# Patient Record
Sex: FEMALE | Race: WHITE | NOT HISPANIC OR LATINO | Employment: OTHER | URBAN - METROPOLITAN AREA
[De-identification: names, ages, dates, MRNs, and addresses within clinical notes are randomized per-mention and may not be internally consistent; named-entity substitution may affect disease eponyms.]

---

## 2019-07-22 DIAGNOSIS — Z12.39 SCREENING BREAST EXAMINATION: Primary | ICD-10-CM

## 2019-08-08 ENCOUNTER — HOSPITAL ENCOUNTER (OUTPATIENT)
Dept: RADIOLOGY | Facility: HOSPITAL | Age: 69
Discharge: HOME | End: 2019-08-08
Attending: FAMILY MEDICINE
Payer: MEDICARE

## 2019-08-08 VITALS — WEIGHT: 154 LBS | BODY MASS INDEX: 24.75 KG/M2 | HEIGHT: 66 IN

## 2019-08-08 DIAGNOSIS — Z12.39 SCREENING BREAST EXAMINATION: ICD-10-CM

## 2019-08-08 PROCEDURE — 77067 SCR MAMMO BI INCL CAD: CPT | Mod: TC

## 2019-09-21 PROBLEM — Z98.84 GASTRIC BYPASS STATUS FOR OBESITY: Status: ACTIVE | Noted: 2019-09-21

## 2019-09-21 PROBLEM — R41.89 COGNITIVE IMPAIRMENT: Status: ACTIVE | Noted: 2019-09-21

## 2019-09-21 PROBLEM — Z79.01 ANTICOAGULANT LONG-TERM USE: Status: ACTIVE | Noted: 2019-09-21

## 2019-09-21 PROBLEM — I10 HYPERTENSION, ESSENTIAL: Status: ACTIVE | Noted: 2019-09-21

## 2019-09-21 PROBLEM — E03.9 HYPOTHYROIDISM: Status: ACTIVE | Noted: 2019-09-21

## 2019-09-21 PROBLEM — F31.9 BIPOLAR 1 DISORDER: Status: ACTIVE | Noted: 2019-09-21

## 2019-09-21 PROBLEM — I48.21 PERMANENT ATRIAL FIBRILLATION: Status: ACTIVE | Noted: 2019-09-21

## 2019-09-21 PROBLEM — R19.7 DIARRHEA: Status: ACTIVE | Noted: 2019-09-21

## 2019-09-21 PROBLEM — A04.72 C. DIFFICILE COLITIS: Status: ACTIVE | Noted: 2019-09-21

## 2019-09-21 PROBLEM — E78.5 HYPERLIPIDEMIA: Status: ACTIVE | Noted: 2019-09-21

## 2019-10-15 DIAGNOSIS — R26.89 POOR BALANCE: Primary | ICD-10-CM

## 2019-10-23 ENCOUNTER — CLINICAL SUPPORT (OUTPATIENT)
Dept: REHABILITATION | Facility: HOSPITAL | Age: 69
End: 2019-10-23
Payer: MEDICARE

## 2019-10-23 DIAGNOSIS — R26.89 LOSS OF BALANCE: Primary | ICD-10-CM

## 2019-10-23 PROCEDURE — 97161 PT EVAL LOW COMPLEX 20 MIN: CPT

## 2019-10-23 NOTE — PLAN OF CARE
"[facility] OUTPATIENT THERAPY  Physical Therapy Initial Evaluation    Name: Enrique Jones  Clinic Number: [id number]    Therapy Diagnosis:   Encounter Diagnosis   Name Primary?    Loss of balance Yes     Physician: Saqib Garcia Jr.,*    Physician Orders: PT Eval and Treat /loss of balance  Medical Diagnosis from Referral: Poor balance  Evaluation Date: 10/23/2019  Authorization Period Expiration: 10/14/2020  Plan of Care Expiration: 12/20/19  Visit # / Visits authorized: 1/ 48    Precautions: Standard and Fall    Subjective   Date of onset: insidious onset  History of current condition - ENRIQUE reports: That she has had some balance issues for quite sometime.  Most recently she was establishing service with a new PCP and was discussing her symptoms and he referred her to OPPT for eval. She adds that her sense of LOB tends to be more exacerbated in a large environment such as "[employer] or [employer]" vs a smaller environment.  She denies any incidents of falls only incidences of LOB with recovery.       Medical History:   No past medical history on file.    Surgical History:   Enrique Jones  has a past surgical history that includes Hysterectomy and Total Reduction Mammoplasty (2002).    Medications:   Enrique has a current medication list which includes the following prescription(s): alprazolam, amlodipine, bupropion, cholestyramine, diphenoxylate-atropine 2.5-0.025 mg, divalproex er, donepezil, eliquis, ferrous sulfate, hydrochlorothiazide, klor-con m20, l-methylfolate-b2-b6-b12, levothyroxine, losartan, multaq, namenda xr, rabeprazole, rifaximin, toprol xl, and trazodone.    Allergies:   Review of patient's allergies indicates:   Allergen Reactions    B12 [cyanocobalamin-cobamamide]     Niacin preparations     Penicillins         Imaging, none:     Prior Therapy: N/A  Social History: lives with their spouse, in a single story slab home  Occupation: retired  Prior Level of Function: " "Independnet  Current Level of Function: Independent/Mod I with QC    Pain:  Current 0/10, worst 0/10, best 0/10   Location: N/A    Description: N/A  Aggravating Factors: N/A  Easing Factors: N/A    Pts goals: "stop being this dizzy"    Objective     Posture:  Sits upright with rounded shoulders       General Objective           MMT       Upper Ext.    Status Comment    Left Right              Flexion 4/5 4/5      Extension 4+/5 4+/5      ABduction 4/5 4/5                      Lower Ext.                Hip flexion 3+/5 3+/5      Hip Ext 3+/5 3+/5      Hip AB 4/5 4/5      Hip AD 3/5 3/5      Glut Max 3+/5 3+/5              Knee flex 4+/5 4+/5      Knee ext 4+/5 4+/5      DF 4/5 4/5                               SPECIAL TESTS            Status Comment    Left Right     Vertebral artery test Negative Negative                                     No Nystagmus noted       FUNCTIONAL TESTS      BBS:39/56= Medium Fall Risk       FUNCTIONAL MOBILITY        Balance: BBS:39/56= Medium Fall Risk.          Gait: Patient amb into clinic with no AD.  She amb with decreased step/stride lengths, decreased velocity/annie.  She is able to clear her B swing feet.  Pateint also amb by intermittently holding walls and or reaching out for furniture to improve confidence/stability with amb.          Transfer: Patient performed sit<>stand T/F from arm chair and mat Mod I with increased UE use as well as increased fwd lean.          Time In: 1500  Time Out: 1605    Assessment       Kasandra is a 69 y.o. female who is retired presenting to OP Physical Therapy for initial evaluation on 10/23/19 with a MD Dx of loss of balance.  Patient exhibits generalized weakness, but most notably at her hips and LE contributing to decreased bal/LOB issues.  Additionally, the BBS reveals patient difficulty with shifting weight, decreased REGGIE, as well as visual dependence for balance, all contributing to and increased/medium risk of falls with a score of " 39/56.  Additionally, her VOR needs assessment to be performed in her upcoming sessions.  The patient's clinical characteristics are evolving.   Patient would benefit from skilled Physical Therapy to address HER noted deficits.      Pt prognosis is Good.   Pt will benefit from skilled outpatient Physical Therapy to address the deficits stated above and in the chart below, provide pt/family education, and to maximize pt's level of independence.     Plan of care discussed with patient: Yes  Pt's spiritual, cultural and educational needs considered and patient is agreeable to the plan of care and goals as stated below:         Goals:  Patient will: STG/LTG Status   1a demonstrate initial HEP with use of handout prn in order to optimize Rx outcomes and max. Functional mob.in 4 visits. STG    2a improve MMT grades for B hip flex to 4-/5 (initial 3+/5), B hip ext to 4-/5 (intiial 3+/5), B hip AB to 4+/5 (initial 4/5), B hip AD to 3+/5 (intiial 3/5), B glut Max to 4-/5 (initial 3+/5), and B ankle DF to 4+/5 (intial 4/5) in order to improve hip and ankle support for hip and ankle strategies to promote balance and decrease fall risk in 7 visits.    STG    3a improve BBS score to 44/56 in order to promote improved balance and decreased fall risk in 7 visits.    STG    4a. Ambulate 350' with </= 4 contacts/incidneces of environmental support to improve bal and confidence with her safe home and community mobility in 7 visits.   STG              1b demonstrate final HEP with use of handout prn in order to optimize Rx outcomes and max. Functional mob.in by discharge. LTG    2b improve MMT grades for B hip flex to 4/5 (initial 3+/5), B hip ext to 4/5 (intiial 3+/5), B hip AD to 4-/5 (intiial 3/5), B glut Max to 4/5 (initial 3+/5), and B ankle DF to 5/5 (intial 4/5) in order to improve hip and ankle support for hip and ankle strategies to promote balance and decrease fall risk in 12 visits.   LTG    3b improve BBS score to 47/56 in  order to promote improved balance and decreased fall risk in 12 visits.   LTG    4b. Ambulate 350' with </= 2 contacts/incidneces of environmental support to improve bal and confidence with her safe home and community mobility in 12 visits.   LTG                            Anticipated Barriers for therapy: None    Medical Necessity is demonstrated by the following  History  Co-morbidities and personal factors that may impact the plan of care Co-morbidities:   Non remarkable    Personal Factors:   age  lifestyle     low   Examination  Body Structures and Functions, activity limitations and participation restrictions that may impact the plan of care Body Regions:   lower extremities    Body Systems:    strength  balance  gait  transitions    Participation Restrictions:   None    Activity limitations:   Learning and applying knowledge  no deficits    General Tasks and Commands  no deficits    Communication  [place]    Mobility  walking    Self care  dressing    Domestic Life  shopping  cooking  doing house work (cleaning house, washing dishes, laundry)    Interactions/Relationships  no deficits    Life Areas  no deficits    Community and Social Life  community life  recreation and leisure         moderate   Clinical Presentation evolving clinical presentation with changing clinical characteristics moderate   Decision Making/ Complexity Score: moderate         Plan       POC valid from 10/23/19 through 12/20/19. 2Times a week for 12 visits, with treatments to consist of: Balance (55361): Improve overall coordination and balance, Cardiovascular, Closed Chain Strengthening, Core Stabilization, Flexibility (57505): improve muscle ROM, flexibility and  function, Home Exercise and Stretching, Patient Education, Plyometrics, Postural Awareness and  Training, Postural Stabilization, ROM Exercises (01276) : Passive or active activities to increase joint ROM, Strengthening  (61555): improve muscle strength and  function., Therapeutic Exercise (49361): improve muscle strength, ROM, flexibility and muscle function., Gait Training (09904) : Improve overall gait function including stair climbing, Cross Friction Massage, Manual Stretching (12491): passive or active stretching to improve muscle length and function., Strain/Counter-Strain, Manual Traction, Myofascial Release , Peripheral Joint Mobilization, Soft Tissue Mobs (02301): increase ROM, tissue length, joint mechanics and modulate pain., Spine Mobilization  (30070): increase ROM, tissue length, joint mechanics and modulate pain., Massage (20547):, Combo E-Stim/Ultrasound, Cryotherapy (33142: Application of cold to decrease local swelling and decrease pain., Heat - 02167:  Application of heat to increase local circulation and decrease pain., Hi-Volt E-Stim  (): Application of electrical stimulation to modulate pain., IFC E-Stim (): Application of electrical stimulation to modulate pain., Mechanical Traction (58644), Micro-Current, Premodulated E-Stim  (): Application of electrical stimulation to modulate pain., TENs E-Stim  (): Application of electrical stimulation to modulate pain., Ultrasound (55309): increase local circulation, improve tissue healing time and modulate pain., Whirlpool (00815): increase local tissue circulation, improve elasticity of tissues, increased blood flow for improved muscle strength, ROM, flexiblity, and function., NMES E-stim ():  Application of electrical stimulation for motor learning and control., Iontophoresis (52704), NMR (29449): re-education of movement, balance, coordination, kinesthetic sense, posture and proprioception, Self Care & Home Management (14975) - Self-care/home management training (e.g., activities of daily living [ADL] and compensatory training, meal preparation, safety procedures, and instructions in use of assistive technology devices/adaptive equipment), direct one-on-one contact (15 minutes),  Therapeutic Activity (99707):  Use of dynamic activities to improve functional performance..        Paul Rey, PT        I CERTIFY THE NEED FOR THESE SERVICES FURNISHED UNDER THIS PLAN OF TREATMENT AND WHILE UNDER MY CARE     Physician's comments:           Physician's Signature: ___________________________________________________

## 2019-11-06 ENCOUNTER — CLINICAL SUPPORT (OUTPATIENT)
Dept: REHABILITATION | Facility: HOSPITAL | Age: 69
End: 2019-11-06
Payer: MEDICARE

## 2019-11-06 DIAGNOSIS — R26.89 LOSS OF BALANCE: Primary | ICD-10-CM

## 2019-11-06 PROCEDURE — 97112 NEUROMUSCULAR REEDUCATION: CPT

## 2019-11-06 PROCEDURE — 97110 THERAPEUTIC EXERCISES: CPT

## 2019-11-06 NOTE — PROGRESS NOTES
Physical Therapy Daily Treatment Note     Name: Enrique Jones  Clinic Number: [id number]    Therapy Diagnosis:   Encounter Diagnosis   Name Primary?    Loss of balance Yes     Physician: Saqib Garcia Jr.,*    Visit Date: 11/6/2019    Physician Orders: PT eval and treat  Medical Diagnosis:  Poor balance  Evaluation Date: 10/23/19  Authorization Period Expiration: 10/14/2020  Plan of Care Certification Period: 12/20/19  Visit #/Visits authorized: 2/ 48     Time In: 1110  Time Out: 1205      Precautions: standard, falls    Subjective     Pt reports: she has some minimal discomfort to her L knee today.  She denies any falls or LOB since last visit.      She N/A compliant with home exercise program.  Response to previous treatment: First visit following eval.    Functional change: N/A    Pain: 0/10  Location: left knee      Objective     ENRIQUE received therapeutic exercises to develop strength, endurance, ROM and flexibility for 25 minutes including:    · Stretches - seated gastroc rope stretch 30 sec x 2  · Stretches - seated hamstring stretch 30 sec x 2  · Supine bridges 10 x 3 with 3 sec holds  · Supine SLR with 1Lb AW 10 x 2  · SL Clams with OTB 10 x 2  · SL Reverse clams with 1Lb AW 10 x 2  · SL AB with 1Lb AW 10 x 2  ·         ENRIQUE participated in neuromuscular re-education activities to improve: Balance, Coordination and Kinesthetic for 20 minutes. The following activities were included:    In // bars:    SLS - 30 sec x 3 trials each EO/EC with no UE support  Stride stance 30 sec x 3 trials EC (DNP)  Max position x 30 sec x 3 trials each EO/EC with use of two fingers to each UE.    Wobble board 30 sec x 3 trials each A/P and lateral    (patient incurred some nausea and dizziness when performing without hands and EC, she required recovery period which resolved nausea and dizziness)    Home Exercises Provided and Patient Education Provided     Education provided:   Issued handout and educated on HEP  including mode, freq reps and sets.  Patient verbalized understanding and with return demonstration.  Patient with no adverse affects noted nor verbalized.      Written Home Exercises Provided: yes.  Exercises were reviewed and ENRIQUE was able to demonstrate them prior to the end of the session.  ENRIQUE demonstrated fair  understanding of the education provided.     See EMR under Media for exercises provided 11/6/2019.    Assessment     Patient participated with PT to address her balance, strength activity genaro and mobility.  She required education, VC/TC for improved exs quality in order to improve hip and ankle strengthening.  She incurred some exacerbation of symptoms with performance of SLS and no use of UE which resolved with recovery a recovery period.  She was issued and educated on HEP and is expected to improve with cont Rx.      ENRIQUE is progressing well towards her goals.   Pt prognosis is Good.     Pt will continue to benefit from skilled outpatient physical therapy to address the deficits listed in the problem list box on initial evaluation, provide pt/family education and to maximize pt's level of independence in the home and community environment.     Pt's spiritual, cultural and educational needs considered and pt agreeable to plan of care and goals.     Anticipated barriers to physical therapy: none    Goals:  Patient will: STG/LTG Status   1a demonstrate initial HEP with use of handout prn in order to optimize Rx outcomes and max. Functional mob.in 4 visits. STG In porgress 11/6/2019      2a improve MMT grades for B hip flex to 4-/5 (initial 3+/5), B hip ext to 4-/5 (intiial 3+/5), B hip AB to 4+/5 (initial 4/5), B hip AD to 3+/5 (intiial 3/5), B glut Max to 4-/5 (initial 3+/5), and B ankle DF to 4+/5 (intial 4/5) in order to improve hip and ankle support for hip and ankle strategies to promote balance and decrease fall risk in 7 visits.    STG In progress 11/6/2019      3a improve BBS score to 44/56  in order to promote improved balance and decreased fall risk in 7 visits.    STG     4a. Ambulate 350' with </= 4 contacts/incidneces of environmental support to improve bal and confidence with her safe home and community mobility in 7 visits.   STG                     1b demonstrate final HEP with use of handout prn in order to optimize Rx outcomes and max. Functional mob.in by discharge. LTG     2b improve MMT grades for B hip flex to 4/5 (initial 3+/5), B hip ext to 4/5 (intiial 3+/5), B hip AD to 4-/5 (intiial 3/5), B glut Max to 4/5 (initial 3+/5), and B ankle DF to 5/5 (intial 4/5) in order to improve hip and ankle support for hip and ankle strategies to promote balance and decrease fall risk in 12 visits.   LTG     3b improve BBS score to 47/56 in order to promote improved balance and decreased fall risk in 12 visits.   LTG     4b. Ambulate 350' with </= 2 contacts/incidneces of environmental support to improve bal and confidence with her safe home and community mobility in 12 visits.   LTG                                    Plan     Reinforce HEP add HS and gastroc stretches to HEP, cont with TE and adjust neuro exs with use of hands to mitigate exacerbation of nausea and dizziness.       Paul Rey, PT

## 2019-11-08 ENCOUNTER — CLINICAL SUPPORT (OUTPATIENT)
Dept: REHABILITATION | Facility: HOSPITAL | Age: 69
End: 2019-11-08
Payer: MEDICARE

## 2019-11-08 DIAGNOSIS — R26.89 LOSS OF BALANCE: Primary | ICD-10-CM

## 2019-11-08 PROCEDURE — 97110 THERAPEUTIC EXERCISES: CPT

## 2019-11-08 PROCEDURE — 97112 NEUROMUSCULAR REEDUCATION: CPT

## 2019-11-08 NOTE — PROGRESS NOTES
Physical Therapy Daily Treatment Note     Name: Enrique Jones  Clinic Number: [id number]    Therapy Diagnosis:   Encounter Diagnosis   Name Primary?    Loss of balance Yes     Physician: Saqib Garcia Jr.,*    Visit Date: 11/8/2019    Physician Orders: PT eval and treat  Medical Diagnosis:  Poor balance  Evaluation Date: 10/23/19  Authorization Period Expiration: 10/14/2020  Plan of Care Certification Period: 12/20/19  Visit #/Visits authorized: 3/ 48     Time In: 1105  Time Out: 1205      Precautions: standard, falls    Subjective     Pt reports: she has some mm soreness  She denies any falls or LOB since last visit and no pain.      She N/A compliant with home exercise program.  Response to previous treatment: mm soreness  Functional change: N/A    Pain: 0/10  Location: left knee      Objective     ENRIQUE received therapeutic exercises to develop strength, endurance, ROM and flexibility for 45 minutes including:    · Stretches - seated gastroc rope stretch 30 sec x 2  · Stretches - seated hamstring stretch 30 sec x 2  · Supine bridges 10 x 3 with 3 sec holds  · Supine SLR with 1Lb AW 10 x 3  · SL Clams with OTB 10 x 3  · SL Reverse clams with 1Lb AW 10 x 3  · SL AB with 1Lb AW 10 x 2  ·         ENRIQUE participated in neuromuscular re-education activities to improve: Balance, Coordination and Kinesthetic for 9 minutes. The following activities were included:    In // bars:    SLS - 30 sec x 2 trials each EO with two finger support (EC => nausea)  Stride stance 30 sec x 2 trials EO  Max position x 30 sec x 2 trials each EO with use of two fingers to each UE. (DNP)   Wobble board 30 sec x 2 trials each A/P and lateral      Home Exercises Provided and Patient Education Provided     Education provided:   Issued handout and educated on HEP including mode, freq reps and sets.  Patient verbalized understanding and with return demonstration.  Patient with no adverse affects noted nor verbalized.      Written Home  Exercises Provided: yes.  Exercises were reviewed and ENRIQUE was able to demonstrate them prior to the end of the session.  ENRIQUE demonstrated fair  understanding of the education provided.     See EMR under Media for exercises provided 11/6/19 and 11/8/19.    Assessment     Patient participated with PT to address her balance, strength act genaro and mob. She required education, VC/TC for improved exs quality and performance in order to address her hip and LE strength as well as for her neuromuscular re-education with ankle and hip musculature.  She was able to genaro PT Rx with no exacerbations in dizziness nor nausea this day and was given updated handout for HEP.  Patient is expected to progress to goals with cont PT in order to obtain her goals and optimize her safe mobility.          ENRIQUE is progressing well towards her goals.   Pt prognosis is Good.     Pt will continue to benefit from skilled outpatient physical therapy to address the deficits listed in the problem list box on initial evaluation, provide pt/family education and to maximize pt's level of independence in the home and community environment.     Pt's spiritual, cultural and educational needs considered and pt agreeable to plan of care and goals.     Anticipated barriers to physical therapy: none    Goals:  Patient will: STG/LTG Status   1a demonstrate initial HEP with use of handout prn in order to optimize Rx outcomes and max. Functional mob.in 4 visits. STG In porgress 11/8/2019      2a improve MMT grades for B hip flex to 4-/5 (initial 3+/5), B hip ext to 4-/5 (intiial 3+/5), B hip AB to 4+/5 (initial 4/5), B hip AD to 3+/5 (intiial 3/5), B glut Max to 4-/5 (initial 3+/5), and B ankle DF to 4+/5 (intial 4/5) in order to improve hip and ankle support for hip and ankle strategies to promote balance and decrease fall risk in 7 visits.    STG In progress 11/8/2019      3a improve BBS score to 44/56 in order to promote improved balance and decreased fall  risk in 7 visits.    STG     4a. Ambulate 350' with </= 4 contacts/incidneces of environmental support to improve bal and confidence with her safe home and community mobility in 7 visits.   STG                     1b demonstrate final HEP with use of handout prn in order to optimize Rx outcomes and max. Functional mob.in by discharge. LTG     2b improve MMT grades for B hip flex to 4/5 (initial 3+/5), B hip ext to 4/5 (intiial 3+/5), B hip AD to 4-/5 (intiial 3/5), B glut Max to 4/5 (initial 3+/5), and B ankle DF to 5/5 (intial 4/5) in order to improve hip and ankle support for hip and ankle strategies to promote balance and decrease fall risk in 12 visits.   LTG     3b improve BBS score to 47/56 in order to promote improved balance and decreased fall risk in 12 visits.   LTG     4b. Ambulate 350' with </= 2 contacts/incidneces of environmental support to improve bal and confidence with her safe home and community mobility in 12 visits.   LTG                                    Plan     Reinforce HEP and cont to prog to LTG as genaro.  Return to 1-2 eyes closed exs     Paul Rey, PT

## 2019-11-11 ENCOUNTER — CLINICAL SUPPORT (OUTPATIENT)
Dept: REHABILITATION | Facility: HOSPITAL | Age: 69
End: 2019-11-11
Payer: MEDICARE

## 2019-11-11 DIAGNOSIS — R26.89 LOSS OF BALANCE: Primary | ICD-10-CM

## 2019-11-11 PROCEDURE — 97112 NEUROMUSCULAR REEDUCATION: CPT

## 2019-11-11 PROCEDURE — 97110 THERAPEUTIC EXERCISES: CPT

## 2019-11-11 NOTE — PROGRESS NOTES
"  Physical Therapy Daily Treatment Note     Name: Enrique Jones  Clinic Number: [id number]    Therapy Diagnosis:   No diagnosis found.  Physician: Saqib Garcia Jr.,*    Visit Date: 11/11/2019    Physician Orders: PT eval and treat  Medical Diagnosis:  Poor balance  Evaluation Date: 10/23/19  Authorization Period Expiration: 10/14/2020  Plan of Care Certification Period: 12/20/19  Visit #/Visits authorized: 3/ 48     Monthly Assessment due on or before 11/23/19      Time In: 1400  Time Out: 1505      Precautions: standard, falls    Subjective     Pt reports: she has some mm soreness "in my groin"  She denies any falls since last visit, but did have several LOB episodes.      She was compliant with home exercise program.  Response to previous treatment: mm soreness  Functional change: N/A    Pain: 0/10  Location: left knee      Objective     ENRIQUE received therapeutic exercises to develop strength, endurance, ROM and flexibility for 46 minutes including:    · Stretches - slant board gastroc stretch 30 sec x 3  · Stretches - seated hamstring stretch 30 sec x 3  · Supine bridges 10 x 3  · Supine SLR with 1Lb AW 10 x 3 no weight  · SL Clams with OTB 10 x 3  · SL Reverse clams with 0Lb AW 10 x 3  · SL AB with 0Lb AW 10 x 3  ·               ENRIQUE participated in neuromuscular re-education activities to improve: Balance, Coordination and Kinesthetic for 10 minutes. The following activities were included:    In // bars:    SLS - 30 sec x 2 trials each EO with two finger support (EC => nausea) with feet progressively positioned from shoulders with to max position on both firm and foam surfaces.    Stride stance 30 sec x 2 trials EO (DNP)  Max position x 30 sec x 2 trials each EO with use of two fingers to each UE. (DNP)   Wobble board 30 sec x 2 trials each A/P and lateral (DNP)      Performed differential DX testing for nystagmus with BPPV as well as VOR x 1 and VOR x 2. Smooth pursuits vs saccades all of which were " negative.         Home Exercises Provided and Patient Education Provided     Education provided:   Issued handout and educated on HEP including mode, freq reps and sets.  Patient verbalized understanding and with return demonstration.  Patient with no adverse affects noted nor verbalized.      Written Home Exercises Provided: yes.  Exercises were reviewed and ENRIQUE was able to demonstrate them prior to the end of the session.  ENRIQUE demonstrated fair  understanding of the education provided.     See EMR under Media for exercises provided 11/6/19 and 11/8/19.    Assessment     Patient participated with PT to address her balance, and strength, as well as for BPPV and VOR  Testing which were negative.  Patient was required education, VC/TC for improved exs quality and performance in order to improve hip/ankle strategies in order to address her balance.  She was able to genaro Rx with no exacerbations of symptoms and c/o of nausea/dizziness.  She is expected to progress to her goals with cont PT Rx in order to achieve safe functional mob/Indpenedence.          ENRIQUE is progressing well towards her goals.   Pt prognosis is Good.     Pt will continue to benefit from skilled outpatient physical therapy to address the deficits listed in the problem list box on initial evaluation, provide pt/family education and to maximize pt's level of independence in the home and community environment.     Pt's spiritual, cultural and educational needs considered and pt agreeable to plan of care and goals.     Anticipated barriers to physical therapy: none    Goals:  Patient will: STG/LTG Status   1a demonstrate initial HEP with use of handout prn in order to optimize Rx outcomes and max. Functional mob.in 4 visits. STG In [facility] 11/11/2019      2a improve MMT grades for B hip flex to 4-/5 (initial 3+/5), B hip ext to 4-/5 (intiial 3+/5), B hip AB to 4+/5 (initial 4/5), B hip AD to 3+/5 (intiial 3/5), B glut Max to 4-/5 (initial 3+/5), and  B ankle DF to 4+/5 (intial 4/5) in order to improve hip and ankle support for hip and ankle strategies to promote balance and decrease fall risk in 7 visits.    STG In progress 11/11/2019      3a improve BBS score to 44/56 in order to promote improved balance and decreased fall risk in 7 visits.    STG     4a. Ambulate 350' with </= 4 contacts/incidneces of environmental support to improve bal and confidence with her safe home and community mobility in 7 visits.   STG                     1b demonstrate final HEP with use of handout prn in order to optimize Rx outcomes and max. Functional mob.in by discharge. LTG     2b improve MMT grades for B hip flex to 4/5 (initial 3+/5), B hip ext to 4/5 (intiial 3+/5), B hip AD to 4-/5 (intiial 3/5), B glut Max to 4/5 (initial 3+/5), and B ankle DF to 5/5 (intial 4/5) in order to improve hip and ankle support for hip and ankle strategies to promote balance and decrease fall risk in 12 visits.   LTG     3b improve BBS score to 47/56 in order to promote improved balance and decreased fall risk in 12 visits.   LTG     4b. Ambulate 350' with </= 2 contacts/incidneces of environmental support to improve bal and confidence with her safe home and community mobility in 12 visits.   LTG                                    Plan     Reinforce HEP and cont to prog to LTG as genaro.     Paul Rey, PT

## 2019-11-13 ENCOUNTER — CLINICAL SUPPORT (OUTPATIENT)
Dept: REHABILITATION | Facility: HOSPITAL | Age: 69
End: 2019-11-13
Payer: MEDICARE

## 2019-11-13 DIAGNOSIS — R26.89 LOSS OF BALANCE: ICD-10-CM

## 2019-11-13 PROCEDURE — 97110 THERAPEUTIC EXERCISES: CPT

## 2019-11-13 PROCEDURE — 97112 NEUROMUSCULAR REEDUCATION: CPT

## 2019-11-13 NOTE — PROGRESS NOTES
Physical Therapy Daily Treatment Note     Name: Enrique Jones  Clinic Number: [id number]    Therapy Diagnosis:   No diagnosis found.  Physician: Saqib Garcia Jr.,*    Visit Date: 11/13/2019    Physician Orders: PT eval and treat  Medical Diagnosis:  Poor balance  Evaluation Date: 10/23/19  Authorization Period Expiration: 10/14/2020  Plan of Care Certification Period: 12/20/19  Visit #/Visits authorized: 4/ 48   PTA visit: 1/5  Monthly Assessment due on or before 11/23/19      Time In: 1300  Time Out: 1400      Precautions: standard, falls    Subjective     Pt reports: Continues to report groin pain /c there-ex.    She was compliant with home exercise program.  Response to previous treatment: mm soreness  Functional change: N/A    Pain: 0/10  Location: left knee      Objective     ENRIQUE received therapeutic exercises to develop strength, endurance, ROM and flexibility for 46 minutes including:          +Recumbent bike x 10 minutes  · Stretches - slant board gastroc stretch 30 sec x 3  · Stretches - seated hamstring stretch 30 sec x 3  · Supine bridges 10 x 3  · Supine SLR with 1Lb AW 10 x 3 no weight  · SL Clams with OTB 10 x 3  · SL Reverse clams with 0Lb AW 10 x 3  · SL AB with 0Lb AW 10 x 3  · +cybex leg press 3 x 10 reps /c 3 plates                ENRIQUE participated in neuromuscular re-education activities to improve: Balance, Coordination and Kinesthetic for 10 minutes. The following activities were included:    In // bars:    SLS - 30 sec x 2 trials each EO   Max position on foam /c EC 3 x 30 sec  Alternating toe taps on foam 3 x 10 reps without UE support    Stride stance 30 sec x 2 trials EO (DNP)  Max position x 30 sec x 2 trials each EO with use of two fingers to each UE. (DNP)   Wobble board 30 sec x 2 trials each A/P and lateral (DNP)      Performed differential DX testing for nystagmus with BPPV as well as VOR x 1 and VOR x 2. Smooth pursuits vs saccades all of which were negative. NP         Home  Exercises Provided and Patient Education Provided     Education provided:   Issued handout and educated on HEP including mode, freq reps and sets.  Patient verbalized understanding and with return demonstration.  Patient with no adverse affects noted nor verbalized.      Written Home Exercises Provided: yes.  Exercises were reviewed and ENRIQUE was able to demonstrate them prior to the end of the session.  ENRIQUE demonstrated fair  understanding of the education provided.     See EMR under Media for exercises provided 11/6/19 and 11/8/19.    Assessment     Pt was able to tolerate progressing there-ex as indicated above. She continues to display difficulty performing SLS balance there-ex, however; she able to perform balance there-ex without onset of nausea. Pt will continue to benefit from skilled PT to improve BLE strength to allow pt to ambulate in an uncontrolled environment without limitations.          ENRIQUE is progressing well towards her goals.   Pt prognosis is Good.     Pt will continue to benefit from skilled outpatient physical therapy to address the deficits listed in the problem list box on initial evaluation, provide pt/family education and to maximize pt's level of independence in the home and community environment.     Pt's spiritual, cultural and educational needs considered and pt agreeable to plan of care and goals.     Anticipated barriers to physical therapy: none    Goals:  Patient will: STG/LTG Status   1a demonstrate initial HEP with use of handout prn in order to optimize Rx outcomes and max. Functional mob.in 4 visits. STG In [facility] 11/13/2019      2a improve MMT grades for B hip flex to 4-/5 (initial 3+/5), B hip ext to 4-/5 (intiial 3+/5), B hip AB to 4+/5 (initial 4/5), B hip AD to 3+/5 (intiial 3/5), B glut Max to 4-/5 (initial 3+/5), and B ankle DF to 4+/5 (intial 4/5) in order to improve hip and ankle support for hip and ankle strategies to promote balance and decrease fall risk in 7  visits.    STG In progress 11/13/2019      3a improve BBS score to 44/56 in order to promote improved balance and decreased fall risk in 7 visits.    STG     4a. Ambulate 350' with </= 4 contacts/incidneces of environmental support to improve bal and confidence with her safe home and community mobility in 7 visits.   STG                     1b demonstrate final HEP with use of handout prn in order to optimize Rx outcomes and max. Functional mob.in by discharge. LTG     2b improve MMT grades for B hip flex to 4/5 (initial 3+/5), B hip ext to 4/5 (intiial 3+/5), B hip AD to 4-/5 (intiial 3/5), B glut Max to 4/5 (initial 3+/5), and B ankle DF to 5/5 (intial 4/5) in order to improve hip and ankle support for hip and ankle strategies to promote balance and decrease fall risk in 12 visits.   LTG     3b improve BBS score to 47/56 in order to promote improved balance and decreased fall risk in 12 visits.   LTG     4b. Ambulate 350' with </= 2 contacts/incidneces of environmental support to improve bal and confidence with her safe home and community mobility in 12 visits.   LTG                                    Plan     Reinforce HEP and cont to prog to LTG as genaro.     Polina Watson, PTA

## 2019-11-14 ENCOUNTER — HOSPITAL ENCOUNTER (OUTPATIENT)
Dept: RADIOLOGY | Facility: HOSPITAL | Age: 69
Discharge: HOME | End: 2019-11-14
Attending: FAMILY MEDICINE
Payer: MEDICARE

## 2019-11-14 DIAGNOSIS — Z98.84 BARIATRIC SURGERY STATUS: Primary | ICD-10-CM

## 2019-11-14 DIAGNOSIS — Z98.84 BARIATRIC SURGERY STATUS: ICD-10-CM

## 2019-11-14 PROCEDURE — 74018 RADEX ABDOMEN 1 VIEW: CPT | Mod: TC,PO

## 2019-11-18 ENCOUNTER — HOSPITAL ENCOUNTER (OUTPATIENT)
Dept: RADIOLOGY | Facility: HOSPITAL | Age: 69
Discharge: HOME | End: 2019-11-18
Attending: FAMILY MEDICINE
Payer: MEDICARE

## 2019-11-18 DIAGNOSIS — Z98.84 BARIATRIC SURGERY STATUS: ICD-10-CM

## 2019-11-18 PROCEDURE — 76700 US EXAM ABDOM COMPLETE: CPT | Mod: TC,PO

## 2019-11-20 ENCOUNTER — CLINICAL SUPPORT (OUTPATIENT)
Dept: REHABILITATION | Facility: HOSPITAL | Age: 69
End: 2019-11-20
Payer: MEDICARE

## 2019-11-20 DIAGNOSIS — R26.89 LOSS OF BALANCE: ICD-10-CM

## 2019-11-20 PROCEDURE — 97110 THERAPEUTIC EXERCISES: CPT

## 2019-11-20 PROCEDURE — 97112 NEUROMUSCULAR REEDUCATION: CPT

## 2019-11-20 NOTE — PROGRESS NOTES
Physical Therapy Daily Treatment Note     Name: Enrique Jones  Clinic Number: [id number]    Therapy Diagnosis:   No diagnosis found.  Physician: Saqib Garcia Jr.,*    Visit Date: 11/20/2019    Physician Orders: PT eval and treat  Medical Diagnosis:  Poor balance  Evaluation Date: 10/23/19  Authorization Period Expiration: 10/14/2020  Plan of Care Certification Period: 12/20/19  Visit #/Visits authorized: 5/ 48   PTA visit: 2/5  Monthly Assessment due on or before 11/23/19      Time In: 1300  Time Out: 1400      Precautions: standard, falls    Subjective     Pt reports: No longer has groin pain.    She was compliant with home exercise program.  Response to previous treatment: mm soreness  Functional change: N/A    Pain: 0/10  Location: left knee      Objective     ENRIQUE received therapeutic exercises to develop strength, endurance, ROM and flexibility for 46 minutes including:          Recumbent bike x 10 minutes  · Stretches - slant board gastroc stretch 30 sec x 3  · Stretches - seated hamstring stretch 30 sec x 3  · Supine bridges 10 x 3  · Supine SLR with 1Lb AW 10 x 3   · SL Clams with OTB 10 x 3  · SL Reverse clams with 0Lb AW 10 x 3  · SL AB with 0Lb AW 10 x 3  · cybex leg press 3 x 10 reps /c 3 plates  · +Step ups /c 1 riser 2 x 10 reps                ENRIQUE participated in neuromuscular re-education activities to improve: Balance, Coordination and Kinesthetic for 10 minutes. The following activities were included:    In // bars:    SLS - 30 sec x 2 trials each EO   Max position on foam /c EC 3 x 30 sec  Alternating toe taps on foam 3 x 10 reps without UE support    Stride stance 30 sec x 2 trials EO (DNP)  Max position x 30 sec x 2 trials each EO with use of two fingers to each UE. (DNP)   Wobble board 30 sec x 2 trials each A/P and lateral (DNP)      Performed differential DX testing for nystagmus with BPPV as well as VOR x 1 and VOR x 2. Smooth pursuits vs saccades all of which were negative. NP          Home Exercises Provided and Patient Education Provided     Education provided:   Issued handout and educated on HEP including mode, freq reps and sets.  Patient verbalized understanding and with return demonstration.  Patient with no adverse affects noted nor verbalized.      Written Home Exercises Provided: yes.  Exercises were reviewed and ENRIQUE was able to demonstrate them prior to the end of the session.  ENRIQUE demonstrated fair  understanding of the education provided.     See EMR under Media for exercises provided 11/6/19 and 11/8/19.    Assessment     Pt was able to tolerate progressing there-ex as indicated above. Pt req'd cueing to improve execution of SLR /c good response noted as she demo'd improvement. No improvement noted in SLS on this date. Pt will continue to benefit from skilled PT to improve BLE strength to allow pt to maneuver obstacles within the community without experiencing LOB or limitations.        ENRIQUE is progressing well towards her goals.   Pt prognosis is Good.     Pt will continue to benefit from skilled outpatient physical therapy to address the deficits listed in the problem list box on initial evaluation, provide pt/family education and to maximize pt's level of independence in the home and community environment.     Pt's spiritual, cultural and educational needs considered and pt agreeable to plan of care and goals.     Anticipated barriers to physical therapy: none    Goals:  Patient will: STG/LTG Status   1a demonstrate initial HEP with use of handout prn in order to optimize Rx outcomes and max. Functional mob.in 4 visits. STG [facility] 11/20/2019      2a improve MMT grades for B hip flex to 4-/5 (initial 3+/5), B hip ext to 4-/5 (intiial 3+/5), B hip AB to 4+/5 (initial 4/5), B hip AD to 3+/5 (intiial 3/5), B glut Max to 4-/5 (initial 3+/5), and B ankle DF to 4+/5 (intial 4/5) in order to improve hip and ankle support for hip and ankle strategies to promote balance and  decrease fall risk in 7 visits.    STG In progress 11/20/2019      3a improve BBS score to 44/56 in order to promote improved balance and decreased fall risk in 7 visits.    STG     4a. Ambulate 350' with </= 4 contacts/incidneces of environmental support to improve bal and confidence with her safe home and community mobility in 7 visits.   STG                     1b demonstrate final HEP with use of handout prn in order to optimize Rx outcomes and max. Functional mob.in by discharge. LTG     2b improve MMT grades for B hip flex to 4/5 (initial 3+/5), B hip ext to 4/5 (intiial 3+/5), B hip AD to 4-/5 (intiial 3/5), B glut Max to 4/5 (initial 3+/5), and B ankle DF to 5/5 (intial 4/5) in order to improve hip and ankle support for hip and ankle strategies to promote balance and decrease fall risk in 12 visits.   LTG     3b improve BBS score to 47/56 in order to promote improved balance and decreased fall risk in 12 visits.   LTG     4b. Ambulate 350' with </= 2 contacts/incidneces of environmental support to improve bal and confidence with her safe home and community mobility in 12 visits.   LTG                                    Plan     Reinforce HEP and cont to prog to LTG as genaro.     Polina Watson, PTA

## 2019-11-25 ENCOUNTER — CLINICAL SUPPORT (OUTPATIENT)
Dept: REHABILITATION | Facility: HOSPITAL | Age: 69
End: 2019-11-25
Payer: MEDICARE

## 2019-11-25 DIAGNOSIS — R26.89 LOSS OF BALANCE: Primary | ICD-10-CM

## 2019-11-25 PROCEDURE — 97110 THERAPEUTIC EXERCISES: CPT

## 2019-11-25 PROCEDURE — 97750 PHYSICAL PERFORMANCE TEST: CPT

## 2019-11-25 NOTE — PROGRESS NOTES
Physical Therapy Monthly Assessment/ Treatment Note     Name: Enrique Jones  Clinic Number: [id number]    Therapy Diagnosis:   No diagnosis found.  Physician: Saqib Garcia Jr.,*    Visit Date: 11/25/2019    Physician Orders: PT eval and treat  Medical Diagnosis:  Poor balance  Evaluation Date: 10/23/19  Authorization Period Expiration: 10/14/2020  Plan of Care Certification Period: 12/20/19  Visit #/Visits authorized: 6/ 48   PTA visit: 0/5    Monthly Assessment performed on 11/25/19, next assessment due on or before 12/25/19      Time In: 1300  Time Out: 1405      Precautions: standard, falls    Subjective     Pt reports: She has no pain,and currently states that she is definitely getting better.  She adds that she has been having longer periods of time (~3-4 days) without exacerbations of dizziness.      She was compliant with home exercise program.  Response to previous treatment: mm soreness  Functional change: N/A    Pain: 0/10  Location: left knee      Objective     ENRIQUE received therapeutic exercises to develop strength, endurance, ROM and flexibility for 25 minutes including:    · Recumbent bike x 10 minutes  · Stretches - slant board gastroc stretch 30 sec x 3 NP  · Stretches - seated hamstring stretch 30 sec x 3 NP  · Supine bridges 10 x 3  · Supine SLR with 1Lb AW 10 x 3   · SL Clams with OTB 10 x 3  · SL Reverse clams with 0Lb AW 10 x 3  · SL AB with 1.5Lb AW 10 x 3  · cybex leg press 3 x 10 reps /c 3 plates  · +Step ups /c 1 riser 2 x 10 reps            DNP neuro-musclar re education this day    ENRIQUE participated in neuromuscular re-education activities to improve: Balance, Coordination and Kinesthetic for 0 minutes. The following activities were included:    In // bars:    SLS - 30 sec x 2 trials each EO   Max position on foam /c EC 3 x 30 sec  Alternating toe taps on foam 3 x 10 reps without UE support    Stride stance 30 sec x 2 trials EO (DNP)  Max position x 30 sec x 2 trials each EO with  use of two fingers to each UE. (DNP)   Wobble board 30 sec x 2 trials each A/P and lateral (DNP)      Performed differential DX testing for nystagmus with BPPV as well as VOR x 1 and VOR x 2. Smooth pursuits vs saccades all of which were negative. NP           Patient  participated in dynamic functional therapeutic activities ( performance testing) to improve functional performance for 30 minutes, including: BBS and MMT        MMT      Hip   Status Comment    Left Right            Flexion 3+/5 3+/5     Extension 4-/5 4-/5     ABduction 4/5 4/5     ADDuction 3+/5 4-/5                   Foot DF 4+/5 4+/5          FunctionalTesting       Armando Balance Test 47/56 = low fall risk        Home Exercises Provided and Patient Education Provided     Education provided:   Issued handout and educated on HEP including mode, freq reps and sets.  Patient verbalized understanding and with return demonstration.  Patient with no adverse affects noted nor verbalized.      Written Home Exercises Provided: yes.  Exercises were reviewed and ENRIQUE was able to demonstrate them prior to the end of the session.  ENRIQUE demonstrated fair  understanding of the education provided.     See EMR under Media for exercises provided 11/6/19 and 11/8/19.    Monthly Assessment     Patient was initially evaluated on 10/23/19 abd has had five follow up visits addressing her deficits.  She has made noted progress with her balance strength activity genaro and functional mob.  She continues with challenges to her balance with hip and ankle strategies as well as cont hip and LE weakness.  Additionally, she does require VC/TC for improved quality and performance of TE with recovery periods due to fatigue. She has met STG 1a and 3a, and STG 2a and 4a are in progress.  She is expected to cont to progress to established goals with cont Rx in order to address her hip and LE weakness in order to improve her balance and mitigate her dizziness and fall risk as well as to  optimize her safe functional mob.      ENRIQUE is progressing well towards her goals.   Pt prognosis is Good.     Pt will continue to benefit from skilled outpatient physical therapy to address the deficits listed in the problem list box on initial evaluation, provide pt/family education and to maximize pt's level of independence in the home and community environment.     Pt's spiritual, cultural and educational needs considered and pt agreeable to plan of care and goals.     Anticipated barriers to physical therapy: none    Goals:  Patient will: STG/LTG Status   1a demonstrate initial HEP with use of handout prn in order to optimize Rx outcomes and max. Functional mob.in 4 visits. STG Met 11/25/2019      2a improve MMT grades for B hip flex to 4-/5 (initial 3+/5), B hip ext to 4-/5 (intiial 3+/5), B hip AB to 4+/5 (initial 4/5), B hip AD to 3+/5 (intiial 3/5), B glut Max to 4-/5 (initial 3+/5), and B ankle DF to 4+/5 (intial 4/5) in order to improve hip and ankle support for hip and ankle strategies to promote balance and decrease fall risk in 7 visits.    STG In progress 11/25/2019      3a improve BBS score to 44/56 in order to promote improved balance and decreased fall risk in 7 visits.    STG Met 11/25/2019    4a. Ambulate 350' with </= 4 contacts/incidneces of environmental support to improve bal and confidence with her safe home and community mobility in 7 visits.   STG In progress 11/25/2019                      1b demonstrate final HEP with use of handout prn in order to optimize Rx outcomes and max. Functional mob.in by discharge. LTG     2b improve MMT grades for B hip flex to 4/5 (initial 3+/5), B hip ext to 4/5 (intiial 3+/5), B hip AD to 4-/5 (intiial 3/5), B glut Max to 4/5 (initial 3+/5), and B ankle DF to 5/5 (intial 4/5) in order to improve hip and ankle support for hip and ankle strategies to promote balance and decrease fall risk in 12 visits.   LTG     3b improve BBS score to 47/56 in order to  promote improved balance and decreased fall risk in 12 visits.   LTG     4b. Ambulate 350' with </= 2 contacts/incidneces of environmental support to improve bal and confidence with her safe home and community mobility in 12 visits.   LTG                                    Plan     Cont to prog to LTG as genaro. And incorporate more hip strengthening     Paul Rey, PT

## 2019-11-27 ENCOUNTER — CLINICAL SUPPORT (OUTPATIENT)
Dept: REHABILITATION | Facility: HOSPITAL | Age: 69
End: 2019-11-27
Payer: MEDICARE

## 2019-11-27 DIAGNOSIS — R26.89 LOSS OF BALANCE: ICD-10-CM

## 2019-11-27 PROCEDURE — 97112 NEUROMUSCULAR REEDUCATION: CPT

## 2019-11-27 PROCEDURE — 97110 THERAPEUTIC EXERCISES: CPT

## 2019-11-27 NOTE — PROGRESS NOTES
Physical Therapy Treatment Note     Name: Enrique Jones  Clinic Number: [id number]    Therapy Diagnosis:   Encounter Diagnosis   Name Primary?    Loss of balance      Physician: Saqib Garcia Jr.,*    Visit Date: 11/27/2019    Physician Orders: PT eval and treat  Medical Diagnosis:  Poor balance  Evaluation Date: 10/23/19  Authorization Period Expiration: 10/14/2020  Plan of Care Certification Period: 12/20/19  Visit #/Visits authorized: 7/ 48   PTA visit: 1/5    Monthly Assessment performed on 11/25/19, next assessment due on or before 12/25/19      Time In: 1300  Time Out: 1405      Precautions: standard, falls    Subjective     Pt reports:no new complaints this am.    She was compliant with home exercise program.  Response to previous treatment: mm soreness  Functional change: N/A    Pain: 0/10  Location: left knee      Objective     ENRIQUE received therapeutic exercises to develop strength, endurance, ROM and flexibility for 25 minutes including:    · Recumbent bike x 10 minutes  · Stretches - slant board gastroc stretch 30 sec x 3 NP  · Stretches - seated hamstring stretch 30 sec x 3 NP  · Supine bridges 10 x 3  · Supine SLR with 1.5Lb AW 10 x 3   · SL Clams with OTB 10 x 3  · SL Reverse clams with 0Lb AW 10 x 3  · SL AB with 1.5Lb AW 10 x 3  · cybex leg press 3 x 10 reps /c 4 plates  · Step ups /c 1 riser 2 x 10 reps    ENRIQUE participated in neuromuscular re-education activities to improve: Balance, Coordination and Kinesthetic for 0 minutes. The following activities were included:    In // bars:    SLS - 30 sec x 2 trials each EO   Max position on foam /c EC 3 x 30 sec  Alternating toe taps on foam 3 x 10 reps without UE support    Stride stance 30 sec x 2 trials EO (DNP)  Max position x 30 sec x 2 trials each EO with use of two fingers to each UE. (DNP)   Wobble board 30 sec x 2 trials each A/P and lateral (DNP)      Performed differential DX testing for nystagmus with BPPV as well as VOR x 1 and VOR x  2. Smooth pursuits vs saccades all of which were negative. NP           Patient  participated in dynamic functional therapeutic activities ( performance testing) to improve functional performance for 0 minutes, including:           Home Exercises Provided and Patient Education Provided     Education provided:   Issued handout and educated on HEP including mode, freq reps and sets.  Patient verbalized understanding and with return demonstration.  Patient with no adverse affects noted nor verbalized.      Written Home Exercises Provided: yes.  Exercises were reviewed and ENRIQUE was able to demonstrate them prior to the end of the session.  ENRIQUE demonstrated fair  understanding of the education provided.     See EMR under Media for exercises provided 11/6/19 and 11/8/19.     Assessment     Pt tolerated increasing there-ex as indicated above well without provocation of pain. She req's cueing to improve execution of sidelying hip abd and responds well to cueing as improvement was noted. She will continue to benefit from skilled PT to improve BLE strength to allow her to ambulate with in the community without requiring environmental support to improve balance.     ENRIQUE is progressing well towards her goals.   Pt prognosis is Good.     Pt will continue to benefit from skilled outpatient physical therapy to address the deficits listed in the problem list box on initial evaluation, provide pt/family education and to maximize pt's level of independence in the home and community environment.     Pt's spiritual, cultural and educational needs considered and pt agreeable to plan of care and goals.     Anticipated barriers to physical therapy: none    Goals:  Patient will: STG/LTG Status   1a demonstrate initial HEP with use of handout prn in order to optimize Rx outcomes and max. Functional mob.in 4 visits. STG Met 11/27/2019      2a improve MMT grades for B hip flex to 4-/5 (initial 3+/5), B hip ext to 4-/5 (intiial 3+/5), B  hip AB to 4+/5 (initial 4/5), B hip AD to 3+/5 (intiial 3/5), B glut Max to 4-/5 (initial 3+/5), and B ankle DF to 4+/5 (intial 4/5) in order to improve hip and ankle support for hip and ankle strategies to promote balance and decrease fall risk in 7 visits.    STG In progress 11/27/2019      3a improve BBS score to 44/56 in order to promote improved balance and decreased fall risk in 7 visits.    STG Met 11/27/2019    4a. Ambulate 350' with </= 4 contacts/incidneces of environmental support to improve bal and confidence with her safe home and community mobility in 7 visits.   STG In progress 11/27/2019                      1b demonstrate final HEP with use of handout prn in order to optimize Rx outcomes and max. Functional mob.in by discharge. LTG     2b improve MMT grades for B hip flex to 4/5 (initial 3+/5), B hip ext to 4/5 (intiial 3+/5), B hip AD to 4-/5 (intiial 3/5), B glut Max to 4/5 (initial 3+/5), and B ankle DF to 5/5 (intial 4/5) in order to improve hip and ankle support for hip and ankle strategies to promote balance and decrease fall risk in 12 visits.   LTG     3b improve BBS score to 47/56 in order to promote improved balance and decreased fall risk in 12 visits.   LTG     4b. Ambulate 350' with </= 2 contacts/incidneces of environmental support to improve bal and confidence with her safe home and community mobility in 12 visits.   LTG                                    Plan     Cont to prog to LTG as genaro. And incorporate more hip strengthening     Polina Watson, PTA

## 2019-12-04 ENCOUNTER — CLINICAL SUPPORT (OUTPATIENT)
Dept: REHABILITATION | Facility: HOSPITAL | Age: 69
End: 2019-12-04
Payer: MEDICARE

## 2019-12-04 DIAGNOSIS — R26.89 LOSS OF BALANCE: Primary | ICD-10-CM

## 2019-12-04 PROCEDURE — 97112 NEUROMUSCULAR REEDUCATION: CPT

## 2019-12-04 PROCEDURE — 97110 THERAPEUTIC EXERCISES: CPT

## 2019-12-04 NOTE — PROGRESS NOTES
Physical Therapy Treatment Note     Name: Enrique Jones  Clinic Number: [id number]    Therapy Diagnosis:   Encounter Diagnosis   Name Primary?    Loss of balance Yes     Physician: Saqib Gracia Jr.,*    Visit Date: 12/4/2019    Physician Orders: PT eval and treat  Medical Diagnosis:  Poor balance  Evaluation Date: 10/23/19  Authorization Period Expiration: 10/14/2020  Plan of Care Certification Period: 12/20/19  Visit #/Visits authorized: 8/ 48   PTA visit: 0/5    Monthly Assessment performed on 11/25/19, next assessment due on or before 12/25/19      Time In: 1300  Time Out: 1405      Precautions: standard, falls    Subjective     Pt reports: she had not been feeling too well and recently had a steroid injection and is a little better this day. Patient arrived early this day and was seen in the 1300 hour.      She was compliant with home exercise program.  Response to previous treatment: mm soreness  Functional change: N/A    Pain: 0/10  Location: left knee      Objective     ENRIQUE received therapeutic exercises to develop strength, endurance, ROM and flexibility for 42 minutes including:    · Recumbent bike x 10 minutes  · Stretches - slant board gastroc stretch 60 sec x 2  · Stretches - seated hamstring stretch 60 sec x 2   · Supine bridges 10 x 3  · Supine SLR with 1.5Lb AW 10 x 3   · SL Clams with OTB 10 x 3  · SL Reverse clams with 0Lb AW 10 x 3  · SL AB with 1.5Lb AW 10 x 3   · cybex leg press 3 x 10 reps /c 5 plates  · Step ups /c 1 riser 2 x 10 repsDNP    ENRIQUE participated in neuromuscular re-education activities to improve: Balance, Coordination and Kinesthetic for 14 minutes. The following activities were included:    In // bars:    SLS - 30 sec x 2 trials each EO   Max position on foam /c EC 3 x 30 sec  Alternating toe taps onto foam 3 x 10 reps without UE support NP  Stride stance 30 sec x 2 trials EO   Max position x 30 sec x 3 trials each EO   Wobble board 30 sec x 3 trials each A/P and  lateral       Patient  participated in dynamic functional therapeutic activities ( performance testing) to improve functional performance for 0 minutes, including:           Home Exercises Provided and Patient Education Provided     Education provided:   Issued handout and educated on HEP including mode, freq reps and sets.  Patient verbalized understanding and with return demonstration.  Patient with no adverse affects noted nor verbalized.      Written Home Exercises Provided: yes.  Exercises were reviewed and ENRIQUE was able to demonstrate them prior to the end of the session.  ENRIQUE demonstrated fair  understanding of the education provided.     See EMR under Media for exercises provided 11/6/19 and 11/8/19.     Assessment     Patient participated with Rx to address her activity genaro, strength, and balance.  She was able to participate with Rx requiring education, VC/TC for improved quality and performance of TE and for effective performance of balance exs to incorporate hip and ankle strategies.   She tolerated noted progressions in resistance with TE for LE strength without c/o and is expected to cont to progress to her established goals with cont PT.      ENRIQUE is progressing well towards her goals.   Pt prognosis is Good.     Pt will continue to benefit from skilled outpatient physical therapy to address the deficits listed in the problem list box on initial evaluation, provide pt/family education and to maximize pt's level of independence in the home and community environment.     Pt's spiritual, cultural and educational needs considered and pt agreeable to plan of care and goals.     Anticipated barriers to physical therapy: none    Goals:  Patient will: STG/LTG Status   1a demonstrate initial HEP with use of handout prn in order to optimize Rx outcomes and max. Functional mob.in 4 visits. STG Met 12/4/2019      2a improve MMT grades for B hip flex to 4-/5 (initial 3+/5), B hip ext to 4-/5 (intiial 3+/5), B  hip AB to 4+/5 (initial 4/5), B hip AD to 3+/5 (intiial 3/5), B glut Max to 4-/5 (initial 3+/5), and B ankle DF to 4+/5 (intial 4/5) in order to improve hip and ankle support for hip and ankle strategies to promote balance and decrease fall risk in 7 visits.    STG In progress 12/4/2019      3a improve BBS score to 44/56 in order to promote improved balance and decreased fall risk in 7 visits.    STG Met 12/4/2019    4a. Ambulate 350' with </= 4 contacts/incidneces of environmental support to improve bal and confidence with her safe home and community mobility in 7 visits.   STG In progress 12/4/2019                      1b demonstrate final HEP with use of handout prn in order to optimize Rx outcomes and max. Functional mob.in by discharge. LTG     2b improve MMT grades for B hip flex to 4/5 (initial 3+/5), B hip ext to 4/5 (intiial 3+/5), B hip AD to 4-/5 (intiial 3/5), B glut Max to 4/5 (initial 3+/5), and B ankle DF to 5/5 (intial 4/5) in order to improve hip and ankle support for hip and ankle strategies to promote balance and decrease fall risk in 12 visits.   LTG     3b improve BBS score to 47/56 in order to promote improved balance and decreased fall risk in 12 visits.   LTG     4b. Ambulate 350' with </= 2 contacts/incidneces of environmental support to improve bal and confidence with her safe home and community mobility in 12 visits.   LTG                                    Plan     Cont to prog to LTG as genaro., incorporate more hip strengthening     Paul Rey, PT

## 2019-12-09 ENCOUNTER — CLINICAL SUPPORT (OUTPATIENT)
Dept: REHABILITATION | Facility: HOSPITAL | Age: 69
End: 2019-12-09
Payer: MEDICARE

## 2019-12-09 DIAGNOSIS — R26.89 LOSS OF BALANCE: Primary | ICD-10-CM

## 2019-12-09 PROCEDURE — 97110 THERAPEUTIC EXERCISES: CPT

## 2019-12-09 PROCEDURE — 97112 NEUROMUSCULAR REEDUCATION: CPT

## 2019-12-09 NOTE — PROGRESS NOTES
Physical Therapy Treatment Note     Name: Enrique Jones  Clinic Number: [id number]    Therapy Diagnosis:   No diagnosis found.  Physician: Saqib Garcia Jr.,*    Visit Date: 12/9/2019    Physician Orders: PT eval and treat  Medical Diagnosis:  Poor balance  Evaluation Date: 10/23/19  Authorization Period Expiration: 10/14/2020  Plan of Care Certification Period: 12/20/19  Visit #/Visits authorized: 9/ 48 (Freq Ev + 12)  PTA visit: 0/5    Monthly Assessment performed on 11/25/19, next assessment due on or before 12/25/19      Time In: 1100  Time Out: 1158      Precautions: standard, falls    Subjective     Pt reports: she has been doing well and has no pain and cont with less incidences of dizziness.and did no have any dizziness over the weekend.     She was compliant with home exercise program.  Response to previous treatment: mm soreness  Functional change: N/A    Pain: 0/10  Location: left knee      Objective     ENRIQUE received therapeutic exercises to develop strength, endurance, ROM and flexibility for 47  minutes including:    · Recumbent bike x 8 minutes  · Stretches - slant board gastroc stretch 60 sec x 2  · Stretches - seated hamstring stretch 60 sec x 2   · Supine bridges 10 x 3   · Supine SLR with 2.0Lb AW 10 x 3   · SL Clams with OTB 10 x 3  · SL Reverse clams with 0Lb AW 10 x 3  · SL AB with 2.0Lb AW 10 x 3   · cybex leg press 3 x 10 reps /c 5 plates  · Step ups /c 1 riser 2 x 10 reps NP    ENRIQUE participated in neuromuscular re-education activities to improve: Balance, Coordination and Kinesthetic for 9 minutes. The following activities were included:      +TM x 9 min @ 0.8-1.0 mph with change in head position q 20 sec    In // bars:DNP    SLS - 30 sec x 2 trials each EO   Max position on foam /c EC 3 x 30 sec  Alternating toe taps onto foam 3 x 10 reps without UE support NP  Stride stance 30 sec x 2 trials EO   Max position x 30 sec x 3 trials each EO   Wobble board 30 sec x 3 trials each A/P and  lateral       Patient  participated in dynamic functional therapeutic activities ( performance testing) to improve functional performance for 0 minutes, including:           Home Exercises Provided and Patient Education Provided     Education provided:   Issued handout and educated on HEP including mode, freq reps and sets.  Patient verbalized understanding and with return demonstration.  Patient with no adverse affects noted nor verbalized.      Written Home Exercises Provided: yes.  Exercises were reviewed and ENRIQUE was able to demonstrate them prior to the end of the session.  ENRIQUE demonstrated fair  understanding of the education provided.     See EMR under Media for exercises provided 11/6/19 and 11/8/19.     Assessment     Patient participated with PT to address her deficits and cont to have increased times between exacerbations of dizziness.  She cont to to tolerate her progressions in Rx without exacerbations in symptoms.  This day she amb on TM with difficulty for increased step/stride lengths @ 1.0 mph.  However she was able to genaro amb with head turning and no incidences of dizziness.  She did require VC/TC for improved exs quality and performance of hip and LE strengthening as well as for increased step length on TM.  She is expected to cont to progress to goals with cont PT Rx.          ENRIQUE is progressing well towards her goals.   Pt prognosis is Good.     Pt will continue to benefit from skilled outpatient physical therapy to address the deficits listed in the problem list box on initial evaluation, provide pt/family education and to maximize pt's level of independence in the home and community environment.     Pt's spiritual, cultural and educational needs considered and pt agreeable to plan of care and goals.     Anticipated barriers to physical therapy: none    Goals:  Patient will: STG/LTG Status   1a demonstrate initial HEP with use of handout prn in order to optimize Rx outcomes and max.  Functional mob.in 4 visits. STG Met 12/9/2019      2a improve MMT grades for B hip flex to 4-/5 (initial 3+/5), B hip ext to 4-/5 (intiial 3+/5), B hip AB to 4+/5 (initial 4/5), B hip AD to 3+/5 (intiial 3/5), B glut Max to 4-/5 (initial 3+/5), and B ankle DF to 4+/5 (intial 4/5) in order to improve hip and ankle support for hip and ankle strategies to promote balance and decrease fall risk in 7 visits.    STG In progress 12/9/2019      3a improve BBS score to 44/56 in order to promote improved balance and decreased fall risk in 7 visits.    STG Met 12/9/2019    4a. Ambulate 350' with </= 4 contacts/incidneces of environmental support to improve bal and confidence with her safe home and community mobility in 7 visits.   STG In progress 12/9/2019                      1b demonstrate final HEP with use of handout prn in order to optimize Rx outcomes and max. Functional mob.in by discharge. LTG     2b improve MMT grades for B hip flex to 4/5 (initial 3+/5), B hip ext to 4/5 (intiial 3+/5), B hip AD to 4-/5 (intiial 3/5), B glut Max to 4/5 (initial 3+/5), and B ankle DF to 5/5 (intial 4/5) in order to improve hip and ankle support for hip and ankle strategies to promote balance and decrease fall risk in 12 visits.   LTG     3b improve BBS score to 47/56 in order to promote improved balance and decreased fall risk in 12 visits.   LTG     4b. Ambulate 350' with </= 2 contacts/incidneces of environmental support to improve bal and confidence with her safe home and community mobility in 12 visits.   LTG                                    Plan     Cont to prog to LTG as genaro., con with hip strengthening as well as TM amb with head turning.      Paul Rey, PT

## 2019-12-11 ENCOUNTER — DOCUMENTATION ONLY (OUTPATIENT)
Dept: REHABILITATION | Facility: HOSPITAL | Age: 69
End: 2019-12-11

## 2019-12-11 NOTE — PROGRESS NOTES
PT/PTA face to face conference completed regarding patient treatment plan and progress towards established goals. Treatment will be continued as described in initial report/ evaluation and treatment/progress notes. Patient will be seen by physical therapist every sixth visit or minimally once per month.       Polina Watson, PTA

## 2020-07-15 PROBLEM — I95.1 ORTHOSTASIS: Status: ACTIVE | Noted: 2020-07-15

## 2020-07-15 PROBLEM — H26.9 CATARACT: Status: ACTIVE | Noted: 2020-07-15

## 2020-07-24 DIAGNOSIS — I95.1 ORTHOSTATIC HYPOTENSION: ICD-10-CM

## 2020-07-24 DIAGNOSIS — I25.10 CORONARY ATHEROSCLEROSIS OF NATIVE CORONARY ARTERY: ICD-10-CM

## 2020-07-24 DIAGNOSIS — I48.91 ATRIAL FIBRILLATION: Primary | ICD-10-CM

## 2020-07-27 DIAGNOSIS — I48.91 ATRIAL FIBRILLATION: Primary | ICD-10-CM

## 2020-07-27 DIAGNOSIS — I95.1 ORTHOSTATIC HYPOTENSION: ICD-10-CM

## 2020-08-03 ENCOUNTER — TELEPHONE (OUTPATIENT)
Dept: CARDIOLOGY | Facility: HOSPITAL | Age: 70
End: 2020-08-03

## 2020-08-04 ENCOUNTER — CLINICAL SUPPORT (OUTPATIENT)
Dept: CARDIOLOGY | Facility: HOSPITAL | Age: 70
End: 2020-08-04
Attending: FAMILY MEDICINE
Payer: MEDICARE

## 2020-08-04 ENCOUNTER — HOSPITAL ENCOUNTER (OUTPATIENT)
Dept: RADIOLOGY | Facility: HOSPITAL | Age: 70
Discharge: HOME | End: 2020-08-04
Attending: FAMILY MEDICINE
Payer: MEDICARE

## 2020-08-04 VITALS — BODY MASS INDEX: 26.68 KG/M2 | WEIGHT: 166 LBS | HEIGHT: 66 IN

## 2020-08-04 DIAGNOSIS — I95.1 ORTHOSTATIC HYPOTENSION: ICD-10-CM

## 2020-08-04 DIAGNOSIS — I48.91 ATRIAL FIBRILLATION: ICD-10-CM

## 2020-08-04 DIAGNOSIS — I25.10 CORONARY ATHEROSCLEROSIS OF NATIVE CORONARY ARTERY: ICD-10-CM

## 2020-08-04 PROCEDURE — 93306 TTE W/DOPPLER COMPLETE: CPT

## 2020-08-04 PROCEDURE — 93880 EXTRACRANIAL BILAT STUDY: CPT | Mod: TC

## 2020-08-05 LAB
AORTIC ROOT ANNULUS: 2.56 CM
AORTIC VALVE CUSP SEPERATION: 1.78 CM
AV INDEX (PROSTH): 0.73
AV MEAN GRADIENT: 3 MMHG
AV PEAK GRADIENT: 5 MMHG
AV VALVE AREA: 2.82 CM2
AV VELOCITY RATIO: 69.54
BSA FOR ECHO PROCEDURE: 1.87 M2
CV ECHO LV RWT: 0.52 CM
DOP CALC AO PEAK VEL: 1.16 M/S
DOP CALC AO VTI: 29.52 CM
DOP CALC LVOT AREA: 3.9 CM2
DOP CALC LVOT DIAMETER: 2.22 CM
DOP CALC LVOT PEAK VEL: 80.67 M/S
DOP CALC LVOT STROKE VOLUME: 83.3 CM3
DOP CALCLVOT PEAK VEL VTI: 21.53 CM
E WAVE DECELERATION TIME: 227.91 MSEC
E/A RATIO: 0.8
E/E' RATIO: 14.18 M/S
ECHO LV POSTERIOR WALL: 1.13 CM (ref 0.6–1.1)
FRACTIONAL SHORTENING: 25 % (ref 28–44)
INTERVENTRICULAR SEPTUM: 1.04 CM (ref 0.6–1.1)
LEFT ATRIUM SIZE: 3.57 CM
LEFT INTERNAL DIMENSION IN SYSTOLE: 3.25 CM (ref 2.1–4)
LEFT VENTRICLE MASS INDEX: 88 G/M2
LEFT VENTRICULAR INTERNAL DIMENSION IN DIASTOLE: 4.36 CM (ref 3.5–6)
LEFT VENTRICULAR MASS: 163.31 G
LV LATERAL E/E' RATIO: 13 M/S
LV SEPTAL E/E' RATIO: 15.6 M/S
MV PEAK A VEL: 0.97 M/S
MV PEAK E VEL: 0.78 M/S
PISA TR MAX VEL: 2.74 M/S
PV PEAK VELOCITY: 77 CM/S
RA PRESSURE: 3 MMHG
RIGHT VENTRICULAR END-DIASTOLIC DIMENSION: 227 CM
TDI LATERAL: 0.06 M/S
TDI SEPTAL: 0.05 M/S
TDI: 0.06 M/S
TR MAX PG: 30 MMHG
TV REST PULMONARY ARTERY PRESSURE: 33 MMHG

## 2020-08-10 ENCOUNTER — HOSPITAL ENCOUNTER (OUTPATIENT)
Dept: RADIOLOGY | Facility: HOSPITAL | Age: 70
Discharge: HOME | End: 2020-08-10
Attending: FAMILY MEDICINE
Payer: MEDICARE

## 2020-08-10 DIAGNOSIS — Z12.31 BREAST CANCER SCREENING BY MAMMOGRAM: ICD-10-CM

## 2020-08-10 DIAGNOSIS — M81.0 OSTEOPOROSIS, UNSPECIFIED OSTEOPOROSIS TYPE, UNSPECIFIED PATHOLOGICAL FRACTURE PRESENCE: ICD-10-CM

## 2020-08-10 PROCEDURE — 77067 SCR MAMMO BI INCL CAD: CPT | Mod: TC,PO

## 2020-08-10 PROCEDURE — 77080 DXA BONE DENSITY AXIAL: CPT | Mod: TC,PO

## 2020-08-15 PROBLEM — I65.29 STENOSIS OF CAROTID ARTERY: Status: ACTIVE | Noted: 2020-08-15

## 2020-09-10 ENCOUNTER — OFFICE VISIT (OUTPATIENT)
Dept: FAMILY MEDICINE | Facility: CLINIC | Age: 70
End: 2020-09-10
Payer: MEDICARE

## 2020-09-10 VITALS
HEIGHT: 66 IN | WEIGHT: 168 LBS | BODY MASS INDEX: 27 KG/M2 | TEMPERATURE: 98 F | HEART RATE: 55 BPM | DIASTOLIC BLOOD PRESSURE: 86 MMHG | SYSTOLIC BLOOD PRESSURE: 138 MMHG | OXYGEN SATURATION: 99 %

## 2020-09-10 DIAGNOSIS — Z98.84 GASTRIC BYPASS STATUS FOR OBESITY: ICD-10-CM

## 2020-09-10 DIAGNOSIS — D51.9 ANEMIA DUE TO VITAMIN B12 DEFICIENCY, UNSPECIFIED B12 DEFICIENCY TYPE: ICD-10-CM

## 2020-09-10 DIAGNOSIS — I10 HYPERTENSION, ESSENTIAL: ICD-10-CM

## 2020-09-10 DIAGNOSIS — E55.9 VITAMIN D DEFICIENCY: ICD-10-CM

## 2020-09-10 DIAGNOSIS — F31.9 BIPOLAR 1 DISORDER: ICD-10-CM

## 2020-09-10 DIAGNOSIS — E78.5 HYPERLIPIDEMIA, UNSPECIFIED HYPERLIPIDEMIA TYPE: Primary | ICD-10-CM

## 2020-09-10 DIAGNOSIS — E03.9 HYPOTHYROIDISM, UNSPECIFIED TYPE: ICD-10-CM

## 2020-09-10 PROCEDURE — 99214 PR OFFICE/OUTPT VISIT, EST, LEVL IV, 30-39 MIN: ICD-10-PCS | Mod: S$GLB,,, | Performed by: FAMILY MEDICINE

## 2020-09-10 PROCEDURE — 99214 OFFICE O/P EST MOD 30 MIN: CPT | Mod: S$GLB,,, | Performed by: FAMILY MEDICINE

## 2020-09-10 RX ORDER — LOSARTAN POTASSIUM 25 MG/1
25 TABLET ORAL DAILY
Qty: 30 TABLET | Refills: 1 | Status: SHIPPED | OUTPATIENT
Start: 2020-09-10 | End: 2021-03-01 | Stop reason: SDUPTHER

## 2020-09-10 RX ORDER — DRONEDARONE 400 MG/1
400 TABLET, FILM COATED ORAL 2 TIMES DAILY
Qty: 180 TABLET | Refills: 1 | Status: SHIPPED | OUTPATIENT
Start: 2020-09-10 | End: 2021-03-01 | Stop reason: SDUPTHER

## 2020-09-10 RX ORDER — RABEPRAZOLE SODIUM 20 MG/1
20 TABLET, DELAYED RELEASE ORAL DAILY
Qty: 90 TABLET | Refills: 1 | Status: SHIPPED | OUTPATIENT
Start: 2020-09-10 | End: 2021-03-01 | Stop reason: SDUPTHER

## 2020-09-10 RX ORDER — KETOROLAC TROMETHAMINE 5 MG/ML
SOLUTION OPHTHALMIC
COMMUNITY
Start: 2020-08-31 | End: 2021-03-01

## 2020-09-10 RX ORDER — PREDNISOLONE ACETATE 10 MG/ML
SUSPENSION/ DROPS OPHTHALMIC
COMMUNITY
Start: 2020-08-31 | End: 2021-03-01

## 2020-09-10 RX ORDER — POTASSIUM CHLORIDE 1500 MG/1
20 TABLET, EXTENDED RELEASE ORAL DAILY
Qty: 90 TABLET | Refills: 1 | Status: SHIPPED | OUTPATIENT
Start: 2020-09-10 | End: 2021-03-01 | Stop reason: SDUPTHER

## 2020-09-10 RX ORDER — DIVALPROEX SODIUM 500 MG/1
1000 TABLET, FILM COATED, EXTENDED RELEASE ORAL DAILY
Qty: 180 TABLET | Refills: 1 | Status: SHIPPED | OUTPATIENT
Start: 2020-09-10 | End: 2021-03-01 | Stop reason: SDUPTHER

## 2020-09-10 RX ORDER — POLYMYXIN B SULFATE AND TRIMETHOPRIM 1; 10000 MG/ML; [USP'U]/ML
SOLUTION OPHTHALMIC
COMMUNITY
Start: 2020-08-12 | End: 2021-03-01

## 2020-09-11 PROBLEM — D51.9 ANEMIA DUE TO VITAMIN B12 DEFICIENCY: Status: ACTIVE | Noted: 2020-09-11

## 2020-09-11 PROBLEM — E55.9 VITAMIN D DEFICIENCY: Status: ACTIVE | Noted: 2020-09-11

## 2020-09-12 NOTE — PROGRESS NOTES
Follow-up multiple issues.  She is status post gastric bypass.  B12 deficiency vitamin-D deficiency deficiency for this.  Please check a follow-up these.  Weight is up about 10 lb.  Her diarrhea Clostridium difficile resolved and she has gained some weight.  She is working well her diet.  carotid stenosis had an ultrasound done by the cardiologist but is not aware of the results needs to work this up for her.  Looked yet up she has some minor disease but no occlusive problems.  Cardiac she has atrial fibrillation she is aware of no palpitations no PND no chest no pedal edema.  She is anticoagulated she is not having with stasis.  Not taking any nonsteroidals.  Bipolar disorder mood seems stable.  No major depression or significant anxiety no episodes.  Medications without side effects.  Due for liver functions and Depakote level.  Hypertension doing well no chest pain blood pressure good.  No pedal edema hyperlipidemia tolerating her medication.  Needs refill.  No myalgia statin.  Her metoprolol dose was decreased.  No orthostasis.    Physical examination vital signs are noted.  Affect is normal.  Mood normal.  No acute distress.  Well-developed well-nourished pupils were equal round regular reactive to light accommodation neck is supple no bruit no adenopathy.  Chest clear no wheezes or crackles no dyspnea.  Heart regular rate rhythm without murmur.  Abdomen bowel sounds positive soft nontender no hepatosplenomegaly no guarding or rebound extremities are without edema positive pedal pulses.  Good capillary refill.    Impression status post gastric bypass.  B12 deficiency.  Vitamin-D deficiency.  Hypertension controlled.  Bipolar disorder stable.  Atrial fibrillation.  Anticoagulation.  Hypertension controlled.  Hyperlipidemia.  Carotid stenosis.    Plan labs at [facility] CBC CMP lipids B12 vitamin D ferritin Depakote level and TSH.  Refilled all of her medications 90 day supplies with 1 refill.  Follow-up  with me in 3 months.

## 2020-09-14 ENCOUNTER — LAB VISIT (OUTPATIENT)
Dept: LAB | Facility: HOSPITAL | Age: 70
End: 2020-09-14
Attending: FAMILY MEDICINE
Payer: MEDICARE

## 2020-09-14 DIAGNOSIS — F31.9 BIPOLAR 1 DISORDER: ICD-10-CM

## 2020-09-14 DIAGNOSIS — D51.9 ANEMIA DUE TO VITAMIN B12 DEFICIENCY, UNSPECIFIED B12 DEFICIENCY TYPE: ICD-10-CM

## 2020-09-14 DIAGNOSIS — Z98.84 GASTRIC BYPASS STATUS FOR OBESITY: ICD-10-CM

## 2020-09-14 DIAGNOSIS — I10 HYPERTENSION, ESSENTIAL: ICD-10-CM

## 2020-09-14 DIAGNOSIS — E55.9 VITAMIN D DEFICIENCY: ICD-10-CM

## 2020-09-14 DIAGNOSIS — E03.9 HYPOTHYROIDISM, UNSPECIFIED TYPE: ICD-10-CM

## 2020-09-14 DIAGNOSIS — E78.5 HYPERLIPIDEMIA, UNSPECIFIED HYPERLIPIDEMIA TYPE: ICD-10-CM

## 2020-09-14 LAB
ALBUMIN SERPL BCP-MCNC: 3.6 G/DL (ref 3.5–5.2)
ALP SERPL-CCNC: 67 U/L (ref 55–135)
ALT SERPL W/O P-5'-P-CCNC: 20 U/L (ref 10–44)
ANION GAP SERPL CALC-SCNC: 11 MMOL/L (ref 8–16)
AST SERPL-CCNC: 17 U/L (ref 10–40)
BASOPHILS # BLD AUTO: 0.05 K/UL (ref 0–0.2)
BASOPHILS NFR BLD: 0.8 % (ref 0–1.9)
BILIRUB SERPL-MCNC: 0.6 MG/DL (ref 0.1–1)
BUN SERPL-MCNC: 16 MG/DL (ref 8–23)
CALCIUM SERPL-MCNC: 9.2 MG/DL (ref 8.7–10.5)
CHLORIDE SERPL-SCNC: 102 MMOL/L (ref 95–110)
CHOLEST SERPL-MCNC: 171 MG/DL (ref 120–199)
CHOLEST/HDLC SERPL: 2.3 {RATIO} (ref 2–5)
CO2 SERPL-SCNC: 29 MMOL/L (ref 23–29)
CREAT SERPL-MCNC: 0.6 MG/DL (ref 0.5–1.4)
DIFFERENTIAL METHOD: ABNORMAL
EOSINOPHIL # BLD AUTO: 0.1 K/UL (ref 0–0.5)
EOSINOPHIL NFR BLD: 2.1 % (ref 0–8)
ERYTHROCYTE [DISTWIDTH] IN BLOOD BY AUTOMATED COUNT: 12.9 % (ref 11.5–14.5)
EST. GFR  (AFRICAN AMERICAN): >60 ML/MIN/1.73 M^2
EST. GFR  (NON AFRICAN AMERICAN): >60 ML/MIN/1.73 M^2
FERRITIN SERPL-MCNC: 329 NG/ML (ref 20–300)
GLUCOSE SERPL-MCNC: 93 MG/DL (ref 70–110)
HCT VFR BLD AUTO: 43.1 % (ref 37–48.5)
HDLC SERPL-MCNC: 75 MG/DL (ref 40–75)
HDLC SERPL: 43.9 % (ref 20–50)
HGB BLD-MCNC: 13.8 G/DL (ref 12–16)
IMM GRANULOCYTES # BLD AUTO: 0.02 K/UL (ref 0–0.04)
IMM GRANULOCYTES NFR BLD AUTO: 0.3 % (ref 0–0.5)
LDLC SERPL CALC-MCNC: 82.8 MG/DL (ref 63–159)
LYMPHOCYTES # BLD AUTO: 3.1 K/UL (ref 1–4.8)
LYMPHOCYTES NFR BLD: 49.2 % (ref 18–48)
MCH RBC QN AUTO: 32.2 PG (ref 27–31)
MCHC RBC AUTO-ENTMCNC: 32 G/DL (ref 32–36)
MCV RBC AUTO: 101 FL (ref 82–98)
MONOCYTES # BLD AUTO: 0.5 K/UL (ref 0.3–1)
MONOCYTES NFR BLD: 7.6 % (ref 4–15)
NEUTROPHILS # BLD AUTO: 2.5 K/UL (ref 1.8–7.7)
NEUTROPHILS NFR BLD: 40 % (ref 38–73)
NONHDLC SERPL-MCNC: 96 MG/DL
NRBC BLD-RTO: 0 /100 WBC
PLATELET # BLD AUTO: 229 K/UL (ref 150–350)
PMV BLD AUTO: 10.9 FL (ref 9.2–12.9)
POTASSIUM SERPL-SCNC: 3.7 MMOL/L (ref 3.5–5.1)
PROT SERPL-MCNC: 6.3 G/DL (ref 6–8.4)
RBC # BLD AUTO: 4.29 M/UL (ref 4–5.4)
SODIUM SERPL-SCNC: 142 MMOL/L (ref 136–145)
TRIGL SERPL-MCNC: 66 MG/DL (ref 30–150)
TSH SERPL DL<=0.005 MIU/L-ACNC: 2.52 UIU/ML (ref 0.34–5.6)
VALPROATE SERPL-MCNC: 49.2 UG/ML (ref 50–100)
VIT B12 SERPL-MCNC: >1500 PG/ML (ref 210–950)
WBC # BLD AUTO: 6.22 K/UL (ref 3.9–12.7)

## 2020-09-14 PROCEDURE — 84443 ASSAY THYROID STIM HORMONE: CPT

## 2020-09-14 PROCEDURE — 85025 COMPLETE CBC W/AUTO DIFF WBC: CPT

## 2020-09-14 PROCEDURE — 80053 COMPREHEN METABOLIC PANEL: CPT

## 2020-09-14 PROCEDURE — 80164 ASSAY DIPROPYLACETIC ACD TOT: CPT

## 2020-09-14 PROCEDURE — 82728 ASSAY OF FERRITIN: CPT

## 2020-09-14 PROCEDURE — 80061 LIPID PANEL: CPT

## 2020-09-14 PROCEDURE — 36415 COLL VENOUS BLD VENIPUNCTURE: CPT

## 2020-09-14 PROCEDURE — 82607 VITAMIN B-12: CPT

## 2020-09-17 RX ORDER — APIXABAN 5 MG/1
5 TABLET, FILM COATED ORAL 2 TIMES DAILY
Qty: 60 TABLET | Refills: 0 | Status: SHIPPED | OUTPATIENT
Start: 2020-09-17 | End: 2020-12-01 | Stop reason: SDUPTHER
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
## 2020-09-18 ENCOUNTER — TELEPHONE (OUTPATIENT)
Dept: CARDIOLOGY | Facility: CLINIC | Age: 70
End: 2020-09-18
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
## 2020-09-21 ENCOUNTER — TELEPHONE (OUTPATIENT)
Dept: FAMILY MEDICINE | Facility: CLINIC | Age: 70
End: 2020-09-21

## 2020-09-21 NOTE — TELEPHONE ENCOUNTER
----- Message from Kacie Jay sent at 9/16/2020  1:29 PM CDT -----  Regarding: pharmacy call  Type:  Pharmacy Calling to Clarify an RX    Name of Caller:Wan  Pharmacy Name:[facility]  Prescription Name:l-methylfolate-b2-b6-b12 (CEREFOLIN) 6-5-50-1 mg Tab  What do they need to clarify?:type of medication and strength  Best Call Back Number:[phone]  Additional Information:        LEFT MESSAGE CALL BACK REGARDING CEREFOLIN

## 2020-09-23 ENCOUNTER — CLINICAL SUPPORT (OUTPATIENT)
Dept: FAMILY MEDICINE | Facility: CLINIC | Age: 70
End: 2020-09-23
Payer: MEDICARE

## 2020-09-23 PROCEDURE — G0008 ADMIN INFLUENZA VIRUS VAC: HCPCS | Mod: S$GLB,,, | Performed by: FAMILY MEDICINE

## 2020-09-23 PROCEDURE — 90694 VACC AIIV4 NO PRSRV 0.5ML IM: CPT | Mod: S$GLB,,, | Performed by: FAMILY MEDICINE

## 2020-09-23 PROCEDURE — G0008 PR ADMIN INFLUENZA VIRUS VAC: ICD-10-PCS | Mod: S$GLB,,, | Performed by: FAMILY MEDICINE

## 2020-09-23 PROCEDURE — 90694 FLU VACCINE - QUADRIVALENT - ADJUVANTED: ICD-10-PCS | Mod: S$GLB,,, | Performed by: FAMILY MEDICINE

## 2020-11-10 ENCOUNTER — OFFICE VISIT (OUTPATIENT)
Dept: CARDIOLOGY | Facility: CLINIC | Age: 70
End: 2020-11-10
Payer: MEDICARE

## 2020-11-10 VITALS
WEIGHT: 172 LBS | HEART RATE: 53 BPM | RESPIRATION RATE: 16 BRPM | HEIGHT: 66 IN | SYSTOLIC BLOOD PRESSURE: 128 MMHG | DIASTOLIC BLOOD PRESSURE: 76 MMHG | OXYGEN SATURATION: 97 % | BODY MASS INDEX: 27.64 KG/M2

## 2020-11-10 DIAGNOSIS — I65.23 BILATERAL CAROTID ARTERY STENOSIS: ICD-10-CM

## 2020-11-10 DIAGNOSIS — I48.0 PAROXYSMAL ATRIAL FIBRILLATION: ICD-10-CM

## 2020-11-10 DIAGNOSIS — E78.5 HYPERLIPIDEMIA, UNSPECIFIED HYPERLIPIDEMIA TYPE: Primary | ICD-10-CM

## 2020-11-10 DIAGNOSIS — I10 HYPERTENSION, ESSENTIAL: ICD-10-CM

## 2020-11-10 PROCEDURE — 99214 OFFICE O/P EST MOD 30 MIN: CPT | Mod: S$GLB,,, | Performed by: INTERNAL MEDICINE

## 2020-11-10 PROCEDURE — 99214 PR OFFICE/OUTPT VISIT, EST, LEVL IV, 30-39 MIN: ICD-10-PCS | Mod: S$GLB,,, | Performed by: INTERNAL MEDICINE

## 2020-12-01 ENCOUNTER — OFFICE VISIT (OUTPATIENT)
Dept: FAMILY MEDICINE | Facility: CLINIC | Age: 70
End: 2020-12-01
Payer: MEDICARE

## 2020-12-01 ENCOUNTER — LAB VISIT (OUTPATIENT)
Dept: LAB | Facility: HOSPITAL | Age: 70
End: 2020-12-01
Attending: FAMILY MEDICINE
Payer: MEDICARE

## 2020-12-01 VITALS
WEIGHT: 170 LBS | SYSTOLIC BLOOD PRESSURE: 138 MMHG | DIASTOLIC BLOOD PRESSURE: 63 MMHG | HEART RATE: 54 BPM | HEIGHT: 66 IN | OXYGEN SATURATION: 98 % | TEMPERATURE: 96 F | BODY MASS INDEX: 27.32 KG/M2

## 2020-12-01 DIAGNOSIS — F31.9 BIPOLAR 1 DISORDER: ICD-10-CM

## 2020-12-01 DIAGNOSIS — I48.21 PERMANENT ATRIAL FIBRILLATION: ICD-10-CM

## 2020-12-01 DIAGNOSIS — E55.9 VITAMIN D DEFICIENCY: ICD-10-CM

## 2020-12-01 DIAGNOSIS — R41.89 COGNITIVE IMPAIRMENT: ICD-10-CM

## 2020-12-01 DIAGNOSIS — G47.00 INSOMNIA, UNSPECIFIED TYPE: ICD-10-CM

## 2020-12-01 DIAGNOSIS — H93.19 TINNITUS, UNSPECIFIED LATERALITY: ICD-10-CM

## 2020-12-01 DIAGNOSIS — D51.9 ANEMIA DUE TO VITAMIN B12 DEFICIENCY, UNSPECIFIED B12 DEFICIENCY TYPE: ICD-10-CM

## 2020-12-01 DIAGNOSIS — I65.29 STENOSIS OF CAROTID ARTERY, UNSPECIFIED LATERALITY: ICD-10-CM

## 2020-12-01 DIAGNOSIS — E78.5 HYPERLIPIDEMIA, UNSPECIFIED HYPERLIPIDEMIA TYPE: ICD-10-CM

## 2020-12-01 DIAGNOSIS — D75.89 MACROCYTOSIS: ICD-10-CM

## 2020-12-01 DIAGNOSIS — R42 VERTIGO: ICD-10-CM

## 2020-12-01 DIAGNOSIS — Z79.01 ANTICOAGULANT LONG-TERM USE: ICD-10-CM

## 2020-12-01 DIAGNOSIS — E03.9 HYPOTHYROIDISM, UNSPECIFIED TYPE: ICD-10-CM

## 2020-12-01 DIAGNOSIS — J30.9 ALLERGIC RHINITIS, UNSPECIFIED SEASONALITY, UNSPECIFIED TRIGGER: ICD-10-CM

## 2020-12-01 DIAGNOSIS — K21.9 GASTROESOPHAGEAL REFLUX DISEASE, UNSPECIFIED WHETHER ESOPHAGITIS PRESENT: ICD-10-CM

## 2020-12-01 DIAGNOSIS — I10 HYPERTENSION, ESSENTIAL: ICD-10-CM

## 2020-12-01 DIAGNOSIS — I10 HYPERTENSION, ESSENTIAL: Primary | ICD-10-CM

## 2020-12-01 DIAGNOSIS — Z98.84 GASTRIC BYPASS STATUS FOR OBESITY: ICD-10-CM

## 2020-12-01 LAB
25(OH)D3+25(OH)D2 SERPL-MCNC: 54 NG/ML (ref 30–96)
ALBUMIN SERPL BCP-MCNC: 3.9 G/DL (ref 3.5–5.2)
ALP SERPL-CCNC: 80 U/L (ref 55–135)
ALT SERPL W/O P-5'-P-CCNC: 33 U/L (ref 10–44)
ANION GAP SERPL CALC-SCNC: 9 MMOL/L (ref 8–16)
AST SERPL-CCNC: 27 U/L (ref 10–40)
BILIRUB SERPL-MCNC: 0.7 MG/DL (ref 0.1–1)
BUN SERPL-MCNC: 13 MG/DL (ref 8–23)
CALCIUM SERPL-MCNC: 9.4 MG/DL (ref 8.7–10.5)
CHLORIDE SERPL-SCNC: 101 MMOL/L (ref 95–110)
CO2 SERPL-SCNC: 31 MMOL/L (ref 23–29)
CREAT SERPL-MCNC: 0.7 MG/DL (ref 0.5–1.4)
EST. GFR  (AFRICAN AMERICAN): >60 ML/MIN/1.73 M^2
EST. GFR  (NON AFRICAN AMERICAN): >60 ML/MIN/1.73 M^2
GLUCOSE SERPL-MCNC: 109 MG/DL (ref 70–110)
POTASSIUM SERPL-SCNC: 5 MMOL/L (ref 3.5–5.1)
PROT SERPL-MCNC: 7.2 G/DL (ref 6–8.4)
SODIUM SERPL-SCNC: 141 MMOL/L (ref 136–145)
TSH SERPL DL<=0.005 MIU/L-ACNC: 2.73 UIU/ML (ref 0.34–5.6)
VALPROATE SERPL-MCNC: 26.4 UG/ML (ref 50–100)
VIT B12 SERPL-MCNC: >1500 PG/ML (ref 210–950)

## 2020-12-01 PROCEDURE — G0009 PNEUMOCOCCAL CONJUGATE VACCINE 13-VALENT LESS THAN 5YO & GREATER THAN: ICD-10-PCS | Mod: S$GLB,,, | Performed by: FAMILY MEDICINE

## 2020-12-01 PROCEDURE — 82607 VITAMIN B-12: CPT

## 2020-12-01 PROCEDURE — 99214 OFFICE O/P EST MOD 30 MIN: CPT | Mod: 25,S$GLB,, | Performed by: FAMILY MEDICINE

## 2020-12-01 PROCEDURE — 90670 PNEUMOCOCCAL CONJUGATE VACCINE 13-VALENT LESS THAN 5YO & GREATER THAN: ICD-10-PCS | Mod: S$GLB,,, | Performed by: FAMILY MEDICINE

## 2020-12-01 PROCEDURE — G0009 ADMIN PNEUMOCOCCAL VACCINE: HCPCS | Mod: S$GLB,,, | Performed by: FAMILY MEDICINE

## 2020-12-01 PROCEDURE — 90670 PCV13 VACCINE IM: CPT | Mod: S$GLB,,, | Performed by: FAMILY MEDICINE

## 2020-12-01 PROCEDURE — 84443 ASSAY THYROID STIM HORMONE: CPT

## 2020-12-01 PROCEDURE — 82306 VITAMIN D 25 HYDROXY: CPT

## 2020-12-01 PROCEDURE — 80053 COMPREHEN METABOLIC PANEL: CPT

## 2020-12-01 PROCEDURE — 99214 PR OFFICE/OUTPT VISIT, EST, LEVL IV, 30-39 MIN: ICD-10-PCS | Mod: 25,S$GLB,, | Performed by: FAMILY MEDICINE

## 2020-12-01 PROCEDURE — 80164 ASSAY DIPROPYLACETIC ACD TOT: CPT

## 2020-12-01 PROCEDURE — 36415 COLL VENOUS BLD VENIPUNCTURE: CPT

## 2020-12-01 RX ORDER — POTASSIUM CHLORIDE 20 MEQ/1
20 TABLET, EXTENDED RELEASE ORAL
COMMUNITY
Start: 2020-09-10 | End: 2020-12-01 | Stop reason: SDUPTHER

## 2020-12-01 RX ORDER — LEVOTHYROXINE SODIUM 100 UG/1
100 TABLET ORAL DAILY
Qty: 90 TABLET | Refills: 3 | Status: SHIPPED | OUTPATIENT
Start: 2020-12-01 | End: 2021-09-29 | Stop reason: SDUPTHER

## 2020-12-01 RX ORDER — CHOLECALCIFEROL (VITAMIN D3) 125 MCG
5000 CAPSULE ORAL DAILY
COMMUNITY
Start: 2020-11-07

## 2020-12-01 RX ORDER — TRAZODONE HYDROCHLORIDE 100 MG/1
100 TABLET ORAL NIGHTLY
Qty: 180 TABLET | Refills: 3 | Status: SHIPPED | OUTPATIENT
Start: 2020-12-01 | End: 2021-09-29 | Stop reason: SDUPTHER

## 2020-12-01 RX ORDER — DRONEDARONE 400 MG/1
400 TABLET, FILM COATED ORAL
COMMUNITY
Start: 2020-09-10 | End: 2020-12-01 | Stop reason: SDUPTHER

## 2020-12-01 RX ORDER — APIXABAN 5 MG/1
5 TABLET, FILM COATED ORAL 2 TIMES DAILY
Qty: 180 TABLET | Refills: 3 | Status: SHIPPED | OUTPATIENT
Start: 2020-12-01 | End: 2021-09-29 | Stop reason: SDUPTHER

## 2020-12-01 RX ORDER — TRAZODONE HYDROCHLORIDE 100 MG/1
100 TABLET ORAL
COMMUNITY
Start: 2020-04-09 | End: 2020-12-01 | Stop reason: SDUPTHER

## 2020-12-01 RX ORDER — DIVALPROEX SODIUM 500 MG/1
500 TABLET, FILM COATED, EXTENDED RELEASE ORAL
COMMUNITY
Start: 2020-09-10 | End: 2020-12-01 | Stop reason: SDUPTHER

## 2020-12-01 RX ORDER — RABEPRAZOLE SODIUM 20 MG/1
20 TABLET, DELAYED RELEASE ORAL
COMMUNITY
Start: 2020-09-25 | End: 2020-12-01 | Stop reason: SDUPTHER

## 2020-12-01 RX ORDER — LEVOTHYROXINE SODIUM 100 UG/1
100 TABLET ORAL
COMMUNITY
Start: 2020-05-26 | End: 2020-12-01 | Stop reason: SDUPTHER

## 2020-12-01 RX ORDER — AMLODIPINE BESYLATE 5 MG/1
5 TABLET ORAL
COMMUNITY
Start: 2020-03-20 | End: 2020-12-01 | Stop reason: SDUPTHER

## 2020-12-01 NOTE — PROGRESS NOTES
Subjective:       Patient ID: Kasandra Jones is a 70 y.o. female.    Chief Complaint: Insomnia (refills 90 days), Hypothyroidism, Hyperlipidemia, and Hypertension    Three month follow up and some medication refills. No blood work prior to today's visit. Last labs done with last 3 month visit. TSH was 2.5. Ferritin was ok. CBC was normal, other than MCV slightly elevated. CMP was normal. Vitamin B12 normal. Total cholesterol 171, LDL 83. Valproic acid 49. Weight has been stable. Hypertension, blood pressure has been doing ok. Cardiovascular doing ok. Saw Dr. Owens 2 weeks ago. No chest pain. A fib is doing ok. No bleeding on Eliquis 5 mg. Taking Multaq 400 mg bid. Bilateral carotid US in July was ok. Some depression being stuck at home due to covid. She is not currently seeing a psychiatrist. Memory is doing ok overall on Namenda. Insomnia, she needs a refill of Trazodone today. She already received her flu shot. She already had the pneumovax 23, so she is now due for prevnar 13. Already had both singles vaccines. She also states her allergies have been bothering her. She is alternating Claritin and Allegra. Reports hoarseness, rhinorrhea, and productive cough. Denies reflux. Taking Aciphex for the GERD. Additionally notes she still gets episodes of dizziness and tinnitus while sitting or standing.     Review of Systems   Constitutional: Negative.  Negative for appetite change, chills, fatigue and fever.   HENT: Positive for rhinorrhea, tinnitus and voice change. Negative for ear pain, sinus pressure/congestion and sore throat.    Respiratory: Positive for cough. Negative for chest tightness, shortness of breath and wheezing.    Cardiovascular: Negative.  Negative for chest pain, palpitations and leg swelling.   Gastrointestinal: Negative.  Negative for abdominal pain, diarrhea, nausea, vomiting and reflux.   Genitourinary: Negative.  Negative for dysuria, frequency, hematuria and urgency.   Musculoskeletal:  Negative.    Integumentary:  Negative.   Neurological: Positive for dizziness. Negative for weakness, numbness and headaches.   Psychiatric/Behavioral:        Depression   All other systems reviewed and are negative.        Objective:      Physical Exam  Vitals signs reviewed.   Constitutional:       General: She is not in acute distress.     Appearance: Normal appearance.   HENT:      Head: Normocephalic and atraumatic.      Right Ear: External ear normal.      Left Ear: External ear normal.      Nose: Nose normal.      Mouth/Throat:      Mouth: Mucous membranes are moist.   Eyes:      Pupils: Pupils are equal, round, and reactive to light.   Neck:      Musculoskeletal: Normal range of motion and neck supple.      Vascular: No carotid bruit.   Cardiovascular:      Rate and Rhythm: Normal rate and regular rhythm.      Pulses: Normal pulses.      Heart sounds: Normal heart sounds. No murmur. No friction rub. No gallop.       Comments: No A fib  Pulmonary:      Effort: Pulmonary effort is normal.      Breath sounds: Normal breath sounds. No wheezing, rhonchi or rales.   Musculoskeletal: Normal range of motion.         General: No swelling or tenderness.      Right lower leg: No edema.      Left lower leg: No edema.   Skin:     General: Skin is warm and dry.      Capillary Refill: Capillary refill takes less than 2 seconds.   Neurological:      General: No focal deficit present.      Mental Status: She is alert and oriented to person, place, and time.   Psychiatric:         Mood and Affect: Mood normal.         Behavior: Behavior normal.         Assessment:       1. Hypertension, essential    2. Hyperlipidemia, unspecified hyperlipidemia type    3. Anticoagulant long-term use    4. Hypothyroidism, unspecified type    5. Permanent atrial fibrillation    6. Cognitive impairment    7. Stenosis of carotid artery, unspecified laterality    8. Bipolar 1 disorder    9. Gastric bypass status for obesity    10. Insomnia,  unspecified type    11. Macrocytosis    12. Allergic rhinitis, unspecified seasonality, unspecified trigger    13. Vertigo    14. Tinnitus, unspecified laterality    15. Gastroesophageal reflux disease, unspecified whether esophagitis present        Plan:       **Try Zyrtec 10 mg qd. Refill Levothyroxine 100 mcg, Eliquis 5 mg, and Trazodone 100 mg 90 day with 1 refill. Valproic acid, Vitamin D, B12, CMP, and TSH ordered. Prevnar 13 done today.*

## 2020-12-02 ENCOUNTER — TELEPHONE (OUTPATIENT)
Dept: FAMILY MEDICINE | Facility: CLINIC | Age: 70
End: 2020-12-02

## 2020-12-02 NOTE — TELEPHONE ENCOUNTER
----- Message from Terry Shay sent at 12/2/2020  8:28 AM CST -----  Contact: Self  Type:  RX Refill Request    Who Called:  Patient  Refill or New Rx:  Refill  RX Name and Strength:  buPROPion (WELLBUTRIN XL) 300 MG 24 hr tablet    Is this a 30 day or 90 day RX:  90  Preferred Pharmacy with phone number:  [facility]   Local or Mail Order:  local  Ordering Provider:  Logan Valladares Call Back Number:  [phone]   Additional Information:  Will  after lunch  #90/3 REFILLS QD

## 2020-12-02 NOTE — TELEPHONE ENCOUNTER
Pt didn't answer  ----- Message from Sanjiv Ford III, MD sent at 12/1/2020  9:23 PM CST -----  UNCHANGED/STABLE

## 2020-12-28 ENCOUNTER — TELEPHONE (OUTPATIENT)
Dept: FAMILY MEDICINE | Facility: CLINIC | Age: 70
End: 2020-12-28

## 2020-12-28 NOTE — TELEPHONE ENCOUNTER
----- Message from Princess GUZMAN Witt sent at 12/28/2020  8:30 AM CST -----  Contact: pt  Type:  RX Refill Request    Who Called: elizabeth spouse   Refill or New Rx:  refill   RX Name and Strength:  amLODIPine (NORVASC) 5 MG tablet  How is the patient currently taking it? (ex. 1XDay):    Is this a 30 day or 90 day RX:  30  Preferred Pharmacy with phone number:    [facility] in [place]     Local or Mail Order: local   Ordering Provider:  Dr. Ford   [facility] Call Back Number:  [phone]     Additional Information:  Patient is needing to speak with someone in regards to traZODone (DESYREL) 100 MG tablet she has been taking it twice instead of once. Please call to assist    NORVASC 5 MG #90/3 QD TRAZODONE 100 MG #180/3 2 QH WRITTEN

## 2021-01-05 ENCOUNTER — IMMUNIZATION (OUTPATIENT)
Dept: PRIMARY CARE CLINIC | Facility: CLINIC | Age: 71
End: 2021-01-05
Payer: MEDICARE

## 2021-01-05 DIAGNOSIS — Z23 NEED FOR VACCINATION: ICD-10-CM

## 2021-01-05 PROCEDURE — 91300 COVID-19, MRNA, LNP-S, PF, 30 MCG/0.3 ML DOSE VACCINE: CPT | Mod: S$GLB,,, | Performed by: FAMILY MEDICINE

## 2021-01-05 PROCEDURE — 0001A COVID-19, MRNA, LNP-S, PF, 30 MCG/0.3 ML DOSE VACCINE: CPT | Mod: S$GLB,,, | Performed by: FAMILY MEDICINE

## 2021-01-05 PROCEDURE — 0001A COVID-19, MRNA, LNP-S, PF, 30 MCG/0.3 ML DOSE VACCINE: ICD-10-PCS | Mod: S$GLB,,, | Performed by: FAMILY MEDICINE

## 2021-01-05 PROCEDURE — 91300 COVID-19, MRNA, LNP-S, PF, 30 MCG/0.3 ML DOSE VACCINE: ICD-10-PCS | Mod: S$GLB,,, | Performed by: FAMILY MEDICINE

## 2021-01-26 ENCOUNTER — IMMUNIZATION (OUTPATIENT)
Dept: PRIMARY CARE CLINIC | Facility: CLINIC | Age: 71
End: 2021-01-26
Payer: MEDICARE

## 2021-01-26 DIAGNOSIS — Z23 NEED FOR VACCINATION: Primary | ICD-10-CM

## 2021-01-26 PROCEDURE — 91300 COVID-19, MRNA, LNP-S, PF, 30 MCG/0.3 ML DOSE VACCINE: ICD-10-PCS | Mod: S$GLB,,, | Performed by: FAMILY MEDICINE

## 2021-01-26 PROCEDURE — 0002A COVID-19, MRNA, LNP-S, PF, 30 MCG/0.3 ML DOSE VACCINE: CPT | Mod: S$GLB,,, | Performed by: FAMILY MEDICINE

## 2021-01-26 PROCEDURE — 0002A COVID-19, MRNA, LNP-S, PF, 30 MCG/0.3 ML DOSE VACCINE: ICD-10-PCS | Mod: S$GLB,,, | Performed by: FAMILY MEDICINE

## 2021-01-26 PROCEDURE — 91300 COVID-19, MRNA, LNP-S, PF, 30 MCG/0.3 ML DOSE VACCINE: CPT | Mod: S$GLB,,, | Performed by: FAMILY MEDICINE

## 2021-02-10 PROBLEM — I48.0 PAROXYSMAL ATRIAL FIBRILLATION: Status: ACTIVE | Noted: 2019-09-21

## 2021-03-01 ENCOUNTER — OFFICE VISIT (OUTPATIENT)
Dept: FAMILY MEDICINE | Facility: CLINIC | Age: 71
End: 2021-03-01
Payer: MEDICARE

## 2021-03-01 VITALS
SYSTOLIC BLOOD PRESSURE: 138 MMHG | OXYGEN SATURATION: 97 % | DIASTOLIC BLOOD PRESSURE: 64 MMHG | HEART RATE: 57 BPM | WEIGHT: 172 LBS | BODY MASS INDEX: 27.64 KG/M2 | HEIGHT: 66 IN

## 2021-03-01 DIAGNOSIS — Z98.84 GASTRIC BYPASS STATUS FOR OBESITY: ICD-10-CM

## 2021-03-01 DIAGNOSIS — R07.9 CHEST PAIN, UNSPECIFIED TYPE: Primary | ICD-10-CM

## 2021-03-01 DIAGNOSIS — I48.21 PERMANENT ATRIAL FIBRILLATION: ICD-10-CM

## 2021-03-01 DIAGNOSIS — I65.29 STENOSIS OF CAROTID ARTERY, UNSPECIFIED LATERALITY: ICD-10-CM

## 2021-03-01 DIAGNOSIS — D51.9 ANEMIA DUE TO VITAMIN B12 DEFICIENCY, UNSPECIFIED B12 DEFICIENCY TYPE: ICD-10-CM

## 2021-03-01 DIAGNOSIS — K21.9 GASTROESOPHAGEAL REFLUX DISEASE, UNSPECIFIED WHETHER ESOPHAGITIS PRESENT: ICD-10-CM

## 2021-03-01 DIAGNOSIS — I10 HYPERTENSION, ESSENTIAL: ICD-10-CM

## 2021-03-01 DIAGNOSIS — F31.9 BIPOLAR 1 DISORDER: ICD-10-CM

## 2021-03-01 DIAGNOSIS — E78.5 HYPERLIPIDEMIA, UNSPECIFIED HYPERLIPIDEMIA TYPE: ICD-10-CM

## 2021-03-01 DIAGNOSIS — E03.9 HYPOTHYROIDISM, UNSPECIFIED TYPE: ICD-10-CM

## 2021-03-01 DIAGNOSIS — E55.9 VITAMIN D DEFICIENCY: ICD-10-CM

## 2021-03-01 DIAGNOSIS — Z79.01 ANTICOAGULANT LONG-TERM USE: ICD-10-CM

## 2021-03-01 DIAGNOSIS — F41.9 ANXIETY: ICD-10-CM

## 2021-03-01 PROCEDURE — 93010 ELECTROCARDIOGRAM REPORT: CPT | Mod: S$GLB,,, | Performed by: INTERNAL MEDICINE

## 2021-03-01 PROCEDURE — 93005 EKG 12-LEAD: ICD-10-PCS | Mod: S$GLB,,, | Performed by: FAMILY MEDICINE

## 2021-03-01 PROCEDURE — 99214 PR OFFICE/OUTPT VISIT, EST, LEVL IV, 30-39 MIN: ICD-10-PCS | Mod: S$GLB,,, | Performed by: FAMILY MEDICINE

## 2021-03-01 PROCEDURE — 99214 OFFICE O/P EST MOD 30 MIN: CPT | Mod: S$GLB,,, | Performed by: FAMILY MEDICINE

## 2021-03-01 PROCEDURE — 93005 ELECTROCARDIOGRAM TRACING: CPT | Mod: S$GLB,,, | Performed by: FAMILY MEDICINE

## 2021-03-01 PROCEDURE — 93010 EKG 12-LEAD: ICD-10-PCS | Mod: S$GLB,,, | Performed by: INTERNAL MEDICINE

## 2021-03-01 RX ORDER — LOSARTAN POTASSIUM 25 MG/1
25 TABLET ORAL DAILY
Qty: 90 TABLET | Refills: 1 | Status: SHIPPED | OUTPATIENT
Start: 2021-03-01 | End: 2021-07-23 | Stop reason: SDUPTHER

## 2021-03-01 RX ORDER — RABEPRAZOLE SODIUM 20 MG/1
20 TABLET, DELAYED RELEASE ORAL DAILY
Qty: 90 TABLET | Refills: 1 | Status: SHIPPED | OUTPATIENT
Start: 2021-03-01 | End: 2021-07-23 | Stop reason: SDUPTHER

## 2021-03-01 RX ORDER — DIVALPROEX SODIUM 500 MG/1
1000 TABLET, FILM COATED, EXTENDED RELEASE ORAL DAILY
Qty: 180 TABLET | Refills: 1 | Status: SHIPPED | OUTPATIENT
Start: 2021-03-01 | End: 2021-09-29 | Stop reason: SDUPTHER

## 2021-03-01 RX ORDER — METOPROLOL SUCCINATE 25 MG/1
25 TABLET, EXTENDED RELEASE ORAL DAILY
Qty: 90 TABLET | Refills: 1 | OUTPATIENT
Start: 2021-03-01 | End: 2021-03-01

## 2021-03-01 RX ORDER — DRONEDARONE 400 MG/1
400 TABLET, FILM COATED ORAL 2 TIMES DAILY
Qty: 180 TABLET | Refills: 1 | Status: SHIPPED | OUTPATIENT
Start: 2021-03-01 | End: 2021-09-29 | Stop reason: SDUPTHER

## 2021-03-01 RX ORDER — DRONEDARONE 400 MG/1
400 TABLET, FILM COATED ORAL 2 TIMES DAILY
Qty: 180 TABLET | Refills: 1 | OUTPATIENT
Start: 2021-03-01 | End: 2021-03-01

## 2021-03-01 RX ORDER — POTASSIUM CHLORIDE 1500 MG/1
20 TABLET, EXTENDED RELEASE ORAL DAILY
Qty: 90 TABLET | Refills: 1 | Status: SHIPPED | OUTPATIENT
Start: 2021-03-01 | End: 2021-09-29 | Stop reason: SDUPTHER

## 2021-03-01 RX ORDER — LOSARTAN POTASSIUM 25 MG/1
25 TABLET ORAL DAILY
Qty: 90 TABLET | Refills: 1 | OUTPATIENT
Start: 2021-03-01 | End: 2021-03-01

## 2021-03-01 RX ORDER — METOPROLOL SUCCINATE 25 MG/1
25 TABLET, EXTENDED RELEASE ORAL DAILY
Qty: 90 TABLET | Refills: 1 | Status: SHIPPED | OUTPATIENT
Start: 2021-03-01 | End: 2021-07-23 | Stop reason: SDUPTHER

## 2021-03-01 RX ORDER — LORATADINE 10 MG/1
10 TABLET ORAL DAILY
COMMUNITY
End: 2022-12-16

## 2021-03-01 RX ORDER — RABEPRAZOLE SODIUM 20 MG/1
20 TABLET, DELAYED RELEASE ORAL DAILY
Qty: 90 TABLET | Refills: 1 | OUTPATIENT
Start: 2021-03-01 | End: 2021-03-01

## 2021-03-01 RX ORDER — ALPRAZOLAM 0.5 MG/1
0.5 TABLET ORAL DAILY PRN
Qty: 30 TABLET | Refills: 0 | Status: SHIPPED | OUTPATIENT
Start: 2021-03-01 | End: 2021-12-29 | Stop reason: SDUPTHER

## 2021-03-01 RX ORDER — ALPRAZOLAM 0.5 MG/1
0.5 TABLET ORAL DAILY PRN
Qty: 30 TABLET | Refills: 0 | Status: SHIPPED | OUTPATIENT
Start: 2021-03-01 | End: 2021-03-01

## 2021-03-01 RX ORDER — POTASSIUM CHLORIDE 1500 MG/1
20 TABLET, EXTENDED RELEASE ORAL DAILY
Qty: 90 TABLET | Refills: 1 | OUTPATIENT
Start: 2021-03-01 | End: 2021-03-01

## 2021-03-01 RX ORDER — DIVALPROEX SODIUM 500 MG/1
1000 TABLET, FILM COATED, EXTENDED RELEASE ORAL DAILY
Qty: 180 TABLET | Refills: 1 | OUTPATIENT
Start: 2021-03-01 | End: 2021-03-01

## 2021-03-03 ENCOUNTER — LAB VISIT (OUTPATIENT)
Dept: LAB | Facility: HOSPITAL | Age: 71
End: 2021-03-03
Attending: FAMILY MEDICINE
Payer: MEDICARE

## 2021-03-03 DIAGNOSIS — Z79.01 ANTICOAGULANT LONG-TERM USE: ICD-10-CM

## 2021-03-03 DIAGNOSIS — I10 HYPERTENSION, ESSENTIAL: ICD-10-CM

## 2021-03-03 DIAGNOSIS — E78.5 HYPERLIPIDEMIA, UNSPECIFIED HYPERLIPIDEMIA TYPE: ICD-10-CM

## 2021-03-03 DIAGNOSIS — D51.9 ANEMIA DUE TO VITAMIN B12 DEFICIENCY, UNSPECIFIED B12 DEFICIENCY TYPE: ICD-10-CM

## 2021-03-03 DIAGNOSIS — F31.9 BIPOLAR 1 DISORDER: ICD-10-CM

## 2021-03-03 DIAGNOSIS — E03.9 HYPOTHYROIDISM, UNSPECIFIED TYPE: ICD-10-CM

## 2021-03-03 DIAGNOSIS — E55.9 VITAMIN D DEFICIENCY: ICD-10-CM

## 2021-03-03 LAB
25(OH)D3+25(OH)D2 SERPL-MCNC: 53 NG/ML (ref 30–96)
ALBUMIN SERPL BCP-MCNC: 3.3 G/DL (ref 3.5–5.2)
ALP SERPL-CCNC: 78 U/L (ref 55–135)
ALT SERPL W/O P-5'-P-CCNC: 24 U/L (ref 10–44)
ANION GAP SERPL CALC-SCNC: 10 MMOL/L (ref 8–16)
AST SERPL-CCNC: 20 U/L (ref 10–40)
BASOPHILS # BLD AUTO: 0.05 K/UL (ref 0–0.2)
BASOPHILS NFR BLD: 0.7 % (ref 0–1.9)
BILIRUB SERPL-MCNC: 0.6 MG/DL (ref 0.1–1)
BUN SERPL-MCNC: 15 MG/DL (ref 8–23)
CALCIUM SERPL-MCNC: 9.3 MG/DL (ref 8.7–10.5)
CHLORIDE SERPL-SCNC: 103 MMOL/L (ref 95–110)
CHOLEST SERPL-MCNC: 157 MG/DL (ref 120–199)
CHOLEST/HDLC SERPL: 2.4 {RATIO} (ref 2–5)
CO2 SERPL-SCNC: 29 MMOL/L (ref 23–29)
CREAT SERPL-MCNC: 0.7 MG/DL (ref 0.5–1.4)
DIFFERENTIAL METHOD: ABNORMAL
EOSINOPHIL # BLD AUTO: 0.2 K/UL (ref 0–0.5)
EOSINOPHIL NFR BLD: 2.5 % (ref 0–8)
ERYTHROCYTE [DISTWIDTH] IN BLOOD BY AUTOMATED COUNT: 12.6 % (ref 11.5–14.5)
EST. GFR  (AFRICAN AMERICAN): >60 ML/MIN/1.73 M^2
EST. GFR  (NON AFRICAN AMERICAN): >60 ML/MIN/1.73 M^2
FERRITIN SERPL-MCNC: 347 NG/ML (ref 20–300)
GLUCOSE SERPL-MCNC: 95 MG/DL (ref 70–110)
HCT VFR BLD AUTO: 42 % (ref 37–48.5)
HDLC SERPL-MCNC: 66 MG/DL (ref 40–75)
HDLC SERPL: 42 % (ref 20–50)
HGB BLD-MCNC: 13.3 G/DL (ref 12–16)
IMM GRANULOCYTES # BLD AUTO: 0.02 K/UL (ref 0–0.04)
IMM GRANULOCYTES NFR BLD AUTO: 0.3 % (ref 0–0.5)
LDLC SERPL CALC-MCNC: 78.6 MG/DL (ref 63–159)
LYMPHOCYTES # BLD AUTO: 2.5 K/UL (ref 1–4.8)
LYMPHOCYTES NFR BLD: 36.2 % (ref 18–48)
MCH RBC QN AUTO: 32 PG (ref 27–31)
MCHC RBC AUTO-ENTMCNC: 31.7 G/DL (ref 32–36)
MCV RBC AUTO: 101 FL (ref 82–98)
MONOCYTES # BLD AUTO: 0.5 K/UL (ref 0.3–1)
MONOCYTES NFR BLD: 7.5 % (ref 4–15)
NEUTROPHILS # BLD AUTO: 3.6 K/UL (ref 1.8–7.7)
NEUTROPHILS NFR BLD: 52.8 % (ref 38–73)
NONHDLC SERPL-MCNC: 91 MG/DL
NRBC BLD-RTO: 0 /100 WBC
PLATELET # BLD AUTO: 223 K/UL (ref 150–350)
PMV BLD AUTO: 11.1 FL (ref 9.2–12.9)
POTASSIUM SERPL-SCNC: 4.7 MMOL/L (ref 3.5–5.1)
PROT SERPL-MCNC: 6.4 G/DL (ref 6–8.4)
RBC # BLD AUTO: 4.15 M/UL (ref 4–5.4)
SODIUM SERPL-SCNC: 142 MMOL/L (ref 136–145)
TRIGL SERPL-MCNC: 62 MG/DL (ref 30–150)
TSH SERPL DL<=0.005 MIU/L-ACNC: 1.82 UIU/ML (ref 0.34–5.6)
VALPROATE SERPL-MCNC: 49.4 UG/ML (ref 50–100)
VIT B12 SERPL-MCNC: >1500 PG/ML (ref 210–950)
WBC # BLD AUTO: 6.79 K/UL (ref 3.9–12.7)

## 2021-03-03 PROCEDURE — 80053 COMPREHEN METABOLIC PANEL: CPT | Performed by: FAMILY MEDICINE

## 2021-03-03 PROCEDURE — 82728 ASSAY OF FERRITIN: CPT | Performed by: FAMILY MEDICINE

## 2021-03-03 PROCEDURE — 85025 COMPLETE CBC W/AUTO DIFF WBC: CPT | Performed by: FAMILY MEDICINE

## 2021-03-03 PROCEDURE — 80164 ASSAY DIPROPYLACETIC ACD TOT: CPT | Performed by: FAMILY MEDICINE

## 2021-03-03 PROCEDURE — 86803 HEPATITIS C AB TEST: CPT | Performed by: FAMILY MEDICINE

## 2021-03-03 PROCEDURE — 82607 VITAMIN B-12: CPT | Performed by: FAMILY MEDICINE

## 2021-03-03 PROCEDURE — 36415 COLL VENOUS BLD VENIPUNCTURE: CPT

## 2021-03-03 PROCEDURE — 82306 VITAMIN D 25 HYDROXY: CPT | Performed by: FAMILY MEDICINE

## 2021-03-03 PROCEDURE — 84443 ASSAY THYROID STIM HORMONE: CPT | Performed by: FAMILY MEDICINE

## 2021-03-03 PROCEDURE — 80061 LIPID PANEL: CPT | Performed by: FAMILY MEDICINE

## 2021-03-04 LAB — HCV AB S/CO SERPL IA: <0.1 S/CO RATIO (ref 0–0.9)

## 2021-05-25 ENCOUNTER — TELEPHONE (OUTPATIENT)
Dept: FAMILY MEDICINE | Facility: CLINIC | Age: 71
End: 2021-05-25

## 2021-05-31 ENCOUNTER — TELEPHONE (OUTPATIENT)
Dept: FAMILY MEDICINE | Facility: CLINIC | Age: 71
End: 2021-05-31

## 2021-06-08 ENCOUNTER — TELEPHONE (OUTPATIENT)
Dept: FAMILY MEDICINE | Facility: CLINIC | Age: 71
End: 2021-06-08

## 2021-06-30 ENCOUNTER — OFFICE VISIT (OUTPATIENT)
Dept: CARDIOLOGY | Facility: CLINIC | Age: 71
End: 2021-06-30
Payer: MEDICARE

## 2021-06-30 VITALS
BODY MASS INDEX: 26.68 KG/M2 | HEART RATE: 60 BPM | DIASTOLIC BLOOD PRESSURE: 76 MMHG | SYSTOLIC BLOOD PRESSURE: 130 MMHG | WEIGHT: 166 LBS | OXYGEN SATURATION: 94 % | HEIGHT: 66 IN

## 2021-06-30 DIAGNOSIS — R94.31 NONSPECIFIC ABNORMAL ELECTROCARDIOGRAM (ECG) (EKG): ICD-10-CM

## 2021-06-30 DIAGNOSIS — I65.23 BILATERAL CAROTID ARTERY STENOSIS: ICD-10-CM

## 2021-06-30 DIAGNOSIS — Z79.01 ANTICOAGULANT LONG-TERM USE: ICD-10-CM

## 2021-06-30 DIAGNOSIS — I48.0 PAROXYSMAL ATRIAL FIBRILLATION: ICD-10-CM

## 2021-06-30 DIAGNOSIS — R06.02 SOB (SHORTNESS OF BREATH): Primary | ICD-10-CM

## 2021-06-30 PROCEDURE — 93000 EKG 12-LEAD: ICD-10-PCS | Mod: S$GLB,,, | Performed by: INTERNAL MEDICINE

## 2021-06-30 PROCEDURE — 99214 OFFICE O/P EST MOD 30 MIN: CPT | Mod: S$GLB,,, | Performed by: INTERNAL MEDICINE

## 2021-06-30 PROCEDURE — 93000 ELECTROCARDIOGRAM COMPLETE: CPT | Mod: S$GLB,,, | Performed by: INTERNAL MEDICINE

## 2021-06-30 PROCEDURE — 99214 PR OFFICE/OUTPT VISIT, EST, LEVL IV, 30-39 MIN: ICD-10-PCS | Mod: S$GLB,,, | Performed by: INTERNAL MEDICINE

## 2021-07-09 ENCOUNTER — HOSPITAL ENCOUNTER (OUTPATIENT)
Dept: CARDIOLOGY | Facility: CLINIC | Age: 71
Discharge: HOME | End: 2021-07-09
Attending: NURSE PRACTITIONER
Payer: MEDICARE

## 2021-07-09 ENCOUNTER — HOSPITAL ENCOUNTER (OUTPATIENT)
Dept: RADIOLOGY | Facility: CLINIC | Age: 71
Discharge: HOME | End: 2021-07-09
Attending: NURSE PRACTITIONER
Payer: MEDICARE

## 2021-07-09 DIAGNOSIS — R94.31 NONSPECIFIC ABNORMAL ELECTROCARDIOGRAM (ECG) (EKG): ICD-10-CM

## 2021-07-09 DIAGNOSIS — R06.02 SOB (SHORTNESS OF BREATH): ICD-10-CM

## 2021-07-09 DIAGNOSIS — I48.0 PAROXYSMAL ATRIAL FIBRILLATION: ICD-10-CM

## 2021-07-09 PROCEDURE — 93015 CV STRESS TEST SUPVJ I&R: CPT | Mod: S$GLB,,, | Performed by: INTERNAL MEDICINE

## 2021-07-09 PROCEDURE — 78452 STRESS TEST WITH MYOCARDIAL PERFUSION (CUPID ONLY): ICD-10-PCS | Mod: S$GLB,,, | Performed by: INTERNAL MEDICINE

## 2021-07-09 PROCEDURE — A9502 TC99M TETROFOSMIN: HCPCS | Mod: S$GLB,,, | Performed by: INTERNAL MEDICINE

## 2021-07-09 PROCEDURE — A9502 STRESS TEST WITH MYOCARDIAL PERFUSION (CUPID ONLY): ICD-10-PCS | Mod: S$GLB,,, | Performed by: INTERNAL MEDICINE

## 2021-07-09 PROCEDURE — 78452 HT MUSCLE IMAGE SPECT MULT: CPT | Mod: S$GLB,,, | Performed by: INTERNAL MEDICINE

## 2021-07-09 PROCEDURE — 93015 STRESS TEST WITH MYOCARDIAL PERFUSION (CUPID ONLY): ICD-10-PCS | Mod: S$GLB,,, | Performed by: INTERNAL MEDICINE

## 2021-07-09 RX ORDER — REGADENOSON 0.08 MG/ML
0.4 INJECTION, SOLUTION INTRAVENOUS ONCE
Status: COMPLETED | OUTPATIENT
Start: 2021-07-09 | End: 2021-07-09

## 2021-07-09 RX ADMIN — REGADENOSON 0.4 MG: 0.08 INJECTION, SOLUTION INTRAVENOUS at 08:07

## 2021-07-18 LAB
CV PHARM DOSE: 0.4 MG
CV STRESS BASE HR: 52 BPM
DIASTOLIC BLOOD PRESSURE: 82 MMHG
EJECTION FRACTION- HIGH: 65 %
END DIASTOLIC INDEX-HIGH: 158 ML/M2
END DIASTOLIC INDEX-LOW: 94 ML/M2
END SYSTOLIC INDEX-HIGH: 71 ML/M2
END SYSTOLIC INDEX-LOW: 33 ML/M2
NUC STRESS DIASTOLIC VOLUME INDEX: 81
NUC STRESS EJECTION FRACTION: 73 %
NUC STRESS SYSTOLIC VOLUME INDEX: 22
OHS CV CPX 1 MINUTE RECOVERY HEART RATE: 75 BPM
OHS CV CPX 85 PERCENT MAX PREDICTED HEART RATE MALE: 121
OHS CV CPX MAX PREDICTED HEART RATE: 143
OHS CV CPX PATIENT IS FEMALE: 1
OHS CV CPX PATIENT IS MALE: 0
OHS CV CPX PEAK DIASTOLIC BLOOD PRESSURE: 68 MMHG
OHS CV CPX PEAK HEAR RATE: 75 BPM
OHS CV CPX PEAK RATE PRESSURE PRODUCT: 9900
OHS CV CPX PEAK SYSTOLIC BLOOD PRESSURE: 132 MMHG
OHS CV CPX PERCENT MAX PREDICTED HEART RATE ACHIEVED: 53
OHS CV CPX RATE PRESSURE PRODUCT PRESENTING: 8320
RETIRED EF AND QEF - SEE NOTES: 53 %
SYSTOLIC BLOOD PRESSURE: 160 MMHG

## 2021-07-20 ENCOUNTER — TELEPHONE (OUTPATIENT)
Dept: FAMILY MEDICINE | Facility: CLINIC | Age: 71
End: 2021-07-20

## 2021-07-23 ENCOUNTER — OFFICE VISIT (OUTPATIENT)
Dept: FAMILY MEDICINE | Facility: CLINIC | Age: 71
End: 2021-07-23
Payer: MEDICARE

## 2021-07-23 ENCOUNTER — LAB VISIT (OUTPATIENT)
Dept: LAB | Facility: HOSPITAL | Age: 71
End: 2021-07-23
Attending: FAMILY MEDICINE
Payer: MEDICARE

## 2021-07-23 VITALS
BODY MASS INDEX: 26.52 KG/M2 | SYSTOLIC BLOOD PRESSURE: 136 MMHG | OXYGEN SATURATION: 98 % | DIASTOLIC BLOOD PRESSURE: 67 MMHG | TEMPERATURE: 97 F | WEIGHT: 165 LBS | HEIGHT: 66 IN | HEART RATE: 52 BPM

## 2021-07-23 DIAGNOSIS — H91.90 HEARING LOSS, UNSPECIFIED HEARING LOSS TYPE, UNSPECIFIED LATERALITY: ICD-10-CM

## 2021-07-23 DIAGNOSIS — I10 HYPERTENSION, ESSENTIAL: ICD-10-CM

## 2021-07-23 DIAGNOSIS — Z79.01 ANTICOAGULANT LONG-TERM USE: ICD-10-CM

## 2021-07-23 DIAGNOSIS — E03.9 HYPOTHYROIDISM, UNSPECIFIED TYPE: ICD-10-CM

## 2021-07-23 DIAGNOSIS — M54.50 LOW BACK PAIN, UNSPECIFIED BACK PAIN LATERALITY, UNSPECIFIED CHRONICITY, UNSPECIFIED WHETHER SCIATICA PRESENT: ICD-10-CM

## 2021-07-23 DIAGNOSIS — Z12.31 BREAST CANCER SCREENING BY MAMMOGRAM: ICD-10-CM

## 2021-07-23 DIAGNOSIS — I65.29 STENOSIS OF CAROTID ARTERY, UNSPECIFIED LATERALITY: ICD-10-CM

## 2021-07-23 DIAGNOSIS — F31.9 BIPOLAR 1 DISORDER: ICD-10-CM

## 2021-07-23 DIAGNOSIS — K21.9 GASTROESOPHAGEAL REFLUX DISEASE, UNSPECIFIED WHETHER ESOPHAGITIS PRESENT: ICD-10-CM

## 2021-07-23 DIAGNOSIS — N39.0 URINARY TRACT INFECTION WITHOUT HEMATURIA, SITE UNSPECIFIED: Primary | ICD-10-CM

## 2021-07-23 DIAGNOSIS — I48.21 PERMANENT ATRIAL FIBRILLATION: ICD-10-CM

## 2021-07-23 DIAGNOSIS — Z98.84 GASTRIC BYPASS STATUS FOR OBESITY: ICD-10-CM

## 2021-07-23 LAB
ALBUMIN SERPL BCP-MCNC: 3.6 G/DL (ref 3.5–5.2)
ALP SERPL-CCNC: 85 U/L (ref 55–135)
ALT SERPL W/O P-5'-P-CCNC: 20 U/L (ref 10–44)
ANION GAP SERPL CALC-SCNC: 9 MMOL/L (ref 8–16)
AST SERPL-CCNC: 16 U/L (ref 10–40)
BASOPHILS # BLD AUTO: 0.03 K/UL (ref 0–0.2)
BASOPHILS NFR BLD: 0.5 % (ref 0–1.9)
BILIRUB SERPL-MCNC: 0.8 MG/DL (ref 0.1–1)
BILIRUB SERPL-MCNC: ABNORMAL MG/DL
BLOOD URINE, POC: ABNORMAL
BUN SERPL-MCNC: 11 MG/DL (ref 8–23)
CALCIUM SERPL-MCNC: 9.1 MG/DL (ref 8.7–10.5)
CHLORIDE SERPL-SCNC: 100 MMOL/L (ref 95–110)
CO2 SERPL-SCNC: 29 MMOL/L (ref 23–29)
COLOR, POC UA: YELLOW
CREAT SERPL-MCNC: 0.6 MG/DL (ref 0.5–1.4)
DIFFERENTIAL METHOD: ABNORMAL
EOSINOPHIL # BLD AUTO: 0.1 K/UL (ref 0–0.5)
EOSINOPHIL NFR BLD: 1.9 % (ref 0–8)
ERYTHROCYTE [DISTWIDTH] IN BLOOD BY AUTOMATED COUNT: 12.7 % (ref 11.5–14.5)
EST. GFR  (AFRICAN AMERICAN): >60 ML/MIN/1.73 M^2
EST. GFR  (NON AFRICAN AMERICAN): >60 ML/MIN/1.73 M^2
GLUCOSE SERPL-MCNC: 103 MG/DL (ref 70–110)
GLUCOSE UR QL STRIP: ABNORMAL
HCT VFR BLD AUTO: 42.7 % (ref 37–48.5)
HGB BLD-MCNC: 13.6 G/DL (ref 12–16)
IMM GRANULOCYTES # BLD AUTO: 0.02 K/UL (ref 0–0.04)
IMM GRANULOCYTES NFR BLD AUTO: 0.3 % (ref 0–0.5)
KETONES UR QL STRIP: ABNORMAL
LEUKOCYTE ESTERASE URINE, POC: ABNORMAL
LYMPHOCYTES # BLD AUTO: 1.9 K/UL (ref 1–4.8)
LYMPHOCYTES NFR BLD: 32.9 % (ref 18–48)
MCH RBC QN AUTO: 32.2 PG (ref 27–31)
MCHC RBC AUTO-ENTMCNC: 31.9 G/DL (ref 32–36)
MCV RBC AUTO: 101 FL (ref 82–98)
MONOCYTES # BLD AUTO: 0.4 K/UL (ref 0.3–1)
MONOCYTES NFR BLD: 7.2 % (ref 4–15)
NEUTROPHILS # BLD AUTO: 3.4 K/UL (ref 1.8–7.7)
NEUTROPHILS NFR BLD: 57.2 % (ref 38–73)
NITRITE, POC UA: POSITIVE
NRBC BLD-RTO: 0 /100 WBC
PH, POC UA: 6
PLATELET # BLD AUTO: 232 K/UL (ref 150–450)
PMV BLD AUTO: 11.2 FL (ref 9.2–12.9)
POTASSIUM SERPL-SCNC: 4.5 MMOL/L (ref 3.5–5.1)
PROT SERPL-MCNC: 6.8 G/DL (ref 6–8.4)
PROTEIN, POC: ABNORMAL
RBC # BLD AUTO: 4.22 M/UL (ref 4–5.4)
SODIUM SERPL-SCNC: 138 MMOL/L (ref 136–145)
SPECIFIC GRAVITY, POC UA: 1.01
UROBILINOGEN, POC UA: NORMAL
VALPROATE SERPL-MCNC: 44.8 UG/ML (ref 50–100)
WBC # BLD AUTO: 5.87 K/UL (ref 3.9–12.7)

## 2021-07-23 PROCEDURE — 85025 COMPLETE CBC W/AUTO DIFF WBC: CPT | Performed by: FAMILY MEDICINE

## 2021-07-23 PROCEDURE — 99214 OFFICE O/P EST MOD 30 MIN: CPT | Mod: S$GLB,,, | Performed by: FAMILY MEDICINE

## 2021-07-23 PROCEDURE — 99214 PR OFFICE/OUTPT VISIT, EST, LEVL IV, 30-39 MIN: ICD-10-PCS | Mod: S$GLB,,, | Performed by: FAMILY MEDICINE

## 2021-07-23 PROCEDURE — 80164 ASSAY DIPROPYLACETIC ACD TOT: CPT | Performed by: FAMILY MEDICINE

## 2021-07-23 PROCEDURE — 36415 COLL VENOUS BLD VENIPUNCTURE: CPT | Performed by: FAMILY MEDICINE

## 2021-07-23 PROCEDURE — 81003 URINALYSIS AUTO W/O SCOPE: CPT | Mod: QW,S$GLB,, | Performed by: FAMILY MEDICINE

## 2021-07-23 PROCEDURE — 81003 POCT URINALYSIS W/O SCOPE: ICD-10-PCS | Mod: QW,S$GLB,, | Performed by: FAMILY MEDICINE

## 2021-07-23 PROCEDURE — 80053 COMPREHEN METABOLIC PANEL: CPT | Performed by: FAMILY MEDICINE

## 2021-07-23 RX ORDER — RABEPRAZOLE SODIUM 20 MG/1
20 TABLET, DELAYED RELEASE ORAL DAILY
Qty: 90 TABLET | Refills: 1 | Status: SHIPPED | OUTPATIENT
Start: 2021-07-23 | End: 2021-09-29 | Stop reason: SDUPTHER

## 2021-07-23 RX ORDER — L-MEFOL/A-CYST/MEB12/ALGAL OIL 6-600-2 MG
TABLET ORAL
Qty: 90 TABLET | Refills: 1 | Status: SHIPPED | OUTPATIENT
Start: 2021-07-23 | End: 2021-09-29 | Stop reason: SDUPTHER

## 2021-07-23 RX ORDER — L-MEFOL/A-CYST/MEB12/ALGAL OIL 6-600-2 MG
TABLET ORAL
COMMUNITY
End: 2021-07-23 | Stop reason: SDUPTHER

## 2021-07-23 RX ORDER — SUCRALFATE 1 G/1
1 TABLET ORAL 3 TIMES DAILY
Qty: 270 TABLET | Refills: 1 | Status: SHIPPED | OUTPATIENT
Start: 2021-07-23 | End: 2021-12-29

## 2021-07-23 RX ORDER — NITROFURANTOIN 25; 75 MG/1; MG/1
100 CAPSULE ORAL 2 TIMES DAILY
Qty: 20 CAPSULE | Refills: 0 | Status: SHIPPED | OUTPATIENT
Start: 2021-07-23 | End: 2021-09-10

## 2021-07-23 RX ORDER — LOSARTAN POTASSIUM 25 MG/1
25 TABLET ORAL DAILY
Qty: 90 TABLET | Refills: 1 | Status: SHIPPED | OUTPATIENT
Start: 2021-07-23 | End: 2021-12-29 | Stop reason: SDUPTHER

## 2021-07-23 RX ORDER — SUCRALFATE 1 G/1
1 TABLET ORAL 4 TIMES DAILY
COMMUNITY
End: 2021-07-23 | Stop reason: SDUPTHER

## 2021-07-23 RX ORDER — NITROFURANTOIN 25; 75 MG/1; MG/1
100 CAPSULE ORAL 2 TIMES DAILY
COMMUNITY
End: 2021-07-23 | Stop reason: SDUPTHER

## 2021-07-23 RX ORDER — METOPROLOL SUCCINATE 25 MG/1
25 TABLET, EXTENDED RELEASE ORAL DAILY
Qty: 90 TABLET | Refills: 1 | Status: SHIPPED | OUTPATIENT
Start: 2021-07-23 | End: 2021-11-15

## 2021-07-25 LAB — BACTERIA UR CULT: ABNORMAL

## 2021-07-26 ENCOUNTER — TELEPHONE (OUTPATIENT)
Dept: FAMILY MEDICINE | Facility: CLINIC | Age: 71
End: 2021-07-26

## 2021-07-27 PROBLEM — R06.02 SOB (SHORTNESS OF BREATH): Status: RESOLVED | Noted: 2021-06-30 | Resolved: 2021-07-27

## 2021-07-27 PROBLEM — H91.90 HEARING LOSS: Status: ACTIVE | Noted: 2021-07-27

## 2021-07-27 PROBLEM — A04.72 C. DIFFICILE COLITIS: Status: RESOLVED | Noted: 2019-09-21 | Resolved: 2021-07-27

## 2021-07-27 PROBLEM — R19.7 DIARRHEA: Status: RESOLVED | Noted: 2019-09-21 | Resolved: 2021-07-27

## 2021-07-27 PROBLEM — R94.31 NONSPECIFIC ABNORMAL ELECTROCARDIOGRAM (ECG) (EKG): Status: RESOLVED | Noted: 2021-06-30 | Resolved: 2021-07-27

## 2021-07-27 PROBLEM — R26.89 LOSS OF BALANCE: Status: RESOLVED | Noted: 2019-10-23 | Resolved: 2021-07-27

## 2021-08-04 ENCOUNTER — OFFICE VISIT (OUTPATIENT)
Dept: CARDIOLOGY | Facility: CLINIC | Age: 71
End: 2021-08-04
Payer: MEDICARE

## 2021-08-04 ENCOUNTER — TELEPHONE (OUTPATIENT)
Dept: CARDIOLOGY | Facility: CLINIC | Age: 71
End: 2021-08-04

## 2021-08-04 VITALS
WEIGHT: 162 LBS | HEART RATE: 56 BPM | HEIGHT: 66 IN | BODY MASS INDEX: 26.03 KG/M2 | SYSTOLIC BLOOD PRESSURE: 122 MMHG | DIASTOLIC BLOOD PRESSURE: 70 MMHG

## 2021-08-04 DIAGNOSIS — K21.00 GASTROESOPHAGEAL REFLUX DISEASE WITH ESOPHAGITIS WITHOUT HEMORRHAGE: ICD-10-CM

## 2021-08-04 DIAGNOSIS — I95.1 ORTHOSTASIS: ICD-10-CM

## 2021-08-04 DIAGNOSIS — Z79.01 ANTICOAGULANT LONG-TERM USE: ICD-10-CM

## 2021-08-04 DIAGNOSIS — I48.0 PAROXYSMAL ATRIAL FIBRILLATION: ICD-10-CM

## 2021-08-04 DIAGNOSIS — E78.00 PURE HYPERCHOLESTEROLEMIA: ICD-10-CM

## 2021-08-04 DIAGNOSIS — I10 HYPERTENSION, ESSENTIAL: ICD-10-CM

## 2021-08-04 PROCEDURE — 99214 PR OFFICE/OUTPT VISIT, EST, LEVL IV, 30-39 MIN: ICD-10-PCS | Mod: S$GLB,,, | Performed by: INTERNAL MEDICINE

## 2021-08-04 PROCEDURE — 99214 OFFICE O/P EST MOD 30 MIN: CPT | Mod: S$GLB,,, | Performed by: INTERNAL MEDICINE

## 2021-08-11 ENCOUNTER — HOSPITAL ENCOUNTER (OUTPATIENT)
Dept: RADIOLOGY | Facility: HOSPITAL | Age: 71
Discharge: HOME | End: 2021-08-11
Attending: FAMILY MEDICINE
Payer: MEDICARE

## 2021-08-11 VITALS — BODY MASS INDEX: 26.05 KG/M2 | WEIGHT: 162.06 LBS | HEIGHT: 66 IN

## 2021-08-11 DIAGNOSIS — Z12.31 BREAST CANCER SCREENING BY MAMMOGRAM: ICD-10-CM

## 2021-08-11 PROCEDURE — 77067 SCR MAMMO BI INCL CAD: CPT | Mod: TC,PO

## 2021-09-09 ENCOUNTER — TELEPHONE (OUTPATIENT)
Dept: FAMILY MEDICINE | Facility: CLINIC | Age: 71
End: 2021-09-09

## 2021-09-10 ENCOUNTER — HOSPITAL ENCOUNTER (OUTPATIENT)
Dept: RADIOLOGY | Facility: HOSPITAL | Age: 71
Discharge: HOME | End: 2021-09-10
Attending: NURSE PRACTITIONER
Payer: MEDICARE

## 2021-09-10 ENCOUNTER — OFFICE VISIT (OUTPATIENT)
Dept: FAMILY MEDICINE | Facility: CLINIC | Age: 71
End: 2021-09-10
Payer: MEDICARE

## 2021-09-10 VITALS
SYSTOLIC BLOOD PRESSURE: 138 MMHG | HEIGHT: 66 IN | TEMPERATURE: 96 F | WEIGHT: 167 LBS | BODY MASS INDEX: 26.84 KG/M2 | OXYGEN SATURATION: 98 % | DIASTOLIC BLOOD PRESSURE: 70 MMHG | HEART RATE: 56 BPM

## 2021-09-10 DIAGNOSIS — F31.9 BIPOLAR 1 DISORDER: ICD-10-CM

## 2021-09-10 DIAGNOSIS — M54.9 UPPER BACK PAIN ON RIGHT SIDE: ICD-10-CM

## 2021-09-10 DIAGNOSIS — F41.9 ANXIETY: Primary | ICD-10-CM

## 2021-09-10 DIAGNOSIS — E78.00 PURE HYPERCHOLESTEROLEMIA: ICD-10-CM

## 2021-09-10 DIAGNOSIS — I48.0 PAROXYSMAL ATRIAL FIBRILLATION: ICD-10-CM

## 2021-09-10 DIAGNOSIS — D51.9 ANEMIA DUE TO VITAMIN B12 DEFICIENCY, UNSPECIFIED B12 DEFICIENCY TYPE: ICD-10-CM

## 2021-09-10 DIAGNOSIS — I65.23 BILATERAL CAROTID ARTERY STENOSIS: ICD-10-CM

## 2021-09-10 DIAGNOSIS — Z79.01 ANTICOAGULANT LONG-TERM USE: ICD-10-CM

## 2021-09-10 DIAGNOSIS — E55.9 VITAMIN D DEFICIENCY: ICD-10-CM

## 2021-09-10 DIAGNOSIS — E03.9 HYPOTHYROIDISM, UNSPECIFIED TYPE: ICD-10-CM

## 2021-09-10 DIAGNOSIS — K21.00 GASTROESOPHAGEAL REFLUX DISEASE WITH ESOPHAGITIS WITHOUT HEMORRHAGE: ICD-10-CM

## 2021-09-10 DIAGNOSIS — R41.89 COGNITIVE IMPAIRMENT: ICD-10-CM

## 2021-09-10 DIAGNOSIS — I10 HYPERTENSION, ESSENTIAL: ICD-10-CM

## 2021-09-10 PROBLEM — H26.9 CATARACT: Status: RESOLVED | Noted: 2020-07-15 | Resolved: 2021-09-10

## 2021-09-10 PROCEDURE — 99214 PR OFFICE/OUTPT VISIT, EST, LEVL IV, 30-39 MIN: ICD-10-PCS | Mod: S$GLB,,, | Performed by: NURSE PRACTITIONER

## 2021-09-10 PROCEDURE — 99214 OFFICE O/P EST MOD 30 MIN: CPT | Mod: S$GLB,,, | Performed by: NURSE PRACTITIONER

## 2021-09-10 PROCEDURE — 72070 X-RAY EXAM THORAC SPINE 2VWS: CPT | Mod: TC

## 2021-09-10 PROCEDURE — 72040 X-RAY EXAM NECK SPINE 2-3 VW: CPT | Mod: TC

## 2021-09-10 RX ORDER — TIZANIDINE 4 MG/1
4 TABLET ORAL EVERY 8 HOURS PRN
Qty: 60 TABLET | Refills: 1 | Status: SHIPPED | OUTPATIENT
Start: 2021-09-10 | End: 2021-09-20

## 2021-09-15 ENCOUNTER — TELEPHONE (OUTPATIENT)
Dept: FAMILY MEDICINE | Facility: CLINIC | Age: 71
End: 2021-09-15

## 2021-09-20 ENCOUNTER — TELEPHONE (OUTPATIENT)
Dept: FAMILY MEDICINE | Facility: CLINIC | Age: 71
End: 2021-09-20

## 2021-09-27 ENCOUNTER — TELEPHONE (OUTPATIENT)
Dept: FAMILY MEDICINE | Facility: CLINIC | Age: 71
End: 2021-09-27

## 2021-09-28 ENCOUNTER — TELEPHONE (OUTPATIENT)
Dept: FAMILY MEDICINE | Facility: CLINIC | Age: 71
End: 2021-09-28

## 2021-09-28 ENCOUNTER — HOSPITAL ENCOUNTER (EMERGENCY)
Facility: HOSPITAL | Age: 71
Discharge: HOME | End: 2021-09-28
Attending: EMERGENCY MEDICINE
Payer: MEDICARE

## 2021-09-28 VITALS
BODY MASS INDEX: 26.84 KG/M2 | HEIGHT: 66 IN | SYSTOLIC BLOOD PRESSURE: 155 MMHG | HEART RATE: 64 BPM | OXYGEN SATURATION: 97 % | TEMPERATURE: 99 F | WEIGHT: 167 LBS | DIASTOLIC BLOOD PRESSURE: 93 MMHG | RESPIRATION RATE: 18 BRPM

## 2021-09-28 DIAGNOSIS — M79.604 PAIN OF RIGHT LOWER EXTREMITY: Primary | ICD-10-CM

## 2021-09-28 DIAGNOSIS — R52 PAIN: ICD-10-CM

## 2021-09-28 PROCEDURE — 99284 EMERGENCY DEPT VISIT MOD MDM: CPT | Mod: 25

## 2021-09-28 RX ORDER — TRAMADOL HYDROCHLORIDE 50 MG/1
50 TABLET ORAL EVERY 6 HOURS PRN
Qty: 6 TABLET | Refills: 0 | Status: SHIPPED | OUTPATIENT
Start: 2021-09-28 | End: 2021-12-29

## 2021-09-29 ENCOUNTER — OFFICE VISIT (OUTPATIENT)
Dept: FAMILY MEDICINE | Facility: CLINIC | Age: 71
End: 2021-09-29
Payer: MEDICARE

## 2021-09-29 VITALS
HEIGHT: 66 IN | HEART RATE: 61 BPM | WEIGHT: 142 LBS | SYSTOLIC BLOOD PRESSURE: 127 MMHG | TEMPERATURE: 97 F | BODY MASS INDEX: 22.82 KG/M2 | OXYGEN SATURATION: 96 % | DIASTOLIC BLOOD PRESSURE: 58 MMHG

## 2021-09-29 DIAGNOSIS — K21.00 GASTROESOPHAGEAL REFLUX DISEASE WITH ESOPHAGITIS WITHOUT HEMORRHAGE: Primary | ICD-10-CM

## 2021-09-29 DIAGNOSIS — I10 HYPERTENSION, ESSENTIAL: ICD-10-CM

## 2021-09-29 DIAGNOSIS — I48.21 PERMANENT ATRIAL FIBRILLATION: ICD-10-CM

## 2021-09-29 DIAGNOSIS — E55.9 VITAMIN D DEFICIENCY: ICD-10-CM

## 2021-09-29 DIAGNOSIS — I65.23 BILATERAL CAROTID ARTERY STENOSIS: ICD-10-CM

## 2021-09-29 DIAGNOSIS — E78.00 PURE HYPERCHOLESTEROLEMIA: ICD-10-CM

## 2021-09-29 DIAGNOSIS — F41.9 ANXIETY: ICD-10-CM

## 2021-09-29 DIAGNOSIS — D51.9 ANEMIA DUE TO VITAMIN B12 DEFICIENCY, UNSPECIFIED B12 DEFICIENCY TYPE: ICD-10-CM

## 2021-09-29 DIAGNOSIS — E03.9 HYPOTHYROIDISM, UNSPECIFIED TYPE: ICD-10-CM

## 2021-09-29 DIAGNOSIS — Z79.01 ANTICOAGULANT LONG-TERM USE: ICD-10-CM

## 2021-09-29 DIAGNOSIS — F31.9 BIPOLAR 1 DISORDER: ICD-10-CM

## 2021-09-29 PROCEDURE — 90694 FLU VACCINE - QUADRIVALENT - ADJUVANTED: ICD-10-PCS | Mod: S$GLB,,, | Performed by: NURSE PRACTITIONER

## 2021-09-29 PROCEDURE — G0008 FLU VACCINE - QUADRIVALENT - ADJUVANTED: ICD-10-PCS | Mod: S$GLB,,, | Performed by: NURSE PRACTITIONER

## 2021-09-29 PROCEDURE — 90694 VACC AIIV4 NO PRSRV 0.5ML IM: CPT | Mod: S$GLB,,, | Performed by: NURSE PRACTITIONER

## 2021-09-29 PROCEDURE — 99214 PR OFFICE/OUTPT VISIT, EST, LEVL IV, 30-39 MIN: ICD-10-PCS | Mod: S$GLB,,, | Performed by: NURSE PRACTITIONER

## 2021-09-29 PROCEDURE — 99214 OFFICE O/P EST MOD 30 MIN: CPT | Mod: S$GLB,,, | Performed by: NURSE PRACTITIONER

## 2021-09-29 PROCEDURE — G0008 ADMIN INFLUENZA VIRUS VAC: HCPCS | Mod: S$GLB,,, | Performed by: NURSE PRACTITIONER

## 2021-09-29 RX ORDER — BUPROPION HYDROCHLORIDE 300 MG/1
300 TABLET ORAL DAILY
Qty: 90 TABLET | Refills: 1 | Status: SHIPPED | OUTPATIENT
Start: 2021-09-29 | End: 2022-03-28 | Stop reason: SDUPTHER

## 2021-09-29 RX ORDER — LEVOTHYROXINE SODIUM 100 UG/1
100 TABLET ORAL DAILY
Qty: 90 TABLET | Refills: 1 | Status: SHIPPED | OUTPATIENT
Start: 2021-09-29 | End: 2022-03-28 | Stop reason: SDUPTHER

## 2021-09-29 RX ORDER — POTASSIUM CHLORIDE 1500 MG/1
20 TABLET, EXTENDED RELEASE ORAL DAILY
Qty: 90 TABLET | Refills: 1 | Status: SHIPPED | OUTPATIENT
Start: 2021-09-29 | End: 2021-12-29 | Stop reason: SDUPTHER

## 2021-09-29 RX ORDER — AMLODIPINE BESYLATE 5 MG/1
5 TABLET ORAL DAILY
Qty: 90 TABLET | Refills: 1 | Status: SHIPPED | OUTPATIENT
Start: 2021-09-29 | End: 2022-03-28 | Stop reason: SDUPTHER

## 2021-09-29 RX ORDER — DIVALPROEX SODIUM 500 MG/1
1000 TABLET, FILM COATED, EXTENDED RELEASE ORAL DAILY
Qty: 180 TABLET | Refills: 1 | Status: SHIPPED | OUTPATIENT
Start: 2021-09-29 | End: 2022-03-28 | Stop reason: SDUPTHER

## 2021-09-29 RX ORDER — TRAZODONE HYDROCHLORIDE 100 MG/1
100 TABLET ORAL NIGHTLY
Qty: 180 TABLET | Refills: 3 | Status: SHIPPED | OUTPATIENT
Start: 2021-09-29 | End: 2021-12-29 | Stop reason: SDUPTHER

## 2021-09-29 RX ORDER — APIXABAN 5 MG/1
5 TABLET, FILM COATED ORAL 2 TIMES DAILY
Qty: 180 TABLET | Refills: 3 | Status: SHIPPED | OUTPATIENT
Start: 2021-09-29 | End: 2023-01-03 | Stop reason: SDUPTHER

## 2021-09-29 RX ORDER — DRONEDARONE 400 MG/1
400 TABLET, FILM COATED ORAL 2 TIMES DAILY
Qty: 180 TABLET | Refills: 1 | Status: SHIPPED | OUTPATIENT
Start: 2021-09-29 | End: 2021-12-29 | Stop reason: SDUPTHER

## 2021-09-29 RX ORDER — L-MEFOL/A-CYST/MEB12/ALGAL OIL 6-600-2 MG
TABLET ORAL
Qty: 90 TABLET | Refills: 1 | Status: SHIPPED | OUTPATIENT
Start: 2021-09-29 | End: 2022-01-13

## 2021-09-29 RX ORDER — RABEPRAZOLE SODIUM 20 MG/1
20 TABLET, DELAYED RELEASE ORAL DAILY
Qty: 90 TABLET | Refills: 1 | Status: SHIPPED | OUTPATIENT
Start: 2021-09-29 | End: 2022-03-28 | Stop reason: SDUPTHER

## 2021-10-05 ENCOUNTER — LAB VISIT (OUTPATIENT)
Dept: LAB | Facility: HOSPITAL | Age: 71
End: 2021-10-05
Attending: NURSE PRACTITIONER
Payer: MEDICARE

## 2021-10-05 DIAGNOSIS — F31.9 BIPOLAR 1 DISORDER: ICD-10-CM

## 2021-10-05 DIAGNOSIS — I10 HYPERTENSION, ESSENTIAL: ICD-10-CM

## 2021-10-05 DIAGNOSIS — Z79.01 ANTICOAGULANT LONG-TERM USE: ICD-10-CM

## 2021-10-05 DIAGNOSIS — E78.00 PURE HYPERCHOLESTEROLEMIA: ICD-10-CM

## 2021-10-05 DIAGNOSIS — E55.9 VITAMIN D DEFICIENCY: ICD-10-CM

## 2021-10-05 DIAGNOSIS — E03.9 HYPOTHYROIDISM, UNSPECIFIED TYPE: ICD-10-CM

## 2021-10-05 LAB
25(OH)D3+25(OH)D2 SERPL-MCNC: 49 NG/ML (ref 30–96)
ALBUMIN SERPL BCP-MCNC: 3.5 G/DL (ref 3.5–5.2)
ALP SERPL-CCNC: 83 U/L (ref 55–135)
ALT SERPL W/O P-5'-P-CCNC: 17 U/L (ref 10–44)
ANION GAP SERPL CALC-SCNC: 11 MMOL/L (ref 8–16)
AST SERPL-CCNC: 15 U/L (ref 10–40)
BASOPHILS # BLD AUTO: 0.04 K/UL (ref 0–0.2)
BASOPHILS NFR BLD: 0.7 % (ref 0–1.9)
BILIRUB SERPL-MCNC: 0.7 MG/DL (ref 0.1–1)
BUN SERPL-MCNC: 14 MG/DL (ref 8–23)
CALCIUM SERPL-MCNC: 9.4 MG/DL (ref 8.7–10.5)
CHLORIDE SERPL-SCNC: 108 MMOL/L (ref 95–110)
CHOLEST SERPL-MCNC: 160 MG/DL (ref 120–199)
CHOLEST/HDLC SERPL: 2.2 {RATIO} (ref 2–5)
CO2 SERPL-SCNC: 28 MMOL/L (ref 23–29)
CREAT SERPL-MCNC: 0.7 MG/DL (ref 0.5–1.4)
DIFFERENTIAL METHOD: ABNORMAL
EOSINOPHIL # BLD AUTO: 0.1 K/UL (ref 0–0.5)
EOSINOPHIL NFR BLD: 2.5 % (ref 0–8)
ERYTHROCYTE [DISTWIDTH] IN BLOOD BY AUTOMATED COUNT: 12.5 % (ref 11.5–14.5)
EST. GFR  (AFRICAN AMERICAN): >60 ML/MIN/1.73 M^2
EST. GFR  (NON AFRICAN AMERICAN): >60 ML/MIN/1.73 M^2
GLUCOSE SERPL-MCNC: 99 MG/DL (ref 70–110)
HCT VFR BLD AUTO: 42.8 % (ref 37–48.5)
HDLC SERPL-MCNC: 72 MG/DL (ref 40–75)
HDLC SERPL: 45 % (ref 20–50)
HGB BLD-MCNC: 13.4 G/DL (ref 12–16)
IMM GRANULOCYTES # BLD AUTO: 0.01 K/UL (ref 0–0.04)
IMM GRANULOCYTES NFR BLD AUTO: 0.2 % (ref 0–0.5)
LDLC SERPL CALC-MCNC: 75.4 MG/DL (ref 63–159)
LYMPHOCYTES # BLD AUTO: 2.1 K/UL (ref 1–4.8)
LYMPHOCYTES NFR BLD: 38.1 % (ref 18–48)
MCH RBC QN AUTO: 31.5 PG (ref 27–31)
MCHC RBC AUTO-ENTMCNC: 31.3 G/DL (ref 32–36)
MCV RBC AUTO: 101 FL (ref 82–98)
MONOCYTES # BLD AUTO: 0.4 K/UL (ref 0.3–1)
MONOCYTES NFR BLD: 7.9 % (ref 4–15)
NEUTROPHILS # BLD AUTO: 2.8 K/UL (ref 1.8–7.7)
NEUTROPHILS NFR BLD: 50.6 % (ref 38–73)
NONHDLC SERPL-MCNC: 88 MG/DL
NRBC BLD-RTO: 0 /100 WBC
PLATELET # BLD AUTO: 229 K/UL (ref 150–450)
PMV BLD AUTO: 10.8 FL (ref 9.2–12.9)
POTASSIUM SERPL-SCNC: 4.8 MMOL/L (ref 3.5–5.1)
PROT SERPL-MCNC: 6.5 G/DL (ref 6–8.4)
RBC # BLD AUTO: 4.25 M/UL (ref 4–5.4)
SODIUM SERPL-SCNC: 147 MMOL/L (ref 136–145)
TRIGL SERPL-MCNC: 63 MG/DL (ref 30–150)
TSH SERPL DL<=0.005 MIU/L-ACNC: 1.81 UIU/ML (ref 0.34–5.6)
VALPROATE SERPL-MCNC: 45.4 UG/ML (ref 50–100)
WBC # BLD AUTO: 5.54 K/UL (ref 3.9–12.7)

## 2021-10-05 PROCEDURE — 36415 COLL VENOUS BLD VENIPUNCTURE: CPT | Performed by: NURSE PRACTITIONER

## 2021-10-05 PROCEDURE — 82306 VITAMIN D 25 HYDROXY: CPT | Performed by: NURSE PRACTITIONER

## 2021-10-05 PROCEDURE — 80053 COMPREHEN METABOLIC PANEL: CPT | Performed by: NURSE PRACTITIONER

## 2021-10-05 PROCEDURE — 80164 ASSAY DIPROPYLACETIC ACD TOT: CPT | Performed by: NURSE PRACTITIONER

## 2021-10-05 PROCEDURE — 80061 LIPID PANEL: CPT | Performed by: NURSE PRACTITIONER

## 2021-10-05 PROCEDURE — 84443 ASSAY THYROID STIM HORMONE: CPT | Performed by: NURSE PRACTITIONER

## 2021-10-05 PROCEDURE — 85025 COMPLETE CBC W/AUTO DIFF WBC: CPT | Performed by: NURSE PRACTITIONER

## 2021-10-07 ENCOUNTER — IMMUNIZATION (OUTPATIENT)
Dept: PRIMARY CARE CLINIC | Facility: CLINIC | Age: 71
End: 2021-10-07
Payer: MEDICARE

## 2021-10-07 DIAGNOSIS — Z23 NEED FOR VACCINATION: Primary | ICD-10-CM

## 2021-10-07 PROCEDURE — 0003A COVID-19, MRNA, LNP-S, PF, 30 MCG/0.3 ML DOSE VACCINE: ICD-10-PCS | Mod: S$GLB,,, | Performed by: FAMILY MEDICINE

## 2021-10-07 PROCEDURE — 91300 COVID-19, MRNA, LNP-S, PF, 30 MCG/0.3 ML DOSE VACCINE: CPT | Mod: S$GLB,,, | Performed by: FAMILY MEDICINE

## 2021-10-07 PROCEDURE — 91300 COVID-19, MRNA, LNP-S, PF, 30 MCG/0.3 ML DOSE VACCINE: ICD-10-PCS | Mod: S$GLB,,, | Performed by: FAMILY MEDICINE

## 2021-10-07 PROCEDURE — 0003A COVID-19, MRNA, LNP-S, PF, 30 MCG/0.3 ML DOSE VACCINE: CPT | Mod: S$GLB,,, | Performed by: FAMILY MEDICINE

## 2021-11-15 ENCOUNTER — OFFICE VISIT (OUTPATIENT)
Dept: CARDIOLOGY | Facility: CLINIC | Age: 71
End: 2021-11-15
Payer: MEDICARE

## 2021-11-15 VITALS
HEIGHT: 66 IN | WEIGHT: 164 LBS | SYSTOLIC BLOOD PRESSURE: 124 MMHG | DIASTOLIC BLOOD PRESSURE: 64 MMHG | HEART RATE: 60 BPM | OXYGEN SATURATION: 95 % | BODY MASS INDEX: 26.36 KG/M2

## 2021-11-15 DIAGNOSIS — I48.0 PAROXYSMAL ATRIAL FIBRILLATION: ICD-10-CM

## 2021-11-15 DIAGNOSIS — R06.02 SHORTNESS OF BREATH: ICD-10-CM

## 2021-11-15 DIAGNOSIS — I10 HYPERTENSION, ESSENTIAL: Primary | ICD-10-CM

## 2021-11-15 PROCEDURE — 99214 PR OFFICE/OUTPT VISIT, EST, LEVL IV, 30-39 MIN: ICD-10-PCS | Mod: S$GLB,,, | Performed by: NURSE PRACTITIONER

## 2021-11-15 PROCEDURE — 99214 OFFICE O/P EST MOD 30 MIN: CPT | Mod: S$GLB,,, | Performed by: NURSE PRACTITIONER

## 2021-11-15 RX ORDER — DIPHENOXYLATE HYDROCHLORIDE AND ATROPINE SULFATE 2.5; .025 MG/1; MG/1
1 TABLET ORAL 4 TIMES DAILY PRN
COMMUNITY
End: 2021-12-29 | Stop reason: SDUPTHER

## 2021-11-15 RX ORDER — METOPROLOL SUCCINATE 25 MG/1
12.5 TABLET, EXTENDED RELEASE ORAL DAILY
Qty: 90 TABLET | Refills: 1 | Status: SHIPPED | OUTPATIENT
Start: 2021-11-15 | End: 2022-03-28 | Stop reason: SDUPTHER

## 2021-12-09 ENCOUNTER — HOSPITAL ENCOUNTER (OUTPATIENT)
Dept: CARDIOLOGY | Facility: CLINIC | Age: 71
Discharge: HOME | End: 2021-12-09
Attending: NURSE PRACTITIONER
Payer: MEDICARE

## 2021-12-09 VITALS — WEIGHT: 164 LBS | BODY MASS INDEX: 26.36 KG/M2 | HEIGHT: 66 IN

## 2021-12-09 DIAGNOSIS — I10 HYPERTENSION, ESSENTIAL: ICD-10-CM

## 2021-12-09 PROCEDURE — 93306 ECHO (CUPID ONLY): ICD-10-PCS | Mod: S$GLB,,, | Performed by: INTERNAL MEDICINE

## 2021-12-09 PROCEDURE — 93306 TTE W/DOPPLER COMPLETE: CPT | Mod: S$GLB,,, | Performed by: INTERNAL MEDICINE

## 2021-12-12 LAB
AORTIC VALVE CUSP SEPERATION: 1.9 CM
AV INDEX (PROSTH): 0.62
AV MEAN GRADIENT: 5 MMHG
AV PEAK GRADIENT: 5 MMHG
AV VALVE AREA: 2.36 CM2
AV VELOCITY RATIO: 0.7
BSA FOR ECHO PROCEDURE: 1.86 M2
CV ECHO LV RWT: 0.42 CM
DOP CALC AO PEAK VEL: 1.08 M/S
DOP CALC AO VTI: 27.4 CM
DOP CALC LVOT AREA: 3.8 CM2
DOP CALC LVOT DIAMETER: 2.2 CM
DOP CALC LVOT PEAK VEL: 0.76 M/S
DOP CALC LVOT STROKE VOLUME: 64.59 CM3
DOP CALCLVOT PEAK VEL VTI: 17 CM
E WAVE DECELERATION TIME: 292 MS
E/A RATIO: 0.69
E/E' RATIO: 14.55 M/S
ECHO LV POSTERIOR WALL: 0.95 CM (ref 0.6–1.1)
EJECTION FRACTION: 48 %
FRACTIONAL SHORTENING: 24 % (ref 28–44)
INTERVENTRICULAR SEPTUM: 1 CM (ref 0.6–1.1)
IVRT: 162 MS
LEFT ATRIUM SIZE: 3.7 CM
LEFT INTERNAL DIMENSION IN SYSTOLE: 3.41 CM (ref 2.1–4)
LEFT VENTRICLE MASS INDEX: 80 G/M2
LEFT VENTRICULAR INTERNAL DIMENSION IN DIASTOLE: 4.5 CM (ref 3.5–6)
LEFT VENTRICULAR MASS: 148.04 G
LV LATERAL E/E' RATIO: 13.33 M/S
LV SEPTAL E/E' RATIO: 16 M/S
MV PEAK A VEL: 1.16 M/S
MV PEAK E VEL: 0.8 M/S
MV STENOSIS PRESSURE HALF TIME: 69 MS
MV VALVE AREA P 1/2 METHOD: 3.19 CM2
PISA TR MAX VEL: 2.61 M/S
RA PRESSURE: 3 MMHG
RIGHT VENTRICULAR END-DIASTOLIC DIMENSION: 1.86 CM
TDI LATERAL: 0.06 M/S
TDI SEPTAL: 0.05 M/S
TDI: 0.06 M/S
TR MAX PG: 27 MMHG
TV REST PULMONARY ARTERY PRESSURE: 30 MMHG

## 2021-12-16 ENCOUNTER — OFFICE VISIT (OUTPATIENT)
Dept: CARDIOLOGY | Facility: CLINIC | Age: 71
End: 2021-12-16
Payer: MEDICARE

## 2021-12-16 VITALS
WEIGHT: 163 LBS | DIASTOLIC BLOOD PRESSURE: 82 MMHG | SYSTOLIC BLOOD PRESSURE: 140 MMHG | HEART RATE: 56 BPM | BODY MASS INDEX: 26.2 KG/M2 | HEIGHT: 66 IN

## 2021-12-16 DIAGNOSIS — I48.0 PAROXYSMAL ATRIAL FIBRILLATION: ICD-10-CM

## 2021-12-16 DIAGNOSIS — Z13.6 ENCOUNTER FOR SCREENING FOR CARDIOVASCULAR DISORDERS: ICD-10-CM

## 2021-12-16 DIAGNOSIS — E78.00 PURE HYPERCHOLESTEROLEMIA: ICD-10-CM

## 2021-12-16 DIAGNOSIS — I10 HYPERTENSION, ESSENTIAL: ICD-10-CM

## 2021-12-16 DIAGNOSIS — R42 DISEQUILIBRIUM: ICD-10-CM

## 2021-12-16 PROCEDURE — 99214 OFFICE O/P EST MOD 30 MIN: CPT | Mod: S$GLB,,, | Performed by: INTERNAL MEDICINE

## 2021-12-16 PROCEDURE — 99214 PR OFFICE/OUTPT VISIT, EST, LEVL IV, 30-39 MIN: ICD-10-PCS | Mod: S$GLB,,, | Performed by: INTERNAL MEDICINE

## 2021-12-27 ENCOUNTER — HOSPITAL ENCOUNTER (OUTPATIENT)
Dept: RADIOLOGY | Facility: HOSPITAL | Age: 71
Discharge: HOME | End: 2021-12-27
Attending: INTERNAL MEDICINE
Payer: MEDICARE

## 2021-12-27 DIAGNOSIS — Z13.6 ENCOUNTER FOR SCREENING FOR CARDIOVASCULAR DISORDERS: ICD-10-CM

## 2021-12-27 LAB
CREAT SERPL-MCNC: 0.7 MG/DL (ref 0.5–1.4)
SAMPLE: NORMAL

## 2021-12-27 PROCEDURE — 70496 CT ANGIOGRAPHY HEAD: CPT | Mod: TC,PO

## 2021-12-27 PROCEDURE — 25500020 PHARM REV CODE 255: Mod: PO | Performed by: INTERNAL MEDICINE

## 2021-12-27 RX ADMIN — IOHEXOL 100 ML: 350 INJECTION, SOLUTION INTRAVENOUS at 02:12

## 2021-12-29 ENCOUNTER — OFFICE VISIT (OUTPATIENT)
Dept: FAMILY MEDICINE | Facility: CLINIC | Age: 71
End: 2021-12-29
Payer: MEDICARE

## 2021-12-29 ENCOUNTER — TELEPHONE (OUTPATIENT)
Dept: NEUROSURGERY | Facility: CLINIC | Age: 71
End: 2021-12-29
Payer: MEDICARE

## 2021-12-29 ENCOUNTER — LAB VISIT (OUTPATIENT)
Dept: LAB | Facility: HOSPITAL | Age: 71
End: 2021-12-29
Attending: FAMILY MEDICINE
Payer: MEDICARE

## 2021-12-29 VITALS
TEMPERATURE: 98 F | HEART RATE: 67 BPM | OXYGEN SATURATION: 95 % | DIASTOLIC BLOOD PRESSURE: 86 MMHG | BODY MASS INDEX: 25.71 KG/M2 | WEIGHT: 160 LBS | SYSTOLIC BLOOD PRESSURE: 132 MMHG | HEIGHT: 66 IN

## 2021-12-29 DIAGNOSIS — F31.9 BIPOLAR 1 DISORDER: ICD-10-CM

## 2021-12-29 DIAGNOSIS — D51.9 ANEMIA DUE TO VITAMIN B12 DEFICIENCY, UNSPECIFIED B12 DEFICIENCY TYPE: ICD-10-CM

## 2021-12-29 DIAGNOSIS — Z79.01 ANTICOAGULANT LONG-TERM USE: ICD-10-CM

## 2021-12-29 DIAGNOSIS — E78.5 HYPERLIPIDEMIA, UNSPECIFIED HYPERLIPIDEMIA TYPE: ICD-10-CM

## 2021-12-29 DIAGNOSIS — R42 DISEQUILIBRIUM: ICD-10-CM

## 2021-12-29 DIAGNOSIS — R41.89 COGNITIVE IMPAIRMENT: ICD-10-CM

## 2021-12-29 DIAGNOSIS — I67.1 CEREBRAL ANEURYSM: ICD-10-CM

## 2021-12-29 DIAGNOSIS — E03.9 HYPOTHYROIDISM, UNSPECIFIED TYPE: ICD-10-CM

## 2021-12-29 DIAGNOSIS — I48.0 PAROXYSMAL ATRIAL FIBRILLATION: Primary | ICD-10-CM

## 2021-12-29 DIAGNOSIS — F41.9 ANXIETY: ICD-10-CM

## 2021-12-29 DIAGNOSIS — N39.0 URINARY TRACT INFECTION WITHOUT HEMATURIA, SITE UNSPECIFIED: ICD-10-CM

## 2021-12-29 DIAGNOSIS — I10 HYPERTENSION, ESSENTIAL: ICD-10-CM

## 2021-12-29 DIAGNOSIS — Z98.84 GASTRIC BYPASS STATUS FOR OBESITY: ICD-10-CM

## 2021-12-29 LAB
BILIRUB SERPL-MCNC: ABNORMAL MG/DL
BLOOD URINE, POC: ABNORMAL
COLOR, POC UA: YELLOW
GLUCOSE UR QL STRIP: NORMAL
KETONES UR QL STRIP: ABNORMAL
LEUKOCYTE ESTERASE URINE, POC: POSITIVE
NITRITE, POC UA: ABNORMAL
PH, POC UA: 5
PROTEIN, POC: ABNORMAL
SPECIFIC GRAVITY, POC UA: 1.02
TSH SERPL DL<=0.005 MIU/L-ACNC: 1.47 UIU/ML (ref 0.34–5.6)
UROBILINOGEN, POC UA: NORMAL
VIT B12 SERPL-MCNC: >1500 PG/ML (ref 210–950)

## 2021-12-29 PROCEDURE — 81003 URINALYSIS AUTO W/O SCOPE: CPT | Mod: QW,S$GLB,, | Performed by: FAMILY MEDICINE

## 2021-12-29 PROCEDURE — 82607 VITAMIN B-12: CPT | Performed by: FAMILY MEDICINE

## 2021-12-29 PROCEDURE — 81003 POCT URINALYSIS W/O SCOPE: ICD-10-PCS | Mod: QW,S$GLB,, | Performed by: FAMILY MEDICINE

## 2021-12-29 PROCEDURE — 36415 COLL VENOUS BLD VENIPUNCTURE: CPT | Performed by: FAMILY MEDICINE

## 2021-12-29 PROCEDURE — 84443 ASSAY THYROID STIM HORMONE: CPT | Performed by: FAMILY MEDICINE

## 2021-12-29 PROCEDURE — 99214 OFFICE O/P EST MOD 30 MIN: CPT | Mod: S$GLB,,, | Performed by: FAMILY MEDICINE

## 2021-12-29 PROCEDURE — 99214 PR OFFICE/OUTPT VISIT, EST, LEVL IV, 30-39 MIN: ICD-10-PCS | Mod: S$GLB,,, | Performed by: FAMILY MEDICINE

## 2021-12-29 RX ORDER — DIPHENOXYLATE HYDROCHLORIDE AND ATROPINE SULFATE 2.5; .025 MG/1; MG/1
1 TABLET ORAL 4 TIMES DAILY PRN
Qty: 30 TABLET | Refills: 1 | Status: SHIPPED | OUTPATIENT
Start: 2021-12-29

## 2021-12-29 RX ORDER — DRONEDARONE 400 MG/1
400 TABLET, FILM COATED ORAL 2 TIMES DAILY
Qty: 180 TABLET | Refills: 1 | Status: SHIPPED | OUTPATIENT
Start: 2021-12-29 | End: 2022-06-27 | Stop reason: SDUPTHER

## 2021-12-29 RX ORDER — TRAZODONE HYDROCHLORIDE 100 MG/1
TABLET ORAL
Qty: 180 TABLET | Refills: 3 | Status: SHIPPED | OUTPATIENT
Start: 2021-12-29 | End: 2023-01-03 | Stop reason: SDUPTHER

## 2021-12-29 RX ORDER — LOSARTAN POTASSIUM 25 MG/1
25 TABLET ORAL DAILY
Qty: 90 TABLET | Refills: 1 | Status: SHIPPED | OUTPATIENT
Start: 2021-12-29 | End: 2022-06-27 | Stop reason: SDUPTHER

## 2021-12-29 RX ORDER — TRAZODONE HYDROCHLORIDE 100 MG/1
TABLET ORAL
Qty: 180 TABLET | Refills: 1 | Status: CANCELLED | OUTPATIENT
Start: 2021-12-29

## 2021-12-29 RX ORDER — POTASSIUM CHLORIDE 1500 MG/1
20 TABLET, EXTENDED RELEASE ORAL DAILY
Qty: 90 TABLET | Refills: 1 | Status: SHIPPED | OUTPATIENT
Start: 2021-12-29 | End: 2022-06-27 | Stop reason: SDUPTHER

## 2021-12-29 RX ORDER — TRAZODONE HYDROCHLORIDE 100 MG/1
100 TABLET ORAL NIGHTLY
Qty: 180 TABLET | Refills: 3 | Status: CANCELLED | OUTPATIENT
Start: 2021-12-29

## 2021-12-29 RX ORDER — ALPRAZOLAM 0.5 MG/1
0.5 TABLET ORAL DAILY PRN
Qty: 30 TABLET | Refills: 0 | Status: SHIPPED | OUTPATIENT
Start: 2021-12-29 | End: 2024-04-01

## 2021-12-29 NOTE — PROGRESS NOTES
A 3 month follow-up.  Needs trazodone refilled filled incorrectly last time needs 100 mg 2 HS sent to  [facility].  Depression doing okay.  Question regarding Cerefolin.  Given to her by Dr. Mag salinas originally.  She seems to be doing okay it is very expensive.  Does have some mild dementia.  Did labs back in October.  Reviewed these.  Lipids blood sugar renal function are all okay.  She is status post gastric bypass.  Had a CTA done by Cardiology due to some carotid disease.  There was only mild plaque there.  But it did show up a 4 mm aneurysm in needs neurosurgical evaluation of this.  Has an appointment with Neurology in [place] in a couple of months.  Mild cognitive impairment.  Does have some atrial fibrillation.  She is anticoagulated.  Hypertension is under control.  Hyperlipidemia current.  Hypothyroidism TSH is due.  B12 deficiency B12 level is due also.  Says some diarrhea in uses p.r.n. Lomotil needs a prescription for this also locally.  Also noticing some light pink staining in underwear.  Past few days.  No definite UTI symptoms.  Urinalysis is positive for leukocytes.    Physical examination vital signs are noted.  Alert.  Neck without bruit.  Chest clear.  Heart regular rate rhythm.  Abdomen bowel sounds are positive soft and nontender.  Extremities without edema positive pulses.    Impression.  Status post gastric bypass.  Cerebral aneurysm.  Paroxysmal atrial fibrillation.  Cognitive impairment mild.  Hypertension controlled.  Hyperlipidemia.  Anticoagulation.  Hypothyroidism due for check.  B12 deficiency due for check.  Diarrhea.  Hematuria possibly UTI.    Plan urine culture and sensitivity.  Refer her to neuro surgery DrPricilla Ortega regarding aneurysm.  Okay to discontinue the Cerefolin.  B12 sublingual 5000 per day.  Get a B12 level and a TSH.  Refill trazodone 100 mg 2  with 3 refills.  Lomotil 30 pills 1 up to q.i.d. p.r.n. diarrhea to local pharmacy.  Mildly meds.  Follow-up in 3 months  with repeat labs.  Treat her after urine culture results are back.  Xanax 0.5 number 30 only 1 daily maximum p.r.n. anxiety.  This sent to [facility] locally also.  Other medications all refilled and sent to  base.

## 2022-01-01 LAB — BACTERIA UR CULT: ABNORMAL

## 2022-01-03 ENCOUNTER — TELEPHONE (OUTPATIENT)
Dept: CARDIOLOGY | Facility: CLINIC | Age: 72
End: 2022-01-03
Payer: MEDICARE

## 2022-01-03 NOTE — TELEPHONE ENCOUNTER
----- Message from Polina Pate NP sent at 12/29/2021  4:46 PM CST -----  REFER TO NEURO SURGERY   TELEMETRY

## 2022-01-04 PROBLEM — I67.1 CEREBRAL ANEURYSM: Status: ACTIVE | Noted: 2022-01-04

## 2022-01-04 RX ORDER — SULFAMETHOXAZOLE AND TRIMETHOPRIM 800; 160 MG/1; MG/1
1 TABLET ORAL 2 TIMES DAILY
Qty: 20 TABLET | Refills: 0 | Status: SHIPPED | OUTPATIENT
Start: 2022-01-04 | End: 2022-01-13

## 2022-01-05 ENCOUNTER — TELEPHONE (OUTPATIENT)
Dept: FAMILY MEDICINE | Facility: CLINIC | Age: 72
End: 2022-01-05
Payer: MEDICARE

## 2022-01-05 ENCOUNTER — TELEPHONE (OUTPATIENT)
Dept: NEUROSURGERY | Facility: CLINIC | Age: 72
End: 2022-01-05
Payer: MEDICARE

## 2022-01-05 NOTE — TELEPHONE ENCOUNTER
----- Message from Meenu Hickman RN sent at 1/5/2022  1:26 PM CST -----    ----- Message -----  From: Stacey M Lefort  Sent: 1/5/2022   1:23 PM CST  To: Meenu Hickman RN    Pt is calling you regarding a referral appt. She can be reached at [phone].  Thank you.

## 2022-01-05 NOTE — TELEPHONE ENCOUNTER
Returned call to pt. Again informed her that Dr. Hilario is not able to treat aneurysms. Provided contacted information to [facility] to schedule evaluation. Pt voiced understanding.

## 2022-01-05 NOTE — TELEPHONE ENCOUNTER
I spoke with pt about scheduling with  for AVM. Message was sent to staff    ----- Message from Meenu Steen sent at 1/5/2022  3:21 PM CST -----  Regarding: Pt call back!  Pt has referral for Neurosurgery asking for call  back to be scheduled       [phone] (home) [phone] ( Cell)

## 2022-01-05 NOTE — TELEPHONE ENCOUNTER
----- Message from Sanjiv Ford III, MD sent at 1/1/2022  7:34 PM CST -----  Bactrim ds bid x 10 days

## 2022-01-07 ENCOUNTER — TELEPHONE (OUTPATIENT)
Dept: NEUROLOGY | Facility: CLINIC | Age: 72
End: 2022-01-07
Payer: MEDICARE

## 2022-01-07 NOTE — TELEPHONE ENCOUNTER
Call placed to Pt to reschedule to earlier appt. Pt states that her PCP wants her to be seen by a Neurosurgeon instead. Appt with Dr. Murillo canceled on 03/07/22 at Pt's request.

## 2022-01-13 ENCOUNTER — OFFICE VISIT (OUTPATIENT)
Dept: NEUROSURGERY | Facility: CLINIC | Age: 72
End: 2022-01-13
Payer: MEDICARE

## 2022-01-13 VITALS
BODY MASS INDEX: 26.36 KG/M2 | HEIGHT: 66 IN | HEART RATE: 64 BPM | WEIGHT: 164 LBS | DIASTOLIC BLOOD PRESSURE: 61 MMHG | SYSTOLIC BLOOD PRESSURE: 107 MMHG

## 2022-01-13 DIAGNOSIS — I67.1 CEREBRAL ANEURYSM: ICD-10-CM

## 2022-01-13 PROCEDURE — 99999 PR PBB SHADOW E&M-EST. PATIENT-LVL V: ICD-10-PCS | Mod: PBBFAC,,, | Performed by: NEUROLOGICAL SURGERY

## 2022-01-13 PROCEDURE — 99204 PR OFFICE/OUTPT VISIT, NEW, LEVL IV, 45-59 MIN: ICD-10-PCS | Mod: S$PBB,,, | Performed by: NEUROLOGICAL SURGERY

## 2022-01-13 PROCEDURE — 99204 OFFICE O/P NEW MOD 45 MIN: CPT | Mod: S$PBB,,, | Performed by: NEUROLOGICAL SURGERY

## 2022-01-13 PROCEDURE — 99215 OFFICE O/P EST HI 40 MIN: CPT | Mod: PBBFAC | Performed by: NEUROLOGICAL SURGERY

## 2022-01-13 PROCEDURE — 99999 PR PBB SHADOW E&M-EST. PATIENT-LVL V: CPT | Mod: PBBFAC,,, | Performed by: NEUROLOGICAL SURGERY

## 2022-01-13 RX ORDER — FERROUS SULFATE 325(65) MG
325 TABLET, DELAYED RELEASE (ENTERIC COATED) ORAL DAILY
COMMUNITY

## 2022-01-13 RX ORDER — HYDROCORTISONE 1 %
1 GEL (GRAM) TOPICAL DAILY
COMMUNITY
Start: 2022-01-01

## 2022-01-13 NOTE — PROGRESS NOTES
Sondra Jones was seen in neurosurgical consultation at the office this morning.  She is a 71-year-old lady with atrial fibrillation who had complained of some headache and balance difficulty over a fairly long time..  She was seen by her cardiologist who ordered a CTA of the head and neck.  This study done at [facility] on 12/27/2021 showed a small area of contrast on the dorsal side of the cervicomedullary junction.  The question of possible aneurysm was raised and she was referred for neurosurgical evaluation.  She has had cataract surgery on the right eye and feels he is not seeing as well out of it.  Hearing has been somewhat decreased.  She has noted no difficulty speaking no focal weakness of the extremities but some feeling of imbalance.  Past medical history includes hypertension treated with Norvasc, Cozaar, and Toprol and hypothyroidism treated with Synthroid.  She has taken Xanax, Wellbutrin, and does a real for anxiety and depression.  Review systems is otherwise unremarkable.  Her  is with her today.    On physical examination she is a well-developed, reasonably well-nourished white lady who is alert and cooperative.  Examination of the head is unremarkable.  Eyes show full extraocular movements.  The left pupil is slightly larger than the right, both react to light.  The right fundus shows a little haziness inferiorly the left fundus is clear.  Hearing is probably decreased to finger rubbing bilaterally she is moving neck freely.  On neurological examination she is speaking normally and provides a reasonable history.  Finger-to-nose was done well and gait today seems unremarkable.  Cranial nerves otherwise intact.  She shows normal facial sensation and movement and the tongue protrudes in the midline.  She shows good strength extremities, normal sensation, hypoactive but symmetrical deep tendon reflexes.    [facility]  The  brain appears normally formed.  There is a 4 mm round area of contrast on the dorsal cervical medullary junction.  This is away from the vertebral arteries and there is not clear attachment to an artery although the tonsillar loop of the left PICA comes somewhat close to this.  The lesion is round in axial, coronal and sagittal views.      Impression:  possible cerebral aneurysm.      Recommendation:  I will have MRI MRA done to see if we can better characterize this abnormality.  If this is aneurysm it would carry a very low risk of subarachnoid hemorrhage.

## 2022-01-21 ENCOUNTER — OFFICE VISIT (OUTPATIENT)
Dept: FAMILY MEDICINE | Facility: CLINIC | Age: 72
End: 2022-01-21
Payer: MEDICARE

## 2022-01-21 VITALS
DIASTOLIC BLOOD PRESSURE: 82 MMHG | HEART RATE: 60 BPM | SYSTOLIC BLOOD PRESSURE: 130 MMHG | WEIGHT: 159 LBS | OXYGEN SATURATION: 97 % | HEIGHT: 66 IN | TEMPERATURE: 99 F | BODY MASS INDEX: 25.55 KG/M2

## 2022-01-21 DIAGNOSIS — M23.91 INTERNAL DERANGEMENT OF RIGHT KNEE: ICD-10-CM

## 2022-01-21 DIAGNOSIS — I67.1 CEREBRAL ANEURYSM: Primary | ICD-10-CM

## 2022-01-21 DIAGNOSIS — I10 HYPERTENSION, ESSENTIAL: ICD-10-CM

## 2022-01-21 PROCEDURE — 99213 PR OFFICE/OUTPT VISIT, EST, LEVL III, 20-29 MIN: ICD-10-PCS | Mod: S$GLB,,, | Performed by: FAMILY MEDICINE

## 2022-01-21 PROCEDURE — 99213 OFFICE O/P EST LOW 20 MIN: CPT | Mod: S$GLB,,, | Performed by: FAMILY MEDICINE

## 2022-01-23 NOTE — PROGRESS NOTES
Needs handicap tag.  Right knee is painful.  Aching behind the knee.  Saw Dr. Harris regarding the cerebral aneurysm.  She is to have MRI MRA done to evaluate it further but he does not seem to think that it will need any intervention.  Right knee painful since November 21st.  Had gotten better then started again yesterday.  Pain is actually slightly inferior to the knee and down the shin.  Hurts to put weight on it.  No swelling.    Physical examination tender right knee anterior medial joint line.  No effusion.  The joint is stable.    Impression.  Internal derangement the right knee.  Most likely meniscus injury.  Cerebral aneurysm.    Plan handicap tag reason 6. Per minute.  X-ray the right knee.  MRI of the right knee.  The MRI the MRA as per Dr. Harris.

## 2022-01-30 PROBLEM — R06.02 SHORTNESS OF BREATH: Status: RESOLVED | Noted: 2021-06-30 | Resolved: 2022-01-30

## 2022-01-30 PROBLEM — M54.9 UPPER BACK PAIN ON RIGHT SIDE: Status: RESOLVED | Noted: 2021-09-10 | Resolved: 2022-01-30

## 2022-01-30 PROBLEM — I48.21 PERMANENT ATRIAL FIBRILLATION: Status: RESOLVED | Noted: 2021-03-01 | Resolved: 2022-01-30

## 2022-01-31 ENCOUNTER — HOSPITAL ENCOUNTER (OUTPATIENT)
Dept: RADIOLOGY | Facility: HOSPITAL | Age: 72
Discharge: HOME | End: 2022-01-31
Attending: NEUROLOGICAL SURGERY
Payer: MEDICARE

## 2022-01-31 ENCOUNTER — OFFICE VISIT (OUTPATIENT)
Dept: NEUROSURGERY | Facility: CLINIC | Age: 72
End: 2022-01-31
Payer: MEDICARE

## 2022-01-31 VITALS
BODY MASS INDEX: 25.55 KG/M2 | HEIGHT: 66 IN | DIASTOLIC BLOOD PRESSURE: 56 MMHG | HEART RATE: 63 BPM | WEIGHT: 159 LBS | SYSTOLIC BLOOD PRESSURE: 116 MMHG

## 2022-01-31 DIAGNOSIS — D49.6 BRAIN TUMOR: Primary | ICD-10-CM

## 2022-01-31 DIAGNOSIS — I67.1 CEREBRAL ANEURYSM: ICD-10-CM

## 2022-01-31 PROCEDURE — 70544 MR ANGIOGRAPHY HEAD W/O DYE: CPT | Mod: 26,,, | Performed by: RADIOLOGY

## 2022-01-31 PROCEDURE — 99999 PR PBB SHADOW E&M-EST. PATIENT-LVL V: CPT | Mod: PBBFAC,,, | Performed by: NEUROLOGICAL SURGERY

## 2022-01-31 PROCEDURE — 70553 MRI BRAIN STEM W/O & W/DYE: CPT | Mod: 26,,, | Performed by: RADIOLOGY

## 2022-01-31 PROCEDURE — 99215 OFFICE O/P EST HI 40 MIN: CPT | Mod: PBBFAC,25 | Performed by: NEUROLOGICAL SURGERY

## 2022-01-31 PROCEDURE — 70553 MRI BRAIN STEM W/O & W/DYE: CPT | Mod: TC

## 2022-01-31 PROCEDURE — 25500020 PHARM REV CODE 255: Performed by: NEUROLOGICAL SURGERY

## 2022-01-31 PROCEDURE — A9585 GADOBUTROL INJECTION: HCPCS | Performed by: NEUROLOGICAL SURGERY

## 2022-01-31 PROCEDURE — 70553 MRI BRAIN W WO CONTRAST: ICD-10-PCS | Mod: 26,,, | Performed by: RADIOLOGY

## 2022-01-31 PROCEDURE — 70544 MRA BRAIN WITHOUT CONTRAST: ICD-10-PCS | Mod: 26,,, | Performed by: RADIOLOGY

## 2022-01-31 PROCEDURE — 99999 PR PBB SHADOW E&M-EST. PATIENT-LVL V: ICD-10-PCS | Mod: PBBFAC,,, | Performed by: NEUROLOGICAL SURGERY

## 2022-01-31 PROCEDURE — 99213 OFFICE O/P EST LOW 20 MIN: CPT | Mod: S$PBB,,, | Performed by: NEUROLOGICAL SURGERY

## 2022-01-31 PROCEDURE — 99213 PR OFFICE/OUTPT VISIT, EST, LEVL III, 20-29 MIN: ICD-10-PCS | Mod: S$PBB,,, | Performed by: NEUROLOGICAL SURGERY

## 2022-01-31 PROCEDURE — 70544 MR ANGIOGRAPHY HEAD W/O DYE: CPT | Mod: 59,TC

## 2022-01-31 RX ORDER — GADOBUTROL 604.72 MG/ML
7 INJECTION INTRAVENOUS
Status: COMPLETED | OUTPATIENT
Start: 2022-01-31 | End: 2022-01-31

## 2022-01-31 RX ADMIN — GADOBUTROL 7 ML: 604.72 INJECTION INTRAVENOUS at 12:01

## 2022-01-31 NOTE — PROGRESS NOTES
Kasandra hughes was seen in neurosurgical follow-up at the office today.  She has had no new symptoms over the last 2 weeks.  She feels that the balance difficulty has been going on for at least a couple of years.  Except for decreased vision in the right eye which apparently relates to cataract she has no other focal neurological symptoms.    Today she is alert and speaking well.  Extraocular movements are good and pupils equal.  She had a little difficulty with tandem gait but did not actually lose her balance.  Finger-to-nose was done well.  She otherwise generally neurologically intact.    MRI of the brain was done at [facility] today.  Again is noted the small enhancing nodule at the cervicomedullary junction just below the foramen of Magendie. There is associated edema in the dorsal and left side of the medulla.  The nodule shows tissue density on T1, is somewhat pale on T2, and is dark on SWI.  It contrasts avidly.  This was compared to a noncontrast MRI of the head on 03/09/2016. No nodule is appreciated in this area and there is no evidence for swelling in the medulla.  The axial sections end just at the area of interest but I do believe they are comparable.  MRA done here today does not show this nodule to be aneurysm and is more consistent with a small tumor or some other process.     I will order CT of the chest, abdomen and pelvis and MRI of the C/T and L spine with contrast as a metastatic workup.

## 2022-02-01 ENCOUNTER — HOSPITAL ENCOUNTER (OUTPATIENT)
Dept: RADIOLOGY | Facility: HOSPITAL | Age: 72
Discharge: HOME | End: 2022-02-01
Attending: FAMILY MEDICINE
Payer: MEDICARE

## 2022-02-01 DIAGNOSIS — M23.91 INTERNAL DERANGEMENT OF RIGHT KNEE: ICD-10-CM

## 2022-02-01 PROCEDURE — 73564 X-RAY EXAM KNEE 4 OR MORE: CPT | Mod: TC,PO,RT

## 2022-02-01 PROCEDURE — 73721 MRI JNT OF LWR EXTRE W/O DYE: CPT | Mod: TC,PO,RT

## 2022-02-07 ENCOUNTER — TELEPHONE (OUTPATIENT)
Dept: FAMILY MEDICINE | Facility: CLINIC | Age: 72
End: 2022-02-07
Payer: MEDICARE

## 2022-02-07 NOTE — TELEPHONE ENCOUNTER
----- Message from John Hunter sent at 2/7/2022  9:32 AM CST -----  Type:  Patient Returning Call    Who Called:  Pt  Who Left Message for Patient:  ??? She thinks Toshia  Does the patient know what this is regarding?:  unsure she thinks appt or test results  Best Call Back Number:  [phone]  Additional Information:  Sts shes returning a call from Friday her phone was not working prop she isn't sure who called or exactly what about.  Don't see any notes.   Please advise -- Thank you     Done

## 2022-02-21 ENCOUNTER — HOSPITAL ENCOUNTER (OUTPATIENT)
Dept: RADIOLOGY | Facility: HOSPITAL | Age: 72
Discharge: HOME | End: 2022-02-21
Attending: NEUROLOGICAL SURGERY
Payer: MEDICARE

## 2022-02-21 ENCOUNTER — OFFICE VISIT (OUTPATIENT)
Dept: NEUROSURGERY | Facility: CLINIC | Age: 72
End: 2022-02-21
Payer: MEDICARE

## 2022-02-21 VITALS — WEIGHT: 159 LBS | BODY MASS INDEX: 25.55 KG/M2 | HEIGHT: 66 IN

## 2022-02-21 DIAGNOSIS — D49.6 BRAIN TUMOR: ICD-10-CM

## 2022-02-21 DIAGNOSIS — D49.7 SPINAL CORD TUMOR: Primary | ICD-10-CM

## 2022-02-21 PROCEDURE — 99213 PR OFFICE/OUTPT VISIT, EST, LEVL III, 20-29 MIN: ICD-10-PCS | Mod: S$PBB,,, | Performed by: NEUROLOGICAL SURGERY

## 2022-02-21 PROCEDURE — 99999 PR PBB SHADOW E&M-EST. PATIENT-LVL IV: CPT | Mod: PBBFAC,,, | Performed by: NEUROLOGICAL SURGERY

## 2022-02-21 PROCEDURE — 72158 MRI LUMBAR SPINE W/O & W/DYE: CPT | Mod: TC

## 2022-02-21 PROCEDURE — 74177 CT CHEST ABDOMEN PELVIS WITH CONTRAST (XPD): ICD-10-PCS | Mod: 26,,, | Performed by: INTERNAL MEDICINE

## 2022-02-21 PROCEDURE — 71260 CT THORAX DX C+: CPT | Mod: 26,,, | Performed by: INTERNAL MEDICINE

## 2022-02-21 PROCEDURE — 72156 MRI NECK SPINE W/O & W/DYE: CPT | Mod: 26,,, | Performed by: RADIOLOGY

## 2022-02-21 PROCEDURE — A9585 GADOBUTROL INJECTION: HCPCS | Performed by: NEUROLOGICAL SURGERY

## 2022-02-21 PROCEDURE — 72157 MRI CHEST SPINE W/O & W/DYE: CPT | Mod: 26,,, | Performed by: RADIOLOGY

## 2022-02-21 PROCEDURE — 72158 MRI SPINE CERVICAL-THORACIC-LUMBAR W W/O CONTRAST (XPD): ICD-10-PCS | Mod: 26,,, | Performed by: RADIOLOGY

## 2022-02-21 PROCEDURE — 25500020 PHARM REV CODE 255: Performed by: NEUROLOGICAL SURGERY

## 2022-02-21 PROCEDURE — 72156 MRI SPINE CERVICAL-THORACIC-LUMBAR W W/O CONTRAST (XPD): ICD-10-PCS | Mod: 26,,, | Performed by: RADIOLOGY

## 2022-02-21 PROCEDURE — 71260 CT THORAX DX C+: CPT | Mod: TC

## 2022-02-21 PROCEDURE — 99214 OFFICE O/P EST MOD 30 MIN: CPT | Mod: PBBFAC,25 | Performed by: NEUROLOGICAL SURGERY

## 2022-02-21 PROCEDURE — 74177 CT ABD & PELVIS W/CONTRAST: CPT | Mod: TC

## 2022-02-21 PROCEDURE — 99213 OFFICE O/P EST LOW 20 MIN: CPT | Mod: S$PBB,,, | Performed by: NEUROLOGICAL SURGERY

## 2022-02-21 PROCEDURE — 71260 CT CHEST ABDOMEN PELVIS WITH CONTRAST (XPD): ICD-10-PCS | Mod: 26,,, | Performed by: INTERNAL MEDICINE

## 2022-02-21 PROCEDURE — 99999 PR PBB SHADOW E&M-EST. PATIENT-LVL IV: ICD-10-PCS | Mod: PBBFAC,,, | Performed by: NEUROLOGICAL SURGERY

## 2022-02-21 PROCEDURE — 74177 CT ABD & PELVIS W/CONTRAST: CPT | Mod: 26,,, | Performed by: INTERNAL MEDICINE

## 2022-02-21 PROCEDURE — 72157 MRI CHEST SPINE W/O & W/DYE: CPT | Mod: TC

## 2022-02-21 PROCEDURE — 72157 MRI SPINE CERVICAL-THORACIC-LUMBAR W W/O CONTRAST (XPD): ICD-10-PCS | Mod: 26,,, | Performed by: RADIOLOGY

## 2022-02-21 PROCEDURE — 72158 MRI LUMBAR SPINE W/O & W/DYE: CPT | Mod: 26,,, | Performed by: RADIOLOGY

## 2022-02-21 RX ORDER — GADOBUTROL 604.72 MG/ML
8 INJECTION INTRAVENOUS
Status: COMPLETED | OUTPATIENT
Start: 2022-02-21 | End: 2022-02-21

## 2022-02-21 RX ADMIN — GADOBUTROL 8 ML: 604.72 INJECTION INTRAVENOUS at 02:02

## 2022-02-21 RX ADMIN — IOHEXOL 75 ML: 350 INJECTION, SOLUTION INTRAVENOUS at 02:02

## 2022-02-21 NOTE — PROGRESS NOTES
Kasandra Jones returned in neurosurgical follow-up to the office this afternoon.  She has had no new findings over the past month.  She says her balance issues are about the same.  She has noted no new neurological symptomatology.    On brief neurological examination today she is speaking normally and answers questions appropriately.  She has good extraocular movements.  Her gait remains mildly impaired.  She seems otherwise neurologically intact.    MRI of the cervical, thoracic and lumbar spine with contrast was done at [facility] today.  The small nodule on the posterior margin of the cervicomedullary junction is again noted.  This shows tissue density on T1 weighted images, tissue density with an apparent small cyst on T2 weighted images and bright contrast enhancement on T1 with contrast.  No other lesions are seen in the cervical, thoracic or lumbar spine.  CT of the chest abdomen and pelvis has not been read by Radiology.  There are no obvious lesions in the lung, liver, kidney, or other organs.  I will give her a call after radiology has had a chance to review these studies.    At this point I believe we can just follow the small nodule.  I will plan to have MRI of the cervical spine with contrast repeated in 3 months.  This series seems to show the abnormality to best advantage.  We can have the MRI done in [place] and I will call her with the results.

## 2022-03-28 ENCOUNTER — OFFICE VISIT (OUTPATIENT)
Dept: FAMILY MEDICINE | Facility: CLINIC | Age: 72
End: 2022-03-28
Payer: MEDICARE

## 2022-03-28 ENCOUNTER — LAB VISIT (OUTPATIENT)
Dept: LAB | Facility: HOSPITAL | Age: 72
End: 2022-03-28
Attending: FAMILY MEDICINE
Payer: MEDICARE

## 2022-03-28 VITALS
TEMPERATURE: 97 F | OXYGEN SATURATION: 97 % | SYSTOLIC BLOOD PRESSURE: 132 MMHG | HEIGHT: 66 IN | BODY MASS INDEX: 24.75 KG/M2 | HEART RATE: 63 BPM | WEIGHT: 154 LBS | DIASTOLIC BLOOD PRESSURE: 60 MMHG

## 2022-03-28 DIAGNOSIS — E03.9 HYPOTHYROIDISM, UNSPECIFIED TYPE: ICD-10-CM

## 2022-03-28 DIAGNOSIS — Z79.01 ANTICOAGULANT LONG-TERM USE: ICD-10-CM

## 2022-03-28 DIAGNOSIS — F31.9 BIPOLAR 1 DISORDER: ICD-10-CM

## 2022-03-28 DIAGNOSIS — J30.9 ALLERGIC RHINITIS, UNSPECIFIED SEASONALITY, UNSPECIFIED TRIGGER: ICD-10-CM

## 2022-03-28 DIAGNOSIS — R06.83 SNORING: ICD-10-CM

## 2022-03-28 DIAGNOSIS — R40.0 DAYTIME SOMNOLENCE: ICD-10-CM

## 2022-03-28 DIAGNOSIS — I48.0 PAROXYSMAL ATRIAL FIBRILLATION: ICD-10-CM

## 2022-03-28 DIAGNOSIS — E78.5 HYPERLIPIDEMIA, UNSPECIFIED HYPERLIPIDEMIA TYPE: Primary | ICD-10-CM

## 2022-03-28 DIAGNOSIS — I10 HYPERTENSION, ESSENTIAL: ICD-10-CM

## 2022-03-28 DIAGNOSIS — K11.7 XEROSTOMIA: ICD-10-CM

## 2022-03-28 DIAGNOSIS — E78.5 HYPERLIPIDEMIA, UNSPECIFIED HYPERLIPIDEMIA TYPE: ICD-10-CM

## 2022-03-28 LAB
ALBUMIN SERPL BCP-MCNC: 3.5 G/DL (ref 3.5–5.2)
ALP SERPL-CCNC: 86 U/L (ref 55–135)
ALT SERPL W/O P-5'-P-CCNC: 22 U/L (ref 10–44)
ANION GAP SERPL CALC-SCNC: 9 MMOL/L (ref 8–16)
AST SERPL-CCNC: 19 U/L (ref 10–40)
BASOPHILS # BLD AUTO: 0.03 K/UL (ref 0–0.2)
BASOPHILS NFR BLD: 0.4 % (ref 0–1.9)
BILIRUB SERPL-MCNC: 0.6 MG/DL (ref 0.1–1)
BUN SERPL-MCNC: 13 MG/DL (ref 8–23)
CALCIUM SERPL-MCNC: 8.9 MG/DL (ref 8.7–10.5)
CHLORIDE SERPL-SCNC: 100 MMOL/L (ref 95–110)
CHOLEST SERPL-MCNC: 171 MG/DL (ref 120–199)
CHOLEST/HDLC SERPL: 2.3 {RATIO} (ref 2–5)
CO2 SERPL-SCNC: 28 MMOL/L (ref 23–29)
CREAT SERPL-MCNC: 0.6 MG/DL (ref 0.5–1.4)
DIFFERENTIAL METHOD: ABNORMAL
EOSINOPHIL # BLD AUTO: 0.1 K/UL (ref 0–0.5)
EOSINOPHIL NFR BLD: 1.6 % (ref 0–8)
ERYTHROCYTE [DISTWIDTH] IN BLOOD BY AUTOMATED COUNT: 13.2 % (ref 11.5–14.5)
EST. GFR  (AFRICAN AMERICAN): >60 ML/MIN/1.73 M^2
EST. GFR  (NON AFRICAN AMERICAN): >60 ML/MIN/1.73 M^2
GLUCOSE SERPL-MCNC: 103 MG/DL (ref 70–110)
HCT VFR BLD AUTO: 42.9 % (ref 37–48.5)
HDLC SERPL-MCNC: 74 MG/DL (ref 40–75)
HDLC SERPL: 43.3 % (ref 20–50)
HGB BLD-MCNC: 13.8 G/DL (ref 12–16)
IMM GRANULOCYTES # BLD AUTO: 0.01 K/UL (ref 0–0.04)
IMM GRANULOCYTES NFR BLD AUTO: 0.1 % (ref 0–0.5)
LDLC SERPL CALC-MCNC: 80 MG/DL (ref 63–159)
LYMPHOCYTES # BLD AUTO: 2 K/UL (ref 1–4.8)
LYMPHOCYTES NFR BLD: 28.9 % (ref 18–48)
MCH RBC QN AUTO: 31.7 PG (ref 27–31)
MCHC RBC AUTO-ENTMCNC: 32.2 G/DL (ref 32–36)
MCV RBC AUTO: 99 FL (ref 82–98)
MONOCYTES # BLD AUTO: 0.5 K/UL (ref 0.3–1)
MONOCYTES NFR BLD: 7.8 % (ref 4–15)
NEUTROPHILS # BLD AUTO: 4.1 K/UL (ref 1.8–7.7)
NEUTROPHILS NFR BLD: 61.2 % (ref 38–73)
NONHDLC SERPL-MCNC: 97 MG/DL
NRBC BLD-RTO: 0 /100 WBC
PLATELET # BLD AUTO: 234 K/UL (ref 150–450)
PMV BLD AUTO: 10.9 FL (ref 9.2–12.9)
POTASSIUM SERPL-SCNC: 4.2 MMOL/L (ref 3.5–5.1)
PROT SERPL-MCNC: 6.3 G/DL (ref 6–8.4)
RBC # BLD AUTO: 4.35 M/UL (ref 4–5.4)
SODIUM SERPL-SCNC: 137 MMOL/L (ref 136–145)
TRIGL SERPL-MCNC: 85 MG/DL (ref 30–150)
TSH SERPL DL<=0.005 MIU/L-ACNC: 1.1 UIU/ML (ref 0.34–5.6)
VALPROATE SERPL-MCNC: 38.3 UG/ML (ref 50–100)
WBC # BLD AUTO: 6.78 K/UL (ref 3.9–12.7)

## 2022-03-28 PROCEDURE — 99214 OFFICE O/P EST MOD 30 MIN: CPT | Mod: S$GLB,,, | Performed by: FAMILY MEDICINE

## 2022-03-28 PROCEDURE — 80164 ASSAY DIPROPYLACETIC ACD TOT: CPT | Performed by: FAMILY MEDICINE

## 2022-03-28 PROCEDURE — 99214 PR OFFICE/OUTPT VISIT, EST, LEVL IV, 30-39 MIN: ICD-10-PCS | Mod: S$GLB,,, | Performed by: FAMILY MEDICINE

## 2022-03-28 PROCEDURE — 80053 COMPREHEN METABOLIC PANEL: CPT | Performed by: FAMILY MEDICINE

## 2022-03-28 PROCEDURE — 80061 LIPID PANEL: CPT | Performed by: FAMILY MEDICINE

## 2022-03-28 PROCEDURE — 85025 COMPLETE CBC W/AUTO DIFF WBC: CPT | Performed by: FAMILY MEDICINE

## 2022-03-28 PROCEDURE — 36415 COLL VENOUS BLD VENIPUNCTURE: CPT | Performed by: FAMILY MEDICINE

## 2022-03-28 PROCEDURE — 84443 ASSAY THYROID STIM HORMONE: CPT | Performed by: FAMILY MEDICINE

## 2022-03-28 RX ORDER — DIVALPROEX SODIUM 500 MG/1
1000 TABLET, FILM COATED, EXTENDED RELEASE ORAL DAILY
Qty: 180 TABLET | Refills: 1 | Status: SHIPPED | OUTPATIENT
Start: 2022-03-28 | End: 2022-10-03 | Stop reason: SDUPTHER

## 2022-03-28 RX ORDER — RABEPRAZOLE SODIUM 20 MG/1
20 TABLET, DELAYED RELEASE ORAL 2 TIMES DAILY
Qty: 180 TABLET | Refills: 1 | Status: SHIPPED | OUTPATIENT
Start: 2022-03-28 | End: 2022-10-03 | Stop reason: SDUPTHER

## 2022-03-28 RX ORDER — LEVOTHYROXINE SODIUM 100 UG/1
100 TABLET ORAL DAILY
Qty: 90 TABLET | Refills: 1 | Status: SHIPPED | OUTPATIENT
Start: 2022-03-28 | End: 2022-10-03 | Stop reason: SDUPTHER

## 2022-03-28 RX ORDER — AMLODIPINE BESYLATE 5 MG/1
5 TABLET ORAL DAILY
Qty: 90 TABLET | Refills: 1 | Status: SHIPPED | OUTPATIENT
Start: 2022-03-28 | End: 2022-06-27 | Stop reason: SDUPTHER

## 2022-03-28 RX ORDER — METOPROLOL SUCCINATE 25 MG/1
12.5 TABLET, EXTENDED RELEASE ORAL DAILY
Qty: 90 TABLET | Refills: 1 | Status: SHIPPED | OUTPATIENT
Start: 2022-03-28 | End: 2022-06-27

## 2022-03-28 RX ORDER — BUPROPION HYDROCHLORIDE 300 MG/1
300 TABLET ORAL DAILY
Qty: 90 TABLET | Refills: 1 | Status: SHIPPED | OUTPATIENT
Start: 2022-03-28 | End: 2022-10-03 | Stop reason: SDUPTHER

## 2022-03-29 NOTE — PROGRESS NOTES
Subjective:       Patient ID: Kasandra Jones is a 71 y.o. female.    Chief Complaint: Follow-up    Allergic rhinitis symptoms Claritin and Zyrtec help.  But some runny nose.  But most to which she has is some cough and throat clearing and hoarseness.  Most likely reflux.  Also dry mouth primarily at night.  Okay during the day.  Snoring.  Fatigue.  Hypertension is controlled.  Hyperlipidemia check is due.  Bipolar 1 controlled.  Hypothyroidism due for TSH.  She was seen by neuro surgery in [place].  Determined to have a spinal cord nodule and/or cyst.  In the brainstem area.  Some paroxysmal atrial fibrillation.    Physical examination vital signs noted.  Tympanic membranes without erythema.  Pharynx without erythema.  Neck without adenopathy no bruit.  Chest clear.  Heart regular rate rhythm.  Abdomen soft nontender.  Extremities without edema.    Review of Systems    Objective:      Physical Exam    Assessment:       1. Hyperlipidemia, unspecified hyperlipidemia type    2. Hypothyroidism, unspecified type    3. Bipolar 1 disorder    4. Hypertension, essential    5. Paroxysmal atrial fibrillation    6. Snoring    7. Allergic rhinitis, unspecified seasonality, unspecified trigger    8. Xerostomia    9. Daytime somnolence        Plan:       Hyperlipidemia, unspecified hyperlipidemia type    Hypothyroidism, unspecified type    Bipolar 1 disorder    Hypertension, essential    Paroxysmal atrial fibrillation    Snoring  -     Ambulatory referral/consult to Sleep Disorders; Future; Expected date: 04/04/2022    Allergic rhinitis, unspecified seasonality, unspecified trigger    Xerostomia    Daytime somnolence  -     Ambulatory referral/consult to Sleep Disorders; Future; Expected date: 04/04/2022    Other orders  -     levothyroxine (SYNTHROID) 100 MCG tablet; Take 1 tablet (100 mcg total) by mouth once daily.  Dispense: 90 tablet; Refill: 1  -     buPROPion (WELLBUTRIN XL) 300 MG 24 hr tablet; Take 1 tablet (300 mg  total) by mouth once daily.  Dispense: 90 tablet; Refill: 1  -     metoprolol succinate (TOPROL-XL) 25 MG 24 hr tablet; Take 0.5 tablets (12.5 mg total) by mouth once daily.  Dispense: 90 tablet; Refill: 1  -     divalproex ER (DEPAKOTE) 500 MG Tb24; Take 2 tablets (1,000 mg total) by mouth once daily.  Dispense: 180 tablet; Refill: 1  -     amLODIPine (NORVASC) 5 MG tablet; Take 1 tablet (5 mg total) by mouth once daily.  Dispense: 90 tablet; Refill: 1  -     RABEprazole (ACIPHEX) 20 mg tablet; Take 1 tablet (20 mg total) by mouth 2 (two) times daily.  Dispense: 180 tablet; Refill: 1      Double up her AcipHex.  Do the labs that were ordered for today.  Sleep study.  Follow-up in 3 months.

## 2022-04-06 DIAGNOSIS — R06.83 SNORING: ICD-10-CM

## 2022-04-06 DIAGNOSIS — R53.83 FATIGUE DUE TO SLEEP PATTERN DISTURBANCE: ICD-10-CM

## 2022-04-06 DIAGNOSIS — G47.19 EXCESSIVE DAYTIME SLEEPINESS: ICD-10-CM

## 2022-04-06 DIAGNOSIS — I48.91 A-FIB: ICD-10-CM

## 2022-04-06 DIAGNOSIS — G47.9 FATIGUE DUE TO SLEEP PATTERN DISTURBANCE: ICD-10-CM

## 2022-04-06 DIAGNOSIS — G47.33 OSA (OBSTRUCTIVE SLEEP APNEA): Primary | ICD-10-CM

## 2022-04-27 ENCOUNTER — PROCEDURE VISIT (OUTPATIENT)
Dept: SLEEP MEDICINE | Facility: HOSPITAL | Age: 72
End: 2022-04-27
Attending: INTERNAL MEDICINE
Payer: MEDICARE

## 2022-04-27 DIAGNOSIS — R53.83 FATIGUE DUE TO SLEEP PATTERN DISTURBANCE: ICD-10-CM

## 2022-04-27 DIAGNOSIS — I48.91 A-FIB: ICD-10-CM

## 2022-04-27 DIAGNOSIS — G47.33 OSA (OBSTRUCTIVE SLEEP APNEA): ICD-10-CM

## 2022-04-27 DIAGNOSIS — G47.9 FATIGUE DUE TO SLEEP PATTERN DISTURBANCE: ICD-10-CM

## 2022-04-27 DIAGNOSIS — R06.83 SNORING: ICD-10-CM

## 2022-04-27 DIAGNOSIS — G47.19 EXCESSIVE DAYTIME SLEEPINESS: ICD-10-CM

## 2022-04-27 PROCEDURE — 95810 POLYSOM 6/> YRS 4/> PARAM: CPT

## 2022-05-10 DIAGNOSIS — R06.83 SNORING: ICD-10-CM

## 2022-05-10 DIAGNOSIS — G47.33 OBSTRUCTIVE SLEEP APNEA: Primary | ICD-10-CM

## 2022-05-10 DIAGNOSIS — G47.19 EXCESSIVE DAYTIME SLEEPINESS: ICD-10-CM

## 2022-05-10 DIAGNOSIS — R53.83 LOSS OF ENERGY: ICD-10-CM

## 2022-05-10 DIAGNOSIS — Z01.818 OTHER SPECIFIED PRE-OPERATIVE EXAMINATION: ICD-10-CM

## 2022-05-12 ENCOUNTER — IMMUNIZATION (OUTPATIENT)
Dept: PRIMARY CARE CLINIC | Facility: CLINIC | Age: 72
End: 2022-05-12
Payer: MEDICARE

## 2022-05-12 DIAGNOSIS — Z23 NEED FOR VACCINATION: Primary | ICD-10-CM

## 2022-05-12 PROCEDURE — 91305 COVID-19, MRNA, LNP-S, PF, 30 MCG/0.3 ML DOSE VACCINE (PFIZER): CPT | Mod: S$GLB,,, | Performed by: FAMILY MEDICINE

## 2022-05-12 PROCEDURE — 0054A COVID-19, MRNA, LNP-S, PF, 30 MCG/0.3 ML DOSE VACCINE (PFIZER): CPT | Mod: S$GLB,,, | Performed by: FAMILY MEDICINE

## 2022-05-12 PROCEDURE — 91305 COVID-19, MRNA, LNP-S, PF, 30 MCG/0.3 ML DOSE VACCINE (PFIZER): ICD-10-PCS | Mod: S$GLB,,, | Performed by: FAMILY MEDICINE

## 2022-05-12 PROCEDURE — 0054A COVID-19, MRNA, LNP-S, PF, 30 MCG/0.3 ML DOSE VACCINE (PFIZER): ICD-10-PCS | Mod: S$GLB,,, | Performed by: FAMILY MEDICINE

## 2022-05-14 ENCOUNTER — PROCEDURE VISIT (OUTPATIENT)
Dept: SLEEP MEDICINE | Facility: HOSPITAL | Age: 72
End: 2022-05-14
Attending: INTERNAL MEDICINE
Payer: MEDICARE

## 2022-05-14 DIAGNOSIS — G47.33 OBSTRUCTIVE SLEEP APNEA: ICD-10-CM

## 2022-05-14 DIAGNOSIS — G47.19 EXCESSIVE DAYTIME SLEEPINESS: ICD-10-CM

## 2022-05-14 DIAGNOSIS — R53.83 LOSS OF ENERGY: ICD-10-CM

## 2022-05-14 DIAGNOSIS — R06.83 SNORING: ICD-10-CM

## 2022-05-14 PROCEDURE — 95811 POLYSOM 6/>YRS CPAP 4/> PARM: CPT

## 2022-06-16 ENCOUNTER — OFFICE VISIT (OUTPATIENT)
Dept: CARDIOLOGY | Facility: CLINIC | Age: 72
End: 2022-06-16
Payer: MEDICARE

## 2022-06-16 VITALS
SYSTOLIC BLOOD PRESSURE: 130 MMHG | HEART RATE: 65 BPM | HEIGHT: 66 IN | OXYGEN SATURATION: 98 % | WEIGHT: 148 LBS | BODY MASS INDEX: 23.78 KG/M2 | DIASTOLIC BLOOD PRESSURE: 78 MMHG

## 2022-06-16 DIAGNOSIS — R42 VERTIGO: ICD-10-CM

## 2022-06-16 DIAGNOSIS — Z79.01 ANTICOAGULANT LONG-TERM USE: ICD-10-CM

## 2022-06-16 DIAGNOSIS — E78.00 PURE HYPERCHOLESTEROLEMIA: ICD-10-CM

## 2022-06-16 DIAGNOSIS — R07.9 CHEST PAIN, UNSPECIFIED TYPE: Primary | ICD-10-CM

## 2022-06-16 DIAGNOSIS — R53.83 FATIGUE, UNSPECIFIED TYPE: ICD-10-CM

## 2022-06-16 DIAGNOSIS — R20.0 NUMBNESS: ICD-10-CM

## 2022-06-16 DIAGNOSIS — I48.0 PAROXYSMAL ATRIAL FIBRILLATION: ICD-10-CM

## 2022-06-16 DIAGNOSIS — I10 HYPERTENSION, ESSENTIAL: ICD-10-CM

## 2022-06-16 PROCEDURE — 99214 PR OFFICE/OUTPT VISIT, EST, LEVL IV, 30-39 MIN: ICD-10-PCS | Mod: S$GLB,,, | Performed by: INTERNAL MEDICINE

## 2022-06-16 PROCEDURE — 93000 EKG 12-LEAD: ICD-10-PCS | Mod: S$GLB,,, | Performed by: INTERNAL MEDICINE

## 2022-06-16 PROCEDURE — 99214 OFFICE O/P EST MOD 30 MIN: CPT | Mod: S$GLB,,, | Performed by: INTERNAL MEDICINE

## 2022-06-16 PROCEDURE — 93000 ELECTROCARDIOGRAM COMPLETE: CPT | Mod: S$GLB,,, | Performed by: INTERNAL MEDICINE

## 2022-06-16 RX ORDER — MECLIZINE HCL 12.5 MG 12.5 MG/1
12.5 TABLET ORAL 3 TIMES DAILY PRN
Qty: 60 TABLET | Refills: 6 | Status: SHIPPED | OUTPATIENT
Start: 2022-06-16 | End: 2022-12-16 | Stop reason: SDUPTHER

## 2022-06-16 NOTE — ASSESSMENT & PLAN NOTE
She is presently maintaining sinus rhythm rate of 54 beats per minute she is on low doses of metoprolol 12.5 mg daily may consider tapering this down further use it every other day and stop it altogether.  Continue on Eliquis 5 mg p.o. b.i.d. and also on Multaq 400 mg p.o. b.i.d..

## 2022-06-16 NOTE — ASSESSMENT & PLAN NOTE
We will use meclizine for the time being and continue on antihistamine therapy may try Zyrtec instead of Claritin.

## 2022-06-16 NOTE — PROGRESS NOTES
Subjective:    Patient ID:  Kasandra Jones is a 71 y.o. female patient here for evaluation Atrial Fibrillation, Chest Pain, Dizziness, Fatigue, Palpitations, and Numbness (Left extremity on and off)      History of Present Illness:     Patient is a 71-year-old lady with history of parotid of paroxysmal atrial fibrillation and intermittent episodes of palpitations noted to have episodes of dizziness.  His overall some time on and off with varying intensity and duration.  This is been limiting her also cause her to have fatigue and tiredness.  She cannot finish a cookie apparently at times otherwise arm no occurrence of any chest discomfort arm neck or jaw pain or any palpitations sustained noted.  No cough or congestion and no swelling in the lower extremities.  She is being followed by neurologist noted to have cerebral aneurysm and this is being watched at this time.        Review of patient's allergies indicates:   Allergen Reactions    Niacin preparations     Penicillins        Past Medical History:   Diagnosis Date    Cataract      Past Surgical History:   Procedure Laterality Date    EYE SURGERY Bilateral 2020 August    cataract removal    HYSTERECTOMY      TOTAL REDUCTION MAMMOPLASTY  2002     Social History     Tobacco Use    Smoking status: Never Smoker    Smokeless tobacco: Never Used   Substance Use Topics    Alcohol use: Never    Drug use: Never        Review of Systems:    As noted in HPI in addition      REVIEW OF SYSTEMS  CARDIOVASCULAR: No recent chest pain, palpitations, arm, neck, or jaw pain  RESPIRATORY: No recent fever, cough chills, SOB or congestion  : No blood in the urine  GI: No Nausea, vomiting, constipation, diarrhea, blood, or reflux.  MUSCULOSKELETAL: No myalgias  NEURO: No lightheadedness positional dizziness is also noted.   EYES: No Double vision, blurry, vision or headache              Objective        Vitals:    06/16/22 1340   BP: 130/78   Pulse: 65       LIPIDS - LAST  2   Lab Results   Component Value Date    CHOL 171 03/28/2022    CHOL 160 10/05/2021    HDL 74 03/28/2022    HDL 72 10/05/2021    LDLCALC 80.0 03/28/2022    LDLCALC 75.4 10/05/2021    TRIG 85 03/28/2022    TRIG 63 10/05/2021    CHOLHDL 43.3 03/28/2022    CHOLHDL 45.0 10/05/2021       CBC - LAST 2  Lab Results   Component Value Date    WBC 6.78 03/28/2022    WBC 5.54 10/05/2021    RBC 4.35 03/28/2022    RBC 4.25 10/05/2021    HGB 13.8 03/28/2022    HGB 13.4 10/05/2021    HCT 42.9 03/28/2022    HCT 42.8 10/05/2021    MCV 99 (H) 03/28/2022     (H) 10/05/2021    MCH 31.7 (H) 03/28/2022    MCH 31.5 (H) 10/05/2021    MCHC 32.2 03/28/2022    MCHC 31.3 (L) 10/05/2021    RDW 13.2 03/28/2022    RDW 12.5 10/05/2021     03/28/2022     10/05/2021    MPV 10.9 03/28/2022    MPV 10.8 10/05/2021    GRAN 4.1 03/28/2022    GRAN 61.2 03/28/2022    LYMPH 2.0 03/28/2022    LYMPH 28.9 03/28/2022    MONO 0.5 03/28/2022    MONO 7.8 03/28/2022    BASO 0.03 03/28/2022    BASO 0.04 10/05/2021    NRBC 0 03/28/2022    NRBC 0 10/05/2021       CHEMISTRY & LIVER FUNCTION - LAST 2  Lab Results   Component Value Date     03/28/2022     (H) 10/05/2021    K 4.2 03/28/2022    K 4.8 10/05/2021     03/28/2022     10/05/2021    CO2 28 03/28/2022    CO2 28 10/05/2021    ANIONGAP 9 03/28/2022    ANIONGAP 11 10/05/2021    BUN 13 03/28/2022    BUN 14 10/05/2021    CREATININE 0.6 03/28/2022    CREATININE 0.7 01/31/2022     03/28/2022    GLU 99 10/05/2021    CALCIUM 8.9 03/28/2022    CALCIUM 9.4 10/05/2021    MG 1.8 06/28/2019    MG 1.8 05/23/2019    ALBUMIN 3.5 03/28/2022    ALBUMIN 3.5 10/05/2021    PROT 6.3 03/28/2022    PROT 6.5 10/05/2021    ALKPHOS 86 03/28/2022    ALKPHOS 83 10/05/2021    ALT 22 03/28/2022    ALT 17 10/05/2021    AST 19 03/28/2022    AST 15 10/05/2021    BILITOT 0.6 03/28/2022    BILITOT 0.7 10/05/2021        CARDIAC PROFILE - LAST 2  Lab Results   Component Value Date    CPK 56  06/28/2019    TROPONINI <0.030 06/28/2019        COAGULATION - LAST 2  No results found for: LABPT, INR, APTT    ENDOCRINE & PSA - LAST 2  Lab Results   Component Value Date    TSH 1.100 03/28/2022    TSH 1.470 12/29/2021        ECHOCARDIOGRAM RESULTS  Results for orders placed during the hospital encounter of 12/09/21    Echo    Interpretation Summary  · The left ventricle is normal in size with mildly decreased systolic function.  · The estimated ejection fraction is 48%.  · Grade I left ventricular diastolic dysfunction.  · Normal right ventricular size with normal right ventricular systolic function.  · Mild left atrial enlargement.  · Mild mitral regurgitation.  · Normal central venous pressure (3 mmHg).  · The estimated PA systolic pressure is 30 mmHg.      CURRENT/PREVIOUS VISIT EKG  Results for orders placed or performed in visit on 06/30/21   IN OFFICE EKG 12-LEAD (to [employer])    Collection Time: 06/30/21  9:57 AM    Narrative    Test Reason : R06.02,    Vent. Rate : 057 BPM     Atrial Rate : 057 BPM     P-R Int : 164 ms          QRS Dur : 084 ms      QT Int : 444 ms       P-R-T Axes : 039 011 069 degrees     QTc Int : 432 ms    Sinus bradycardia  Septal infarct ,age undetermined  Abnormal ECG  When compared with ECG of 01-MAR-2021 13:29,  Septal infarct is now Present  T wave inversion now evident in Anterior leads  Confirmed by Francesco Blakely MD ([id number]) on 7/5/2021 2:32:22 PM    Referred By: YIFAN PALMA           Confirmed By:Francesco Blakely MD     No valid procedures specified.   Results for orders placed during the hospital encounter of 07/09/21    Nuclear Stress - Cardiology Interpreted    Interpretation Summary    Normal myocardial perfusion scan. There is no evidence of myocardial ischemia or infarction.    The gated perfusion images showed an ejection fraction of 73% post stress. Normal ejection fraction is greater than 53%.    There is normal wall motion post stress.    LV cavity size is  and  normal at stress.    The EKG portion of this study is negative for ischemia.    The patient reported no chest pain during the stress test.    There were no arrhythmias during stress.    No valid procedures specified.    PHYSICAL EXAM  CONSTITUTIONAL: Well built, well nourished in no apparent distress  NECK: no carotid bruit, no JVD  LUNGS: CTA  CHEST WALL: no tenderness  HEART: regular rate and rhythm, S1, S2 normal, no murmur, click, rub or gallop   ABDOMEN: soft, non-tender; bowel sounds normal; no masses,  no organomegaly  EXTREMITIES: Extremities normal, no edema, no calf tenderness noted  NEURO: AAO X 3 she does have symptoms of positional vertigo.    I HAVE REVIEWED :    The vital signs, nurses notes, and all the pertinent radiology and labs.    06/16/2022 EKG shows sinus bradycardia rate of 54 beats per minute otherwise within normal limits.    Current Outpatient Medications   Medication Instructions    ALPRAZolam (XANAX) 0.5 mg, Oral, Daily PRN    amLODIPine (NORVASC) 5 mg, Oral, Daily    buPROPion (WELLBUTRIN XL) 300 mg, Oral, Daily    cholecalciferol, vitamin D3, 125 mcg (5,000 unit) capsule No dose, route, or frequency recorded.    cyanocobalamin-cobamamide (B12) 5,000-100 mcg Lozg No dose, route, or frequency recorded.    diphenoxylate-atropine 2.5-0.025 mg (LOMOTIL) 2.5-0.025 mg per tablet 1 tablet, Oral, 4 times daily PRN    divalproex ER (DEPAKOTE) 1,000 mg, Oral, Daily    ELIQUIS 5 mg, Oral, 2 times daily    ferrous sulfate 325 (65 FE) MG EC tablet No dose, route, or frequency recorded.    KLOR-CON M20 20 mEq tablet 20 mEq, Oral, Daily    levothyroxine (SYNTHROID) 100 mcg, Oral, Daily    loratadine (CLARITIN) 10 mg, Oral, Daily    losartan (COZAAR) 25 mg, Oral, Daily    metoprolol succinate (TOPROL-XL) 12.5 mg, Oral, Daily    MULTAQ 400 mg, Oral, 2 times daily    RABEprazole (ACIPHEX) 20 mg, Oral, 2 times daily    traZODone (DESYREL) 100 MG tablet TAKE TWO TABLETS PO NIGHTLY     vit C,G-Ol-izozv-lutein-zeaxan (PRESERVISION AREDS 2) 250-90-40-1 mg Cap No dose, route, or frequency recorded.          Assessment & Plan     Paroxysmal atrial fibrillation  She is presently maintaining sinus rhythm rate of 54 beats per minute she is on low doses of metoprolol 12.5 mg daily may consider tapering this down further use it every other day and stop it altogether.  Continue on Eliquis 5 mg p.o. b.i.d. and also on Multaq 400 mg p.o. b.i.d..    Hyperlipidemia  She is off for lipid lowering agents at this time.  Continue on low-fat low-cholesterol diet    Hypertension, essential  Blood pressure is 130/78 mmHg.  Encouraged her to cut back on amlodipine to 2.5 mg a day continue losartan low dose of 25 mg once daily    Anticoagulant long-term use  Continue on Eliquis 5 mg p.o. b.i.d..    Vertigo  We will use meclizine for the time being and continue on antihistamine therapy may try Zyrtec instead of Claritin.          No follow-ups on file.

## 2022-06-16 NOTE — ASSESSMENT & PLAN NOTE
Blood pressure is 130/78 mmHg.  Encouraged her to cut back on amlodipine to 2.5 mg a day continue losartan low dose of 25 mg once daily

## 2022-06-27 ENCOUNTER — LAB VISIT (OUTPATIENT)
Dept: LAB | Facility: HOSPITAL | Age: 72
End: 2022-06-27
Attending: FAMILY MEDICINE
Payer: MEDICARE

## 2022-06-27 ENCOUNTER — OFFICE VISIT (OUTPATIENT)
Dept: FAMILY MEDICINE | Facility: CLINIC | Age: 72
End: 2022-06-27
Payer: MEDICARE

## 2022-06-27 VITALS
WEIGHT: 145 LBS | OXYGEN SATURATION: 98 % | HEIGHT: 66 IN | TEMPERATURE: 97 F | DIASTOLIC BLOOD PRESSURE: 84 MMHG | SYSTOLIC BLOOD PRESSURE: 136 MMHG | BODY MASS INDEX: 23.3 KG/M2 | HEART RATE: 77 BPM

## 2022-06-27 DIAGNOSIS — I48.0 PAROXYSMAL ATRIAL FIBRILLATION: ICD-10-CM

## 2022-06-27 DIAGNOSIS — F31.9 BIPOLAR 1 DISORDER: ICD-10-CM

## 2022-06-27 DIAGNOSIS — E03.9 HYPOTHYROIDISM, UNSPECIFIED TYPE: ICD-10-CM

## 2022-06-27 DIAGNOSIS — E78.5 HYPERLIPIDEMIA, UNSPECIFIED HYPERLIPIDEMIA TYPE: ICD-10-CM

## 2022-06-27 DIAGNOSIS — Z79.01 ANTICOAGULANT LONG-TERM USE: ICD-10-CM

## 2022-06-27 DIAGNOSIS — I10 HYPERTENSION, ESSENTIAL: ICD-10-CM

## 2022-06-27 DIAGNOSIS — I10 HYPERTENSION, ESSENTIAL: Primary | ICD-10-CM

## 2022-06-27 LAB
ALBUMIN SERPL BCP-MCNC: 3.6 G/DL (ref 3.5–5.2)
ALP SERPL-CCNC: 78 U/L (ref 55–135)
ALT SERPL W/O P-5'-P-CCNC: 23 U/L (ref 10–44)
ANION GAP SERPL CALC-SCNC: 7 MMOL/L (ref 8–16)
AST SERPL-CCNC: 21 U/L (ref 10–40)
BASOPHILS # BLD AUTO: 0.04 K/UL (ref 0–0.2)
BASOPHILS NFR BLD: 0.6 % (ref 0–1.9)
BILIRUB SERPL-MCNC: 0.6 MG/DL (ref 0.1–1)
BUN SERPL-MCNC: 14 MG/DL (ref 8–23)
CALCIUM SERPL-MCNC: 9 MG/DL (ref 8.7–10.5)
CHLORIDE SERPL-SCNC: 101 MMOL/L (ref 95–110)
CHOLEST SERPL-MCNC: 171 MG/DL (ref 120–199)
CHOLEST/HDLC SERPL: 2.3 {RATIO} (ref 2–5)
CO2 SERPL-SCNC: 29 MMOL/L (ref 23–29)
CREAT SERPL-MCNC: 0.7 MG/DL (ref 0.5–1.4)
DIFFERENTIAL METHOD: ABNORMAL
EOSINOPHIL # BLD AUTO: 0.1 K/UL (ref 0–0.5)
EOSINOPHIL NFR BLD: 1.4 % (ref 0–8)
ERYTHROCYTE [DISTWIDTH] IN BLOOD BY AUTOMATED COUNT: 13.2 % (ref 11.5–14.5)
EST. GFR  (AFRICAN AMERICAN): >60 ML/MIN/1.73 M^2
EST. GFR  (NON AFRICAN AMERICAN): >60 ML/MIN/1.73 M^2
GLUCOSE SERPL-MCNC: 107 MG/DL (ref 70–110)
HCT VFR BLD AUTO: 44.4 % (ref 37–48.5)
HDLC SERPL-MCNC: 74 MG/DL (ref 40–75)
HDLC SERPL: 43.3 % (ref 20–50)
HGB BLD-MCNC: 14 G/DL (ref 12–16)
IMM GRANULOCYTES # BLD AUTO: 0.02 K/UL (ref 0–0.04)
IMM GRANULOCYTES NFR BLD AUTO: 0.3 % (ref 0–0.5)
LDLC SERPL CALC-MCNC: 84 MG/DL (ref 63–159)
LYMPHOCYTES # BLD AUTO: 2.1 K/UL (ref 1–4.8)
LYMPHOCYTES NFR BLD: 29.6 % (ref 18–48)
MCH RBC QN AUTO: 31.7 PG (ref 27–31)
MCHC RBC AUTO-ENTMCNC: 31.5 G/DL (ref 32–36)
MCV RBC AUTO: 101 FL (ref 82–98)
MONOCYTES # BLD AUTO: 0.6 K/UL (ref 0.3–1)
MONOCYTES NFR BLD: 8.1 % (ref 4–15)
NEUTROPHILS # BLD AUTO: 4.3 K/UL (ref 1.8–7.7)
NEUTROPHILS NFR BLD: 60 % (ref 38–73)
NONHDLC SERPL-MCNC: 97 MG/DL
NRBC BLD-RTO: 0 /100 WBC
PLATELET # BLD AUTO: 250 K/UL (ref 150–450)
PMV BLD AUTO: 11.1 FL (ref 9.2–12.9)
POTASSIUM SERPL-SCNC: 4.4 MMOL/L (ref 3.5–5.1)
PROT SERPL-MCNC: 6.5 G/DL (ref 6–8.4)
RBC # BLD AUTO: 4.42 M/UL (ref 4–5.4)
SODIUM SERPL-SCNC: 137 MMOL/L (ref 136–145)
TRIGL SERPL-MCNC: 65 MG/DL (ref 30–150)
TSH SERPL DL<=0.005 MIU/L-ACNC: 2 UIU/ML (ref 0.34–5.6)
VALPROATE SERPL-MCNC: 40 UG/ML (ref 50–100)
WBC # BLD AUTO: 7.12 K/UL (ref 3.9–12.7)

## 2022-06-27 PROCEDURE — 84443 ASSAY THYROID STIM HORMONE: CPT | Performed by: FAMILY MEDICINE

## 2022-06-27 PROCEDURE — 99214 OFFICE O/P EST MOD 30 MIN: CPT | Mod: S$GLB,,, | Performed by: FAMILY MEDICINE

## 2022-06-27 PROCEDURE — 80164 ASSAY DIPROPYLACETIC ACD TOT: CPT | Performed by: FAMILY MEDICINE

## 2022-06-27 PROCEDURE — 36415 COLL VENOUS BLD VENIPUNCTURE: CPT | Performed by: FAMILY MEDICINE

## 2022-06-27 PROCEDURE — 80061 LIPID PANEL: CPT | Performed by: FAMILY MEDICINE

## 2022-06-27 PROCEDURE — 80053 COMPREHEN METABOLIC PANEL: CPT | Performed by: FAMILY MEDICINE

## 2022-06-27 PROCEDURE — 85025 COMPLETE CBC W/AUTO DIFF WBC: CPT | Performed by: FAMILY MEDICINE

## 2022-06-27 PROCEDURE — 99214 PR OFFICE/OUTPT VISIT, EST, LEVL IV, 30-39 MIN: ICD-10-PCS | Mod: S$GLB,,, | Performed by: FAMILY MEDICINE

## 2022-06-27 RX ORDER — DRONEDARONE 400 MG/1
400 TABLET, FILM COATED ORAL 2 TIMES DAILY
Qty: 180 TABLET | Refills: 1 | Status: SHIPPED | OUTPATIENT
Start: 2022-06-27 | End: 2023-01-03 | Stop reason: SDUPTHER

## 2022-06-27 RX ORDER — AMLODIPINE BESYLATE 2.5 MG/1
2.5 TABLET ORAL DAILY
Qty: 30 TABLET | Refills: 0 | Status: SHIPPED | OUTPATIENT
Start: 2022-06-27 | End: 2022-07-20

## 2022-06-27 RX ORDER — POTASSIUM CHLORIDE 1500 MG/1
20 TABLET, EXTENDED RELEASE ORAL DAILY
Qty: 90 TABLET | Refills: 1 | Status: SHIPPED | OUTPATIENT
Start: 2022-06-27 | End: 2022-12-16

## 2022-06-27 RX ORDER — LOSARTAN POTASSIUM 25 MG/1
25 TABLET ORAL DAILY
Qty: 90 TABLET | Refills: 1 | Status: SHIPPED | OUTPATIENT
Start: 2022-06-27 | End: 2022-07-20

## 2022-06-27 NOTE — PROGRESS NOTES
Subjective:       Patient ID: Kasandra Jones is a 71 y.o. female.    Chief Complaint: Follow-up    HPI   Patient presents to clinic for follow-up. When she bends over she gets dizzy. Sometimes is dizzy when getting out of bed. States she feels off balance and weak. Has hard time walking long distances. Fell when getting off toilet last week. Hit her back on the toilet.  Episode happen during the night.  Did not pass out. Does not check blood pressure at home. Hypertension stable. Cardiovascular ok. Denies chest pain or palpitations. Taking Eliquis. A-Fib is stable. Was taken off Metoprolol by cardiologist. Recently started taking Antivert for vertigo. Decrease amlodipine.  Cardiology discontinued Toprol a gave her Antivert.    Complete MRI cervical spine to monitor cyst.  Review of Systems   Cardiovascular: Negative for chest pain and palpitations.   Neurological: Positive for dizziness, weakness and light-headedness.         Objective:      Physical Exam  Constitutional:       Appearance: Normal appearance.   Neck:      Vascular: No carotid bruit.   Cardiovascular:      Rate and Rhythm: Normal rate. Rhythm irregular.      Pulses: Normal pulses.      Heart sounds: Normal heart sounds.   Pulmonary:      Effort: Pulmonary effort is normal.      Breath sounds: Normal breath sounds.   Lymphadenopathy:      Cervical: No cervical adenopathy.   Skin:     General: Skin is warm and dry.   Neurological:      Mental Status: She is alert.   Psychiatric:         Mood and Affect: Mood normal.         Behavior: Behavior normal.       Arvin-Hallpike maneuver is negative.  Pupils are equal round regular active light accommodation.  Neurologically intact.  Other than positive Romberg.  Dizziness upon standing.  Assessment/Plan:     Hypertension, essential  -     Comprehensive Metabolic Panel; Future; Expected date: 06/27/2022    Hyperlipidemia, unspecified hyperlipidemia type  -     Lipid Panel; Future; Expected date:  06/27/2022    Bipolar 1 disorder  -     Valproic Acid; Future; Expected date: 06/27/2022    Paroxysmal atrial fibrillation    Hypothyroidism, unspecified type  -     TSH; Future; Expected date: 06/27/2022    Anticoagulant long-term use  -     CBC Auto Differential; Future; Expected date: 06/27/2022    Other orders  -     KLOR-CON M20 20 mEq tablet; Take 1 tablet (20 mEq total) by mouth once daily.  Dispense: 90 tablet; Refill: 1  -     losartan (COZAAR) 25 MG tablet; Take 1 tablet (25 mg total) by mouth once daily.  Dispense: 90 tablet; Refill: 1  -     MULTAQ 400 mg Tab; Take 1 tablet (400 mg total) by mouth 2 (two) times daily.  Dispense: 180 tablet; Refill: 1  -     amLODIPine (NORVASC) 2.5 MG tablet; Take 1 tablet (2.5 mg total) by mouth once daily.  Dispense: 30 tablet; Refill: 0       Refilled medications.  Needs to do the repeat MRI of the cervical spine with contrast.  Ordered by Dr. Harris.  He will review it.  To evaluate nodule in the upper cord brainstem area.  CBC CMP lipids TSH and valproic acid levels ordered.  Decrease her amlodipine from 5 mg to 2.5 mg daily 30 pills.  Follow-up in 3 weeks to see if this has helped her symptoms if they are due to orthostasis rather than vertigo.

## 2022-07-19 ENCOUNTER — HOSPITAL ENCOUNTER (OUTPATIENT)
Dept: RADIOLOGY | Facility: HOSPITAL | Age: 72
Discharge: HOME | End: 2022-07-19
Attending: NEUROLOGICAL SURGERY
Payer: MEDICARE

## 2022-07-19 DIAGNOSIS — D49.7 SPINAL CORD TUMOR: ICD-10-CM

## 2022-07-19 PROCEDURE — A9585 GADOBUTROL INJECTION: HCPCS | Mod: PO | Performed by: NEUROLOGICAL SURGERY

## 2022-07-19 PROCEDURE — 72156 MRI NECK SPINE W/O & W/DYE: CPT | Mod: TC,PO

## 2022-07-19 PROCEDURE — 25500020 PHARM REV CODE 255: Mod: PO | Performed by: NEUROLOGICAL SURGERY

## 2022-07-19 RX ORDER — GADOBUTROL 604.72 MG/ML
6.5 INJECTION INTRAVENOUS
Status: COMPLETED | OUTPATIENT
Start: 2022-07-19 | End: 2022-07-19

## 2022-07-19 RX ADMIN — GADOBUTROL 6.5 ML: 604.72 INJECTION INTRAVENOUS at 09:07

## 2022-07-20 ENCOUNTER — OFFICE VISIT (OUTPATIENT)
Dept: FAMILY MEDICINE | Facility: CLINIC | Age: 72
End: 2022-07-20
Payer: MEDICARE

## 2022-07-20 VITALS
BODY MASS INDEX: 23.46 KG/M2 | DIASTOLIC BLOOD PRESSURE: 56 MMHG | TEMPERATURE: 97 F | HEART RATE: 63 BPM | SYSTOLIC BLOOD PRESSURE: 109 MMHG | HEIGHT: 66 IN | WEIGHT: 146 LBS | OXYGEN SATURATION: 98 %

## 2022-07-20 DIAGNOSIS — I95.1 ORTHOSTASIS: Primary | ICD-10-CM

## 2022-07-20 DIAGNOSIS — R06.02 SOB (SHORTNESS OF BREATH): ICD-10-CM

## 2022-07-20 PROCEDURE — 99213 OFFICE O/P EST LOW 20 MIN: CPT | Mod: S$GLB,,, | Performed by: FAMILY MEDICINE

## 2022-07-20 PROCEDURE — 99213 PR OFFICE/OUTPT VISIT, EST, LEVL III, 20-29 MIN: ICD-10-PCS | Mod: S$GLB,,, | Performed by: FAMILY MEDICINE

## 2022-07-20 NOTE — PROGRESS NOTES
Subjective:       Patient ID: Kasandra Jones is a 71 y.o. female.    Chief Complaint: Blood Pressure Check (Patient stated that she is experiencing weakness and she feels like blacking out.), Nausea, and Dizziness    HPI   Patient presents to clinic for follow up on hypertension and dizziness. Still experiencing dizziness. Feels short of breath and feels like she is going to black out. No actual falls. Has not been able to check blood pressure when this happens. Has been taking Amlodipine 2.5mg as directed. Blood pressure stable today. Some hypotension at home- 94/57. Discontinue Amlodipine and Losartan. Monitor blood pressure. Valproic Acid 40. TSH 2. Chol 171 HDL 74 LDL 84. CMP normal. CBC normal. No anemia. Had MRI of cervical spine yesterday. Radiologist documented 3 x 3 x 3 mm enhancing mass along the posterior margin of the spinal cord at the level the foramen magnum. Findings could reflect a small meningioma, or other soft tissue mass. Follow up with Dr. Harris.   Review of Systems   Respiratory: Negative.    Cardiovascular: Negative.  Negative for chest pain and palpitations.   Neurological: Positive for dizziness.   Hematological: Negative.    Psychiatric/Behavioral: Negative.          Objective:      Physical Exam  Constitutional:       Appearance: Normal appearance.   Neck:      Vascular: No carotid bruit.   Cardiovascular:      Rate and Rhythm: Normal rate and regular rhythm.      Pulses: Normal pulses.      Heart sounds: Normal heart sounds.   Pulmonary:      Effort: Pulmonary effort is normal.      Breath sounds: Normal breath sounds.   Lymphadenopathy:      Cervical: No cervical adenopathy.   Skin:     General: Skin is warm and dry.   Neurological:      Mental Status: She is alert.   Psychiatric:         Mood and Affect: Mood normal.         Behavior: Behavior normal.         Assessment/Plan:     Orthostasis  -     Cortisol, 8AM; Future; Expected date: 07/20/2022  -     Cancel: Cortisol, 4PM; Future;  Expected date: 07/20/2022    SOB (shortness of breath)  -     Cortisol, 8AM; Future; Expected date: 07/20/2022  -     Cancel: Cortisol, 4PM; Future; Expected date: 07/20/2022     N cortisols.  Follow-up here in 2 weeks.  euro surgery should be reviewing her MRI in contacting her.  They do not hear from then then the shin call them.  Discontinue the losartan and discontinue the amlodipine to see if we can help orthostasis.  Check a.m. and p.m. cortisols levels.  Follow-up here in 2 weeks.

## 2022-07-21 ENCOUNTER — LAB VISIT (OUTPATIENT)
Dept: LAB | Facility: HOSPITAL | Age: 72
End: 2022-07-21
Attending: FAMILY MEDICINE
Payer: MEDICARE

## 2022-07-21 DIAGNOSIS — I95.1 ORTHOSTASIS: ICD-10-CM

## 2022-07-21 DIAGNOSIS — R06.02 SOB (SHORTNESS OF BREATH): ICD-10-CM

## 2022-07-21 LAB
CORTIS SERPL-MCNC: 10.1 UG/DL (ref 3.1–16.7)
CORTIS SERPL-MCNC: 12.5 UG/DL (ref 4.3–22.4)

## 2022-07-21 PROCEDURE — 36415 COLL VENOUS BLD VENIPUNCTURE: CPT | Performed by: FAMILY MEDICINE

## 2022-07-21 PROCEDURE — 82533 TOTAL CORTISOL: CPT | Mod: 91 | Performed by: FAMILY MEDICINE

## 2022-07-21 PROCEDURE — 82533 TOTAL CORTISOL: CPT | Performed by: FAMILY MEDICINE

## 2022-08-03 ENCOUNTER — OFFICE VISIT (OUTPATIENT)
Dept: FAMILY MEDICINE | Facility: CLINIC | Age: 72
End: 2022-08-03
Payer: MEDICARE

## 2022-08-03 VITALS
HEIGHT: 66 IN | SYSTOLIC BLOOD PRESSURE: 130 MMHG | OXYGEN SATURATION: 99 % | DIASTOLIC BLOOD PRESSURE: 69 MMHG | BODY MASS INDEX: 23.12 KG/M2 | TEMPERATURE: 97 F | HEART RATE: 60 BPM | WEIGHT: 143.88 LBS

## 2022-08-03 DIAGNOSIS — G95.89 SPINAL CORD MASS: ICD-10-CM

## 2022-08-03 DIAGNOSIS — Z12.31 BREAST CANCER SCREENING BY MAMMOGRAM: ICD-10-CM

## 2022-08-03 DIAGNOSIS — I95.1 ORTHOSTASIS: Primary | ICD-10-CM

## 2022-08-03 PROCEDURE — 99213 PR OFFICE/OUTPT VISIT, EST, LEVL III, 20-29 MIN: ICD-10-PCS | Mod: S$GLB,,, | Performed by: FAMILY MEDICINE

## 2022-08-03 PROCEDURE — 99213 OFFICE O/P EST LOW 20 MIN: CPT | Mod: S$GLB,,, | Performed by: FAMILY MEDICINE

## 2022-08-05 NOTE — PROGRESS NOTES
Subjective:       Patient ID: Kasandra Jones is a 71 y.o. female.    Chief Complaint: Follow-up (2 week f/u/)    Feeling better.  Much more steady.  Off the losartan in Norvasc.  Less dizzy.  Less off balance.  Feel safer on her feet.  Breast cancer screening mammogram due.  The upper spinal cord mass she had the MRI with and without done.  Does not seem to be any different.  But she has not heard from her neurosurgeon about it yet.  BMI is now 23.    Physical examination vital signs are noted.  No acute distress.  Neck without bruit.  Chest clear.  Heart regular rate and rhythm.    Labs cortisols are normal.      Objective:        Assessment:       1. Orthostasis    2. Spinal cord mass    3. BMI 23.0-23.9, adult    4. Breast cancer screening by mammogram        Plan:       Orthostasis    Spinal cord mass    BMI 23.0-23.9, adult    Breast cancer screening by mammogram  -     Mammo Digital Screening Bilat; Future; Expected date: 08/03/2022    Mammogram.  Follow-up 2 months.  Stay off of the blood pressure medicines for now.  Recommend she call Dr. Harris regarding the MRI he was supposed to review it and contact her.

## 2022-08-24 ENCOUNTER — PATIENT MESSAGE (OUTPATIENT)
Dept: ADMINISTRATIVE | Facility: HOSPITAL | Age: 72
End: 2022-08-24
Payer: MEDICARE

## 2022-08-25 ENCOUNTER — HOSPITAL ENCOUNTER (OUTPATIENT)
Dept: RADIOLOGY | Facility: HOSPITAL | Age: 72
Discharge: HOME | End: 2022-08-25
Attending: FAMILY MEDICINE
Payer: MEDICARE

## 2022-08-25 DIAGNOSIS — Z12.31 BREAST CANCER SCREENING BY MAMMOGRAM: ICD-10-CM

## 2022-08-25 PROCEDURE — 77067 SCR MAMMO BI INCL CAD: CPT | Mod: TC,PO

## 2022-09-19 ENCOUNTER — OFFICE VISIT (OUTPATIENT)
Dept: NEUROSURGERY | Facility: CLINIC | Age: 72
End: 2022-09-19
Payer: MEDICARE

## 2022-09-19 VITALS
DIASTOLIC BLOOD PRESSURE: 75 MMHG | SYSTOLIC BLOOD PRESSURE: 122 MMHG | BODY MASS INDEX: 22.98 KG/M2 | HEART RATE: 81 BPM | HEIGHT: 66 IN | WEIGHT: 143 LBS

## 2022-09-19 DIAGNOSIS — D49.7 SPINAL CORD TUMOR: Primary | ICD-10-CM

## 2022-09-19 PROCEDURE — 99999 PR PBB SHADOW E&M-EST. PATIENT-LVL IV: CPT | Mod: PBBFAC,,, | Performed by: NEUROLOGICAL SURGERY

## 2022-09-19 PROCEDURE — 99214 OFFICE O/P EST MOD 30 MIN: CPT | Mod: PBBFAC | Performed by: NEUROLOGICAL SURGERY

## 2022-09-19 PROCEDURE — 99214 PR OFFICE/OUTPT VISIT, EST, LEVL IV, 30-39 MIN: ICD-10-PCS | Mod: S$PBB,,, | Performed by: NEUROLOGICAL SURGERY

## 2022-09-19 PROCEDURE — 99214 OFFICE O/P EST MOD 30 MIN: CPT | Mod: S$PBB,,, | Performed by: NEUROLOGICAL SURGERY

## 2022-09-19 PROCEDURE — 99999 PR PBB SHADOW E&M-EST. PATIENT-LVL IV: ICD-10-PCS | Mod: PBBFAC,,, | Performed by: NEUROLOGICAL SURGERY

## 2022-09-19 NOTE — PROGRESS NOTES
Kasandra Jones was seen in neurosurgical follow-up at the office today.  She has remained generally neurologically stable.  Her blood pressure medicines were reduced and with this she has felt more active and stable.  She still has episodes where she feels she is going to fall back but has not actually fallen.  She has had no new headaches, visual disturbance or other neurological symptoms.    On neurological examination she is alert and speaking clearly.  Extraocular movements remain good and pupils equal.  Gait is somewhat broad-based and she still has some difficulty going heel-to-toe.  She shows no focal weakness and seems generally neurologically intact.    MRI of the cervical spine was repeated at [facility] on 07/19/2022 and compared to her previous MRI of the cervical spine on 02/21/2022 and MRI of the brain on 01/31/2022.  Again is seen a small enhancing 3 mm nodule on the dorsal medulla at the level of the foramen magnum.  This appears unchanged.    This seems to be a benign finding.  We will see how she does in the coming year and I will have MRI repeated in about 1 year to follow the abnormality.

## 2022-09-20 ENCOUNTER — TELEPHONE (OUTPATIENT)
Dept: CARDIOLOGY | Facility: CLINIC | Age: 72
End: 2022-09-20
Payer: MEDICARE

## 2022-09-20 NOTE — TELEPHONE ENCOUNTER
----- Message from John Hunter sent at 9/20/2022 10:37 AM CDT -----  Type:  Sooner Appointment Request    Caller is requesting a sooner appointment.  Caller declined first available appointment listed below.  Caller will not accept being placed on the waitlist and is requesting a message be sent to doctor.    Name of Caller:  Pt  When is the first available appointment?     Symptoms:  letter for F/U in Nov -   Best Call Back Number:  [phone]  Additional Information:  Please advise -- Thank you

## 2022-09-22 LAB — CRC RECOMMENDATION EXT: NORMAL

## 2022-09-23 ENCOUNTER — TELEPHONE (OUTPATIENT)
Dept: FAMILY MEDICINE | Facility: CLINIC | Age: 72
End: 2022-09-23
Payer: MEDICARE

## 2022-09-23 DIAGNOSIS — E03.9 HYPOTHYROIDISM, UNSPECIFIED TYPE: ICD-10-CM

## 2022-09-23 DIAGNOSIS — I10 HYPERTENSION, ESSENTIAL: Primary | ICD-10-CM

## 2022-09-23 DIAGNOSIS — E78.5 HYPERLIPIDEMIA, UNSPECIFIED HYPERLIPIDEMIA TYPE: ICD-10-CM

## 2022-09-23 NOTE — TELEPHONE ENCOUNTER
Done pt advised  ----- Message from Terry Shay sent at 9/23/2022  3:49 PM CDT -----  Contact: Self  Type: Needs Medical Advice  Who Called:  Patient  Best Call Back Number: [phone]   Additional Information:  Wanted to know if labs were needed for 10/3 appt.

## 2022-09-23 NOTE — TELEPHONE ENCOUNTER
----- Message from Jaylene Sandoval sent at 9/23/2022  3:56 PM CDT -----  Contact: pt  Type: Needs Medical Advice    Who Called: pt  Best Call Back Number: [phone]    Inquiry/Question: pt has an appt 10/03/2022 and she would like to get her labs done before her appt. Please enter them into the system       Thank you~

## 2022-09-29 ENCOUNTER — LAB VISIT (OUTPATIENT)
Dept: LAB | Facility: HOSPITAL | Age: 72
End: 2022-09-29
Attending: NEUROLOGICAL SURGERY
Payer: MEDICARE

## 2022-09-29 DIAGNOSIS — E78.5 HYPERLIPIDEMIA, UNSPECIFIED HYPERLIPIDEMIA TYPE: ICD-10-CM

## 2022-09-29 DIAGNOSIS — I10 HYPERTENSION, ESSENTIAL: ICD-10-CM

## 2022-09-29 DIAGNOSIS — E03.9 HYPOTHYROIDISM, UNSPECIFIED TYPE: ICD-10-CM

## 2022-09-29 DIAGNOSIS — D49.7 SPINAL CORD TUMOR: ICD-10-CM

## 2022-09-29 LAB
ALBUMIN SERPL BCP-MCNC: 3.5 G/DL (ref 3.5–5.2)
ALP SERPL-CCNC: 107 U/L (ref 55–135)
ALT SERPL W/O P-5'-P-CCNC: 20 U/L (ref 10–44)
ANION GAP SERPL CALC-SCNC: 8 MMOL/L (ref 8–16)
AST SERPL-CCNC: 19 U/L (ref 10–40)
BASOPHILS # BLD AUTO: 0.03 K/UL (ref 0–0.2)
BASOPHILS NFR BLD: 0.6 % (ref 0–1.9)
BILIRUB SERPL-MCNC: 0.8 MG/DL (ref 0.1–1)
BUN SERPL-MCNC: 17 MG/DL (ref 8–23)
CALCIUM SERPL-MCNC: 9.3 MG/DL (ref 8.7–10.5)
CHLORIDE SERPL-SCNC: 103 MMOL/L (ref 95–110)
CHOLEST SERPL-MCNC: 170 MG/DL (ref 120–199)
CHOLEST/HDLC SERPL: 2.3 {RATIO} (ref 2–5)
CO2 SERPL-SCNC: 30 MMOL/L (ref 23–29)
CREAT SERPL-MCNC: 0.7 MG/DL (ref 0.5–1.4)
CREAT SERPL-MCNC: 0.7 MG/DL (ref 0.5–1.4)
DIFFERENTIAL METHOD: ABNORMAL
EOSINOPHIL # BLD AUTO: 0.1 K/UL (ref 0–0.5)
EOSINOPHIL NFR BLD: 2.6 % (ref 0–8)
ERYTHROCYTE [DISTWIDTH] IN BLOOD BY AUTOMATED COUNT: 13.1 % (ref 11.5–14.5)
EST. GFR  (NO RACE VARIABLE): >60 ML/MIN/1.73 M^2
EST. GFR  (NO RACE VARIABLE): >60 ML/MIN/1.73 M^2
GLUCOSE SERPL-MCNC: 100 MG/DL (ref 70–110)
HCT VFR BLD AUTO: 43.8 % (ref 37–48.5)
HDLC SERPL-MCNC: 74 MG/DL (ref 40–75)
HDLC SERPL: 43.5 % (ref 20–50)
HGB BLD-MCNC: 14.2 G/DL (ref 12–16)
IMM GRANULOCYTES # BLD AUTO: 0 K/UL (ref 0–0.04)
IMM GRANULOCYTES NFR BLD AUTO: 0 % (ref 0–0.5)
LDLC SERPL CALC-MCNC: 82.4 MG/DL (ref 63–159)
LYMPHOCYTES # BLD AUTO: 2.2 K/UL (ref 1–4.8)
LYMPHOCYTES NFR BLD: 40.6 % (ref 18–48)
MCH RBC QN AUTO: 32.2 PG (ref 27–31)
MCHC RBC AUTO-ENTMCNC: 32.4 G/DL (ref 32–36)
MCV RBC AUTO: 99 FL (ref 82–98)
MONOCYTES # BLD AUTO: 0.4 K/UL (ref 0.3–1)
MONOCYTES NFR BLD: 7.1 % (ref 4–15)
NEUTROPHILS # BLD AUTO: 2.7 K/UL (ref 1.8–7.7)
NEUTROPHILS NFR BLD: 49.1 % (ref 38–73)
NONHDLC SERPL-MCNC: 96 MG/DL
NRBC BLD-RTO: 0 /100 WBC
PLATELET # BLD AUTO: 221 K/UL (ref 150–450)
PMV BLD AUTO: 11.1 FL (ref 9.2–12.9)
POTASSIUM SERPL-SCNC: 3.9 MMOL/L (ref 3.5–5.1)
PROT SERPL-MCNC: 6.5 G/DL (ref 6–8.4)
RBC # BLD AUTO: 4.41 M/UL (ref 4–5.4)
SODIUM SERPL-SCNC: 141 MMOL/L (ref 136–145)
TRIGL SERPL-MCNC: 68 MG/DL (ref 30–150)
TSH SERPL DL<=0.005 MIU/L-ACNC: 2.38 UIU/ML (ref 0.34–5.6)
WBC # BLD AUTO: 5.39 K/UL (ref 3.9–12.7)

## 2022-09-29 PROCEDURE — 84443 ASSAY THYROID STIM HORMONE: CPT | Performed by: FAMILY MEDICINE

## 2022-09-29 PROCEDURE — 80053 COMPREHEN METABOLIC PANEL: CPT | Performed by: FAMILY MEDICINE

## 2022-09-29 PROCEDURE — 85025 COMPLETE CBC W/AUTO DIFF WBC: CPT | Performed by: FAMILY MEDICINE

## 2022-09-29 PROCEDURE — 36415 COLL VENOUS BLD VENIPUNCTURE: CPT | Performed by: FAMILY MEDICINE

## 2022-09-29 PROCEDURE — 80061 LIPID PANEL: CPT | Performed by: FAMILY MEDICINE

## 2022-10-03 ENCOUNTER — OFFICE VISIT (OUTPATIENT)
Dept: FAMILY MEDICINE | Facility: CLINIC | Age: 72
End: 2022-10-03
Payer: MEDICARE

## 2022-10-03 ENCOUNTER — LAB VISIT (OUTPATIENT)
Dept: LAB | Facility: HOSPITAL | Age: 72
End: 2022-10-03
Attending: FAMILY MEDICINE
Payer: MEDICARE

## 2022-10-03 VITALS
DIASTOLIC BLOOD PRESSURE: 72 MMHG | HEIGHT: 66 IN | SYSTOLIC BLOOD PRESSURE: 118 MMHG | BODY MASS INDEX: 22.79 KG/M2 | HEART RATE: 68 BPM | WEIGHT: 141.81 LBS | OXYGEN SATURATION: 97 % | TEMPERATURE: 97 F

## 2022-10-03 DIAGNOSIS — Z79.01 ANTICOAGULANT LONG-TERM USE: Primary | ICD-10-CM

## 2022-10-03 DIAGNOSIS — G95.89 SPINAL CORD MASS: ICD-10-CM

## 2022-10-03 DIAGNOSIS — I48.0 PAROXYSMAL ATRIAL FIBRILLATION: ICD-10-CM

## 2022-10-03 DIAGNOSIS — Z98.84 GASTRIC BYPASS STATUS FOR OBESITY: ICD-10-CM

## 2022-10-03 DIAGNOSIS — E55.9 VITAMIN D DEFICIENCY: ICD-10-CM

## 2022-10-03 DIAGNOSIS — R42 DIZZINESS: ICD-10-CM

## 2022-10-03 DIAGNOSIS — R41.89 COGNITIVE IMPAIRMENT: ICD-10-CM

## 2022-10-03 DIAGNOSIS — E03.9 HYPOTHYROIDISM, UNSPECIFIED TYPE: ICD-10-CM

## 2022-10-03 DIAGNOSIS — E53.8 VITAMIN B12 DEFICIENCY: ICD-10-CM

## 2022-10-03 DIAGNOSIS — F31.9 BIPOLAR 1 DISORDER: ICD-10-CM

## 2022-10-03 DIAGNOSIS — Z79.01 ANTICOAGULANT LONG-TERM USE: ICD-10-CM

## 2022-10-03 DIAGNOSIS — R53.83 FATIGUE, UNSPECIFIED TYPE: ICD-10-CM

## 2022-10-03 DIAGNOSIS — E78.5 HYPERLIPIDEMIA, UNSPECIFIED HYPERLIPIDEMIA TYPE: ICD-10-CM

## 2022-10-03 DIAGNOSIS — I65.23 BILATERAL CAROTID ARTERY STENOSIS: ICD-10-CM

## 2022-10-03 DIAGNOSIS — Z98.84 HISTORY OF GASTRIC BYPASS: ICD-10-CM

## 2022-10-03 LAB
25(OH)D3+25(OH)D2 SERPL-MCNC: 59 NG/ML (ref 30–96)
FERRITIN SERPL-MCNC: 358 NG/ML (ref 20–300)
VIT B12 SERPL-MCNC: >1500 PG/ML (ref 210–950)

## 2022-10-03 PROCEDURE — 36415 COLL VENOUS BLD VENIPUNCTURE: CPT | Performed by: FAMILY MEDICINE

## 2022-10-03 PROCEDURE — 82728 ASSAY OF FERRITIN: CPT | Performed by: FAMILY MEDICINE

## 2022-10-03 PROCEDURE — 90694 FLU VACCINE - QUADRIVALENT - ADJUVANTED: ICD-10-PCS | Mod: S$GLB,,, | Performed by: FAMILY MEDICINE

## 2022-10-03 PROCEDURE — G0008 ADMIN INFLUENZA VIRUS VAC: HCPCS | Mod: S$GLB,,, | Performed by: FAMILY MEDICINE

## 2022-10-03 PROCEDURE — 99214 PR OFFICE/OUTPT VISIT, EST, LEVL IV, 30-39 MIN: ICD-10-PCS | Mod: S$GLB,,, | Performed by: FAMILY MEDICINE

## 2022-10-03 PROCEDURE — 90694 VACC AIIV4 NO PRSRV 0.5ML IM: CPT | Mod: S$GLB,,, | Performed by: FAMILY MEDICINE

## 2022-10-03 PROCEDURE — 82607 VITAMIN B-12: CPT | Performed by: FAMILY MEDICINE

## 2022-10-03 PROCEDURE — 82306 VITAMIN D 25 HYDROXY: CPT | Performed by: FAMILY MEDICINE

## 2022-10-03 PROCEDURE — 99214 OFFICE O/P EST MOD 30 MIN: CPT | Mod: S$GLB,,, | Performed by: FAMILY MEDICINE

## 2022-10-03 PROCEDURE — G0008 FLU VACCINE - QUADRIVALENT - ADJUVANTED: ICD-10-PCS | Mod: S$GLB,,, | Performed by: FAMILY MEDICINE

## 2022-10-03 RX ORDER — DIVALPROEX SODIUM 500 MG/1
1000 TABLET, FILM COATED, EXTENDED RELEASE ORAL DAILY
Qty: 180 TABLET | Refills: 1 | Status: SHIPPED | OUTPATIENT
Start: 2022-10-03 | End: 2023-01-20 | Stop reason: SDUPTHER

## 2022-10-03 RX ORDER — RABEPRAZOLE SODIUM 20 MG/1
20 TABLET, DELAYED RELEASE ORAL 2 TIMES DAILY
Qty: 180 TABLET | Refills: 1 | Status: SHIPPED | OUTPATIENT
Start: 2022-10-03 | End: 2023-01-20 | Stop reason: SDUPTHER

## 2022-10-03 RX ORDER — BUPROPION HYDROCHLORIDE 300 MG/1
300 TABLET ORAL DAILY
Qty: 90 TABLET | Refills: 1 | Status: SHIPPED | OUTPATIENT
Start: 2022-10-03 | End: 2023-01-03 | Stop reason: SDUPTHER

## 2022-10-03 RX ORDER — LEVOTHYROXINE SODIUM 100 UG/1
100 TABLET ORAL DAILY
Qty: 90 TABLET | Refills: 1 | Status: SHIPPED | OUTPATIENT
Start: 2022-10-03 | End: 2023-01-20 | Stop reason: SDUPTHER

## 2022-10-05 NOTE — PROGRESS NOTES
Subjective:       Patient ID: Kasandra Jones is a 72 y.o. female.    Chief Complaint: Shortness of Breath and Dizziness    Saw Dr. Harris regarding the spinal cord tumor or cyst.  Recommend follow-up in 1 year.  Feels this is benign.  Cognitive impairment mild.  Bipolar 1 on Depakote.  Dizzy and weak often on.  All day at times however.  Only gets the sensation when standing.  Okay in bed.  Blood pressure is good at home never low.  Paroxysmal atrial fibrillation doing okay.  Hyperlipidemia good control.  Cholesterol 170 HDL 74 LDL 82.  Carotid stenosis.  Current.  Fatigue.  Has appointment with Dr. Blakely in December.  Hypothyroidism.  Anticoagulated.  Status post gastric bypass.  Valproic acid level 40.  Cortisols normal.  TSH 2.38.  CMP is normal.    Physical examination.  Vital signs noted.  No acute distress.  Alert and neck without bruit.  Chest clear.  Heart regular rate rhythm.  Extremities without edema.  Romberg is positive.      Objective:        Assessment:       1. Anticoagulant long-term use    2. Gastric bypass status for obesity    3. Spinal cord mass    4. Cognitive impairment    5. Bipolar 1 disorder    6. Dizziness    7. Paroxysmal atrial fibrillation    8. Hyperlipidemia, unspecified hyperlipidemia type    9. Fatigue, unspecified type    10. Hypothyroidism, unspecified type    11. History of gastric bypass    12. Vitamin B12 deficiency    13. Vitamin D deficiency    14. Bilateral carotid artery stenosis          Plan:       Anticoagulant long-term use  -     Ferritin; Future; Expected date: 10/03/2022    Gastric bypass status for obesity    Spinal cord mass    Cognitive impairment  -     Ferritin; Future; Expected date: 10/03/2022    Bipolar 1 disorder  -     Ferritin; Future; Expected date: 10/03/2022    Dizziness  -     Ferritin; Future; Expected date: 10/03/2022    Paroxysmal atrial fibrillation  -     Holter monitor - 24 hour; Future  -     Ferritin; Future; Expected date:  10/03/2022    Hyperlipidemia, unspecified hyperlipidemia type    Fatigue, unspecified type  -     Ferritin; Future; Expected date: 10/03/2022    Hypothyroidism, unspecified type    History of gastric bypass    Vitamin B12 deficiency  -     Vitamin B12; Future; Expected date: 10/03/2022    Vitamin D deficiency  -     Vitamin D; Future; Expected date: 10/03/2022    Bilateral carotid artery stenosis    Other orders  -     levothyroxine (SYNTHROID) 100 MCG tablet; Take 1 tablet (100 mcg total) by mouth once daily.  Dispense: 90 tablet; Refill: 1  -     buPROPion (WELLBUTRIN XL) 300 MG 24 hr tablet; Take 1 tablet (300 mg total) by mouth once daily.  Dispense: 90 tablet; Refill: 1  -     divalproex 500 MG Tb24; Take 2 tablets (1,000 mg total) by mouth once daily.  Dispense: 180 tablet; Refill: 1  -     RABEprazole (ACIPHEX) 20 mg tablet; Take 1 tablet (20 mg total) by mouth 2 (two) times daily.  Dispense: 180 tablet; Refill: 1  -     Influenza (FLUAD) - Quadrivalent (Adjuvanted) *Preferred* (65+) (PF)    Refilled her medications.  Flu shot high-dose.  TD AP and COVID vaccine at pharmacy.  Repeat cervical MRI in 1 year.  Check ferritin B12 and vitamin-D.  Holter monitor to the  episodes.

## 2022-10-14 ENCOUNTER — CLINICAL SUPPORT (OUTPATIENT)
Dept: CARDIOLOGY | Facility: HOSPITAL | Age: 72
End: 2022-10-14
Attending: FAMILY MEDICINE
Payer: MEDICARE

## 2022-10-14 DIAGNOSIS — I48.0 PAROXYSMAL ATRIAL FIBRILLATION: ICD-10-CM

## 2022-10-14 PROCEDURE — 93227 HOLTER MONITOR - 24 HOUR (CUPID ONLY): ICD-10-PCS | Mod: ,,, | Performed by: INTERNAL MEDICINE

## 2022-10-14 PROCEDURE — 93226 XTRNL ECG REC<48 HR SCAN A/R: CPT

## 2022-10-14 PROCEDURE — 93227 XTRNL ECG REC<48 HR R&I: CPT | Mod: ,,, | Performed by: INTERNAL MEDICINE

## 2022-10-19 LAB
OHS CV EVENT MONITOR DAY: 1
OHS CV HOLTER LENGTH DECIMAL HOURS: 48
OHS CV HOLTER LENGTH HOURS: 24
OHS CV HOLTER LENGTH MINUTES: 0
OHS CV HOLTER SINUS AVERAGE HR: 70
OHS CV HOLTER SINUS MAX HR: 107
OHS CV HOLTER SINUS MIN HR: 47

## 2022-12-16 ENCOUNTER — OFFICE VISIT (OUTPATIENT)
Dept: CARDIOLOGY | Facility: CLINIC | Age: 72
End: 2022-12-16
Payer: MEDICARE

## 2022-12-16 VITALS
DIASTOLIC BLOOD PRESSURE: 74 MMHG | RESPIRATION RATE: 16 BRPM | HEART RATE: 83 BPM | HEIGHT: 66 IN | SYSTOLIC BLOOD PRESSURE: 118 MMHG | OXYGEN SATURATION: 93 % | BODY MASS INDEX: 21.69 KG/M2 | WEIGHT: 135 LBS

## 2022-12-16 DIAGNOSIS — I70.0 AORTIC ATHEROSCLEROSIS: ICD-10-CM

## 2022-12-16 DIAGNOSIS — E78.00 PURE HYPERCHOLESTEROLEMIA: ICD-10-CM

## 2022-12-16 DIAGNOSIS — I10 HYPERTENSION, ESSENTIAL: ICD-10-CM

## 2022-12-16 DIAGNOSIS — Z79.01 ANTICOAGULANT LONG-TERM USE: ICD-10-CM

## 2022-12-16 DIAGNOSIS — E03.2 HYPOTHYROIDISM DUE TO NON-MEDICATION EXOGENOUS SUBSTANCES: ICD-10-CM

## 2022-12-16 DIAGNOSIS — I48.0 PAROXYSMAL ATRIAL FIBRILLATION: ICD-10-CM

## 2022-12-16 PROCEDURE — 93010 EKG 12-LEAD: ICD-10-PCS | Mod: S$PBB,,, | Performed by: GENERAL PRACTICE

## 2022-12-16 PROCEDURE — 99999 PR PBB SHADOW E&M-EST. PATIENT-LVL IV: CPT | Mod: PBBFAC,,, | Performed by: INTERNAL MEDICINE

## 2022-12-16 PROCEDURE — 99214 OFFICE O/P EST MOD 30 MIN: CPT | Mod: S$PBB,,, | Performed by: INTERNAL MEDICINE

## 2022-12-16 PROCEDURE — 99214 OFFICE O/P EST MOD 30 MIN: CPT | Mod: PBBFAC,PN | Performed by: INTERNAL MEDICINE

## 2022-12-16 PROCEDURE — 93005 ELECTROCARDIOGRAM TRACING: CPT | Mod: PBBFAC,PN | Performed by: GENERAL PRACTICE

## 2022-12-16 PROCEDURE — 99999 PR PBB SHADOW E&M-EST. PATIENT-LVL IV: ICD-10-PCS | Mod: PBBFAC,,, | Performed by: INTERNAL MEDICINE

## 2022-12-16 PROCEDURE — 93010 ELECTROCARDIOGRAM REPORT: CPT | Mod: S$PBB,,, | Performed by: GENERAL PRACTICE

## 2022-12-16 PROCEDURE — 99214 PR OFFICE/OUTPT VISIT, EST, LEVL IV, 30-39 MIN: ICD-10-PCS | Mod: S$PBB,,, | Performed by: INTERNAL MEDICINE

## 2022-12-16 RX ORDER — MECLIZINE HCL 12.5 MG 12.5 MG/1
12.5 TABLET ORAL 3 TIMES DAILY PRN
Qty: 60 TABLET | Refills: 6 | Status: SHIPPED | OUTPATIENT
Start: 2022-12-16 | End: 2022-12-19 | Stop reason: SDUPTHER

## 2022-12-16 NOTE — ASSESSMENT & PLAN NOTE
Because of weakness and other side effects statins have been withheld.  Her last lipid levels are as follows    Latest Reference Range & Units 09/29/22 09:21   Cholesterol 120 - 199 mg/dL 170   HDL 40 - 75 mg/dL 74   HDL/Cholesterol Ratio 20.0 - 50.0 % 43.5   LDL Cholesterol External 63.0 - 159.0 mg/dL 82.4   Non-HDL Cholesterol mg/dL 96   Total Cholesterol/HDL Ratio 2.0 - 5.0  2.3   Triglycerides 30 - 150 mg/dL 68     Her non-HDL cholesterol is 96 no further intervention is indicated at this time.

## 2022-12-16 NOTE — PROGRESS NOTES
Subjective:    Patient ID:  Kasandra Jones is a 72 y.o. female patient here for evaluation Fatigue and Tachycardia (Feels heart racing, off balance, loss of appetite )      History of Present Illness:     Patient is a 72-year-old lady who has history of intermittent episodes of palpitations feels her heart racing sometimes feeling weak.  And feels tired at times and also off balance from time to time.  She had a 24 Holter evaluation completed in October 14th and this showed isolated PVCs and occasional couplets noted.  Are PACs were also noted in the some in the pattern of bigeminy but no sustained arrhythmias identified.    Patient reported multiple symptoms of weakness and palpitations during which time she has only shown isolated PACs or PVCs.  She is currently being evaluated by neurologist at the [facility].    No history of syncope noted   No chest discomfort arm neck or jaw pain noted.  She has constant feeling of feeling off balance and also some fatigue persisting.  Her last potassium level in September was 3.9 and magnesium is low 3 years ago.      Review of patient's allergies indicates:   Allergen Reactions    Niacin preparations     Penicillins        Past Medical History:   Diagnosis Date    Cataract      Past Surgical History:   Procedure Laterality Date    EYE SURGERY Bilateral 2020 August    cataract removal    HYSTERECTOMY      TOTAL REDUCTION MAMMOPLASTY  2002     Social History     Tobacco Use    Smoking status: Never    Smokeless tobacco: Never   Substance Use Topics    Alcohol use: Never    Drug use: Never        Review of Systems:    As noted in HPI in addition      REVIEW OF SYSTEMS  CARDIOVASCULAR: No recent chest pain, palpitations, arm, neck, or jaw pain  RESPIRATORY: No recent fever, cough chills, SOB or congestion  : No blood in the urine  GI: No Nausea, vomiting, constipation, diarrhea, blood, or reflux.  MUSCULOSKELETAL: No myalgias  NEURO: No lightheadedness or dizziness  EYES: No  Double vision, blurry, vision or headache              Objective        Vitals:    12/16/22 1041   BP: 118/74   Pulse: 83   Resp: 16       LIPIDS - LAST 2   Lab Results   Component Value Date    CHOL 170 09/29/2022    CHOL 171 06/27/2022    HDL 74 09/29/2022    HDL 74 06/27/2022    LDLCALC 82.4 09/29/2022    LDLCALC 84.0 06/27/2022    TRIG 68 09/29/2022    TRIG 65 06/27/2022    CHOLHDL 43.5 09/29/2022    CHOLHDL 43.3 06/27/2022       CBC - LAST 2  Lab Results   Component Value Date    WBC 5.39 09/29/2022    WBC 7.12 06/27/2022    RBC 4.41 09/29/2022    RBC 4.42 06/27/2022    HGB 14.2 09/29/2022    HGB 14.0 06/27/2022    HCT 43.8 09/29/2022    HCT 44.4 06/27/2022    MCV 99 (H) 09/29/2022     (H) 06/27/2022    MCH 32.2 (H) 09/29/2022    MCH 31.7 (H) 06/27/2022    MCHC 32.4 09/29/2022    MCHC 31.5 (L) 06/27/2022    RDW 13.1 09/29/2022    RDW 13.2 06/27/2022     09/29/2022     06/27/2022    MPV 11.1 09/29/2022    MPV 11.1 06/27/2022    GRAN 2.7 09/29/2022    GRAN 49.1 09/29/2022    LYMPH 2.2 09/29/2022    LYMPH 40.6 09/29/2022    MONO 0.4 09/29/2022    MONO 7.1 09/29/2022    BASO 0.03 09/29/2022    BASO 0.04 06/27/2022    NRBC 0 09/29/2022    NRBC 0 06/27/2022       CHEMISTRY & LIVER FUNCTION - LAST 2  Lab Results   Component Value Date     09/29/2022     06/27/2022    K 3.9 09/29/2022    K 4.4 06/27/2022     09/29/2022     06/27/2022    CO2 30 (H) 09/29/2022    CO2 29 06/27/2022    ANIONGAP 8 09/29/2022    ANIONGAP 7 (L) 06/27/2022    BUN 17 09/29/2022    BUN 14 06/27/2022    CREATININE 0.7 09/29/2022    CREATININE 0.7 09/29/2022     09/29/2022     06/27/2022    CALCIUM 9.3 09/29/2022    CALCIUM 9.0 06/27/2022    MG 1.8 06/28/2019    MG 1.8 05/23/2019    ALBUMIN 3.5 09/29/2022    ALBUMIN 3.6 06/27/2022    PROT 6.5 09/29/2022    PROT 6.5 06/27/2022    ALKPHOS 107 09/29/2022    ALKPHOS 78 06/27/2022    ALT 20 09/29/2022    ALT 23 06/27/2022    AST 19 09/29/2022     AST 21 06/27/2022    BILITOT 0.8 09/29/2022    BILITOT 0.6 06/27/2022        CARDIAC PROFILE - LAST 2  Lab Results   Component Value Date    CPK 56 06/28/2019    TROPONINI <0.030 06/28/2019        COAGULATION - LAST 2  No results found for: LABPT, INR, APTT    ENDOCRINE & PSA - LAST 2  Lab Results   Component Value Date    TSH 2.380 09/29/2022    TSH 2.000 06/27/2022        ECHOCARDIOGRAM RESULTS  Results for orders placed during the hospital encounter of 12/09/21    Echo    Interpretation Summary  · The left ventricle is normal in size with mildly decreased systolic function.  · The estimated ejection fraction is 48%.  · Grade I left ventricular diastolic dysfunction.  · Normal right ventricular size with normal right ventricular systolic function.  · Mild left atrial enlargement.  · Mild mitral regurgitation.  · Normal central venous pressure (3 mmHg).  · The estimated PA systolic pressure is 30 mmHg.      CURRENT/PREVIOUS VISIT EKG  Results for orders placed or performed in visit on 06/16/22   IN OFFICE EKG 12-LEAD (to [place])    Collection Time: 06/16/22  1:48 PM    Narrative    Test Reason : R07.9,R20.0,R53.83,    Vent. Rate : 054 BPM     Atrial Rate : 054 BPM     P-R Int : 174 ms          QRS Dur : 096 ms      QT Int : 462 ms       P-R-T Axes : 053 029 062 degrees     QTc Int : 438 ms    Sinus bradycardia  Otherwise normal ECG  When compared with ECG of 30-JUN-2021 09:57,  Criteria for Septal infarct are no longer Present  T wave inversion no longer evident in Anterior leads  Confirmed by Francesco Blakely MD ([id number]) on 6/26/2022 6:43:20 PM    Referred By:  ELIZABETH           Confirmed By:Francesco Blakely MD     No valid procedures specified.   Results for orders placed during the hospital encounter of 07/09/21    Nuclear Stress - Cardiology Interpreted    Interpretation Summary    Normal myocardial perfusion scan. There is no evidence of myocardial ischemia or infarction.    The gated perfusion images showed an  ejection fraction of 73% post stress. Normal ejection fraction is greater than 53%.    There is normal wall motion post stress.    LV cavity size is  and normal at stress.    The EKG portion of this study is negative for ischemia.    The patient reported no chest pain during the stress test.    There were no arrhythmias during stress.    No valid procedures specified.    PHYSICAL EXAM  CONSTITUTIONAL: Well built, well nourished in no apparent distress  NECK: no carotid bruit, no JVD  LUNGS: CTA  CHEST WALL: no tenderness  HEART: regular rate and rhythm, S1, S2 normal, no murmur, click, rub or gallop   ABDOMEN: soft, non-tender; bowel sounds normal; no masses,  no organomegaly  EXTREMITIES: Extremities normal, no edema, no calf tenderness noted  NEURO: AAO X 3    I HAVE REVIEWED :    The vital signs, nurses notes, and all the pertinent radiology and labs.        Current Outpatient Medications   Medication Instructions    ALPRAZolam (XANAX) 0.5 mg, Oral, Daily PRN    buPROPion (WELLBUTRIN XL) 300 mg, Oral, Daily    cholecalciferol, vitamin D3, 125 mcg (5,000 unit) capsule No dose, route, or frequency recorded.    cyanocobalamin-cobamamide (B12) 5,000-100 mcg Lozg No dose, route, or frequency recorded.    diphenoxylate-atropine 2.5-0.025 mg (LOMOTIL) 2.5-0.025 mg per tablet 1 tablet, Oral, 4 times daily PRN    divalproex 1,000 mg, Oral, Daily    ELIQUIS 5 mg, Oral, 2 times daily    ferrous sulfate 325 (65 FE) MG EC tablet No dose, route, or frequency recorded.    levothyroxine (SYNTHROID) 100 mcg, Oral, Daily    meclizine (ANTIVERT) 12.5 mg, Oral, 3 times daily PRN    MULTAQ 400 mg, Oral, 2 times daily    RABEprazole (ACIPHEX) 20 mg, Oral, 2 times daily    sod.chlorid/potassium chloride (SODIUM CL-POTASSIUM CHLORIDE ORAL) 20 meq daily    traZODone (DESYREL) 100 MG tablet TAKE TWO TABLETS PO NIGHTLY    vit C,Q-Kb-hqfne-lutein-zeaxan (PRESERVISION AREDS 2) 250-90-40-1 mg Cap No dose, route, or frequency recorded.           Assessment & Plan     Paroxysmal atrial fibrillation  Patient has history of paroxysmal atrial fibrillation currently maintaining regular rhythm EKG has sinus rhythm rate of 68 beats per minute with borderline voltage criteria for LVH noted.  I have encouraged to continue on Multaq 400 mg p.o. b.i.d. and also to maintain on Eliquis 5 mg p.o. b.i.d..    Hyperlipidemia  Because of weakness and other side effects statins have been withheld.  Her last lipid levels are as follows    Latest Reference Range & Units 09/29/22 09:21   Cholesterol 120 - 199 mg/dL 170   HDL 40 - 75 mg/dL 74   HDL/Cholesterol Ratio 20.0 - 50.0 % 43.5   LDL Cholesterol External 63.0 - 159.0 mg/dL 82.4   Non-HDL Cholesterol mg/dL 96   Total Cholesterol/HDL Ratio 2.0 - 5.0  2.3   Triglycerides 30 - 150 mg/dL 68     Her non-HDL cholesterol is 96 no further intervention is indicated at this time.    Hypertension, essential  Blood pressure is stable at 1 18/74 mmHg.  And and she is not on any antihypertensive therapy    Anticoagulant long-term use  Long-term use of Eliquis 5 mg p.o. b.i.d. no obvious bleeding tendencies at this time.    Hypothyroidism  She is on thyroid supplements at 100 mcg a day maintain the same          Follow up in about 6 months (around 6/16/2023).

## 2022-12-16 NOTE — ASSESSMENT & PLAN NOTE
Patient has history of paroxysmal atrial fibrillation currently maintaining regular rhythm EKG has sinus rhythm rate of 68 beats per minute with borderline voltage criteria for LVH noted.  I have encouraged to continue on Multaq 400 mg p.o. b.i.d. and also to maintain on Eliquis 5 mg p.o. b.i.d..

## 2022-12-19 RX ORDER — MECLIZINE HCL 12.5 MG 12.5 MG/1
12.5 TABLET ORAL 3 TIMES DAILY PRN
Qty: 60 TABLET | Refills: 1 | Status: SHIPPED | OUTPATIENT
Start: 2022-12-19 | End: 2023-01-11

## 2022-12-19 NOTE — TELEPHONE ENCOUNTER
----- Message from Amira Murillo sent at 12/19/2022  8:36 AM CST -----  Type:  RX Refill Request    Who Called:  pt  Refill or New Rx:  refill  RX Name and Strength:  meclizine (ANTIVERT) 12.5 mg tablet  How is the patient currently taking it? (ex. 1XDay):  as directed  Is this a 30 day or 90 day RX:  90  Preferred Pharmacy with phone number:    [facility] - [place], [place] - [address]  [address] [place] [address]  Phone: [phone] Fax: [phone]    Local or Mail Order:  ;ocal  Ordering Provider:  Reddy Valladares Call Back Number:  [phone] (home)     Additional Information:  thank you

## 2022-12-26 ENCOUNTER — OFFICE VISIT (OUTPATIENT)
Dept: URGENT CARE | Facility: CLINIC | Age: 72
End: 2022-12-26
Attending: NURSE PRACTITIONER
Payer: MEDICARE

## 2022-12-26 VITALS
DIASTOLIC BLOOD PRESSURE: 72 MMHG | RESPIRATION RATE: 18 BRPM | OXYGEN SATURATION: 98 % | HEIGHT: 66 IN | BODY MASS INDEX: 21.53 KG/M2 | SYSTOLIC BLOOD PRESSURE: 145 MMHG | WEIGHT: 134 LBS | HEART RATE: 76 BPM | TEMPERATURE: 98 F

## 2022-12-26 DIAGNOSIS — J02.9 VIRAL PHARYNGITIS: ICD-10-CM

## 2022-12-26 DIAGNOSIS — J02.9 SORE THROAT: Primary | ICD-10-CM

## 2022-12-26 LAB
CTP QC/QA: YES
S PYO RRNA THROAT QL PROBE: NEGATIVE

## 2022-12-26 PROCEDURE — 99213 PR OFFICE/OUTPT VISIT, EST, LEVL III, 20-29 MIN: ICD-10-PCS | Mod: S$GLB,,, | Performed by: NURSE PRACTITIONER

## 2022-12-26 PROCEDURE — 87880 POCT RAPID STREP A: ICD-10-PCS | Mod: QW,,, | Performed by: NURSE PRACTITIONER

## 2022-12-26 PROCEDURE — 99213 OFFICE O/P EST LOW 20 MIN: CPT | Mod: S$GLB,,, | Performed by: NURSE PRACTITIONER

## 2022-12-26 PROCEDURE — 87880 STREP A ASSAY W/OPTIC: CPT | Mod: QW,,, | Performed by: NURSE PRACTITIONER

## 2022-12-26 NOTE — PATIENT INSTRUCTIONS
Zyrtec, Allegra or Claritin (your choice, generic is fine) daily  Magic mouthwash 10mls gargle and spit every 4 hours   Tylenol as directed  Follow up if in this clinic or with your PCP if your symptoms do not improve

## 2022-12-26 NOTE — PROGRESS NOTES
"Subjective:       Patient ID: Kasandra Jones is a 72 y.o. female.    Vitals:  height is 5' 6" (1.676 m) and weight is 60.8 kg (134 lb). Her oral temperature is 98.2 °F (36.8 °C). Her blood pressure is 145/72 (abnormal) and her pulse is 76. Her respiration is 18 and oxygen saturation is 98%.     Chief Complaint: Sore Throat        Sore Throat   This is a new problem. The current episode started in the past 7 days (4 days ago). Associated symptoms include headaches. Associated symptoms comments: Dizziness  . She has tried acetaminophen for the symptoms. The treatment provided mild relief.     HENT:  Positive for sore throat.    Neurological:  Positive for headaches.     Objective:      Physical Exam   Constitutional: She is oriented to person, place, and time.   HENT:   Head: Normocephalic and atraumatic.   Ears:   Right Ear: A middle ear effusion is present.   Left Ear: A middle ear effusion is present.   Nose: Rhinorrhea present.   Mouth/Throat: Posterior oropharyngeal erythema present. No oropharyngeal exudate.   Eyes: Conjunctivae are normal. Extraocular movement intact   Neck: Neck supple.   Cardiovascular: Normal rate, regular rhythm, normal heart sounds and normal pulses.   Pulmonary/Chest: Effort normal and breath sounds normal.   Abdominal: Normal appearance. Soft.   Musculoskeletal: Normal range of motion.         General: Normal range of motion.   Neurological: She is alert and oriented to person, place, and time.   Skin: Skin is warm and dry. Capillary refill takes 2 to 3 seconds.   Psychiatric: Her behavior is normal. Mood normal.   Nursing note and vitals reviewed.      Assessment:       1. Sore throat    2. Viral pharyngitis        Strep A: Negative  Plan:       Patient presents with sore throat and mild headache x 4 days. Her symptoms are improving today. Strep Negative, bilateral middle ear effusion and rhinorrhea with mild erythema to pharynx consistent with viral etiology. Findings discussed with " patient and her .  Sore throat  -     POCT rapid strep A    Viral pharyngitis  -     diphenhydrAMINE-aluminum-magnesium hydroxide-simethicone-LIDOcaine HCl 2%; Swish and spit 10 mLs every 4 (four) hours as needed (sore throat).  Dispense: 180 each; Refill: 0         Zyrtec, Allegra or Claritin (your choice, generic is fine) daily  Magic mouthwash 10mls gargle and spit every 4 hours   Tylenol as directed  Follow up if in this clinic or with your PCP if your symptoms do not improve

## 2023-01-03 ENCOUNTER — OFFICE VISIT (OUTPATIENT)
Dept: FAMILY MEDICINE | Facility: CLINIC | Age: 73
End: 2023-01-03
Payer: MEDICARE

## 2023-01-03 ENCOUNTER — TELEPHONE (OUTPATIENT)
Dept: FAMILY MEDICINE | Facility: CLINIC | Age: 73
End: 2023-01-03

## 2023-01-03 ENCOUNTER — LAB VISIT (OUTPATIENT)
Dept: LAB | Facility: HOSPITAL | Age: 73
End: 2023-01-03
Attending: FAMILY MEDICINE
Payer: MEDICARE

## 2023-01-03 VITALS
BODY MASS INDEX: 20.89 KG/M2 | WEIGHT: 130 LBS | HEIGHT: 66 IN | SYSTOLIC BLOOD PRESSURE: 123 MMHG | TEMPERATURE: 97 F | HEART RATE: 88 BPM | OXYGEN SATURATION: 96 % | DIASTOLIC BLOOD PRESSURE: 62 MMHG

## 2023-01-03 DIAGNOSIS — Z79.01 ANTICOAGULANT LONG-TERM USE: ICD-10-CM

## 2023-01-03 DIAGNOSIS — R51.9 NONINTRACTABLE HEADACHE, UNSPECIFIED CHRONICITY PATTERN, UNSPECIFIED HEADACHE TYPE: ICD-10-CM

## 2023-01-03 DIAGNOSIS — I48.0 PAROXYSMAL ATRIAL FIBRILLATION: Primary | ICD-10-CM

## 2023-01-03 DIAGNOSIS — F31.9 BIPOLAR 1 DISORDER: ICD-10-CM

## 2023-01-03 DIAGNOSIS — R42 DISEQUILIBRIUM: ICD-10-CM

## 2023-01-03 DIAGNOSIS — R42 VERTIGO: ICD-10-CM

## 2023-01-03 DIAGNOSIS — I70.0 AORTIC ATHEROSCLEROSIS: ICD-10-CM

## 2023-01-03 DIAGNOSIS — I48.0 PAROXYSMAL ATRIAL FIBRILLATION: ICD-10-CM

## 2023-01-03 DIAGNOSIS — D49.6 BRAIN TUMOR: ICD-10-CM

## 2023-01-03 DIAGNOSIS — E03.2 HYPOTHYROIDISM DUE TO NON-MEDICATION EXOGENOUS SUBSTANCES: ICD-10-CM

## 2023-01-03 DIAGNOSIS — G95.89 SPINAL CORD MASS: ICD-10-CM

## 2023-01-03 LAB
ALBUMIN SERPL BCP-MCNC: 3.3 G/DL (ref 3.5–5.2)
ALP SERPL-CCNC: 83 U/L (ref 55–135)
ALT SERPL W/O P-5'-P-CCNC: 23 U/L (ref 10–44)
ANION GAP SERPL CALC-SCNC: 7 MMOL/L (ref 8–16)
AST SERPL-CCNC: 26 U/L (ref 10–40)
BASOPHILS # BLD AUTO: 0.02 K/UL (ref 0–0.2)
BASOPHILS NFR BLD: 0.4 % (ref 0–1.9)
BILIRUB SERPL-MCNC: 0.7 MG/DL (ref 0.1–1)
BUN SERPL-MCNC: 12 MG/DL (ref 8–23)
CALCIUM SERPL-MCNC: 8.8 MG/DL (ref 8.7–10.5)
CHLORIDE SERPL-SCNC: 99 MMOL/L (ref 95–110)
CO2 SERPL-SCNC: 32 MMOL/L (ref 23–29)
CREAT SERPL-MCNC: 0.7 MG/DL (ref 0.5–1.4)
DIFFERENTIAL METHOD: ABNORMAL
EOSINOPHIL # BLD AUTO: 0 K/UL (ref 0–0.5)
EOSINOPHIL NFR BLD: 0.2 % (ref 0–8)
ERYTHROCYTE [DISTWIDTH] IN BLOOD BY AUTOMATED COUNT: 13.6 % (ref 11.5–14.5)
EST. GFR  (NO RACE VARIABLE): >60 ML/MIN/1.73 M^2
GLUCOSE SERPL-MCNC: 99 MG/DL (ref 70–110)
HCT VFR BLD AUTO: 44.6 % (ref 37–48.5)
HGB BLD-MCNC: 14 G/DL (ref 12–16)
IMM GRANULOCYTES # BLD AUTO: 0.01 K/UL (ref 0–0.04)
IMM GRANULOCYTES NFR BLD AUTO: 0.2 % (ref 0–0.5)
LYMPHOCYTES # BLD AUTO: 1.5 K/UL (ref 1–4.8)
LYMPHOCYTES NFR BLD: 31.8 % (ref 18–48)
MCH RBC QN AUTO: 31.5 PG (ref 27–31)
MCHC RBC AUTO-ENTMCNC: 31.4 G/DL (ref 32–36)
MCV RBC AUTO: 100 FL (ref 82–98)
MONOCYTES # BLD AUTO: 0.7 K/UL (ref 0.3–1)
MONOCYTES NFR BLD: 14.1 % (ref 4–15)
NEUTROPHILS # BLD AUTO: 2.5 K/UL (ref 1.8–7.7)
NEUTROPHILS NFR BLD: 53.3 % (ref 38–73)
NRBC BLD-RTO: 0 /100 WBC
PLATELET # BLD AUTO: 195 K/UL (ref 150–450)
PMV BLD AUTO: 10.5 FL (ref 9.2–12.9)
POTASSIUM SERPL-SCNC: 4 MMOL/L (ref 3.5–5.1)
PROT SERPL-MCNC: 6.5 G/DL (ref 6–8.4)
RBC # BLD AUTO: 4.45 M/UL (ref 4–5.4)
SODIUM SERPL-SCNC: 138 MMOL/L (ref 136–145)
TSH SERPL DL<=0.005 MIU/L-ACNC: 3.51 UIU/ML (ref 0.34–5.6)
WBC # BLD AUTO: 4.75 K/UL (ref 3.9–12.7)

## 2023-01-03 PROCEDURE — 80053 COMPREHEN METABOLIC PANEL: CPT | Performed by: FAMILY MEDICINE

## 2023-01-03 PROCEDURE — 99214 PR OFFICE/OUTPT VISIT, EST, LEVL IV, 30-39 MIN: ICD-10-PCS | Mod: S$GLB,,, | Performed by: FAMILY MEDICINE

## 2023-01-03 PROCEDURE — 84443 ASSAY THYROID STIM HORMONE: CPT | Performed by: FAMILY MEDICINE

## 2023-01-03 PROCEDURE — 99214 OFFICE O/P EST MOD 30 MIN: CPT | Mod: S$GLB,,, | Performed by: FAMILY MEDICINE

## 2023-01-03 PROCEDURE — 85025 COMPLETE CBC W/AUTO DIFF WBC: CPT | Performed by: FAMILY MEDICINE

## 2023-01-03 PROCEDURE — 36415 COLL VENOUS BLD VENIPUNCTURE: CPT | Performed by: FAMILY MEDICINE

## 2023-01-03 RX ORDER — DRONEDARONE 400 MG/1
400 TABLET, FILM COATED ORAL 2 TIMES DAILY
Qty: 180 TABLET | Refills: 3 | Status: SHIPPED | OUTPATIENT
Start: 2023-01-03 | End: 2023-01-20 | Stop reason: SDUPTHER

## 2023-01-03 RX ORDER — MECLIZINE HYDROCHLORIDE 25 MG/1
12.5 TABLET ORAL 3 TIMES DAILY PRN
Qty: 90 TABLET | Refills: 0 | Status: CANCELLED | OUTPATIENT
Start: 2023-01-03

## 2023-01-03 RX ORDER — TRAZODONE HYDROCHLORIDE 100 MG/1
TABLET ORAL
Qty: 180 TABLET | Refills: 3 | Status: SHIPPED | OUTPATIENT
Start: 2023-01-03 | End: 2023-01-20 | Stop reason: SDUPTHER

## 2023-01-03 RX ORDER — BUPROPION HYDROCHLORIDE 300 MG/1
300 TABLET ORAL DAILY
Qty: 90 TABLET | Refills: 3 | Status: SHIPPED | OUTPATIENT
Start: 2023-01-03 | End: 2023-01-20 | Stop reason: SDUPTHER

## 2023-01-03 RX ORDER — APIXABAN 5 MG/1
5 TABLET, FILM COATED ORAL 2 TIMES DAILY
Qty: 180 TABLET | Refills: 3 | Status: SHIPPED | OUTPATIENT
Start: 2023-01-03 | End: 2023-01-20 | Stop reason: SDUPTHER

## 2023-01-03 RX ORDER — MECLIZINE HCL 12.5 MG 12.5 MG/1
12.5 TABLET ORAL 3 TIMES DAILY PRN
Qty: 60 TABLET | Refills: 1 | Status: CANCELLED | OUTPATIENT
Start: 2023-01-03 | End: 2024-01-03

## 2023-01-03 RX ORDER — POTASSIUM CHLORIDE 20 MEQ/1
20 TABLET, EXTENDED RELEASE ORAL DAILY
Qty: 90 TABLET | Refills: 3 | Status: SHIPPED | OUTPATIENT
Start: 2023-01-03 | End: 2023-01-20 | Stop reason: SDUPTHER

## 2023-01-03 NOTE — TELEPHONE ENCOUNTER
----- Message from Tianna Hickey sent at 1/3/2023  2:13 PM CST -----  Regarding: need corrected STAT order  Please correct the STAT MRI Brain. [facility] does not do MRI Brain WITH only, we can do WITH and WITHOUT or WITHOUT only, but not just WITH, please correct order,  thank you

## 2023-01-03 NOTE — TELEPHONE ENCOUNTER
----- Message from Tianna Hickey sent at 1/3/2023  2:01 PM CST -----  Regarding: please correct STAT MRI BRAIN order  Please change order to either STAT MRI Brain With and Without or Without only, we do not do MRI Brain With only ... thanks

## 2023-01-04 ENCOUNTER — TELEPHONE (OUTPATIENT)
Dept: FAMILY MEDICINE | Facility: CLINIC | Age: 73
End: 2023-01-04
Payer: MEDICARE

## 2023-01-04 ENCOUNTER — HOSPITAL ENCOUNTER (OUTPATIENT)
Dept: RADIOLOGY | Facility: HOSPITAL | Age: 73
Discharge: HOME | End: 2023-01-04
Attending: FAMILY MEDICINE
Payer: MEDICARE

## 2023-01-04 DIAGNOSIS — D49.6 BRAIN TUMOR: ICD-10-CM

## 2023-01-04 DIAGNOSIS — R35.0 URINE FREQUENCY: Primary | ICD-10-CM

## 2023-01-04 PROCEDURE — 25500020 PHARM REV CODE 255: Performed by: FAMILY MEDICINE

## 2023-01-04 PROCEDURE — A9585 GADOBUTROL INJECTION: HCPCS | Performed by: FAMILY MEDICINE

## 2023-01-04 PROCEDURE — 70553 MRI BRAIN STEM W/O & W/DYE: CPT | Mod: TC

## 2023-01-04 RX ORDER — GADOBUTROL 604.72 MG/ML
6.5 INJECTION INTRAVENOUS
Status: COMPLETED | OUTPATIENT
Start: 2023-01-04 | End: 2023-01-04

## 2023-01-04 RX ADMIN — GADOBUTROL 6.5 ML: 604.72 INJECTION INTRAVENOUS at 10:01

## 2023-01-04 NOTE — PROGRESS NOTES
Subjective:       Patient ID: Kasandra Jones is a 72 y.o. female.    Chief Complaint: No chief complaint on file.    Has had dizziness.  Saw Cardiology.  They felt she had benign positional vertigo.  Gave her some meclizine.  She was on 25 mg instead of 12 and half was doing okay without them then used a few has had fatigue has been hard for her to walk out balance weak.  Some headache.  They thought she may have had a stroke a few days ago but her symptoms improved over few hours.  Had some slurred speech at that 0.1 night.  Had seen Cardiology for checkup in about the vertigo.  Does not have dizziness supine or rolling over in bed.  Only when upright.  Balance has been very bad all the time.  But worse over the past month.  Does have a sore throat sinus pressure going on since December 23rd.  Went to urgent care given allergy medicine.  Does have atrial fibrillation is anticoagulated.  Does have the history of the previous brain tumor.  For which she would seen neurosurgery in [place].    Physical examination.  Vital signs are noted.  Left pupil is slightly larger than the right but reactive.  Romberg is positive.  Chest is clear.  Heart irregularly irregular.  Neck is without bruit.  Extremities without edema.  Michael-Hallpike maneuver is negative.      Objective:        Assessment:       1. Paroxysmal atrial fibrillation    2. Anticoagulant long-term use    3. Vertigo    4. Nonintractable headache, unspecified chronicity pattern, unspecified headache type    5. Brain tumor    6. Hypothyroidism due to non-medication exogenous substances    7. Disequilibrium    8. Aortic atherosclerosis    9. Bipolar 1 disorder    10. Spinal cord mass          Plan:       Paroxysmal atrial fibrillation  -     TSH; Future; Expected date: 01/03/2023  -     Comprehensive Metabolic Panel; Future; Expected date: 01/03/2023    Anticoagulant long-term use  -     CBC Auto Differential; Future; Expected date: 01/03/2023    Vertigo  -      TSH; Future; Expected date: 01/03/2023    Nonintractable headache, unspecified chronicity pattern, unspecified headache type    Brain tumor  -     MRI Brain With Contrast; Future; Expected date: 01/03/2023  -     MRI Brain W WO Contrast; Future; Expected date: 01/03/2023    Hypothyroidism due to non-medication exogenous substances    Disequilibrium    Aortic atherosclerosis    Bipolar 1 disorder    Spinal cord mass    Other orders  -     buPROPion (WELLBUTRIN XL) 300 MG 24 hr tablet; Take 1 tablet (300 mg total) by mouth once daily.  Dispense: 90 tablet; Refill: 3  -     ELIQUIS 5 mg Tab; Take 1 tablet (5 mg total) by mouth 2 (two) times daily.  Dispense: 180 tablet; Refill: 3  -     MULTAQ 400 mg Tab; Take 1 tablet (400 mg total) by mouth 2 (two) times daily.  Dispense: 180 tablet; Refill: 3  -     potassium chloride SA (K-DUR,KLOR-CON) 20 MEQ tablet; Take 1 tablet (20 mEq total) by mouth once daily.  Dispense: 90 tablet; Refill: 3  -     traZODone (DESYREL) 100 MG tablet; TAKE TWO TABLETS PO NIGHTLY  Dispense: 180 tablet; Refill: 3      Will refill her medications.  Feel we need to do the MRI of the brain with and without contrast to the history of the tumor to make sure it is not enlarging and make sure that she is not had a stroke fr or vascular disease.  Check CBC CMP and TSH also.  om either AFib

## 2023-01-04 NOTE — TELEPHONE ENCOUNTER
Pt here yest forgot to mention urine freq lil stream , pt downstairs having mri so put orders in for ua cs.

## 2023-01-05 ENCOUNTER — TELEPHONE (OUTPATIENT)
Dept: FAMILY MEDICINE | Facility: CLINIC | Age: 73
End: 2023-01-05
Payer: MEDICARE

## 2023-01-05 DIAGNOSIS — R42 VERTIGO: Primary | ICD-10-CM

## 2023-01-05 RX ORDER — NITROFURANTOIN 25; 75 MG/1; MG/1
100 CAPSULE ORAL 2 TIMES DAILY
Qty: 20 CAPSULE | Refills: 0 | Status: SHIPPED | OUTPATIENT
Start: 2023-01-05 | End: 2023-02-08 | Stop reason: ALTCHOICE

## 2023-01-05 RX ORDER — NITROFURANTOIN 25; 75 MG/1; MG/1
100 CAPSULE ORAL 2 TIMES DAILY
COMMUNITY
End: 2023-01-05 | Stop reason: SDUPTHER

## 2023-01-05 NOTE — TELEPHONE ENCOUNTER
----- Message from Jaylene Lori sent at 1/5/2023  3:01 PM CST -----  Contact: pt  Type: Test Results    Who Called: pt  Name of Test: (labs, xray etc..) urine specimen  Date of Test: 01/03/2023  Ordering Provider: Logan  Where the test performed: [facility]  Best Call Back Number: [phone] or  [phone]    Additional Information-Pt states she is having pain and burning with urination.     Please advise caller/pt-Thank you~

## 2023-01-05 NOTE — TELEPHONE ENCOUNTER
Notified pt [facility] pos sending macrobid to [facility] bs.

## 2023-01-09 ENCOUNTER — TELEPHONE (OUTPATIENT)
Dept: FAMILY MEDICINE | Facility: CLINIC | Age: 73
End: 2023-01-09
Payer: MEDICARE

## 2023-01-09 NOTE — TELEPHONE ENCOUNTER
----- Message from Evan Garrison sent at 1/9/2023  1:43 PM CST -----  Contact: Yeyo  Type:  RX Refill Request  Who Called: Pt  Yeyo  Refill or New Rx: refills   RX Name and Strength: ELIQUIS 5 mg Tab 180 tablet   How is the patient currently taking it? (ex. 1XDay):    Is this a 30 day or 90 day RX:    Preferred Pharmacy with phone number:  [facility]/pharmacy #[id number] - [place], [place] - [address]  Phone:  [phone]  Fax:  [phone]  Local or Mail Order:  Local  Ordering Provider:  Sanjiv Ford III, MD  Best Call Back Number:  [phone]  Additional Information: Pt requesting if she can have some of her meds RX ELIQUIS 5 mg Tab 180 tablet , sent to her local pharmacy until her mail order comes in.

## 2023-01-09 NOTE — TELEPHONE ENCOUNTER
----- Message from Evan Garrison sent at 1/9/2023  1:43 PM CST -----  Contact: Yeyo  Type:  RX Refill Request  Who Called: Pt  Yeyo  Refill or New Rx: refills   RX Name and Strength: ELIQUIS 5 mg Tab 180 tablet   How is the patient currently taking it? (ex. 1XDay):    Is this a 30 day or 90 day RX:    Preferred Pharmacy with phone number:  [facility]/pharmacy #[id number] - [place], [place] - [address]  Phone:  [phone]  Fax:  [phone]  Local or Mail Order:  Local  Ordering Provider:  Sanjiv Ford III, MD  Best Call Back Number:  [phone]  Additional Information: Pt requesting if she can have some of her meds RX ELIQUIS 5 mg Tab 180 tablet , sent to her local pharmacy until her mail order comes in.

## 2023-01-20 RX ORDER — TRAZODONE HYDROCHLORIDE 100 MG/1
TABLET ORAL
Qty: 180 TABLET | Refills: 1 | Status: SHIPPED | OUTPATIENT
Start: 2023-01-20 | End: 2023-07-12 | Stop reason: SDUPTHER

## 2023-01-20 RX ORDER — APIXABAN 5 MG/1
5 TABLET, FILM COATED ORAL 2 TIMES DAILY
Qty: 180 TABLET | Refills: 1 | Status: SHIPPED | OUTPATIENT
Start: 2023-01-20 | End: 2023-07-12 | Stop reason: SDUPTHER

## 2023-01-20 RX ORDER — POTASSIUM CHLORIDE 20 MEQ/1
20 TABLET, EXTENDED RELEASE ORAL DAILY
Qty: 90 TABLET | Refills: 1 | Status: SHIPPED | OUTPATIENT
Start: 2023-01-20 | End: 2023-07-12 | Stop reason: SDUPTHER

## 2023-01-20 RX ORDER — LEVOTHYROXINE SODIUM 100 UG/1
100 TABLET ORAL DAILY
Qty: 90 TABLET | Refills: 1 | Status: SHIPPED | OUTPATIENT
Start: 2023-01-20 | End: 2023-07-12 | Stop reason: SDUPTHER

## 2023-01-20 RX ORDER — DRONEDARONE 400 MG/1
400 TABLET, FILM COATED ORAL 2 TIMES DAILY
Qty: 180 TABLET | Refills: 1 | Status: SHIPPED | OUTPATIENT
Start: 2023-01-20 | End: 2023-07-12 | Stop reason: SDUPTHER

## 2023-01-20 RX ORDER — RABEPRAZOLE SODIUM 20 MG/1
20 TABLET, DELAYED RELEASE ORAL 2 TIMES DAILY
Qty: 180 TABLET | Refills: 1 | Status: SHIPPED | OUTPATIENT
Start: 2023-01-20 | End: 2023-07-12 | Stop reason: SDUPTHER

## 2023-01-20 RX ORDER — DIVALPROEX SODIUM 500 MG/1
1000 TABLET, FILM COATED, EXTENDED RELEASE ORAL DAILY
Qty: 180 TABLET | Refills: 1 | Status: SHIPPED | OUTPATIENT
Start: 2023-01-20 | End: 2023-07-12 | Stop reason: SDUPTHER

## 2023-01-20 RX ORDER — BUPROPION HYDROCHLORIDE 300 MG/1
300 TABLET ORAL DAILY
Qty: 90 TABLET | Refills: 1 | Status: SHIPPED | OUTPATIENT
Start: 2023-01-20 | End: 2023-07-12 | Stop reason: SDUPTHER

## 2023-02-08 ENCOUNTER — TELEPHONE (OUTPATIENT)
Dept: NEUROLOGY | Facility: CLINIC | Age: 73
End: 2023-02-08
Payer: MEDICARE

## 2023-02-08 ENCOUNTER — OFFICE VISIT (OUTPATIENT)
Dept: OTOLARYNGOLOGY | Facility: CLINIC | Age: 73
End: 2023-02-08
Payer: MEDICARE

## 2023-02-08 VITALS — HEART RATE: 79 BPM | SYSTOLIC BLOOD PRESSURE: 120 MMHG | DIASTOLIC BLOOD PRESSURE: 72 MMHG

## 2023-02-08 DIAGNOSIS — R26.9 GAIT DISORDER: ICD-10-CM

## 2023-02-08 DIAGNOSIS — G25.9 MOVEMENT DISORDER: Primary | ICD-10-CM

## 2023-02-08 DIAGNOSIS — R42 DYSEQUILIBRIUM: ICD-10-CM

## 2023-02-08 DIAGNOSIS — R29.898 WEAKNESS OF HIP, UNSPECIFIED LATERALITY: ICD-10-CM

## 2023-02-08 PROCEDURE — 99215 OFFICE O/P EST HI 40 MIN: CPT | Mod: PBBFAC,PO | Performed by: OTOLARYNGOLOGY

## 2023-02-08 PROCEDURE — 99999 PR PBB SHADOW E&M-EST. PATIENT-LVL V: ICD-10-PCS | Mod: PBBFAC,,, | Performed by: OTOLARYNGOLOGY

## 2023-02-08 PROCEDURE — 99204 OFFICE O/P NEW MOD 45 MIN: CPT | Mod: S$PBB,,, | Performed by: OTOLARYNGOLOGY

## 2023-02-08 PROCEDURE — 99999 PR PBB SHADOW E&M-EST. PATIENT-LVL V: CPT | Mod: PBBFAC,,, | Performed by: OTOLARYNGOLOGY

## 2023-02-08 PROCEDURE — 99204 PR OFFICE/OUTPT VISIT, NEW, LEVL IV, 45-59 MIN: ICD-10-PCS | Mod: S$PBB,,, | Performed by: OTOLARYNGOLOGY

## 2023-02-08 RX ORDER — LANOLIN ALCOHOL/MO/W.PET/CERES
400 CREAM (GRAM) TOPICAL DAILY
COMMUNITY
Start: 2023-01-01

## 2023-02-08 NOTE — TELEPHONE ENCOUNTER
Patient returned call regarding scheduling an appointment. Patient states that she is dizzy, having trouble with walking, and weak in her legs. Appointment is scheduled for February 16 at 3:30 pm with Anu Borden NP

## 2023-02-08 NOTE — PATIENT INSTRUCTIONS
Issue seemed to be primarily that of ataxia and pelvic girdle weakness more than any suspicion of inner ear disease.  While certainly there may be age-related balance changes there is no history consistent with Meniere's disease, vestibular neuropathy or symptoms or physical findings consistent with vestibular weakness.  Further audiologic testing including VNG and VEMP testing are not recommended at this time but can be pursued if recommended by Neurology.      A greatest priority starting physical therapy for strengthening and fall prevention.  Referral to Neurology also provided for evaluation of a possible neurodegenerative condition.    Return with any worsening of symptoms, failure to improve, or any other concerns for further evaluation and treatment.

## 2023-02-09 ENCOUNTER — TELEPHONE (OUTPATIENT)
Dept: OTOLARYNGOLOGY | Facility: CLINIC | Age: 73
End: 2023-02-09
Payer: MEDICARE

## 2023-02-09 NOTE — TELEPHONE ENCOUNTER
----- Message from Eden Flanagan sent at 2/8/2023  5:05 PM CST -----  Type:  Needs Medical Advice    Who Called: pt  Symptoms (please be specific): pt was seen in the office on 02/08/23 and they have questions about the after care summary report in regards to some medications     Would the patient rather a call back or a response via [facility]? call  Best Call Back Number: [phone]  Additional Information:

## 2023-02-09 NOTE — TELEPHONE ENCOUNTER
"Pt called back. She had questions on the "stop taking these medications" portion on her after visit summary. Advised pt that those medications were on her medication list when we went over them, and those were ones that she stated that she was no longer taking. Pt verbalized understanding. ThanksAlison  "

## 2023-02-13 ENCOUNTER — CLINICAL SUPPORT (OUTPATIENT)
Dept: REHABILITATION | Facility: HOSPITAL | Age: 73
End: 2023-02-13
Attending: OTOLARYNGOLOGY
Payer: MEDICARE

## 2023-02-13 DIAGNOSIS — R29.898 WEAKNESS OF HIP, UNSPECIFIED LATERALITY: ICD-10-CM

## 2023-02-13 DIAGNOSIS — R42 DYSEQUILIBRIUM: ICD-10-CM

## 2023-02-13 DIAGNOSIS — R26.9 GAIT DISORDER: ICD-10-CM

## 2023-02-13 PROCEDURE — 97110 THERAPEUTIC EXERCISES: CPT | Mod: PN

## 2023-02-13 PROCEDURE — 97161 PT EVAL LOW COMPLEX 20 MIN: CPT | Mod: PN

## 2023-02-13 NOTE — PATIENT INSTRUCTIONS
Heel Raises        Stand with support. Tighten pelvic floor and hold. With knees straight, raise heels off ground. Hold ___ seconds. Relax for ___ seconds.  Repeat ___ times. Do ___ times a day.    Copyright © I. All rights reserved.      Mini-Squats (Standing)        Stand with support. Bend knees slightly. Tighten pelvic floor. Hold for ___ seconds. Return to straight standing. Relax for ___ seconds.  Repeat ___ times. Do ___ times a day.    Copyright © I. All rights reserved.      Hip Abduction (Standing)        Stand with support. Squeeze pelvic floor and hold. Lift right leg out to side, keeping toe forward. Hold for ___ seconds. Relax for ___ seconds.    Repeat ___ times. Do ___ times a day.

## 2023-02-13 NOTE — PLAN OF CARE
[facility]  Physical Therapy Neurological Rehabilitation Initial Evaluation    Name: Kasandra Jones  Clinic Number: [id number]    Therapy Diagnosis:   Encounter Diagnoses   Name Primary?    Dysequilibrium     Weakness of hip, unspecified laterality     Gait disorder      Physician: Arturo De Luna MD    Physician Orders: PT Eval and Treat   Medical Diagnosis from Referral:   R42 (ICD-10-CM) - Dysequilibrium   R29.898 (ICD-10-CM) - Weakness of hip, unspecified laterality   R26.9 (ICD-10-CM) - Gait disorder     Evaluation Date: 2/13/2023  Authorization Period Expiration: 2/8/24  Plan of Care Expiration: 4/17/23  Visit # / Visits authorized: 1/ 1    Time In: 1300  Time Out: 1345  Total Billable Time: 45 minutes    Precautions: Standard, Fall, hard of hearing and wears glasses    Subjective   Date of onset: 2/8/23   (referral)  History of current condition - Kasandra reports: 8 mos ago feels like pulled a groin muscle to the right side. C/o tenderness to right buttocks on initial sitting. Sometimes  leg does not work well. Two months ago the Right ankle seems to stop working for a while,  Patient mentioned being dizzy but then changed the word to being disoriented.    Hx of being obese >300#s 23 years ago and wonders if it has something to do with it.     Medical History:   Past Medical History:   Diagnosis Date    Cataract        Surgical History:   Kasandra Jones  has a past surgical history that includes Hysterectomy; Total Reduction Mammoplasty (2002); and Eye surgery (Bilateral, 2020 August).    Medications:   Kasandra has a current medication list which includes the following prescription(s): alprazolam, bupropion, cholecalciferol (vitamin d3), b12, diphenoxylate-atropine 2.5-0.025 mg, divalproex, eliquis, ferrous sulfate, levothyroxine, magnesium oxide, meclizine, multaq, potassium chloride sa, rabeprazole, trazodone, and vit c,a-vh-rplzk-lutein-zeaxan.    Allergies:   Review of  "patient's allergies indicates:   Allergen Reactions    Niacin preparations     Penicillins         Imaging, MRI studies: 1/4/23 Brain 1. Stable appearance of the examination with evidence of a fairly small surface lesions along the ventral edge of the cervical cord along the leptomeningeal surface of the proximal cervical cord at and slightly below the craniocervical junction without significant interval change upon comparison may reflect a small primary cord lesion or a small senescent meningioma. No appreciable change upon comparison.  2. Chronic cerebral atrophy and superimposed chronic deep white matter ischemia and small foci of elevated T2 signal reflective of small chronic microvascular infarcts or small areas of gliosis. Moderate  2. No evidence of pathologic enhancement postcontrast elsewhere.    Prior Therapy: yes.  Social History: lives with    Falls: none; but flops to the sofa sometimes.   DME: Scooter, Walker, and Straight cane  ; tub bench in the shower  Home Environment: no steps/stairs.   Exercise Routine / History: none   Family Present at time of Eval:  is in the lobby.   Occupation: retired   Prior Level of Function: independent.   Current Level of Function: afraid to get dizzy when driving; on straight cane for gait; relies with  when walking in community; unable to stand to cook a full meal. Pain when lying on the side.    Pain:  Current 3/10, worst 8/10, best 2/10   Location: right groin down the anterior thigh  Description: "like a pulled muscle"  Aggravating Factors: when knees meet; standing  Easing Factors: pain medication; keep legs apart.    Patient's goals: "to be able to keep my balance and avoid falling"    Objective       Posture: anterior head; right shoulder lower; wide stance  Palpation: no edema or tenderness noted  Sensation: decreased proprioception  Range of Motion/Strength: BUE"s WFL  4-/5 gross strength  BLE's WFL   3+5 gross strength " except both knee flexion 3/5    Flexibility: no joint limitations  Gait: With AD.  Device Used -  Cane  Analysis: R side antalgic gait; wide stance; occasional left leg scissoring; unsteady   Bed Mobility:Independent  Transfers: Independent  Special Tests: Romberg (+)  30 second sit to stand :6  Other: TUG   with cane   1. 31.75 sec      2. 27.17 sec       3. 29.11 sec      MEAN: 29.34  ALMAZAN Assessment    39/56  1. Sitting to Standing   3 - able to stand independely using hands  2. Standing Unsupported   4 - able to stand safely 2 minutes without hold  3. Sitting Unsupported   4 - able to sit safely and securely 2 minutes  4. Standing to Sitting   3 - controls descent by using hands  5. Pivot Transfer   3 - able to transfer safely with definite use of hands  6. Standing with Eyes Closed   3 - able to stand 10 seconds with supervision  7. Standing with Feet Together   3 - able to place feet together independently and stand for 1 minute with supervision  8. Reaching Forward with Outstretched Arm   2 - can reach forward 5 cm/2 inches safely  9. Retrieving Object from Floor   3 - able to pick slipper but needs supervision  10. Turning to Look Behind   4 - looks behind from both sides and weights shifts well  11. Turning 360 Degrees   3 - able to trun 360 safely one side only in 4 seconds or less  12. Placing Alternate Foot on Step   1 - able to complete > 2 steps needs min assist  13. Standing with One Foot in Front   2 - able to take small step indenpendently and hold 30 seconds  14. Standing on One Foot   1 - tries to lift leg and unable to hold 3 seconds but remains standing independently    CMS Impairment/Limitation/Restriction for FOTO balance Survey    Therapist reviewed FOTO scores for Kasandra Jones on 2/13/2023.   FOTO documents entered into [employer] - see Media section.    Limitation Score: 56%         TREATMENT   Treatment Time In: 1330  Treatment Time Out: 1345  Total Treatment time separate from Evaluation: 15  minutes    Kasandra received therapeutic exercises to develop strength , balance and establish home exercises for 15 minutes including:  Sit to stand  Heel raises  Mini squats  standing hip abductions.    Home Exercises and Patient Education Provided    Education provided:   - Discussed Plan of care; Home exercise instruction and safety precautions.   Recommended to use RW instead of cane for absolute safety.    Written Home Exercises Provided: yes.  Exercises were reviewed and Kasandra was able to demonstrate them prior to the end of the session.  Kasandra demonstrated good  understanding of the education provided.     See EMR under Patient Instructions for exercises provided 2/13/2023.    Assessment   Kasandra is a 72 y.o. female referred to outpatient Physical Therapy with a medical diagnosis of dysequilibrium, Weakness of hip, gait disorder. Patient presents with unsteady balance, high risk for fall, age-related debility and use of Assistive device for gait.    Patient prognosis is Good.   Patient will benefit from skilled outpatient Physical Therapy to address the deficits stated above and in the chart below, provide patient/family education, and to maximize patient's level of independence.     Plan of care discussed with patient: Yes  Patient's spiritual, cultural and educational needs considered and patient is agreeable to the plan of care and goals as stated below:     Anticipated Barriers for therapy: advance age    Medical Necessity is demonstrated by the following  History  Co-morbidities and personal factors that may impact the plan of care Co-morbidities:   advanced age and anxiety    Personal Factors:   coping style     low   Examination  Body Structures and Functions, activity limitations and participation restrictions that may impact the plan of care Body Regions:   lower extremities    Body Systems:    ROM  strength  balance  gait  transitions    Participation Restrictions:   none    Activity limitations:    Learning and applying knowledge  listening    General Tasks and Commands  undertaking multiple tasks    Communication  communicating with/receiving spoken language    Mobility  lifting and carrying objects  walking    Self care  no deficits    Domestic Life  doing house work (cleaning house, washing dishes, laundry)    Interactions/Relationships  no deficits    Life Areas  no deficits    Community and Social Life  no deficits         low   Clinical Presentation stable and uncomplicated low   Decision Making/ Complexity Score: low     Goals:  Short Term Goals: 3 weeks   Patient will report compliance to home exercises given.  Patient will tolerate cardio exercises 10' on Nustep or bike  Patient will perform standing exercises without upper extremity support    Long Term Goals: 8 weeks   Patient will improved sit to stand to 8 reps or better in 30 seconds to demonstrate improved function.  Patient will increase time up and go 20 seconds or less to demonstrate improved confidence with mobility.  Patient will have increased balance on the Armando scale > 42/56  Patient will improve self assessment limitation to 50% or less.    Plan   Plan of care Certification: 2/13/2023 to 4/17/23    Outpatient Physical Therapy 2 times weekly for 8 weeks to include the following interventions: Gait Training, Manual Therapy, Neuromuscular Re-ed, Therapeutic Activities, and Therapeutic Exercise.     Ayo Hernandez, PT

## 2023-02-16 ENCOUNTER — OFFICE VISIT (OUTPATIENT)
Dept: NEUROLOGY | Facility: CLINIC | Age: 73
End: 2023-02-16
Payer: MEDICARE

## 2023-02-16 ENCOUNTER — LAB VISIT (OUTPATIENT)
Dept: LAB | Facility: HOSPITAL | Age: 73
End: 2023-02-16
Attending: NURSE PRACTITIONER
Payer: MEDICARE

## 2023-02-16 VITALS
BODY MASS INDEX: 20.62 KG/M2 | WEIGHT: 128.31 LBS | RESPIRATION RATE: 16 BRPM | DIASTOLIC BLOOD PRESSURE: 79 MMHG | SYSTOLIC BLOOD PRESSURE: 127 MMHG | HEIGHT: 66 IN | HEART RATE: 85 BPM

## 2023-02-16 DIAGNOSIS — G62.9 PERIPHERAL POLYNEUROPATHY: Primary | ICD-10-CM

## 2023-02-16 DIAGNOSIS — G25.9 MOVEMENT DISORDER: ICD-10-CM

## 2023-02-16 DIAGNOSIS — G60.9 HEREDITARY AND IDIOPATHIC NEUROPATHY, UNSPECIFIED: ICD-10-CM

## 2023-02-16 DIAGNOSIS — I48.0 PAROXYSMAL ATRIAL FIBRILLATION: ICD-10-CM

## 2023-02-16 DIAGNOSIS — G62.9 PERIPHERAL POLYNEUROPATHY: ICD-10-CM

## 2023-02-16 DIAGNOSIS — Z51.81 THERAPEUTIC DRUG MONITORING: ICD-10-CM

## 2023-02-16 DIAGNOSIS — R29.898 WEAKNESS OF HIP, UNSPECIFIED LATERALITY: ICD-10-CM

## 2023-02-16 DIAGNOSIS — R42 DYSEQUILIBRIUM: ICD-10-CM

## 2023-02-16 DIAGNOSIS — R26.9 GAIT DISORDER: ICD-10-CM

## 2023-02-16 DIAGNOSIS — Z98.84 HISTORY OF GASTRIC BYPASS: ICD-10-CM

## 2023-02-16 DIAGNOSIS — G95.89 SPINAL CORD MASS: ICD-10-CM

## 2023-02-16 DIAGNOSIS — R41.89 COGNITIVE IMPAIRMENT: ICD-10-CM

## 2023-02-16 DIAGNOSIS — D51.9 ANEMIA DUE TO VITAMIN B12 DEFICIENCY, UNSPECIFIED B12 DEFICIENCY TYPE: ICD-10-CM

## 2023-02-16 DIAGNOSIS — I95.1 ORTHOSTASIS: ICD-10-CM

## 2023-02-16 LAB — ERYTHROCYTE [SEDIMENTATION RATE] IN BLOOD BY PHOTOMETRIC METHOD: <2 MM/HR (ref 0–36)

## 2023-02-16 PROCEDURE — 83921 ORGANIC ACID SINGLE QUANT: CPT | Performed by: NURSE PRACTITIONER

## 2023-02-16 PROCEDURE — 84165 PROTEIN E-PHORESIS SERUM: CPT | Performed by: NURSE PRACTITIONER

## 2023-02-16 PROCEDURE — 36415 COLL VENOUS BLD VENIPUNCTURE: CPT | Mod: PO | Performed by: NURSE PRACTITIONER

## 2023-02-16 PROCEDURE — 99999 PR PBB SHADOW E&M-EST. PATIENT-LVL V: CPT | Mod: PBBFAC,,, | Performed by: NURSE PRACTITIONER

## 2023-02-16 PROCEDURE — 86140 C-REACTIVE PROTEIN: CPT | Performed by: NURSE PRACTITIONER

## 2023-02-16 PROCEDURE — 99215 PR OFFICE/OUTPT VISIT, EST, LEVL V, 40-54 MIN: ICD-10-PCS | Mod: S$PBB,,, | Performed by: NURSE PRACTITIONER

## 2023-02-16 PROCEDURE — 86334 IMMUNOFIX E-PHORESIS SERUM: CPT | Performed by: NURSE PRACTITIONER

## 2023-02-16 PROCEDURE — 80164 ASSAY DIPROPYLACETIC ACD TOT: CPT | Performed by: NURSE PRACTITIONER

## 2023-02-16 PROCEDURE — 84207 ASSAY OF VITAMIN B-6: CPT | Performed by: NURSE PRACTITIONER

## 2023-02-16 PROCEDURE — 82550 ASSAY OF CK (CPK): CPT | Performed by: NURSE PRACTITIONER

## 2023-02-16 PROCEDURE — 86334 PATHOLOGIST INTERPRETATION IFE: ICD-10-PCS | Mod: 26,,, | Performed by: PATHOLOGY

## 2023-02-16 PROCEDURE — 84165 PATHOLOGIST INTERPRETATION SPE: ICD-10-PCS | Mod: 26,,, | Performed by: PATHOLOGY

## 2023-02-16 PROCEDURE — 99215 OFFICE O/P EST HI 40 MIN: CPT | Mod: PBBFAC,PO | Performed by: NURSE PRACTITIONER

## 2023-02-16 PROCEDURE — 84425 ASSAY OF VITAMIN B-1: CPT | Performed by: NURSE PRACTITIONER

## 2023-02-16 PROCEDURE — 99999 PR PBB SHADOW E&M-EST. PATIENT-LVL V: ICD-10-PCS | Mod: PBBFAC,,, | Performed by: NURSE PRACTITIONER

## 2023-02-16 PROCEDURE — 84165 PROTEIN E-PHORESIS SERUM: CPT | Mod: 26,,, | Performed by: PATHOLOGY

## 2023-02-16 PROCEDURE — 86038 ANTINUCLEAR ANTIBODIES: CPT | Performed by: NURSE PRACTITIONER

## 2023-02-16 PROCEDURE — 85652 RBC SED RATE AUTOMATED: CPT | Performed by: NURSE PRACTITIONER

## 2023-02-16 PROCEDURE — 82525 ASSAY OF COPPER: CPT | Performed by: NURSE PRACTITIONER

## 2023-02-16 PROCEDURE — 86334 IMMUNOFIX E-PHORESIS SERUM: CPT | Mod: 26,,, | Performed by: PATHOLOGY

## 2023-02-16 PROCEDURE — 82390 ASSAY OF CERULOPLASMIN: CPT | Performed by: NURSE PRACTITIONER

## 2023-02-16 PROCEDURE — 99215 OFFICE O/P EST HI 40 MIN: CPT | Mod: S$PBB,,, | Performed by: NURSE PRACTITIONER

## 2023-02-16 RX ORDER — CETIRIZINE HYDROCHLORIDE 10 MG/1
10 TABLET ORAL DAILY
COMMUNITY

## 2023-02-16 NOTE — PROGRESS NOTES
"NEUROLOGY  Outpatient Consultation Visit     [facility]  [address] [facility] [address], [place] [address]  [phone] (office) / [phone] (fax)    Patient Name:  Kasandra Jones  :  1950  MR #:  [id number]  Acct #:  [id number]    Date of  Visit: 2023    Other Physicians:  Sanjiv Ford III, MD (Primary Care Physician)      CHIEF COMPLAINT: Dizziness      HISTORY OF PRESENT ILLNESS:  Kasandra Jones is a 72 y.o. R-handed female seen in consultation for dizziness per Arturo De Luna, *    Medical history is significant for anxiety, bipolar 1 disorder, HLD, pAfib, B12 deficiency, gastric bypass surgery, vitamin D deficiency    She notes dizziness and imbalance. It started approximately 1 year ago and is getting worse.   She feels "woozy" just sitting in a chair, but symptoms are much worse with standing or ambulating. She feels like she is swaying when she is standing upright. She often times falls straight backwards and  will have to catch her. She doesn't feel like she is going to pass out and hasn't had any LOC. No sensation of room spinning. It is worse with any change in direction or height, like stepping off a curb. When she first wakes up, she seems to be her strongest, but worsens as the day progresses.     She had a fall earlier this week. She was walking in her room and just fell backwards. Prior to this, had not fallen within the last 6 months but has had several near falls.     She appears to be shuffling her feet more per her . She uses a cane.     She denies numbness or tingling in the LE.     She feels tired and weak all the time but denies any focal weakness in the LE.     Denies HA, vision change, or speech change. She has a lot of nausea, but no vomiting. Has chronic hearing loss and has hearing aids. No change from her baseline.     She started PT Monday to help her weakness and imbalance.     She has a mass on her spine that has been present for at " least a year per report. She is followed by Dr. Jass Harris for this.     She initially saw her cardiologist and her PCP for her concerns. She was then referred to ENT, who referred her to neurology.     She used to have high BP, but then had issues with BP dropping and was taken off of all Rx.     She takes Depakote 1000 mg daily for BPD. She takes Xanax 0.5 mg as needed. Her current Rx has lasted her for over a year, though. There hasn't been any change in her medications to correlate with her symptoms.     She takes B12 orally every day.     She has tremor in the R hand when she is using it to eat or write. None at rest.      finds that she is easily confused. She has ST memory loss. Onset was 1-2 years ago and is progressively worse. She is independent with ADLs and most iADLs. She has weakness, fatigue and SOB that limit her ability to stand or exert too long. She takes her medications without difficulty. She has executive dysfunction. No language changes.     She has never slept well at night. She makes up for it during the day with naps.     Allergies:  Review of patient's allergies indicates:   Allergen Reactions    Niacin preparations     Penicillins        Current Medications:  Current Outpatient Medications   Medication Sig Dispense Refill    ALPRAZolam (XANAX) 0.5 MG tablet Take 1 tablet (0.5 mg total) by mouth daily as needed for Anxiety. (Patient taking differently: Take 0.5 mg by mouth daily as needed for Anxiety. PRN) 30 tablet 0    buPROPion (WELLBUTRIN XL) 300 MG 24 hr tablet Take 1 tablet (300 mg total) by mouth once daily. 90 tablet 1    cetirizine (ZYRTEC) 10 MG tablet Take 10 mg by mouth once daily.      cholecalciferol, vitamin D3, 125 mcg (5,000 unit) capsule       cyanocobalamin-cobamamide (B12) 5,000-100 mcg Lozg       diphenoxylate-atropine 2.5-0.025 mg (LOMOTIL) 2.5-0.025 mg per tablet Take 1 tablet by mouth 4 (four) times daily as needed for Diarrhea. 30 tablet 1    divalproex 500  "MG Tb24 Take 2 tablets (1,000 mg total) by mouth once daily. 180 tablet 1    ELIQUIS 5 mg Tab Take 1 tablet (5 mg total) by mouth 2 (two) times daily. 180 tablet 1    ferrous sulfate 325 (65 FE) MG EC tablet       levothyroxine (SYNTHROID) 100 MCG tablet Take 1 tablet (100 mcg total) by mouth once daily. 90 tablet 1    magnesium oxide (MAG-OX) 400 mg (241.3 mg magnesium) tablet       meclizine (ANTIVERT) 12.5 mg tablet Take 1 tablet (12.5 mg total) by mouth every 6 (six) hours. 90 tablet 0    MULTAQ 400 mg Tab Take 1 tablet (400 mg total) by mouth 2 (two) times daily. 180 tablet 1    potassium chloride SA (K-DUR,KLOR-CON) 20 MEQ tablet Take 1 tablet (20 mEq total) by mouth once daily. 90 tablet 1    RABEprazole (ACIPHEX) 20 mg tablet Take 1 tablet (20 mg total) by mouth 2 (two) times daily. 180 tablet 1    traZODone (DESYREL) 100 MG tablet TAKE TWO TABLETS PO NIGHTLY 180 tablet 1    vit C,S-Qj-nhjfd-lutein-zeaxan (PRESERVISION AREDS 2) 250-90-40-1 mg Cap        No current facility-administered medications for this visit.       Past Medical History:  Past Medical History:   Diagnosis Date    Cataract        Past Surgical History:  Past Surgical History:   Procedure Laterality Date    EYE SURGERY Bilateral 2020 August    cataract removal    HYSTERECTOMY      TOTAL REDUCTION MAMMOPLASTY  2002       Family History:  family history includes Breast cancer in her maternal aunt.    Social History:   reports that she has never smoked. She has never used smokeless tobacco. She reports that she does not drink alcohol and does not use drugs.      REVIEW OF SYSTEMS:  As per HPI    PHYSICAL EXAM:  /79 (BP Location: Left arm, Patient Position: Standing, BP Method: Medium (Automatic))   Pulse 85   Resp 16   Ht 5' 6" (1.676 m)   Wt 58.2 kg (128 lb 4.9 oz)   LMP  (LMP Unknown)   BMI 20.71 kg/m²     General: Well groomed. No acute distress.  Pulmonary: Normal effort and rate.   Musculoskeletal: No obvious joint " deformities, moves all extremities well.  Extremities: No clubbing, cyanosis or edema.     Neurological exam:  Mental status: Awake and alert.  Oriented to person, place, time and situation. Recent memory appears to be limited.   Speech/Language: Fluent and appropriate. No dysarthria or aphasia on conversation. Able to follow complex commands.   Cranial nerves (II-XII): Visual fields full. Pupils equal round and reactive to light, extraocular movements intact, no ptosis, no nystagmus. Facial sensation and symmetry intact bilaterally. Hearing grossly intact. Palate deferred. Shoulder shrug normal bilaterally. Normal tongue protrusion.   Motor: 5 out of 5 strength throughout the upper and lower extremities bilaterally except 4/5 in bilateral IP (?effort). Normal bulk and tone. No abnormal movements noted. No drift appreciated.   Sensation: Decreased to pinprick to the ankles bilaterally. Intact to vibration. Abnormal proprioception.   DTR: 2+ at the biceps, mute at the knees.   Coordination: Finger-nose-finger testing intact bilaterally. JAMILA normal bilaterally. Mild BUE postural and intent tremor. No resting tremor. + Romberg test.   Gait: Stands unassisted, but nearly falls backwards as soon as she is upright. Base is wide. No significant shuffling. Very unstable, cautious.     DIAGNOSTIC DATA:  I have personally reviewed provider notes, labs and imaging made available to me today.     Reported onset of weakness, lightheadedness and imbalance in 11/2021 to cardiology. 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 Harris. She reported to Dr. Harris that her imbalance had been ongoing for at lest a couple of years. After further imaging, the area in question was deemed to be a nodule of uncertain behavior rather than an aneurysm. She underwent a metastatic evaluation (Neuroaxial MRIs, CT CAP) which was negative.     In the midst of this, she was also found  to be orthostatic and her BP medications were discontinued. She also had a Holter study to evaluate her symptoms, which didn't show any significant arrhythmia.     Imaging:  MRI Brain w wo 1/4/23:      IMPRESSION:  1. Stable appearance of the examination with evidence of a fairly small surface lesions along the ventral edge of the cervical cord along the leptomeningeal surface of the proximal cervical cord at and slightly below the craniocervical junction without significant interval change upon comparison may reflect a small primary cord lesion or a small senescent meningioma. No appreciable change upon comparison.  2. Chronic cerebral atrophy and superimposed chronic deep white matter ischemia and small foci of elevated T2 signal reflective of small chronic microvascular infarcts or small areas of gliosis. Moderate  2. No evidence of pathologic enhancement postcontrast elsewhere.    MRI C spine w wo 7/19/22:    IMPRESSION:  1.  Multilevel discogenic and facet degenerative changes of the cervical spine as described.  2.  3 x 3 x 3 mm enhancing mass along the posterior margin of the spinal cord at the level the foramen magnum. Findings could reflect a small meningioma, or other soft tissue mass.    MRI brain w wo 1/2022:  FINDINGS:  MRI of the brain demonstrates a 5 mm exophytic enhancing nodule along the posterior aspect of the medulla at the cervicomedullary junction.  This produces edema in the posterior aspect of medulla as seen on FLAIR and T2 imaging.  It does not appear associated with an artery to suggest aneurysm.  There is susceptibility on the SWI images indicating that it has internal hemorrhage.  No other enhancing nodule is seen in the brain.  No other edema is seen.     Small 5 mm cystic area is seen in the right paramedian suzette, not identified on FLAIR imaging.  This may represent an incidental parenchymal cyst, less likely a prior lacunar infarct.     Skull base structures show no acute lesions.   Bilateral surgical changes consistent cataract surgery.     3D time-of-flight MRA of the brain was performed.  No aneurysm, AVM, or stenosis is seen.  The lesion at the craniocervical junction was not included on noncontrast MRA, so contrast enhanced MRA was performed.  This shows the enhancing nodule, because it was performed following contrast.  There is no artery seen leading to the enhancing mass, however.     Impression:  5 mm enhancing hemorrhagic nodule at the craniocervical junction posteriorly.  This appears to be parenchymal and exophytic.  Differential considerations include primary or secondary to mass lesions such as hemorrhagic metastasis or hemangioblastoma.  Further investigation recommended.     No other acute findings within the brain.     No aneurysm, stenosis or AVM.     MRA brain 1/2022:  Impression:  5 mm enhancing hemorrhagic nodule at the craniocervical junction posteriorly.  This appears to be parenchymal and exophytic.  Differential considerations include primary or secondary to mass lesions such as hemorrhagic metastasis or hemangioblastoma.  Further investigation recommended.     No other acute findings within the brain.    CTA head and neck 12/2021:  IMPRESSION:  1. A 4 mm enhancing mass along the dorsal aspect of the cervicomedullary junction, suspicious for aneurysm. Neurosurgical referral is recommended.  2. Mild atheromatous plaque of the carotid arteries, with no carotid or vertebral arterial stenosis.  3. No significant abnormality of the intracranial arterial vasculature.     Cardiac:  EKG 12/2022: NSR    24 hr Holter 10/2022:  The diary was properly completed. There were occasional hookup related artifacts. Overall, the study was of good quality. The tape was adequate (1 days , 24 hours, 0 minutes). There was an episode of fast heartbeat reported. The corresponding rhythm strips revealed the following: During the event (finished eating), the rhythm was sinus rhythm at 87 bpm with  PVCs. There was an episode of heart beating hard reported. The corresponding rhythm strips revealed the following: During the event (sitting), the rhythm was sinus rhythm at 77 bpm with PVCs. There was an episode of flutter, feel shaky reported. The corresponding rhythm strips revealed the following: During the event (at computer), the rhythm was sinus rhythm at 72 bpm with PVCs. There was an episode of dizzy, almost fell reported. The corresponding rhythm strips revealed the following: During the event (sitting up), the rhythm was sinus rhythm at 87 bpm with PACs & PVCs. There was an episode of heart racing, sob reported. The corresponding rhythm strips revealed the following: During the event (putting food away), the rhythm was sinus rhythm at 75 bpm with PVCs. There was an episode of heart racing reported. The corresponding rhythm strips revealed the following: During the event (cooking), the rhythm was sinus rhythm at 90 bpm with PVCs.  Predominant Rhythm Sinus rhythm with heart rates varying between 47 and 107 BPM with an average of 70BPM. Maximum heart rate recorded at: 09:34 CDT on day 1. Minimum heart rate recorded at 07:33 CDT on day 1.  Ventricular Arrhythmias There were frequent mostly monomorphic PVCs totalling 3866 and averaging 80.54 per hour. There were 1 polymorphic couplets.  Supraventricular Arrhythmias There were very rare monomorphic PACs totalling 53 and averaging 1.1 per hour, 19 supraventricular bigeminy episodes and 45 supraventricular trigeminy episodes.  There was 1 run of non-sustained of PACs and lasting to 3 beats.       Labs:  Lab Results   Component Value Date    WBC 4.75 01/03/2023    HGB 14.0 01/03/2023    HCT 44.6 01/03/2023     01/03/2023     (H) 01/03/2023    RDW 13.6 01/03/2023     Lab Results   Component Value Date     01/03/2023    K 4.0 01/03/2023    CL 99 01/03/2023    CO2 32 (H) 01/03/2023    BUN 12 01/03/2023    CREATININE 0.7 01/03/2023    GLU 99  01/03/2023    CALCIUM 8.8 01/03/2023    MG 1.8 06/28/2019     Lab Results   Component Value Date    PROT 6.5 01/03/2023    ALBUMIN 3.3 (L) 01/03/2023    BILITOT 0.7 01/03/2023    AST 26 01/03/2023    ALKPHOS 83 01/03/2023    ALT 23 01/03/2023     No results found for: INR, PROTIME, PTT  Lab Results   Component Value Date    CHOL 170 09/29/2022    HDL 74 09/29/2022    LDLCALC 82.4 09/29/2022    TRIG 68 09/29/2022    CHOLHDL 43.5 09/29/2022     Lab Results   Component Value Date    LABA1C 5.1 04/02/2018      Lab Results   Component Value Date    DEMCOKRK32 >1500 (H) 10/03/2022     No results found for: FOLATE  Lab Results   Component Value Date    TSH 3.510 01/03/2023     Component      Latest Ref Rng & Units 6/27/2022   Valproic Acid Lvl      50.0 - 100.0 ug/mL 40.0 (L)         ASSESSMENT & PLAN:  Kasandra Jones is a 72 y.o. R-handed female seen in consultation for imbalance.     Problem List Items Addressed This Visit          Neuro    Cognitive impairment    Current Assessment & Plan     No objective cognitive testing in EMR  Time did not allow cognitive evaluation today, but will conduct MMSE at next follow up           Spinal cord mass    Overview     Noted in the posterior medulla at the cervicomedullary junction 12/2021  Stable on imaging as of 1/2023  Metastatic evaluation (neuroaxial MRI, CT CAP) negative   Followed by Dr. Harris in [place] -- presumably benign             Cardiac/Vascular    Paroxysmal atrial fibrillation    Current Assessment & Plan     On DOAC & Multaq         Orthostasis    Current Assessment & Plan     Orthostatic from sit to stand today with appropriate HR response (147/76, 74 --> 127/79, 85)  Recently taken off of BP Rx due to orthostasis             Oncology    Anemia due to vitamin B12 deficiency       Endocrine    History of gastric bypass       Orthopedic    Weakness of hip       Other    Dysequilibrium    Current Assessment & Plan     Chronic and likely multifactorial    - Unclear  relationship to the cervicomedullary mass, which produces a mild bit of posterior medullary edema. Lesion in this area would be expected to affect balance/equilibrium, but given it's documented stability over time, would not be expected to account for the worsening in symptoms that she reports recently.   - as noted, she continues to be orthostatic. This may be related to poor intake given her reports of fairly chronic nausea.   - on exam, she also has e/o peripheral polyneuropathy with impaired proprioception. This is likely playing a large role here given her characteristic gait. Will check additional serologies to assess for possible etiologies of neuropathy. She was encouraged to continue to use an AD to ambulate and is currently enrolled in PT.   - there was concern for pelvic girdle weakness, but on my exam today she has minimal weakness in the IP muscles, some of which seems to be effort related. Will check CK. No etiology for this weakness noted on recent neuroaxial imaging.   - note is also made of chronic memory loss reported in EMR and by her . A neurodegenerative etiology of her gait/imbalance is still a consideration. No parkinsonism noted on exam today, though. Will consider NP testing.          Gait disorder     Other Visit Diagnoses       Peripheral polyneuropathy    -  Primary    Movement disorder        Therapeutic drug monitoring        Hereditary and idiopathic neuropathy, unspecified                Follow up: 6 weeks -- will need MMSE at follow up     I spent a total of 65 minutes on the day of the visit.    This includes face to face time with the patient, as well as non-face to face time preparing for and completing the visit (review of prior diagnostic testing and clinical notes, obtaining or reviewing history, documenting clinical information in the EMR, independently interpreting and communicating results to the patient/family and coordinating ongoing care).     Case discussed /  imaging reviewed with Dr. Shin       I appreciate the opportunity to participate in the care of this patient. Please feel free to contact me with any concerns or questions.       Anu Borden, [facility]  [facility] [address] [place] [address]

## 2023-02-17 PROBLEM — D49.6 BRAIN TUMOR: Status: RESOLVED | Noted: 2023-01-03 | Resolved: 2023-02-17

## 2023-02-17 PROBLEM — R42 VERTIGO: Status: RESOLVED | Noted: 2022-06-16 | Resolved: 2023-02-17

## 2023-02-17 PROBLEM — I67.1 CEREBRAL ANEURYSM: Status: RESOLVED | Noted: 2022-01-04 | Resolved: 2023-02-17

## 2023-02-17 LAB
ALBUMIN SERPL ELPH-MCNC: 3.34 G/DL (ref 3.35–5.55)
ALPHA1 GLOB SERPL ELPH-MCNC: 0.26 G/DL (ref 0.17–0.41)
ALPHA2 GLOB SERPL ELPH-MCNC: 0.44 G/DL (ref 0.43–0.99)
ANA SER QL IF: NORMAL
B-GLOBULIN SERPL ELPH-MCNC: 0.74 G/DL (ref 0.5–1.1)
CERULOPLASMIN SERPL-MCNC: 12 MG/DL (ref 15–45)
CK SERPL-CCNC: 38 U/L (ref 20–180)
CRP SERPL-MCNC: 1 MG/L (ref 0–8.2)
GAMMA GLOB SERPL ELPH-MCNC: 0.92 G/DL (ref 0.67–1.58)
INTERPRETATION SERPL IFE-IMP: NORMAL
PATHOLOGIST INTERPRETATION IFE: NORMAL
PATHOLOGIST INTERPRETATION SPE: NORMAL
PROT SERPL-MCNC: 5.7 G/DL (ref 6–8.4)
VALPROATE SERPL-MCNC: 30.6 UG/ML (ref 50–100)

## 2023-02-17 NOTE — ASSESSMENT & PLAN NOTE
Chronic and likely multifactorial    - Unclear relationship to the cervicomedullary mass, which produces a mild bit of posterior medullary edema. Lesion in this area would be expected to affect balance/equilibrium, but given it's documented stability over time, would not be expected to account for the worsening in symptoms that she reports recently.   - as noted, she continues to be orthostatic. This may be related to poor intake given her reports of fairly chronic nausea.   - on exam, she also has e/o peripheral polyneuropathy with impaired proprioception. This is likely playing a large role here given her characteristic gait. Will check additional serologies to assess for possible etiologies of neuropathy. She was encouraged to continue to use an AD to ambulate and is currently enrolled in PT.   - there was concern for pelvic girdle weakness, but on my exam today she has minimal weakness in the IP muscles, some of which seems to be effort related. Will check CK. No etiology for this weakness noted on recent neuroaxial imaging.   - note is also made of chronic memory loss reported in EMR and by her . A neurodegenerative etiology of her gait/imbalance is still a consideration. No parkinsonism noted on exam today, though. Will consider NP testing.

## 2023-02-17 NOTE — ASSESSMENT & PLAN NOTE
Orthostatic from sit to stand today with appropriate HR response (147/76, 74 --> 127/79, 85)  Recently taken off of BP Rx due to orthostasis

## 2023-02-17 NOTE — ASSESSMENT & PLAN NOTE
No objective cognitive testing in EMR  Time did not allow cognitive evaluation today, but will conduct MMSE at next follow up

## 2023-02-21 LAB
COPPER SERPL-MCNC: 283 UG/L (ref 810–1990)
METHYLMALONATE SERPL-SCNC: 0.11 UMOL/L
PYRIDOXAL SERPL-MCNC: 4 UG/L (ref 5–50)
VIT B1 BLD-MCNC: 66 UG/L (ref 38–122)

## 2023-02-23 ENCOUNTER — CLINICAL SUPPORT (OUTPATIENT)
Dept: REHABILITATION | Facility: HOSPITAL | Age: 73
End: 2023-02-23
Payer: MEDICARE

## 2023-02-23 DIAGNOSIS — R42 DYSEQUILIBRIUM: Primary | ICD-10-CM

## 2023-02-23 PROCEDURE — 97110 THERAPEUTIC EXERCISES: CPT | Mod: PN,CQ

## 2023-02-23 PROCEDURE — 97116 GAIT TRAINING THERAPY: CPT | Mod: PN,CQ

## 2023-02-23 NOTE — PROGRESS NOTES
[facility]   Physical Therapy Treatment Note     Name: Kasandra Jones  Clinic Number: [id number]    Therapy Diagnosis:   Encounter Diagnosis   Name Primary?    Dysequilibrium Yes     Physician: Arturo De Luna, *    Visit Date: 2023     Physician Orders: PT Eval and Treat   Medical Diagnosis from Referral:   R42 (ICD-10-CM) - Dysequilibrium   R29.898 (ICD-10-CM) - Weakness of hip, unspecified laterality   R26.9 (ICD-10-CM) - Gait disorder      Evaluation Date: 2023  Authorization Period Expiration: 24  Plan of Care Expiration: 23  Visit # / Visits authorized:          PTA Visit #:      Time In: 415  Time Out: 500  Total Billable Time: 45 minutes    SUBJECTIVE     Pt reports: patient reports having a fall on Thursday. Patient reported she hit her head on table. Patient states she has not experienced any dizzy or drowsiness. PTA encouraged to call primary if she began to feel worse due to hitting her head. Patient stated she does not know what caused her to fall.   She was compliant with home exercise program.  Response to previous treatment: stated she struggles with completing 20 reps   Functional change: fall     Pain: 4 or 5 10  Location: bilateral knee      OBJECTIVE     Objective Measures updated at progress report unless specified.     Treatment     Kasandra received the treatments listed below:      therapeutic exercises to develop strength, endurance, ROM, flexibility, posture, and core stabilization for 35 minutes including:  -SciFit 10 minute   -Long arc quads 1.5 # 3 x 10   -hip flexion 1.5 # 3 x 10  -Hip adduction ball squeezes  3x10   -hip abduction with green theraband 3 x 10  -toe taps 4 in steps 2 x 10   -standing mini squats 2 x 10   -standing heel raises 2 x10       gait training to improve functional mobility and safety for 15  minutes, includinft x 2 with rollator with stand by assist and cuing for brake safety   Side stepping 10ft x4    Backward 10ft x 4     .    Patient Education and Home Exercises     Home Exercises Provided and Patient Education Provided     Education provided:   - Patient provided with verbal and demonstrative instruction for all activities performed in today's session.     Written Home Exercises Provided: yes. Exercises were reviewed and Kasandra was able to demonstrate them prior to the end of the session.  Kasandra demonstrated good  understanding of the education provided. See EMR under Patient Instructions for exercises provided during therapy sessions    ASSESSMENT     Patient  provided good participation and effort during today's session with treatment focused on lower extremity strengthen .     Kasandra Is progressing well towards her goals.   Pt prognosis is Good.     Pt will continue to benefit from skilled outpatient physical therapy to address the deficits listed in the problem list box on initial evaluation, provide pt/family education and to maximize pt's level of independence in the home and community environment.     Pt's spiritual, cultural and educational needs considered and pt agreeable to plan of care and goals.     Anticipated barriers to physical therapy: age     Goals: Goals:  Short Term Goals: 3 weeks   Patient will report compliance to home exercises given.  Patient will tolerate cardio exercises 10' on Nustep or bike  Patient will perform standing exercises without upper extremity support     Long Term Goals: 8 weeks   Patient will improved sit to stand to 8 reps or better in 30 seconds to demonstrate improved function.  Patient will increase time up and go 20 seconds or less to demonstrate improved confidence with mobility.  Patient will have increased balance on the Armando scale > 42/56  Patient will improve self assessment limitation to 50% or less.    PLAN     Plan of care Certification: 2/13/2023 to 4/17/23     Outpatient Physical Therapy 2 times weekly for 8 weeks to include the following interventions:  Gait Training, Manual Therapy, Neuromuscular Re-ed, Therapeutic Activities, and Therapeutic Exercise.     Ana Lundberg, PTA

## 2023-02-24 ENCOUNTER — CLINICAL SUPPORT (OUTPATIENT)
Dept: REHABILITATION | Facility: HOSPITAL | Age: 73
End: 2023-02-24
Payer: MEDICARE

## 2023-02-24 DIAGNOSIS — R42 DYSEQUILIBRIUM: Primary | ICD-10-CM

## 2023-02-24 PROCEDURE — 97116 GAIT TRAINING THERAPY: CPT | Mod: PN,CQ

## 2023-02-24 PROCEDURE — 97110 THERAPEUTIC EXERCISES: CPT | Mod: PN,CQ

## 2023-02-24 NOTE — PROGRESS NOTES
[facility]   Physical Therapy Treatment Note     Name: Kasandra Jones  Clinic Number: [id number]    Therapy Diagnosis:   Encounter Diagnosis   Name Primary?    Dysequilibrium Yes     Physician: Arturo De Luna, *    Visit Date: 2023     Physician Orders: PT Eval and Treat   Medical Diagnosis from Referral:   R42 (ICD-10-CM) - Dysequilibrium   R29.898 (ICD-10-CM) - Weakness of hip, unspecified laterality   R26.9 (ICD-10-CM) - Gait disorder      Evaluation Date: 2023  Authorization Period Expiration: 24  Plan of Care Expiration: 23  Visit # / Visits authorized:          PTA Visit #:      Time In: 1018  Time Out: 1103  Total Billable Time: 45 minutes    SUBJECTIVE     Pt reports: patient reports feeling unsteady on her feet today   Response to previous treatment: quads sore   Functional change: none    Pain: 4 or 5 10  Location: bilateral knee      OBJECTIVE     Objective Measures updated at progress report unless specified.     Treatment     Kasandra received the treatments listed below:      therapeutic exercises to develop strength, endurance, ROM, flexibility, posture, and core stabilization for 35 minutes including:  -SciFit 10 minute   -2 x5 sit to stand   -toe taps 4 in steps 2 x 10   -standing mini squats 10   -standing heel raises 2 x10   -hip flexion standing 2 x10   -hip abduction 2 x10   -standing on airex feet apart 30 seconds, feet together and with eyes closed       gait training to improve functional mobility and safety for 10  minutes, includinft x 2 with rollator with stand by assist and cuing for brake safety       .    Patient Education and Home Exercises     Home Exercises Provided and Patient Education Provided     Education provided:   - Patient provided with verbal and demonstrative instruction for all activities performed in today's session.     Written Home Exercises Provided: yes. Exercises were reviewed and Kasandra was able to  demonstrate them prior to the end of the session.  Kasandra demonstrated good  understanding of the education provided. See EMR under Patient Instructions for exercises provided during therapy sessions    ASSESSMENT     Patient  provided good participation and effort during today's session with treatment focused on lower extremity strengthen and balance training. Patient resented with increase lower extremity weakness, unsteadiness with two episodes of both lower extremity buckling however was able to self correct. Patient demonstrated poor recall of Rolator  safety and break safety.     Kasandra Is progressing well towards her goals.   Pt prognosis is Good.     Pt will continue to benefit from skilled outpatient physical therapy to address the deficits listed in the problem list box on initial evaluation, provide pt/family education and to maximize pt's level of independence in the home and community environment.     Pt's spiritual, cultural and educational needs considered and pt agreeable to plan of care and goals.     Anticipated barriers to physical therapy: age     Goals: Goals:  Short Term Goals: 3 weeks   Patient will report compliance to home exercises given.  Patient will tolerate cardio exercises 10' on Nustep or bike  Patient will perform standing exercises without upper extremity support     Long Term Goals: 8 weeks   Patient will improved sit to stand to 8 reps or better in 30 seconds to demonstrate improved function.  Patient will increase time up and go 20 seconds or less to demonstrate improved confidence with mobility.  Patient will have increased balance on the Armando scale > 42/56  Patient will improve self assessment limitation to 50% or less.    PLAN     Plan of care Certification: 2/13/2023 to 4/17/23     Outpatient Physical Therapy 2 times weekly for 8 weeks to include the following interventions: Gait Training, Manual Therapy, Neuromuscular Re-ed, Therapeutic Activities, and Therapeutic Exercise.      Ana Lundberg, [facility]

## 2023-02-27 ENCOUNTER — CLINICAL SUPPORT (OUTPATIENT)
Dept: REHABILITATION | Facility: HOSPITAL | Age: 73
End: 2023-02-27
Payer: MEDICARE

## 2023-02-27 DIAGNOSIS — R42 DYSEQUILIBRIUM: Primary | ICD-10-CM

## 2023-02-27 PROCEDURE — 97116 GAIT TRAINING THERAPY: CPT | Mod: PN,CQ

## 2023-02-27 PROCEDURE — 97110 THERAPEUTIC EXERCISES: CPT | Mod: PN,CQ

## 2023-02-27 NOTE — PROGRESS NOTES
[facility]   Physical Therapy Treatment Note     Name: Kasandra Jones  Clinic Number: [id number]    Therapy Diagnosis:   Encounter Diagnosis   Name Primary?    Dysequilibrium Yes     Physician: Arturo De Luna, *    Visit Date: 2023     Physician Orders: PT Eval and Treat   Medical Diagnosis from Referral:   R42 (ICD-10-CM) - Dysequilibrium   R29.898 (ICD-10-CM) - Weakness of hip, unspecified laterality   R26.9 (ICD-10-CM) - Gait disorder      Evaluation Date: 2023  Authorization Period Expiration: 24  Plan of Care Expiration: 23  Visit # / Visits authorized: 3/12         PTA Visit #:      Time In: 145  Time Out: 23  Total Billable Time: 38minutes    SUBJECTIVE     Pt reports: patient reports feeling weak and dizzy. Patient reported having a hard time walking and feels her balance is off. Patient reports she continued to loose wait however is not eating. Patient reports not having a appetite.    Response to previous treatment: quads sore   Functional change: none    Pain: 4 or 5 10  Location: bilateral knee      OBJECTIVE     Objective Measures updated at progress report unless specified.     Treatment     Kasandra received the treatments listed below:      therapeutic exercises to develop strength, endurance, ROM, flexibility, posture, and core stabilization for 28 minutes including:  -SciFit 15 minute with 3 minute rest beak in middle.   Seated hip flexion with 1.5 #2 x10   Long arc quads with 1.5 # 2 x10   Hip addiction with ball 2x 10   Hip abduction 2 x10   3 sit to stand completed before patient reported feeling two dizzy and could not complete       gait training to improve functional mobility and safety for 10  minutes, includinft and 200ft with rollator with stand by assist and cuing for brake safety       .    Patient Education and Home Exercises     Home Exercises Provided and Patient Education Provided     Education provided:   - Patient provided  with verbal and demonstrative instruction for all activities performed in today's session.   -educated patient on importance of providing her body nutriment  in order to build strength. Discussed talking patient primary doctor about weight lost and using Boost protein drink in diet to add calories.     Written Home Exercises Provided: yes. Exercises were reviewed and Kasandra was able to demonstrate them prior to the end of the session.  Kasandra demonstrated good  understanding of the education provided. See EMR under Patient Instructions for exercises provided during therapy sessions    ASSESSMENT     Patient presented with increase weakness and dizziness and was unable to complete standing therapeutic exercise today. Patient required extended rest beaks and     Kasandra Is progressing well towards her goals.   Pt prognosis is Good.     Pt will continue to benefit from skilled outpatient physical therapy to address the deficits listed in the problem list box on initial evaluation, provide pt/family education and to maximize pt's level of independence in the home and community environment.     Pt's spiritual, cultural and educational needs considered and pt agreeable to plan of care and goals.     Anticipated barriers to physical therapy: age     Goals: Goals:  Short Term Goals: 3 weeks   Patient will report compliance to home exercises given.  Patient will tolerate cardio exercises 10' on Nustep or bike  Patient will perform standing exercises without upper extremity support     Long Term Goals: 8 weeks   Patient will improved sit to stand to 8 reps or better in 30 seconds to demonstrate improved function.  Patient will increase time up and go 20 seconds or less to demonstrate improved confidence with mobility.  Patient will have increased balance on the Armando scale > 42/56  Patient will improve self assessment limitation to 50% or less.    PLAN     Plan of care Certification: 2/13/2023 to 4/17/23     Outpatient Physical  Therapy 2 times weekly for 8 weeks to include the following interventions: Gait Training, Manual Therapy, Neuromuscular Re-ed, Therapeutic Activities, and Therapeutic Exercise.     Ana Lundberg, PTA

## 2023-03-01 ENCOUNTER — PATIENT MESSAGE (OUTPATIENT)
Dept: NEUROLOGY | Facility: CLINIC | Age: 73
End: 2023-03-01
Payer: MEDICARE

## 2023-03-01 DIAGNOSIS — E61.0 COPPER DEFICIENCY: ICD-10-CM

## 2023-03-01 DIAGNOSIS — R27.0 ATAXIA: Primary | ICD-10-CM

## 2023-03-01 NOTE — TELEPHONE ENCOUNTER
Pt has partial dentures, but does not use any denture creams.  She will get her blood drawn tomorrow at [facility].

## 2023-03-02 ENCOUNTER — CLINICAL SUPPORT (OUTPATIENT)
Dept: REHABILITATION | Facility: HOSPITAL | Age: 73
End: 2023-03-02
Payer: MEDICARE

## 2023-03-02 ENCOUNTER — LAB VISIT (OUTPATIENT)
Dept: LAB | Facility: HOSPITAL | Age: 73
End: 2023-03-02
Attending: NURSE PRACTITIONER
Payer: MEDICARE

## 2023-03-02 DIAGNOSIS — R27.0 ATAXIA: ICD-10-CM

## 2023-03-02 DIAGNOSIS — E61.0 COPPER DEFICIENCY: ICD-10-CM

## 2023-03-02 DIAGNOSIS — R42 DYSEQUILIBRIUM: Primary | ICD-10-CM

## 2023-03-02 PROCEDURE — 84446 ASSAY OF VITAMIN E: CPT | Performed by: NURSE PRACTITIONER

## 2023-03-02 PROCEDURE — 84630 ASSAY OF ZINC: CPT | Performed by: NURSE PRACTITIONER

## 2023-03-02 PROCEDURE — 97116 GAIT TRAINING THERAPY: CPT | Mod: PN,CQ

## 2023-03-02 PROCEDURE — 36415 COLL VENOUS BLD VENIPUNCTURE: CPT | Performed by: NURSE PRACTITIONER

## 2023-03-02 PROCEDURE — 97112 NEUROMUSCULAR REEDUCATION: CPT | Mod: PN,CQ

## 2023-03-02 PROCEDURE — 97110 THERAPEUTIC EXERCISES: CPT | Mod: PN,CQ

## 2023-03-02 NOTE — PROGRESS NOTES
[facility]   Physical Therapy Treatment Note     Name: Kasandra Jones  Clinic Number: [id number]    Therapy Diagnosis:   Encounter Diagnosis   Name Primary?    Dysequilibrium Yes     Physician: Arturo De Luna, *    Visit Date: 3/2/2023     Physician Orders: PT Eval and Treat   Medical Diagnosis from Referral:   R42 (ICD-10-CM) - Dysequilibrium   R29.898 (ICD-10-CM) - Weakness of hip, unspecified laterality   R26.9 (ICD-10-CM) - Gait disorder      Evaluation Date: 2/13/2023  Authorization Period Expiration: 2/8/24  Plan of Care Expiration: 4/17/23  Visit # / Visits authorized: 4/12         PTA Visit #: 2/5     Time In: 1030  Time Out: 1114  Total Billable Time: 44minutes    SUBJECTIVE     Pt reports: patient reports feeling better today and stronger. Patient stated she has been trying to eat more and has been drinking her boost drinks. Patient ambulated in to gym with cane completing 2/3 steps before progressing cane and with shuffled steps   Response to previous treatment: quads sore   Functional change: none    Pain: 4 or 5 10  Location: bilateral knee      OBJECTIVE     Objective Measures updated at progress report unless specified.     Treatment     Kasandra received the treatments listed below:      therapeutic exercises to develop strength, endurance, ROM, flexibility, posture, and core stabilization for 10 minutes including:  -SciFit 10 minute       gait training to improve functional mobility and safety for 20 minutes, including:  Gait training with cane and contact guard assist/ minimal assistance from therapist to prevent falls for 150feet x 2 and cuing for proper sequencing and foot clearance and decreasing time in pre-swing stance to reduce lost of balance.   150ft at end of session back to lobby with cane and Hand held assist on other hand due to increase weakness and unsteadiness from session.       neuromuscular re-education activities to improve: Balance, Coordination,  Kinesthetic, Sense, Proprioception, and Posture for 14 minutes. The following activities were included:  -30 seconds x2 staggered stance with both feet leading   -30 seconds staggered stance with both feet leading on airex   -30 split stance with one lower extremity on 6 in step both lower extremity   - pre-swing practice with foam roll proving proper step length and patient rocking  from heel strike and toe off with one Upper extermity support on parallel bars and then only Upper extermity support on cane.     Patient Education and Home Exercises     Home Exercises Provided and Patient Education Provided     Education provided:   - Patient provided with verbal and demonstrative instruction for all activities performed in today's session.   -talked with patient about eating small meals more frequently due to low appetite  -educated patient on proper sequencing with cane, even reciprocal gait pattern     Written Home Exercises Provided: yes. Exercises were reviewed and Kasandra was able to demonstrate them prior to the end of the session.  Kasandra demonstrated good  understanding of the education provided. See EMR under Patient Instructions for exercises provided during therapy sessions    ASSESSMENT     Patient arrived with cane on today and presented with shuffled steps and improper sequencing when using cane. Patient patient was provided visual and verbal demonstration on proper sequencing and foot clearance however then patient presented with decrease pacing, step length greater on Left side and multiple LOB during the pre-swing pace greater when stepping with Left lower extremity.  By the end of session patient presented with increase fatigue, weakness and dizziness. Patient required increase assistance for ambulation out of the facility due to fatigue and weakness.     Kasandra Is progressing well towards her goals.   Pt prognosis is Good.     Pt will continue to benefit from skilled outpatient physical therapy to  address the deficits listed in the problem list box on initial evaluation, provide pt/family education and to maximize pt's level of independence in the home and community environment.     Pt's spiritual, cultural and educational needs considered and pt agreeable to plan of care and goals.     Anticipated barriers to physical therapy: age     Goals: Goals:  Short Term Goals: 3 weeks   Patient will report compliance to home exercises given.  Patient will tolerate cardio exercises 10' on Nustep or bike  Patient will perform standing exercises without upper extremity support     Long Term Goals: 8 weeks   Patient will improved sit to stand to 8 reps or better in 30 seconds to demonstrate improved function.  Patient will increase time up and go 20 seconds or less to demonstrate improved confidence with mobility.  Patient will have increased balance on the Armando scale > 42/56  Patient will improve self assessment limitation to 50% or less.    PLAN     Plan of care Certification: 2/13/2023 to 4/17/23     Outpatient Physical Therapy 2 times weekly for 8 weeks to include the following interventions: Gait Training, Manual Therapy, Neuromuscular Re-ed, Therapeutic Activities, and Therapeutic Exercise.     Ana Lundberg, PTA

## 2023-03-03 ENCOUNTER — PATIENT MESSAGE (OUTPATIENT)
Dept: NEUROLOGY | Facility: CLINIC | Age: 73
End: 2023-03-03
Payer: MEDICARE

## 2023-03-03 DIAGNOSIS — E61.0 COPPER DEFICIENCY: Primary | ICD-10-CM

## 2023-03-03 NOTE — TELEPHONE ENCOUNTER
Pt with notable copper deficiency, history of bariatric surgery with chronic poor intake / nausea. Likely etiology of her neurological symptoms (peripheral neuropathy, gait disturbance)    Also with B6 deficiency    Recs:  Start daily B6 100 mg   Start copper supplement as follows: 8 mg daily for 1 week, then 6 mg daily for 1 week, then 4 mg daily for 1 week, then 2 mg daily thereafter   Repeat copper levels in 1 month

## 2023-03-05 LAB — ZINC SERPL-MCNC: 83 UG/DL (ref 44–115)

## 2023-03-06 ENCOUNTER — TELEPHONE (OUTPATIENT)
Dept: NEUROLOGY | Facility: CLINIC | Age: 73
End: 2023-03-06
Payer: MEDICARE

## 2023-03-06 ENCOUNTER — CLINICAL SUPPORT (OUTPATIENT)
Dept: REHABILITATION | Facility: HOSPITAL | Age: 73
End: 2023-03-06
Payer: MEDICARE

## 2023-03-06 DIAGNOSIS — R42 DYSEQUILIBRIUM: Primary | ICD-10-CM

## 2023-03-06 DIAGNOSIS — R26.9 GAIT DISORDER: ICD-10-CM

## 2023-03-06 DIAGNOSIS — R29.898 WEAKNESS OF HIP, UNSPECIFIED LATERALITY: ICD-10-CM

## 2023-03-06 PROCEDURE — 97110 THERAPEUTIC EXERCISES: CPT | Mod: PN

## 2023-03-06 NOTE — TELEPHONE ENCOUNTER
----- Message from Tianna Dick sent at 3/6/2023  1:23 PM CST -----  Regarding: advice  Contact: patient  Type: Needs Medical Advice  Who Called:  patient  Symptoms (please be specific):    How long has patient had these symptoms:    Pharmacy name and phone #:    Best Call Back Number: [phone] (home)   Additional Information: patient received a call to start taking copper. Patient states she was not able to find the medication locally and had to order it. She states is should be coming in today. She needs to know if her lab appointment or appointment with doctor will need to be pushed back since she is unable to start the medication. Please call patient to advise.Thanks!

## 2023-03-06 NOTE — PROGRESS NOTES
[facility]   Physical Therapy Treatment Note     Name: Kasandra Jones  Clinic Number: [id number]    Therapy Diagnosis:   Encounter Diagnoses   Name Primary?    Dysequilibrium Yes    Weakness of hip, unspecified laterality     Gait disorder      Physician: Arturo De Luna MD    Visit Date: 3/6/2023     Physician Orders: PT Eval and Treat   Medical Diagnosis from Referral:   R42 (ICD-10-CM) - Dysequilibrium   R29.898 (ICD-10-CM) - Weakness of hip, unspecified laterality   R26.9 (ICD-10-CM) - Gait disorder      Evaluation Date: 2/13/2023  Authorization Period Expiration: 2/8/24  Plan of Care Expiration: 4/17/23  Visit # / Visits authorized: 5/12     Time In: 0930  Time Out: 1015  Total Billable Time: 45 minutes    FOTO: 40/100    SUBJECTIVE     Pt reports: right hip pain with movements. Had been for 8 months. Don't remember if she ever mentioned it with anybody. Also don't remember if she mentioned it to the MD about her having no appetite. She will force herself to eat just so she gets nourishment.  Admits 2 falls without  since last therapy visits.   Response to previous treatment: states she's too tired after each visit.   Functional change: using rollator.    Pain:  variable/10  Location: right groin. No pain at rest.      OBJECTIVE     Tight hamstrings.    Treatment     Kasandra received the treatments listed below:      therapeutic exercises to develop strength, endurance, ROM, flexibility, posture, and core stabilization for 45 minutes including:  Hamstring stretch  R hip abduction 20  R hip IR 20  Heel slide RLE 20  Crooklying hip adduction 20  Bridging 20  Seated hamstring curls manual resist 15/15  Standing heel raises 20  Standing mini squats 20  Standing hip Abduction single leg stance unilateral 10/10  alternate 10/10      Patient Education and Home Exercises     Home Exercises Provided and Patient Education Provided     Education provided:   -use of rollator for  safety.  Reconsult with MD regarding lack of appetite and groin pains.  Written Home Exercises Provided: yes. Exercises were reviewed and Kasandra was able to demonstrate them prior to the end of the session.  Kasandra demonstrated good  understanding of the education provided. See EMR under Patient Instructions for exercises provided during therapy sessions    ASSESSMENT     Patient is more concerned with groin discomforts during sit to stand.No mention of falling until she was explained the value of having two handed support in rollator as she tends to lean on this therapist during standing balance activities.     Kasandra Is progressing well towards her goals.   Pt prognosis is Good.     Pt will continue to benefit from skilled outpatient physical therapy to address the deficits listed in the problem list box on initial evaluation, provide pt/family education and to maximize pt's level of independence in the home and community environment.     Pt's spiritual, cultural and educational needs considered and pt agreeable to plan of care and goals.     Anticipated barriers to physical therapy: age     Goals: Goals:  Short Term Goals: 3 weeks   Patient will report compliance to home exercises given.  Patient will tolerate cardio exercises 10' on Nustep or bike  Patient will perform standing exercises without upper extremity support     Long Term Goals: 8 weeks   Patient will improved sit to stand to 8 reps or better in 30 seconds to demonstrate improved function.  Patient will increase time up and go 20 seconds or less to demonstrate improved confidence with mobility.  Patient will have increased balance on the Armando scale > 42/56  Patient will improve self assessment limitation to 50% or less.    PLAN     AROM R hip  Strength/endurance/balance training  Continue Plan of care.    Ayo Hernandez, PT

## 2023-03-12 LAB
A-TOCOPHEROL VIT E SERPL-MCNC: 10.2 MG/L (ref 9–29)
GAMMA TOCOPHEROL SERPL-MCNC: 0.7 MG/L (ref 0.5–4.9)

## 2023-03-13 ENCOUNTER — CLINICAL SUPPORT (OUTPATIENT)
Dept: REHABILITATION | Facility: HOSPITAL | Age: 73
End: 2023-03-13
Payer: MEDICARE

## 2023-03-13 DIAGNOSIS — R42 DYSEQUILIBRIUM: Primary | ICD-10-CM

## 2023-03-13 PROCEDURE — 97110 THERAPEUTIC EXERCISES: CPT | Mod: PN,CQ

## 2023-03-13 PROCEDURE — 97112 NEUROMUSCULAR REEDUCATION: CPT | Mod: PN,CQ

## 2023-03-13 NOTE — PROGRESS NOTES
"[facility]   Physical Therapy Treatment Note     Name: Kasandra Jones  Clinic Number: [id number]    Therapy Diagnosis:   Encounter Diagnosis   Name Primary?    Dysequilibrium Yes     Physician: Arturo De Luna, *    Visit Date: 3/13/2023     Physician Orders: PT Eval and Treat   Medical Diagnosis from Referral:   R42 (ICD-10-CM) - Dysequilibrium   R29.898 (ICD-10-CM) - Weakness of hip, unspecified laterality   R26.9 (ICD-10-CM) - Gait disorder      Evaluation Date: 2/13/2023  Authorization Period Expiration: 2/8/24  Plan of Care Expiration: 4/17/23  Visit # / Visits authorized: 6/12         PTA Visit #: 1/5     Time In: 1115  Time Out: 1200  Total Billable Time: 44 minutes    SUBJECTIVE     Pt reports: patient came into session with only cane. Patient stated she was feeling better and does not even need the cane however during gait into gym patient has 3 stubbles and reached out to door ways and walls to steady herself during gait. Patient asked about her two falls and she stated " well that has passed". PTA informed patient that she continues to need Rolator due to the three stumbles and reaching out for wall and doorways.    Response to previous treatment: none reported   Functional change: able to stand and cook dinner    Pain: 2/10  Location: right hip only when she moves it    OBJECTIVE     Objective Measures updated at progress report unless specified.     Treatment     Kasandra received the treatments listed below:      therapeutic exercises to develop strength, endurance, ROM, flexibility, posture, and core stabilization for 10 minutes including:  -SciFit 10 minute         neuromuscular re-education activities to improve: Balance, Coordination, Kinesthetic, Sense, Proprioception, and Posture for 35 minutes. The following activities were included:  -stepping over hurdles x 3 with one Upper extermity support left arm and on trail with no Upper extermity support   -side stepping " over hurdles x 4 with one Upper extermity support   -pre-swing practice with foam roll proving proper step length and patient rocking  from heel strike and toe off with no Upper extermity support. Increase difficulties with right lower extremity leading     with cane and contact guard assist/ minimal assistance from therapist to prevent falls for 150 feet x 2 and cuing for proper sequencing and foot clearance and decreasing time in pre-swing stance to reduce lost of balance.   Patient Education and Home Exercises     Home Exercises Provided and Patient Education Provided     Education provided:   - Patient provided with verbal and demonstrative instruction for all activities performed in today's session.   -educated patient on maintaining awareness to safety even on days she feels good. Educated patient on not becoming to confident and forgetting to be careful resulting in a fall  -educated patient on proper sequencing with cane, even reciprocal gait pattern     Written Home Exercises Provided: yes. Exercises were reviewed and Kasandra was able to demonstrate them prior to the end of the session.  Kasandra demonstrated good  understanding of the education provided. See EMR under Patient Instructions for exercises provided during therapy sessions    ASSESSMENT     Patient arrival with only cane again today presenting with poor balance and improper sequencing with cane. Patient completed gait training with same with cuing on proper step length and cane sequencing with cane and patient had multiple lost of balance requiring minimal assist to correct or reaching out to wall to self correct. Patient takes larger step with left lower extremity and prolongs time in in pre-swing phase where patient loses balance. Patient cued to decrease time in this phase and complete even reciprocal steps.       Kasandra Is progressing well towards her goals.   Pt prognosis is Good.     Pt will continue to benefit from skilled outpatient physical  therapy to address the deficits listed in the problem list box on initial evaluation, provide pt/family education and to maximize pt's level of independence in the home and community environment.     Pt's spiritual, cultural and educational needs considered and pt agreeable to plan of care and goals.     Anticipated barriers to physical therapy: age     Goals: Goals:  Short Term Goals: 3 weeks   Patient will report compliance to home exercises given.  Patient will tolerate cardio exercises 10' on Nustep or bike  Patient will perform standing exercises without upper extremity support     Long Term Goals: 8 weeks   Patient will improved sit to stand to 8 reps or better in 30 seconds to demonstrate improved function.  Patient will increase time up and go 20 seconds or less to demonstrate improved confidence with mobility.  Patient will have increased balance on the Armando scale > 42/56  Patient will improve self assessment limitation to 50% or less.    PLAN     Plan of care Certification: 2/13/2023 to 4/17/23     Outpatient Physical Therapy 2 times weekly for 8 weeks to include the following interventions: Gait Training, Manual Therapy, Neuromuscular Re-ed, Therapeutic Activities, and Therapeutic Exercise.     Ana Lundberg, PTA

## 2023-03-16 ENCOUNTER — CLINICAL SUPPORT (OUTPATIENT)
Dept: REHABILITATION | Facility: HOSPITAL | Age: 73
End: 2023-03-16
Payer: MEDICARE

## 2023-03-16 DIAGNOSIS — R42 DYSEQUILIBRIUM: Primary | ICD-10-CM

## 2023-03-16 PROCEDURE — 97116 GAIT TRAINING THERAPY: CPT | Mod: PN,CQ

## 2023-03-16 PROCEDURE — 97110 THERAPEUTIC EXERCISES: CPT | Mod: PN,CQ

## 2023-03-16 NOTE — PROGRESS NOTES
[facility]   Physical Therapy Treatment Note     Name: Kasandra Jones  Clinic Number: [id number]    Therapy Diagnosis:   Encounter Diagnosis   Name Primary?    Dysequilibrium Yes     Physician: Arturo De Luna, *    Visit Date: 3/16/2023     Physician Orders: PT Eval and Treat   Medical Diagnosis from Referral:   R42 (ICD-10-CM) - Dysequilibrium   R29.898 (ICD-10-CM) - Weakness of hip, unspecified laterality   R26.9 (ICD-10-CM) - Gait disorder      Evaluation Date: 2/13/2023  Authorization Period Expiration: 2/8/24  Plan of Care Expiration: 4/17/23  Visit # / Visits authorized: 6/12         PTA Visit #: 1/5     Time In: 1115  Time Out: 1200  Total Billable Time: 44 minutes    SUBJECTIVE     Pt reports: she felt good last night however today she does not feel well and her whole body hurts   Functional change: able to stand and cook dinner again last night     Pain: 2/10  Location: right hip only when she moves it    OBJECTIVE     Objective Measures updated at progress report unless specified.     Treatment     Kasandra received the treatments listed below:        gait training to improve functional mobility and safety for 25  minutes, including:    Gait training on treadmill  for 2 minute, 1 MPH 30 seconds .8 MPH and 2 minutes .8 MPH  150 feet x 2 with Rolator      therapeutic exercises to develop strength, endurance, ROM, flexibility, posture, and core stabilization for 20 minutes including:  Supine hamstring stretch 30 seconds x 2  Knee to chest 30 seconds x2  Hip adduction stretch 30 seconds x 2 single left and both lower extremity   Lower trunk rotation x 10       Patient Education and Home Exercises     Home Exercises Provided and Patient Education Provided     Education provided:   - Patient provided with verbal and demonstrative instruction for all activities performed in today's session.   -educated patient on maintaining awareness to safety even on days she feels good. Educated  patient on not becoming to confident and forgetting to be careful resulting in a fall  -educated patient on proper sequencing with cane, even reciprocal gait pattern     Written Home Exercises Provided: yes. Exercises were reviewed and Kasandra was able to demonstrate them prior to the end of the session.  Kasandra demonstrated good  understanding of the education provided. See EMR under Patient Instructions for exercises provided during therapy sessions    ASSESSMENT    Patient was able to tolerate short duration of gait training on treadmill however required rest after 2 minute due to shortness of breath and soreness in knees. Patient was cued for proper step length with left lower extremity, heel strike and PLB.  Patient was able to correct gait technique however when fatigued began shuffling feet again. Patient complained of groin tightness through out and PTA incorporated supine stretching to decrease pain and tightness. Patient was able to complete stretching however complained of pain in her tail bone when lying down.    Kasandra Is progressing well towards her goals.   Pt prognosis is Good.     Pt will continue to benefit from skilled outpatient physical therapy to address the deficits listed in the problem list box on initial evaluation, provide pt/family education and to maximize pt's level of independence in the home and community environment.     Pt's spiritual, cultural and educational needs considered and pt agreeable to plan of care and goals.     Anticipated barriers to physical therapy: age     Goals: Goals:  Short Term Goals: 3 weeks   Patient will report compliance to home exercises given.  Patient will tolerate cardio exercises 10' on Nustep or bike  Patient will perform standing exercises without upper extremity support     Long Term Goals: 8 weeks   Patient will improved sit to stand to 8 reps or better in 30 seconds to demonstrate improved function.  Patient will increase time up and go 20 seconds or  less to demonstrate improved confidence with mobility.  Patient will have increased balance on the Armando scale > 42/56  Patient will improve self assessment limitation to 50% or less.    PLAN     Plan of care Certification: 2/13/2023 to 4/17/23     Outpatient Physical Therapy 2 times weekly for 8 weeks to include the following interventions: Gait Training, Manual Therapy, Neuromuscular Re-ed, Therapeutic Activities, and Therapeutic Exercise.     Ana Lundberg, PTA

## 2023-03-20 ENCOUNTER — CLINICAL SUPPORT (OUTPATIENT)
Dept: REHABILITATION | Facility: HOSPITAL | Age: 73
End: 2023-03-20
Payer: MEDICARE

## 2023-03-20 DIAGNOSIS — R42 DYSEQUILIBRIUM: Primary | ICD-10-CM

## 2023-03-20 DIAGNOSIS — R26.9 GAIT DISORDER: ICD-10-CM

## 2023-03-20 DIAGNOSIS — R29.898 WEAKNESS OF HIP, UNSPECIFIED LATERALITY: ICD-10-CM

## 2023-03-20 PROCEDURE — 97110 THERAPEUTIC EXERCISES: CPT | Mod: PN

## 2023-03-20 RX ORDER — MECLIZINE HCL 12.5 MG 12.5 MG/1
12.5 TABLET ORAL EVERY 6 HOURS
Qty: 90 TABLET | Refills: 0 | Status: SHIPPED | OUTPATIENT
Start: 2023-03-20 | End: 2023-04-18

## 2023-03-20 NOTE — TELEPHONE ENCOUNTER
----- Message from Anton Raya sent at 3/20/2023  9:04 AM CDT -----  Regarding: refill  Contact: Yeyo  Type:  RX Refill Request    Who Called:  Yeyo  Refill or New Rx:  Refill  RX Name and Strength:  ANTIVERT) 12.5 mg   How is the patient currently taking it? (ex. 1XDay):  twice a day  Is this a 30 day or 90 day RX:  30day    Preferred Pharmacy with phone number:    [facility] - [address] [address]  Phone: [phone] Fax: [phone]     Local or Mail Order:  local  Ordering Provider:  Peg Hwang  Best Call Back Number:  [phone] (home)     Case number [id number]

## 2023-03-20 NOTE — PROGRESS NOTES
"[facility]   Physical Therapy Treatment Note     Name: Kasandra Jones  Clinic Number: [id number]    Therapy Diagnosis:   Encounter Diagnoses   Name Primary?    Dysequilibrium Yes    Gait disorder     Weakness of hip, unspecified laterality      Physician: Arturo De Luna MD    Visit Date: 3/20/2023     Physician Orders: PT Eval and Treat   Medical Diagnosis from Referral:   R42 (ICD-10-CM) - Dysequilibrium   R29.898 (ICD-10-CM) - Weakness of hip, unspecified laterality   R26.9 (ICD-10-CM) - Gait disorder      Evaluation Date: 2/13/2023  Authorization Period Expiration: 2/8/24  Plan of Care Expiration: 4/17/23  Visit # / Visits authorized: 9/12     Time In: 1345  Time Out: 1430  Total Billable Time: 45 minutes    FOTO: 40/100    SUBJECTIVE     Pt reports: experiencing a fall with left foot bruise. Pain on toe movements but none on weight bearing.  Patient is compliant with Home exercises.  Response to previous treatment: soreness to knees and SOB with continuous activity.  Functional change: recent fall made her more apprehensive.    Pain:  variable/10  Location: right groin. No pain at rest.      OBJECTIVE     Tight hamstrings.  Mild discoloration /ecchymosis to left lateral forefoot dorsum.    Treatment     Kasandra received the treatments listed below:      therapeutic exercises to develop strength, endurance, ROM, flexibility, posture, and core stabilization for 45 minutes including:  Standing gastroc stretch  Standing heel raises 20  Standing mini squats 20  Standing hip Abduction single leg stance unilateral 10/10  alternate 10/10  Step up on 3" box R/L 15/15  Sit to stand using hands 10; without hands 5    Patient Education and Home Exercises     Home Exercises Provided and Patient Education Provided     Education provided:   Knee stability using full extension when weight bearing without AD.  Written Home Exercises Provided: yes. Exercises were reviewed and Kasandra was able to " demonstrate them prior to the end of the session.  Kasandra demonstrated good  understanding of the education provided. See EMR under Patient Instructions for exercises provided during therapy sessions    ASSESSMENT     Patient presented with incidence o left knee buckle x 2 during weight shifting activities.  Sit to stand needs tactile cues to lean forward and use momentum when not using hand for support. Performed steps up with no knee buckle.  Kasandra Is progressing well towards her goals.   Pt prognosis is Good.     Pt will continue to benefit from skilled outpatient physical therapy to address the deficits listed in the problem list box on initial evaluation, provide pt/family education and to maximize pt's level of independence in the home and community environment.     Pt's spiritual, cultural and educational needs considered and pt agreeable to plan of care and goals.     Anticipated barriers to physical therapy: age     Goals: Goals:  Short Term Goals:  Patient will report compliance to home exercises given. (Ongoing)  Patient will tolerate cardio exercises 10' on Nustep or bike  (met)  Patient will perform standing exercises without upper extremity support   (met)     Long Term Goals:    Patient will improved sit to stand to 8 reps or better in 30 seconds to demonstrate improved function.  (Ongoing)  Patient will increase time up and go 20 seconds or less to demonstrate improved confidence with mobility. (Ongoing)  Patient will have increased balance on the Armando scale > 42/56   (ongoing)  Patient will improve self assessment limitation to 50% or less.  (Ongoing)    PLAN     Knee stabilization  Strength/endurance/balance training  Continue Plan of care.    Ayo Hernandez, PT

## 2023-03-20 NOTE — TELEPHONE ENCOUNTER
No new care gaps identified.  [facility] Embedded Care Gaps. Reference number: [id number]. 3/20/2023   1:34:46 PM CDT

## 2023-03-23 ENCOUNTER — HOSPITAL ENCOUNTER (OUTPATIENT)
Dept: RADIOLOGY | Facility: HOSPITAL | Age: 73
Discharge: HOME | End: 2023-03-23
Payer: MEDICARE

## 2023-03-23 ENCOUNTER — OFFICE VISIT (OUTPATIENT)
Dept: FAMILY MEDICINE | Facility: CLINIC | Age: 73
End: 2023-03-23
Payer: MEDICARE

## 2023-03-23 VITALS
TEMPERATURE: 97 F | HEART RATE: 70 BPM | SYSTOLIC BLOOD PRESSURE: 136 MMHG | OXYGEN SATURATION: 99 % | DIASTOLIC BLOOD PRESSURE: 84 MMHG | HEIGHT: 66 IN | WEIGHT: 125.13 LBS | BODY MASS INDEX: 20.11 KG/M2

## 2023-03-23 DIAGNOSIS — T14.8XXA: ICD-10-CM

## 2023-03-23 DIAGNOSIS — S09.90XA TRAUMATIC INJURY OF HEAD, INITIAL ENCOUNTER: ICD-10-CM

## 2023-03-23 DIAGNOSIS — R42 DIZZINESS: Primary | ICD-10-CM

## 2023-03-23 DIAGNOSIS — R42 DIZZINESS AND GIDDINESS: ICD-10-CM
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
## 2023-03-23 DIAGNOSIS — I10 HYPERTENSION, ESSENTIAL: ICD-10-CM

## 2023-03-23 PROCEDURE — 99214 PR OFFICE/OUTPT VISIT, EST, LEVL IV, 30-39 MIN: ICD-10-PCS | Mod: S$GLB,,,

## 2023-03-23 PROCEDURE — 70450 CT HEAD/BRAIN W/O DYE: CPT | Mod: TC

## 2023-03-23 PROCEDURE — 99214 OFFICE O/P EST MOD 30 MIN: CPT | Mod: S$GLB,,,

## 2023-03-23 PROCEDURE — 72110 X-RAY EXAM L-2 SPINE 4/>VWS: CPT | Mod: TC

## 2023-03-23 RX ORDER — MECLIZINE HYDROCHLORIDE 25 MG/1
TABLET ORAL
COMMUNITY
Start: 2022-12-27 | End: 2023-03-23 | Stop reason: DRUGHIGH

## 2023-03-23 RX ORDER — COPPER GLUCONATE 2 MG
1 TABLET ORAL DAILY
COMMUNITY

## 2023-03-23 NOTE — PROGRESS NOTES
Subjective:       Patient ID: Kasandra Jones is a 72 y.o. female.    Chief Complaint: Back Pain and Fall    Patient presents to clinic today with complaints of back pain after falling. Has had loss of balance for quite awhile. Today her left knee gave out and she fell straight back. She hit her head, back, and shoulders on concrete floor. She is taking Eliquis.  Fall was unwitnessed.  did hear her fall and when he got to here she was awake and oriented. She now has pain in her lower back. She does not have a headache.  She denies one sided weakness, difficulty speaking, or facial droop.  She does feel a tenderness where she hit her head.  She has felt dizziness of an on for quite some time. She does fall frequently. Has history of cognitive impairment, anxiety, bipolar, hypertension, a-fib, carotid stenosis, orthostasis. She is complaining of pain in her lower back an and tenderness in her right shoulder.  She has seen Neurology recently for evaluation of dizziness and falling which have seemed to worsen recently.     Review of patient's allergies indicates:   Allergen Reactions    Niacin preparations     Penicillins      Social Determinants of Health     Tobacco Use: Low Risk     Smoking Tobacco Use: Never    Smokeless Tobacco Use: Never    Passive Exposure: Not on file   Alcohol Use: Not on file   Financial Resource Strain: Not on file   Food Insecurity: Not on file   Transportation Needs: Not on file   Physical Activity: Not on file   Stress: Not on file   Social Connections: Not on file   Housing Stability: Not on file   Depression: Low Risk     Last PHQ Score: 0      Past Medical History:   Diagnosis Date    Cataract       Past Surgical History:   Procedure Laterality Date    EYE SURGERY Bilateral 2020 August    cataract removal    HYSTERECTOMY      TOTAL REDUCTION MAMMOPLASTY  2002      Social History     Socioeconomic History    Marital status:          Current Outpatient Medications:      ALPRAZolam (XANAX) 0.5 MG tablet, Take 1 tablet (0.5 mg total) by mouth daily as needed for Anxiety. (Patient taking differently: Take 0.5 mg by mouth daily as needed for Anxiety. PRN), Disp: 30 tablet, Rfl: 0    buPROPion (WELLBUTRIN XL) 300 MG 24 hr tablet, Take 1 tablet (300 mg total) by mouth once daily., Disp: 90 tablet, Rfl: 1    cetirizine (ZYRTEC) 10 MG tablet, Take 10 mg by mouth once daily., Disp: , Rfl:     cholecalciferol, vitamin D3, 125 mcg (5,000 unit) capsule, , Disp: , Rfl:     copper gluconate 2 mg Tab, Take by mouth., Disp: , Rfl:     cyanocobalamin-cobamamide (B12) 5,000-100 mcg Lozg, , Disp: , Rfl:     diphenoxylate-atropine 2.5-0.025 mg (LOMOTIL) 2.5-0.025 mg per tablet, Take 1 tablet by mouth 4 (four) times daily as needed for Diarrhea., Disp: 30 tablet, Rfl: 1    divalproex 500 MG Tb24, Take 2 tablets (1,000 mg total) by mouth once daily., Disp: 180 tablet, Rfl: 1    ELIQUIS 5 mg Tab, Take 1 tablet (5 mg total) by mouth 2 (two) times daily., Disp: 180 tablet, Rfl: 1    ferrous sulfate 325 (65 FE) MG EC tablet, , Disp: , Rfl:     levothyroxine (SYNTHROID) 100 MCG tablet, Take 1 tablet (100 mcg total) by mouth once daily., Disp: 90 tablet, Rfl: 1    magnesium oxide (MAG-OX) 400 mg (241.3 mg magnesium) tablet, , Disp: , Rfl:     meclizine (ANTIVERT) 12.5 mg tablet, Take 1 tablet (12.5 mg total) by mouth every 6 (six) hours., Disp: 90 tablet, Rfl: 0    MULTAQ 400 mg Tab, Take 1 tablet (400 mg total) by mouth 2 (two) times daily., Disp: 180 tablet, Rfl: 1    potassium chloride SA (K-DUR,KLOR-CON) 20 MEQ tablet, Take 1 tablet (20 mEq total) by mouth once daily., Disp: 90 tablet, Rfl: 1    RABEprazole (ACIPHEX) 20 mg tablet, Take 1 tablet (20 mg total) by mouth 2 (two) times daily., Disp: 180 tablet, Rfl: 1    traZODone (DESYREL) 100 MG tablet, TAKE TWO TABLETS PO NIGHTLY, Disp: 180 tablet, Rfl: 1    vit C,D-Pk-pndho-lutein-zeaxan (PRESERVISION AREDS 2) 250-90-40-1 mg Cap, , Disp: , Rfl:     Lab  Results   Component Value Date    WBC 5.50 03/23/2023    HGB 13.5 03/23/2023    HCT 42.7 03/23/2023     03/23/2023    CHOL 170 09/29/2022    TRIG 68 09/29/2022    HDL 74 09/29/2022    ALT 23 01/03/2023    AST 26 01/03/2023     01/03/2023    K 4.0 01/03/2023    CL 99 01/03/2023    CREATININE 0.7 01/03/2023    BUN 12 01/03/2023    CO2 32 (H) 01/03/2023    TSH 3.510 01/03/2023       Review of Systems   Constitutional:  Positive for fatigue. Negative for chills and fever.   Respiratory:  Negative for shortness of breath.    Cardiovascular:  Negative for chest pain and palpitations.   Musculoskeletal:  Positive for back pain.   Neurological:  Positive for dizziness, weakness and headaches. Negative for facial asymmetry, speech difficulty and numbness.   Psychiatric/Behavioral:  Negative for confusion. The patient is not nervous/anxious.      Objective:      Physical Exam  Vitals reviewed.   Constitutional:       Appearance: Normal appearance.   HENT:      Head: No contusion.      Comments: No contusion but there is some pain with palpation to posterior head.   Eyes:      General: No visual field deficit.     Pupils: Pupils are unequal.      Visual Fields: Right eye visual fields normal and left eye visual fields normal.      Comments: Left pupil slightly larger than right but both pupils are reactive.    Neck:      Vascular: No carotid bruit.   Cardiovascular:      Rate and Rhythm: Normal rate. Rhythm irregular.      Pulses: Normal pulses.           Radial pulses are 2+ on the right side and 2+ on the left side.        Posterior tibial pulses are 2+ on the right side and 2+ on the left side.      Heart sounds: Normal heart sounds.   Pulmonary:      Effort: Pulmonary effort is normal.      Breath sounds: Normal breath sounds.   Musculoskeletal:      Cervical back: Normal.      Thoracic back: Normal.      Lumbar back: Signs of trauma, tenderness and bony tenderness present. No swelling or deformity. Normal  range of motion.        Back:       Right lower leg: No edema.      Left lower leg: No edema.   Neurological:      Mental Status: She is alert.      GCS: GCS eye subscore is 4. GCS verbal subscore is 5. GCS motor subscore is 6.      Cranial Nerves: Cranial nerves 2-12 are intact. No dysarthria or facial asymmetry.      Coordination: Romberg sign positive.      Comments: Unable to get out of chair by herself.        Assessment:       1. Dizziness    2. Hypertension, essential    3. Fall, initial encounter    4. Traumatic injury of head, initial encounter    5. Traumatic ecchymosis of spine    6. Dizziness and giddiness        Plan:       Kasandra was seen today for back pain and fall.    Diagnoses and all orders for this visit:    Dizziness  -     Comprehensive Metabolic Panel; Future  -     CBC Auto Differential; Future  -     Magnesium; Future    Hypertension, essential  -     Comprehensive Metabolic Panel; Future  -     CBC Auto Differential; Future  -     Magnesium; Future    Fall, initial encounter  -     Cancel: X-Ray Lumbar Spine AP And Lateral; Future    Traumatic injury of head, initial encounter    Traumatic ecchymosis of spine  -     Cancel: X-Ray Lumbar Spine AP And Lateral; Future    Dizziness and giddiness  -     CT Head Without Contrast; Future     Will do Stat CT of head due to trauma and on anticoagulant.  Xray of lumbar spine.  Check electrolytes and blood count.  Would have preferred if patient went to ER for evaluation but she does not wish to go at this time. Will follow up with patient by phone after imaging and labs.

## 2023-03-27 ENCOUNTER — CLINICAL SUPPORT (OUTPATIENT)
Dept: REHABILITATION | Facility: HOSPITAL | Age: 73
End: 2023-03-27
Payer: MEDICARE

## 2023-03-27 DIAGNOSIS — R42 DYSEQUILIBRIUM: Primary | ICD-10-CM

## 2023-03-27 DIAGNOSIS — R26.9 GAIT DISORDER: ICD-10-CM

## 2023-03-27 PROCEDURE — 97110 THERAPEUTIC EXERCISES: CPT | Mod: PN

## 2023-03-27 NOTE — PROGRESS NOTES
"[facility]   Physical Therapy Treatment Note     Name: Kasandra Jones  Clinic Number: [id number]    Therapy Diagnosis:   Encounter Diagnoses   Name Primary?    Dysequilibrium Yes    Gait disorder      Physician: Arturo De Luna MD    Visit Date: 3/27/2023     Physician Orders: PT Eval and Treat   Medical Diagnosis from Referral:   R42 (ICD-10-CM) - Dysequilibrium   R29.898 (ICD-10-CM) - Weakness of hip, unspecified laterality   R26.9 (ICD-10-CM) - Gait disorder      Evaluation Date: 2/13/2023  Authorization Period Expiration: 2/8/24  Plan of Care Expiration: 4/17/23  Visit # / Visits authorized: 9/12     Time In: 1115  Time Out: 1200  Total Billable Time: 45 minutes    SUBJECTIVE     Pt reports: another fall and had to go to see MD for tests. Admittedly hit head/right shoulder  and bruised low back..  Patient was not compliant with Home exercises.  Response to previous treatment: .Had 2 knee buckle during session.  Functional change: none significant    Pain:  3/10  Location: low back..      OBJECTIVE       Treatment     Kasandra received the treatments listed below:      therapeutic exercises to develop strength, endurance, ROM, flexibility, posture, and core stabilization for 45 minutes including:  Nustep L3 all 4's 10'  Standing gastroc stretch  Airex standing balance  eyes open/close  1'/1'  Standing heel raises 20  Standing mini squats 20  Standing hip Abduction single leg stance unilateral 10/10  alternate 10/10  Step up on 3" box R/L 15/15  Seated hamstring curls manual resist 20/20    Patient Education and Home Exercises     Home Exercises Provided and Patient Education Provided     Education provided:   Use of rollator vs. Cane. Discussed knee bracing or sleeve.  Written Home Exercises Provided: yes. Exercises were reviewed and Kasandra was able to demonstrate them prior to the end of the session.  Kasandra demonstrated good  understanding of the education provided. See EMR under " Patient Instructions for exercises provided during therapy sessions    ASSESSMENT     Patient performed all exercises with no buckling.   Tolerated Rx well.  Kasandra Is progressing well towards her goals.   Pt prognosis is Good.     Pt will continue to benefit from skilled outpatient physical therapy to address the deficits listed in the problem list box on initial evaluation, provide pt/family education and to maximize pt's level of independence in the home and community environment.     Pt's spiritual, cultural and educational needs considered and pt agreeable to plan of care and goals.     Anticipated barriers to physical therapy: age     Goals: Goals:  Short Term Goals:  Patient will report compliance to home exercises given. (Ongoing)  Patient will tolerate cardio exercises 10' on Nustep or bike  (met)  Patient will perform standing exercises without upper extremity support   (met)     Long Term Goals:    Patient will improved sit to stand to 8 reps or better in 30 seconds to demonstrate improved function.  (Ongoing)  Patient will increase time up and go 20 seconds or less to demonstrate improved confidence with mobility. (Ongoing)  Patient will have increased balance on the Armando scale > 42/56   (ongoing)  Patient will improve self assessment limitation to 50% or less.  (Ongoing)    PLAN     Knee stabilization  Strength/endurance/balance training  Continue Plan of care.    Ayo Hernandez, PT

## 2023-03-30 ENCOUNTER — DOCUMENTATION ONLY (OUTPATIENT)
Dept: REHABILITATION | Facility: HOSPITAL | Age: 73
End: 2023-03-30
Payer: MEDICARE

## 2023-03-30 ENCOUNTER — CLINICAL SUPPORT (OUTPATIENT)
Dept: REHABILITATION | Facility: HOSPITAL | Age: 73
End: 2023-03-30
Payer: MEDICARE

## 2023-03-30 DIAGNOSIS — R42 DYSEQUILIBRIUM: Primary | ICD-10-CM

## 2023-03-30 PROCEDURE — 97110 THERAPEUTIC EXERCISES: CPT | Mod: PN,CQ

## 2023-03-30 NOTE — PROGRESS NOTES
"[facility]   Physical Therapy Treatment Note     Name: Kasandra Jones  Clinic Number: [id number]    Therapy Diagnosis:   Encounter Diagnosis   Name Primary?    Dysequilibrium Yes     Physician: Arturo De Luna MD    Visit Date: 3/30/2023     Physician Orders: PT Eval and Treat   Medical Diagnosis from Referral:   R42 (ICD-10-CM) - Dysequilibrium   R29.898 (ICD-10-CM) - Weakness of hip, unspecified laterality   R26.9 (ICD-10-CM) - Gait disorder      Evaluation Date: 2/13/2023  Authorization Period Expiration: 2/8/24  Plan of Care Expiration: 4/17/23  Visit # / Visits authorized: 10/12     Time In: 1025  Time Out: 1110  Total Billable Time: 45 minutes    SUBJECTIVE     Pt reports: "My problem today is I have 2 broken toes." Pt reports pain in left foot that she describes as moderate. She stated that she was walking well with her cane yesterday but her toes are bothering her too much today and she has to use the Rolator.   Patient was not compliant with Home exercises.  Response to previous treatment: no issues   Functional change: none significant    Pain:  3/10  Location: low back..      OBJECTIVE       Treatment     Kasandra received the treatments listed below:      therapeutic exercises to develop strength, endurance, ROM, flexibility, posture, and core stabilization for 45 minutes including:  Nustep L3 all 4's 10'  Standing gastroc stretch  Airex standing balance  eyes open/close  1'/1'  Standing heel raises 20  Standing mini squats 20  Standing hip Abduction single leg stance unilateral 2 x 10/10  alternate 10/10  Step up on 3" box R/L 15/15  Standing hamstring curls 15 x each   Seated hamstring curls manual resist 20/20 Not performed today     Patient Education and Home Exercises     Home Exercises Provided and Patient Education Provided     Education provided:   Use of rollator vs. Cane. Discussed knee bracing or sleeve.  Written Home Exercises Provided: yes. Exercises were " reviewed and Kasandra was able to demonstrate them prior to the end of the session.  Kasandra demonstrated good  understanding of the education provided. See EMR under Patient Instructions for exercises provided during therapy sessions    ASSESSMENT     Pt presented to therapy engaged and willing to participate with PT. She was somewhat anxious about increasing pain in her foot however she was able to complete all recommended therapeutic exercises without increase of discomfort. She did require seated rest breaks but this was more from fatigue than pain. Hamstring curls performed standing against gravity today. Pt is expected to continue progressing towards long term therapy goals.   Kasandra Is progressing well towards her goals.   Pt prognosis is Good.     Pt will continue to benefit from skilled outpatient physical therapy to address the deficits listed in the problem list box on initial evaluation, provide pt/family education and to maximize pt's level of independence in the home and community environment.     Pt's spiritual, cultural and educational needs considered and pt agreeable to plan of care and goals.     Anticipated barriers to physical therapy: age     Goals: Goals:  Short Term Goals:  Patient will report compliance to home exercises given. (Ongoing)  Patient will tolerate cardio exercises 10' on Nustep or bike  (met)  Patient will perform standing exercises without upper extremity support   (met)     Long Term Goals:    Patient will improved sit to stand to 8 reps or better in 30 seconds to demonstrate improved function.  (Ongoing)  Patient will increase time up and go 20 seconds or less to demonstrate improved confidence with mobility. (Ongoing)  Patient will have increased balance on the Armando scale > 42/56   (ongoing)  Patient will improve self assessment limitation to 50% or less.  (Ongoing)    PLAN     Knee stabilization  Strength/endurance/balance training  Continue Plan of care.    Jyoti Dozier, PTA

## 2023-04-03 ENCOUNTER — CLINICAL SUPPORT (OUTPATIENT)
Dept: REHABILITATION | Facility: HOSPITAL | Age: 73
End: 2023-04-03
Payer: MEDICARE

## 2023-04-03 DIAGNOSIS — R42 DYSEQUILIBRIUM: Primary | ICD-10-CM

## 2023-04-03 PROCEDURE — 97110 THERAPEUTIC EXERCISES: CPT | Mod: PN

## 2023-04-03 PROCEDURE — 97112 NEUROMUSCULAR REEDUCATION: CPT | Mod: PN

## 2023-04-03 NOTE — PROGRESS NOTES
"[facility]   Physical Therapy Treatment Note     Name: Kasandra Jones  Clinic Number: [id number]    Therapy Diagnosis:   Encounter Diagnosis   Name Primary?    Dysequilibrium Yes     Physician: Arturo De Luna MD    Visit Date: 4/3/2023     Physician Orders: PT Eval and Treat   Medical Diagnosis from Referral:   R42 (ICD-10-CM) - Dysequilibrium   R29.898 (ICD-10-CM) - Weakness of hip, unspecified laterality   R26.9 (ICD-10-CM) - Gait disorder      Evaluation Date: 2/13/2023  Authorization Period Expiration: 2/8/24  Plan of Care Expiration: 4/17/23  Visit # / Visits authorized: 11/24     Time In: 1030  Time Out: 1115  Total Billable Time: 45 minutes    SUBJECTIVE     Pt reports:: "I'm still having pain in my groin, but that's been there." "My  ordered me another walker, one to fit in the closet because this was too big."  Patient was not compliant with Home exercises.  Response to previous treatment: no issues   Functional change: none significant    Pain:  3/10  Location: low back..      OBJECTIVE       Treatment     Kasandra received the treatments listed below:      therapeutic exercises to develop strength, endurance, ROM, flexibility, posture, and core stabilization for 30 minutes including:  Nustep L4 all 4's 10'  Standing heel raises 20 #1lbs  Standing hip flexion x20 #1lbs  Standing hip Abduction single leg stance alternate  2x10  Standing hamstring curls 15 x each #1lbs    neuromuscular re-education activities to improve: Balance, Kinesthetic, Proprioception, and Posture for 15 minutes. The following activities were included:  Airex standing balance  eyes open/close  1'/1'  Sit to stand x20 with rollator , vc's for hand placement  Ambulation x360ft with rollator, 2 instances of right knee buckling however stabilized with rollator.      Patient Education and Home Exercises     Home Exercises Provided and Patient Education Provided     Education provided:   Use of rollator " vs. Cane. Discussed knee bracing or sleeve.  Written Home Exercises Provided: yes. Exercises were reviewed and Kasandra was able to demonstrate them prior to the end of the session.  Kasandra demonstrated good  understanding of the education provided. See EMR under Patient Instructions for exercises provided during therapy sessions    ASSESSMENT     Pt presented to therapy engaged and willing to participate with PT. She does fatigue quickly, required verbal cues to take appropriate rest breaks to ensure safety. Noted to have 3-4 instances of right knee buckling with transfers and ambulation, again required verbal cues to slow pace to maintain safety. Pt is expected to continue progressing towards long term therapy goals.   Kasandra Is progressing well towards her goals.   Pt prognosis is Good.     Pt will continue to benefit from skilled outpatient physical therapy to address the deficits listed in the problem list box on initial evaluation, provide pt/family education and to maximize pt's level of independence in the home and community environment.     Pt's spiritual, cultural and educational needs considered and pt agreeable to plan of care and goals.     Anticipated barriers to physical therapy: age     Goals: Goals:  Short Term Goals:  Patient will report compliance to home exercises given. (Ongoing)  Patient will tolerate cardio exercises 10' on Nustep or bike  (met)  Patient will perform standing exercises without upper extremity support   (met)     Long Term Goals:    Patient will improved sit to stand to 8 reps or better in 30 seconds to demonstrate improved function.  (Ongoing)  Patient will increase time up and go 20 seconds or less to demonstrate improved confidence with mobility. (Ongoing)  Patient will have increased balance on the Armando scale > 42/56   (ongoing)  Patient will improve self assessment limitation to 50% or less.  (Ongoing)    PLAN     Knee stabilization  Strength/endurance/balance  training  Continue Plan of care.    Kimberly Lopez PT, DPT, CLT  4/3/2023

## 2023-04-07 ENCOUNTER — LAB VISIT (OUTPATIENT)
Dept: LAB | Facility: HOSPITAL | Age: 73
End: 2023-04-07
Attending: NURSE PRACTITIONER
Payer: MEDICARE

## 2023-04-07 DIAGNOSIS — E61.0 COPPER DEFICIENCY: ICD-10-CM

## 2023-04-07 PROCEDURE — 82525 ASSAY OF COPPER: CPT | Performed by: NURSE PRACTITIONER

## 2023-04-07 PROCEDURE — 82390 ASSAY OF CERULOPLASMIN: CPT | Performed by: NURSE PRACTITIONER

## 2023-04-07 PROCEDURE — 36415 COLL VENOUS BLD VENIPUNCTURE: CPT | Performed by: NURSE PRACTITIONER

## 2023-04-08 LAB — CERULOPLASMIN SERPL-MCNC: 19.5 MG/DL (ref 19–39)

## 2023-04-10 ENCOUNTER — CLINICAL SUPPORT (OUTPATIENT)
Dept: REHABILITATION | Facility: HOSPITAL | Age: 73
End: 2023-04-10
Payer: MEDICARE

## 2023-04-10 DIAGNOSIS — R42 DYSEQUILIBRIUM: Primary | ICD-10-CM

## 2023-04-10 DIAGNOSIS — R26.9 GAIT DISORDER: ICD-10-CM

## 2023-04-10 DIAGNOSIS — R29.898 WEAKNESS OF HIP, UNSPECIFIED LATERALITY: ICD-10-CM

## 2023-04-10 PROCEDURE — 97110 THERAPEUTIC EXERCISES: CPT | Mod: PN

## 2023-04-10 PROCEDURE — 97112 NEUROMUSCULAR REEDUCATION: CPT | Mod: PN

## 2023-04-10 NOTE — PROGRESS NOTES
"[facility]   Physical Therapy Treatment Note     Name: Kasandra Jones  Clinic Number: [id number]    Therapy Diagnosis:   Encounter Diagnoses   Name Primary?    Dysequilibrium Yes    Gait disorder     Weakness of hip, unspecified laterality      Physician: Arturo De Luna MD    Visit Date: 4/10/2023     Physician Orders: PT Eval and Treat   Medical Diagnosis from Referral:   R42 (ICD-10-CM) - Dysequilibrium   R29.898 (ICD-10-CM) - Weakness of hip, unspecified laterality   R26.9 (ICD-10-CM) - Gait disorder      Evaluation Date: 2/13/2023  Authorization Period Expiration: 2/8/24  Plan of Care Expiration: 4/17/23  Visit # / Visits authorized: 12/24     Time In: 1030  Time Out: 1115  Total Billable Time: 45 minutes    SUBJECTIVE     Pt reports:: feeling her low back but no pain  Patient was compliant with Home exercises.  Response to previous treatment: no issues   Functional change: none significant    Pain:  1/10  Location: low back..      OBJECTIVE     On rollator walker for ambulation.  Treatment     Kasandra received the treatments listed below:      therapeutic exercises to develop strength, endurance, ROM, flexibility, posture, and core stabilization for 30 minutes including:  Nustep L4 all 4's 10'  Standing heel raises 20   Standing hip flexion x20   Standing hip Abduction single leg stance alternate  2x10  Step up 3" box stool 15  RLE    neuromuscular re-education activities to improve: Balance, Kinesthetic, Proprioception, and Posture for 15 minutes. The following activities were included:  Airex standing balance  eyes open/close  1'/1'  Sit to stand x20   High marching jackson  20/20.      Patient Education and Home Exercises     Home Exercises Provided and Patient Education Provided     Education provided:   Use of rollator vs. Cane. Discussed knee bracing or sleeve.  Written Home Exercises Provided: yes. Exercises were reviewed and Kasandra was able to demonstrate them prior to the " end of the session.  Kasandra demonstrated good  understanding of the education provided. See EMR under Patient Instructions for exercises provided during therapy sessions    ASSESSMENT     Pt is anxious from random right knee buckle. No sign of instability during session.  Tolerated Rx.   Kasandra Is progressing well towards her goals.   Pt prognosis is Good.     Pt will continue to benefit from skilled outpatient physical therapy to address the deficits listed in the problem list box on initial evaluation, provide pt/family education and to maximize pt's level of independence in the home and community environment.     Pt's spiritual, cultural and educational needs considered and pt agreeable to plan of care and goals.     Anticipated barriers to physical therapy: age     Goals: Goals:  Short Term Goals:  Patient will report compliance to home exercises given. (Ongoing)  Patient will tolerate cardio exercises 10' on Nustep or bike  (met)  Patient will perform standing exercises without upper extremity support   (met)     Long Term Goals:    Patient will improved sit to stand to 8 reps or better in 30 seconds to demonstrate improved function.  (To be assessed)  Patient will increase time up and go 20 seconds or less to demonstrate improved confidence with mobility. (To be assessed)  Patient will have increased balance on the Armando scale > 42/56   (to be assessed)  Patient will improve self assessment limitation to 50% or less.  (To be assessed)    PLAN     Reassessment  Update Plan of care.    Ayo Hernandez, PT

## 2023-04-11 LAB — COPPER SERPL-MCNC: 87 UG/DL (ref 80–158)

## 2023-04-13 ENCOUNTER — CLINICAL SUPPORT (OUTPATIENT)
Dept: REHABILITATION | Facility: HOSPITAL | Age: 73
End: 2023-04-13
Payer: MEDICARE

## 2023-04-13 DIAGNOSIS — R42 DYSEQUILIBRIUM: Primary | ICD-10-CM

## 2023-04-13 PROCEDURE — 97110 THERAPEUTIC EXERCISES: CPT | Mod: PN

## 2023-04-13 NOTE — PROGRESS NOTES
[facility]  Physical Therapy Discharge Note    Name: Kasandra Jones  Clinic Number: [id number]    Therapy Diagnosis:   Encounter Diagnosis   Name Primary?    Dysequilibrium Yes     Physician: Arturo De Luna MD    Physician Orders: Eval and treat  Medical Diagnosis: Dysequilibrium  Evaluation Date: 2/15/23      Date of Last visit: 4/13/23  Total Visits Received: 13    Today's visit:  Time IN:  1030  Time Out: 1115  Total time in minutes: 45    Treatment: Thera Ex 45  Includes:  Gastroc stretch  Sit to stand  Timed Up and Go  Balancing Exercises  Hip flexion with theraband  Marching jackson in place.  Reviewed Home Exercises.      ASSESSMENT      Patient seen x 7 weeks of therapy. Had made progress but decreased proprioception and random knee weakness from positional inhibition makes her moderate risk for fall especially without the bilateral support of the walker.  Ms. Slaughter preferred to be discharge.  30 second sit to stand: 9  TUG   1. 20.13 sec     2. 20.22 sec       3. 18.09 sec         MEAN:   19.48 sec  ALMAZAN Assessment   43/56  1. Sitting to Standing   3 - able to stand independely using hands  2. Standing Unsupported   4 - able to stand safely 2 minutes without hold  3. Sitting Unsupported   4 - able to sit safely and securely 2 minutes  4. Standing to Sitting   4 - sits safely with minimal use of hands  5. Pivot Transfer   4 - able to trnasfer safely with minor use of hands  6. Standing with Eyes Closed   1 - unable to keep eyes closed 3 seconds but stands safely  7. Standing with Feet Together   4 - able to place feet together independently and stand 1 minute safely  8. Reaching Forward with Outstretched Arm   3 - can reach forward 12 cm/5 inches safely  9. Retrieving Object from Floor   4 - able to  slipper safely and easily  10. Turning to Look Behind   4 - looks behind from both sides and weights shifts well  11. Turning 360 Degrees   3 - able to trun 360 safely one  side only in 4 seconds or less  12. Placing Alternate Foot on Step   2 - able to complete 4 steps without aid with supervision  13. Standing with One Foot in Front   2 - able to take small step indenpendently and hold 30 seconds  14. Standing on One Foot   1 - tries to lift leg and unable to hold 3 seconds but remains standing independently    Discharge reason: Patient has reached the maximum rehab potential for the present time and Patient requested discharge    Discharge FOTO Score: 46/100    Goals: Short Term Goals:   Patient will report compliance to home exercises given. (Met)  Patient will tolerate cardio exercises 10' on Nustep or bike   (met)  Patient will perform standing exercises without upper extremity support   (partially met)       Long Term Goals:    Patient will improved sit to stand to 8 reps or better in 30 seconds to demonstrate improved function.  (Met 9)  Patient will increase time up and go 20 seconds or less to demonstrate improved confidence with mobility  (partially met  ).  Patient will have increased balance on the Armando scale > 42/56    (met)  Patient will improve self assessment limitation to 50% or less. (Unmet 46/100)    PLAN   This patient is discharged from Physical Therapy      Ayo Hernandez, PT

## 2023-04-18 ENCOUNTER — OFFICE VISIT (OUTPATIENT)
Dept: NEUROLOGY | Facility: CLINIC | Age: 73
End: 2023-04-18
Payer: MEDICARE

## 2023-04-18 VITALS
BODY MASS INDEX: 20.05 KG/M2 | SYSTOLIC BLOOD PRESSURE: 146 MMHG | RESPIRATION RATE: 16 BRPM | HEART RATE: 73 BPM | WEIGHT: 124.75 LBS | DIASTOLIC BLOOD PRESSURE: 78 MMHG | HEIGHT: 66 IN

## 2023-04-18 DIAGNOSIS — G95.89 SPINAL CORD MASS: ICD-10-CM

## 2023-04-18 DIAGNOSIS — R26.9 GAIT DISORDER: ICD-10-CM

## 2023-04-18 DIAGNOSIS — R42 DYSEQUILIBRIUM: ICD-10-CM

## 2023-04-18 DIAGNOSIS — I95.1 ORTHOSTASIS: ICD-10-CM

## 2023-04-18 DIAGNOSIS — E61.0 COPPER DEFICIENCY: Primary | ICD-10-CM

## 2023-04-18 DIAGNOSIS — R41.89 COGNITIVE IMPAIRMENT: ICD-10-CM

## 2023-04-18 DIAGNOSIS — I48.0 PAROXYSMAL ATRIAL FIBRILLATION: ICD-10-CM

## 2023-04-18 PROBLEM — R29.898 WEAKNESS OF HIP: Status: RESOLVED | Noted: 2023-02-13 | Resolved: 2023-04-18

## 2023-04-18 PROCEDURE — 99999 PR PBB SHADOW E&M-EST. PATIENT-LVL V: ICD-10-PCS | Mod: PBBFAC,,, | Performed by: NURSE PRACTITIONER

## 2023-04-18 PROCEDURE — 99214 PR OFFICE/OUTPT VISIT, EST, LEVL IV, 30-39 MIN: ICD-10-PCS | Mod: S$PBB,,, | Performed by: NURSE PRACTITIONER

## 2023-04-18 PROCEDURE — 99214 OFFICE O/P EST MOD 30 MIN: CPT | Mod: S$PBB,,, | Performed by: NURSE PRACTITIONER

## 2023-04-18 PROCEDURE — 99215 OFFICE O/P EST HI 40 MIN: CPT | Mod: PBBFAC,PO | Performed by: NURSE PRACTITIONER

## 2023-04-18 PROCEDURE — 99999 PR PBB SHADOW E&M-EST. PATIENT-LVL V: CPT | Mod: PBBFAC,,, | Performed by: NURSE PRACTITIONER

## 2023-04-18 NOTE — ASSESSMENT & PLAN NOTE
Pt indicates that she would like to transition care to local NSGY dept -- due for visit in Sept, will assist when time comes

## 2023-04-18 NOTE — PROGRESS NOTES
NEUROLOGY  Outpatient Follow Up Visit     [facility]  [address], [place] [address]  [phone] (office) / [phone] (fax)    Patient Name:  Kasandra Jones  :  1950  MR #:  [id number]  Acct #:  [id number]    Date of  Visit: 2023    Other Physicians:  Sanjiv Fodr III, MD (Primary Care Physician)      CHIEF COMPLAINT: Dizziness (Follow up)      Interval history:  23:  Kasandra Jones is a 72 y.o. R-handed female seen in f/u for dizziness     Medical history is significant for anxiety, bipolar 1 disorder, HLD, pAfib, B12 deficiency, gastric bypass surgery, vitamin D deficiency    Serologies were significant for copper and B6 deficiencies.     Currently taking B6 100 mg daily and copper 2 mg daily (completed step wise titration as directed). Repeat copper levels done recently showed improvement.     Last fall was in mid March. She had a CT of the head that was acutely negative.     She reports significant improvement in dizziness and imbalance. Overall, states that she is feeling much better. She is walking better, states that she isn't as reliant on her cane. She still uses it to prevent falls.     Recently discharged from outpatient PT. Notes indicate that she made progress, but impaired proprioception and knee weakness limited potential for further rehab. Recommended continued use of walker.     Orthostatic at last visit from sit to stand. Continues on Multaq due to history of Afib. Remains off all other BP agents. Denies any further positional symptoms. Has been able to increase PO intake some. Able to tolerate small portions at each meal time now. Feels that she stays well hydrated.     Was told by cardiology to take meclizine BID scheduled. Makes her tired. She wants to stop it.     No cognitive change since last visit. Remains independent with ADLs and most iADLs. Has been able to cook more recently. Does have some trouble balancing checkbook, so   "handles this and she checks behind him. Able to take her Rx without help. Doesn't drive due to chronic issues / dizziness, doesn't really like to drive. Sleeping better since last visit also.     HISTORY OF PRESENT ILLNESS 2/16/23:  Kasandra Jones is a 72 y.o. R-handed female seen in consultation for dizziness per No ref. provider found    Medical history is significant for anxiety, bipolar 1 disorder, HLD, pAfib, B12 deficiency, gastric bypass surgery, vitamin D deficiency    She notes dizziness and imbalance. It started approximately 1 year ago and is getting worse.   She feels "woozy" just sitting in a chair, but symptoms are much worse with standing or ambulating. She feels like she is swaying when she is standing upright. She often times falls straight backwards and  will have to catch her. She doesn't feel like she is going to pass out and hasn't had any LOC. No sensation of room spinning. It is worse with any change in direction or height, like stepping off a curb. When she first wakes up, she seems to be her strongest, but worsens as the day progresses.     She had a fall earlier this week. She was walking in her room and just fell backwards. Prior to this, had not fallen within the last 6 months but has had several near falls.     She appears to be shuffling her feet more per her . She uses a cane.     She denies numbness or tingling in the LE.     She feels tired and weak all the time but denies any focal weakness in the LE.     Denies HA, vision change, or speech change. She has a lot of nausea, but no vomiting. Has chronic hearing loss and has hearing aids. No change from her baseline.     She started PT Monday to help her weakness and imbalance.     She has a mass on her spine that has been present for at least a year per report. She is followed by Dr. Jass Harris for this.     She initially saw her cardiologist and her PCP for her concerns. She was then referred to ENT, who referred her " to neurology.     She used to have high BP, but then had issues with BP dropping and was taken off of all Rx.     She takes Depakote 1000 mg daily for BPD. She takes Xanax 0.5 mg as needed. Her current Rx has lasted her for over a year, though. There hasn't been any change in her medications to correlate with her symptoms.     She takes B12 orally every day.     She has tremor in the R hand when she is using it to eat or write. None at rest.      finds that she is easily confused. She has ST memory loss. Onset was 1-2 years ago and is progressively worse. She is independent with ADLs and most iADLs. She has weakness, fatigue and SOB that limit her ability to stand or exert too long. She takes her medications without difficulty. She has executive dysfunction. No language changes.     She has never slept well at night. She makes up for it during the day with naps.     Allergies:  Review of patient's allergies indicates:   Allergen Reactions    Niacin preparations     Penicillins        Current Medications:  Current Outpatient Medications   Medication Sig Dispense Refill    ALPRAZolam (XANAX) 0.5 MG tablet Take 1 tablet (0.5 mg total) by mouth daily as needed for Anxiety. (Patient taking differently: Take 0.5 mg by mouth daily as needed for Anxiety. PRN) 30 tablet 0    buPROPion (WELLBUTRIN XL) 300 MG 24 hr tablet Take 1 tablet (300 mg total) by mouth once daily. 90 tablet 1    cetirizine (ZYRTEC) 10 MG tablet Take 10 mg by mouth once daily.      cholecalciferol, vitamin D3, 125 mcg (5,000 unit) capsule       copper gluconate 2 mg Tab Take by mouth.      cyanocobalamin-cobamamide (B12) 5,000-100 mcg Lozg       diphenoxylate-atropine 2.5-0.025 mg (LOMOTIL) 2.5-0.025 mg per tablet Take 1 tablet by mouth 4 (four) times daily as needed for Diarrhea. 30 tablet 1    divalproex 500 MG Tb24 Take 2 tablets (1,000 mg total) by mouth once daily. 180 tablet 1    ELIQUIS 5 mg Tab Take 1 tablet (5 mg total) by mouth 2 (two)  "times daily. 180 tablet 1    ferrous sulfate 325 (65 FE) MG EC tablet       levothyroxine (SYNTHROID) 100 MCG tablet Take 1 tablet (100 mcg total) by mouth once daily. 90 tablet 1    magnesium oxide (MAG-OX) 400 mg (241.3 mg magnesium) tablet       MULTAQ 400 mg Tab Take 1 tablet (400 mg total) by mouth 2 (two) times daily. 180 tablet 1    potassium chloride SA (K-DUR,KLOR-CON) 20 MEQ tablet Take 1 tablet (20 mEq total) by mouth once daily. 90 tablet 1    RABEprazole (ACIPHEX) 20 mg tablet Take 1 tablet (20 mg total) by mouth 2 (two) times daily. 180 tablet 1    traZODone (DESYREL) 100 MG tablet TAKE TWO TABLETS PO NIGHTLY 180 tablet 1    vit C,Q-Bu-rvozz-lutein-zeaxan (PRESERVISION AREDS 2) 250-90-40-1 mg Cap        No current facility-administered medications for this visit.       Past Medical History:  Past Medical History:   Diagnosis Date    Cataract        Past Surgical History:  Past Surgical History:   Procedure Laterality Date    EYE SURGERY Bilateral 2020 August    cataract removal    HYSTERECTOMY      TOTAL REDUCTION MAMMOPLASTY  2002       Family History:  family history includes Breast cancer in her maternal aunt.    Social History:   reports that she has never smoked. She has never used smokeless tobacco. She reports that she does not drink alcohol and does not use drugs.      REVIEW OF SYSTEMS:  As per HPI    PHYSICAL EXAM:  BP (!) 146/78 (BP Location: Left arm, Patient Position: Sitting, BP Method: Medium (Automatic))   Pulse 73   Resp 16   Ht 5' 6" (1.676 m)   Wt 56.6 kg (124 lb 12.5 oz)   LMP  (LMP Unknown)   BMI 20.14 kg/m²     General: Well groomed. No acute distress.  Pulmonary: Normal effort and rate.   Musculoskeletal: No obvious joint deformities, moves all extremities well.  Extremities: No clubbing, cyanosis or edema.     Neurological exam:  Mental status: Awake and alert.  Oriented to person, place, time and situation. Recent memory impaired on    Speech/Language: Fluent and " appropriate. No dysarthria or aphasia on conversation. Able to follow complex commands.   Cranial nerves (II-XII): Visual fields full. Extraocular movements intact, no ptosis, no nystagmus. Face symmetric. Hearing grossly intact. Palate deferred. Shoulder shrug normal bilaterally. Normal tongue protrusion.   Motor: 5 out of 5 strength throughout the upper and lower extremities bilaterally. Normal bulk and tone. No abnormal movements or drift.   Sensation: Decreased to pinprick to the ankles bilaterally. Intact to vibration. Abnormal proprioception.   DTR: 2+ at the biceps, mute at the knees.   Coordination: Finger-nose-finger testing intact bilaterally. JAMILA normal bilaterally. Mild BUE postural and intent tremor. No resting tremor. Negative Romberg (sways a bit with eyes closed, but able to maintain posture today).   Gait: Stands unassisted on second attempt. Base is wide. No significant shuffling.     Questions: Patient Score Max Score   What is the year? Season? Date? Day? Month? 5 5   Where are we now? State? County? Town/City? Hospital? Floor? 4 5   Name 3 unrelated objects, then ask patient to repeat them. Response is used for scoring. Repeat until patient learns them if possible.  3 3   Serial 7s (93, 86, 79, 72, 65) OR spell WORLD backwards 5 5   Recall of previous 3 objects 1 3   Naming of 2 simple objects 2 2   Repeat the phrase: 'No ifs, ands, or buts. 1 1   Take the paper in your right hand, fold it in half, and put it on the floor. 3 3   Please read this and do what it says. (Written instruction to close eyes).  1 1   Make up and write a sentence about anything. (Must contain noun and verb) 1 1   Please copy this picture. (Must contain all 10 angles with 2 intersecting) 1 1   TOTAL: 27 30   Folstein et al., 1975  [email or web address]      DIAGNOSTIC DATA:  I have personally reviewed provider notes, labs and imaging made available to me today.      Reported onset of weakness, lightheadedness and imbalance in 11/2021 to cardiology. 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 Harris. She reported to Dr. Harris that her imbalance had been ongoing for at lest a couple of years. After further imaging, the area in question was deemed to be a nodule of uncertain behavior rather than an aneurysm. She underwent a metastatic evaluation (Neuroaxial MRIs, CT CAP) which was negative.     In the midst of this, she was also found to be orthostatic and her BP medications were discontinued. She also had a Holter study to evaluate her symptoms, which didn't show any significant arrhythmia.     Imaging:  MRI Brain w wo 1/4/23:      IMPRESSION:  1. Stable appearance of the examination with evidence of a fairly small surface lesions along the ventral edge of the cervical cord along the leptomeningeal surface of the proximal cervical cord at and slightly below the craniocervical junction without significant interval change upon comparison may reflect a small primary cord lesion or a small senescent meningioma. No appreciable change upon comparison.  2. Chronic cerebral atrophy and superimposed chronic deep white matter ischemia and small foci of elevated T2 signal reflective of small chronic microvascular infarcts or small areas of gliosis. Moderate  2. No evidence of pathologic enhancement postcontrast elsewhere.    MRI C spine w wo 7/19/22:    IMPRESSION:  1.  Multilevel discogenic and facet degenerative changes of the cervical spine as described.  2.  3 x 3 x 3 mm enhancing mass along the posterior margin of the spinal cord at the level the foramen magnum. Findings could reflect a small meningioma, or other soft tissue mass.    MRI brain w wo 1/2022:  FINDINGS:  MRI of the brain demonstrates a 5 mm exophytic enhancing nodule along the posterior aspect of the medulla at the cervicomedullary junction.  This  produces edema in the posterior aspect of medulla as seen on FLAIR and T2 imaging.  It does not appear associated with an artery to suggest aneurysm.  There is susceptibility on the SWI images indicating that it has internal hemorrhage.  No other enhancing nodule is seen in the brain.  No other edema is seen.     Small 5 mm cystic area is seen in the right paramedian suzette, not identified on FLAIR imaging.  This may represent an incidental parenchymal cyst, less likely a prior lacunar infarct.     Skull base structures show no acute lesions.  Bilateral surgical changes consistent cataract surgery.     3D time-of-flight MRA of the brain was performed.  No aneurysm, AVM, or stenosis is seen.  The lesion at the craniocervical junction was not included on noncontrast MRA, so contrast enhanced MRA was performed.  This shows the enhancing nodule, because it was performed following contrast.  There is no artery seen leading to the enhancing mass, however.     Impression:  5 mm enhancing hemorrhagic nodule at the craniocervical junction posteriorly.  This appears to be parenchymal and exophytic.  Differential considerations include primary or secondary to mass lesions such as hemorrhagic metastasis or hemangioblastoma.  Further investigation recommended.     No other acute findings within the brain.     No aneurysm, stenosis or AVM.     MRA brain 1/2022:  Impression:  5 mm enhancing hemorrhagic nodule at the craniocervical junction posteriorly.  This appears to be parenchymal and exophytic.  Differential considerations include primary or secondary to mass lesions such as hemorrhagic metastasis or hemangioblastoma.  Further investigation recommended.     No other acute findings within the brain.    CTA head and neck 12/2021:  IMPRESSION:  1. A 4 mm enhancing mass along the dorsal aspect of the cervicomedullary junction, suspicious for aneurysm. Neurosurgical referral is recommended.  2. Mild atheromatous plaque of the carotid  arteries, with no carotid or vertebral arterial stenosis.  3. No significant abnormality of the intracranial arterial vasculature.     Cardiac:  EKG 12/2022: NSR    24 hr Holter 10/2022:  The diary was properly completed. There were occasional hookup related artifacts. Overall, the study was of good quality. The tape was adequate (1 days , 24 hours, 0 minutes). There was an episode of fast heartbeat reported. The corresponding rhythm strips revealed the following: During the event (finished eating), the rhythm was sinus rhythm at 87 bpm with PVCs. There was an episode of heart beating hard reported. The corresponding rhythm strips revealed the following: During the event (sitting), the rhythm was sinus rhythm at 77 bpm with PVCs. There was an episode of flutter, feel shaky reported. The corresponding rhythm strips revealed the following: During the event (at computer), the rhythm was sinus rhythm at 72 bpm with PVCs. There was an episode of dizzy, almost fell reported. The corresponding rhythm strips revealed the following: During the event (sitting up), the rhythm was sinus rhythm at 87 bpm with PACs & PVCs. There was an episode of heart racing, sob reported. The corresponding rhythm strips revealed the following: During the event (putting food away), the rhythm was sinus rhythm at 75 bpm with PVCs. There was an episode of heart racing reported. The corresponding rhythm strips revealed the following: During the event (cooking), the rhythm was sinus rhythm at 90 bpm with PVCs.  Predominant Rhythm Sinus rhythm with heart rates varying between 47 and 107 BPM with an average of 70BPM. Maximum heart rate recorded at: 09:34 CDT on day 1. Minimum heart rate recorded at 07:33 CDT on day 1.  Ventricular Arrhythmias There were frequent mostly monomorphic PVCs totalling 3866 and averaging 80.54 per hour. There were 1 polymorphic couplets.  Supraventricular Arrhythmias There were very rare monomorphic PACs totalling 53 and  averaging 1.1 per hour, 19 supraventricular bigeminy episodes and 45 supraventricular trigeminy episodes.  There was 1 run of non-sustained of PACs and lasting to 3 beats.       Labs:  Lab Results   Component Value Date    WBC 5.50 03/23/2023    HGB 13.5 03/23/2023    HCT 42.7 03/23/2023     03/23/2023     (H) 03/23/2023    RDW 14.3 03/23/2023     Lab Results   Component Value Date     03/23/2023    K 4.1 03/23/2023     03/23/2023    CO2 32 (H) 03/23/2023    BUN 17 03/23/2023    CREATININE 0.7 03/23/2023     03/23/2023    CALCIUM 9.1 03/23/2023    MG 2.0 03/23/2023     Lab Results   Component Value Date    PROT 6.3 03/23/2023    ALBUMIN 3.4 (L) 03/23/2023    BILITOT 0.8 03/23/2023    AST 26 03/23/2023    ALKPHOS 95 03/23/2023    ALT 31 03/23/2023     No results found for: INR, PROTIME, PTT  Lab Results   Component Value Date    CHOL 170 09/29/2022    HDL 74 09/29/2022    LDLCALC 82.4 09/29/2022    TRIG 68 09/29/2022    CHOLHDL 43.5 09/29/2022     Lab Results   Component Value Date    LABA1C 5.1 04/02/2018      Lab Results   Component Value Date    OMDSSUYQ94 >1500 (H) 10/03/2022     No results found for: FOLATE  Lab Results   Component Value Date    TSH 3.510 01/03/2023     Component      Latest Ref Rng & Units 6/27/2022   Valproic Acid Lvl      50.0 - 100.0 ug/mL 40.0 (L)     Component      Latest Ref Rng & Units 4/7/2023 3/2/2023 2/16/2023   Protein, Serum      6.0 - 8.4 g/dL   5.7 (L)   Albumin grams/dl      3.35 - 5.55 g/dL   3.34 (L)   Alpha-1 grams/dl      0.17 - 0.41 g/dL   0.26   Alpha-2      0.43 - 0.99 g/dL   0.44   Beta      0.50 - 1.10 g/dL   0.74   Gamma      0.67 - 1.58 g/dL   0.92   Vitamin E      9.0 - 29.0 mg/L  10.2    Vitamin E (Gamma-Tocopherol)      0.5 - 4.9 mg/L  0.7    Valproic Acid Lvl      50.0 - 100.0 ug/mL   30.6 (L)   Methlymalonic Acid      <0.40 umol/L   0.11   CHARITO Screen      Negative <1:80   Negative <1:80   Immunofix Interp.         SEE COMMENT    Thiamine      38 - 122 ug/L   66   Vitamin B6      5 - 50 ug/L   4 (L)   Copper      80 - 158 ug/dL 87  283 (L)   CERULOPLASMIN      19.0 - 39.0 mg/dL 19.5  12.0 (L)   CPK      20 - 180 U/L   38   Sed Rate      0 - 36 mm/Hr   <2   CRP      0.0 - 8.2 mg/L   1.0   Pathologist Interpretation GIBSON         REVIEWED   Pathologist Interpretation SPE         REVIEWED   Zinc, Serum-ALT      44 - 115 ug/dL  83        ASSESSMENT & PLAN:  Kasandra Jones is a 72 y.o. R-handed female seen in f/u for dizziness and imbalance.     Problem List Items Addressed This Visit          Neuro    Cognitive impairment    Overview     MMSE  27/30 April 2023    MRI brain Jan 2023 shows generalized atrophy and mild to moderate microangiopathy            Current Assessment & Plan     Discussed normal aging / MCI / dementia with pt and family -- currently does not meet criteria for dementia  Could have MCI, but note that clinical picture is clouded by other issues, including copper deficiency, poor sleep, mood disorder, hypoperfusion     Plan:  Continue copper supplement   Discussed NP testing, but she elects to defer for now  Will monitor for any clinical progression over time   Discussed vascular RF and importance of continued efforts to maximize vascular health  Reinforced importance of adequate sleep -- this has improved recently            Spinal cord mass    Overview     Noted in the posterior medulla at the cervicomedullary junction 12/2021  Stable on imaging as of 1/2023  Metastatic evaluation (neuroaxial MRI, CT CAP) negative   Followed by Dr. Harris in NSGY -- presumably benign            Current Assessment & Plan     Pt indicates that she would like to transition care to local NSGY dept -- due for visit in Sept, will assist when time comes               Cardiac/Vascular    Paroxysmal atrial fibrillation    Current Assessment & Plan     On Eliquis for cardioembolic stroke prevention           Orthostasis    Current Assessment & Plan      Continue efforts to maintain proper hydration               Endocrine    Copper deficiency - Primary    Current Assessment & Plan     Likely etiology of impaired proprioception / peripheral neuropathy leading to gait disorder, dysequilibrium, proprioceptive changes   Hx of gastric bypass  Continue same supplementation  Recent repeat levels showing improvement  Symptomatically improving               Other    Dysequilibrium    Current Assessment & Plan     Multifactorial as noted -- orthostasis, copper/B6 deficiencies likely causing polyneuropathy, potential mass effect associated with cervicomedullary mass (although stable over time on imaging)    S/p PT with noted improvement. Continue fall precautions, use of AD given long term DOAC making her high risk for falls   Continue copper / B6 supps  Stop meclizine            Gait disorder         Follow up: 6 months     I spent a total of 35 minutes on the day of the visit.    This includes face to face time with the patient, as well as non-face to face time preparing for and completing the visit (review of prior diagnostic testing and clinical notes, obtaining or reviewing history, documenting clinical information in the EMR, independently interpreting and communicating results to the patient/family and coordinating ongoing care).     Case discussed / imaging reviewed with Dr. Shin       I appreciate the opportunity to participate in the care of this patient. Please feel free to contact me with any concerns or questions.       Anu Borden, ACNPC-AG  [facility]  [address] [place] [address]

## 2023-04-18 NOTE — ASSESSMENT & PLAN NOTE
Discussed normal aging / MCI / dementia with pt and family -- currently does not meet criteria for dementia  Could have MCI, but note that clinical picture is clouded by other issues, including copper deficiency, poor sleep, mood disorder, hypoperfusion     Plan:  Continue copper supplement   Discussed NP testing, but she elects to defer for now  Will monitor for any clinical progression over time   Discussed vascular RF and importance of continued efforts to maximize vascular health  Reinforced importance of adequate sleep -- this has improved recently

## 2023-04-18 NOTE — ASSESSMENT & PLAN NOTE
Likely etiology of impaired proprioception / peripheral neuropathy leading to gait disorder, dysequilibrium, proprioceptive changes   Hx of gastric bypass  Continue same supplementation  Recent repeat levels showing improvement  Symptomatically improving

## 2023-04-18 NOTE — ASSESSMENT & PLAN NOTE
Multifactorial as noted -- orthostasis, copper/B6 deficiencies likely causing polyneuropathy, potential mass effect associated with cervicomedullary mass (although stable over time on imaging)    S/p PT with noted improvement. Continue fall precautions, use of AD given long term DOAC making her high risk for falls   Continue copper / B6 supps  Stop meclizine

## 2023-04-18 NOTE — PATIENT INSTRUCTIONS
Continue the copper and B6 supplements    You can stop the meclizine!    Continue to use the cane to prevent falls.     Take your time when going from being seated to standing up to prevent drops in your blood pressure. Do your best to stay well hydrated throughout the day     In September, can assist in getting you in to see a local neurosurgeon as discussed.     Follow up with me in 6 months or sooner if needed     While memory loss may not be reversible in many cases, we do know that there are several steps that you can take to prevent further memory loss:  Diet:  A Mediterranean style diet has been shown to be beneficial. This diet typically includes fresh fruits/vegetables, whole grains, fish and poultry. Foods, such as red meat, dairy and processed foods are avoided.   Research indicates certain nutrients may aid in brain function, such as B vitamins (especially B6, B12, and folic acid), antioxidants (such as vitamins C and E, and beta carotene), and Omega-3 fatty acids.  Minimize or eliminate the use of alcohol     Medications:  Discuss any prescription or over the counter medications you might be taking with your doctor to avoid those that can cause sedation or worsen cognitive function, such as sleep aids, benzodiazepines, and antihistamines.     Socialization and Exercise:  30-45 minutes of brisk physical activity 5 days/week has been shown to improve function in vascular dementias, lower the risk of stroke and slow the progression of memory loss  Activities that are engaging or mentally stimulating, such as word puzzles, jigsaw puzzles and Sudoku, are also beneficial to cognitive health  Regular interaction with friends, family and community are also known to be helpful.     Sleep:  Establish a regular, consistent sleep pattern and practice good sleep hygiene.    Avoid screen time (computer, TV, smartphones or tablets) or heavy meals for at least an hour before bedtime.   Avoid caffeine or stimulants  after 2 PM.   Exercise earlier in the day or mornings and keep your sleeping environment comfortable. Bedtime and wake-up times should be consistent every night and morning so the body becomes used to a single routine, even on the weekends.   Having a wind down routine (e.g., soft lights in the house, bath before bed, reduced fluid intake, songs, reading, less noise) also helps to promote sleep readiness.   Untreated obstructive sleep apnea can lead to an increased risk for stroke and further memory loss      STRATEGIES TO COPE WITH YOUR COGNITIVE DEFICITS:  Pay Attention!  Reduce distractions in the area  Look at the person speaking to you & paraphrase what they are saying  Write down important things  Ask them to repeat themselves if you zone out  Ask people to simplify or reduce information if you need to  Processing Speed  Using multiple methods to learn new information, such as listening, taking notes, and recording, can be helpful  Give yourself enough time to complete certain tasks to reduce frustration  Executive Functioning  Don't try to multi-task. Do tasks separately to ensure that each gets completed.   Use a calendar or planner to keep you on track  Write down steps to complicated tasks in case you forget  Storing Information  Immediately after you learn something, try to recall it. Repeat this and gradually lengthen the interval between your recalls  Recalling information   Jog your memory! If you loose something, try to think back to when you last had it. Mentally walk yourself through the steps of your day to prod your memory.   Use clues, timers, memos, notes, etc.  Stay organized - keep your keys in a certain place, put your important papers in a certain place, etc.   Having a routine helps to anchor memories as well

## 2023-04-27 ENCOUNTER — OFFICE VISIT (OUTPATIENT)
Dept: FAMILY MEDICINE | Facility: CLINIC | Age: 73
End: 2023-04-27
Payer: MEDICARE

## 2023-04-27 VITALS
OXYGEN SATURATION: 97 % | RESPIRATION RATE: 14 BRPM | DIASTOLIC BLOOD PRESSURE: 82 MMHG | HEART RATE: 72 BPM | TEMPERATURE: 98 F | SYSTOLIC BLOOD PRESSURE: 126 MMHG | BODY MASS INDEX: 19.61 KG/M2 | WEIGHT: 122 LBS | HEIGHT: 66 IN

## 2023-04-27 DIAGNOSIS — M25.551 RIGHT HIP PAIN: ICD-10-CM

## 2023-04-27 DIAGNOSIS — N39.0 URINARY TRACT INFECTION WITHOUT HEMATURIA, SITE UNSPECIFIED: Primary | ICD-10-CM

## 2023-04-27 PROCEDURE — 99214 PR OFFICE/OUTPT VISIT, EST, LEVL IV, 30-39 MIN: ICD-10-PCS | Mod: S$GLB,,,

## 2023-04-27 PROCEDURE — 99214 OFFICE O/P EST MOD 30 MIN: CPT | Mod: S$GLB,,,

## 2023-04-27 RX ORDER — NITROFURANTOIN 25; 75 MG/1; MG/1
100 CAPSULE ORAL 2 TIMES DAILY
Qty: 10 CAPSULE | Refills: 0 | Status: SHIPPED | OUTPATIENT
Start: 2023-04-27 | End: 2023-05-02

## 2023-04-27 NOTE — PROGRESS NOTES
Subjective:       Patient ID: Kasandra Jones is a 72 y.o. female.    Chief Complaint: Urinary Tract Infection    Patient presents to clinic with complaints of a UTI. She states she has had an urgency to urinate for a few days.  She denies dysuria, fever, or chills. She also complains of right sided groin pain that has been present for years. She state she has discussed this with may other doctors and no one has been able to provide her with a reason. She would like this evaluated. Pain occurs every time she moves but is not present at rest. She takes over the counter medications which does help but does not relieve completely.     Urinary Tract Infection   This is a new problem. The current episode started in the past 7 days. The problem occurs every urination. The problem has been unchanged. She is Not sexually active. There is No history of pyelonephritis. Associated symptoms include hesitancy and urgency. Pertinent negatives include no flank pain or hematuria. Associated symptoms comments: Pain after urination. Treatments tried: azo. The treatment provided no relief. Her past medical history is significant for recurrent UTIs.   Review of patient's allergies indicates:   Allergen Reactions    Niacin preparations     Penicillins      Social Determinants of Health     Tobacco Use: Low Risk     Smoking Tobacco Use: Never    Smokeless Tobacco Use: Never    Passive Exposure: Not on file   Alcohol Use: Not on file   Financial Resource Strain: Not on file   Food Insecurity: Not on file   Transportation Needs: Not on file   Physical Activity: Not on file   Stress: Not on file   Social Connections: Not on file   Housing Stability: Not on file   Depression: Low Risk     Last PHQ Score: 0      Past Medical History:   Diagnosis Date    Cataract       Past Surgical History:   Procedure Laterality Date    EYE SURGERY Bilateral 2020 August    cataract removal    HYSTERECTOMY      TOTAL REDUCTION MAMMOPLASTY  2002      Social  History     Socioeconomic History    Marital status:          Current Outpatient Medications:     ALPRAZolam (XANAX) 0.5 MG tablet, Take 1 tablet (0.5 mg total) by mouth daily as needed for Anxiety. (Patient taking differently: Take 0.5 mg by mouth daily as needed for Anxiety. PRN), Disp: 30 tablet, Rfl: 0    buPROPion (WELLBUTRIN XL) 300 MG 24 hr tablet, Take 1 tablet (300 mg total) by mouth once daily., Disp: 90 tablet, Rfl: 1    cetirizine (ZYRTEC) 10 MG tablet, Take 10 mg by mouth once daily., Disp: , Rfl:     cholecalciferol, vitamin D3, 125 mcg (5,000 unit) capsule, , Disp: , Rfl:     copper gluconate 2 mg Tab, Take by mouth., Disp: , Rfl:     cyanocobalamin-cobamamide (B12) 5,000-100 mcg Lozg, , Disp: , Rfl:     diphenoxylate-atropine 2.5-0.025 mg (LOMOTIL) 2.5-0.025 mg per tablet, Take 1 tablet by mouth 4 (four) times daily as needed for Diarrhea., Disp: 30 tablet, Rfl: 1    divalproex 500 MG Tb24, Take 2 tablets (1,000 mg total) by mouth once daily., Disp: 180 tablet, Rfl: 1    ELIQUIS 5 mg Tab, Take 1 tablet (5 mg total) by mouth 2 (two) times daily., Disp: 180 tablet, Rfl: 1    ferrous sulfate 325 (65 FE) MG EC tablet, , Disp: , Rfl:     levothyroxine (SYNTHROID) 100 MCG tablet, Take 1 tablet (100 mcg total) by mouth once daily., Disp: 90 tablet, Rfl: 1    magnesium oxide (MAG-OX) 400 mg (241.3 mg magnesium) tablet, , Disp: , Rfl:     MULTAQ 400 mg Tab, Take 1 tablet (400 mg total) by mouth 2 (two) times daily., Disp: 180 tablet, Rfl: 1    nitrofurantoin, macrocrystal-monohydrate, (MACROBID) 100 MG capsule, Take 1 capsule (100 mg total) by mouth 2 (two) times daily. for 5 days, Disp: 10 capsule, Rfl: 0    potassium chloride SA (K-DUR,KLOR-CON) 20 MEQ tablet, Take 1 tablet (20 mEq total) by mouth once daily., Disp: 90 tablet, Rfl: 1    RABEprazole (ACIPHEX) 20 mg tablet, Take 1 tablet (20 mg total) by mouth 2 (two) times daily., Disp: 180 tablet, Rfl: 1    traZODone (DESYREL) 100 MG tablet, TAKE TWO  TABLETS PO NIGHTLY, Disp: 180 tablet, Rfl: 1    vit C,O-Vp-aaazp-lutein-zeaxan (PRESERVISION AREDS 2) 250-90-40-1 mg Cap, , Disp: , Rfl:     Lab Results   Component Value Date    WBC 5.50 03/23/2023    HGB 13.5 03/23/2023    HCT 42.7 03/23/2023     03/23/2023    CHOL 170 09/29/2022    TRIG 68 09/29/2022    HDL 74 09/29/2022    ALT 31 03/23/2023    AST 26 03/23/2023     03/23/2023    K 4.1 03/23/2023     03/23/2023    CREATININE 0.7 03/23/2023    BUN 17 03/23/2023    CO2 32 (H) 03/23/2023    TSH 3.510 01/03/2023       Review of Systems   Genitourinary:  Positive for hesitancy and urgency. Negative for dysuria, flank pain and hematuria.   Musculoskeletal:  Positive for arthralgias.        Pain in right groin       Objective:      Physical Exam  Vitals reviewed.   Constitutional:       Appearance: Normal appearance.   Cardiovascular:      Rate and Rhythm: Normal rate and regular rhythm.      Pulses: Normal pulses.           Radial pulses are 2+ on the right side and 2+ on the left side.        Posterior tibial pulses are 2+ on the right side and 2+ on the left side.      Heart sounds: Normal heart sounds, S1 normal and S2 normal.   Pulmonary:      Effort: Pulmonary effort is normal.      Breath sounds: Normal breath sounds.   Abdominal:      Tenderness: There is right CVA tenderness.   Genitourinary:     Comments: Urine positive for nitrites and leukocytes.   Musculoskeletal:      Right hip: Tenderness present.      Right lower leg: No edema.      Left lower leg: No edema.   Neurological:      Mental Status: She is alert.       Assessment:       1. Urinary tract infection without hematuria, site unspecified    2. Right hip pain        Plan:       Kasandra was seen today for urinary tract infection.    Diagnoses and all orders for this visit:    Urinary tract infection without hematuria, site unspecified  -     nitrofurantoin, macrocrystal-monohydrate, (MACROBID) 100 MG capsule; Take 1 capsule (100 mg  total) by mouth 2 (two) times daily. for 5 days    Right hip pain  -     X-Ray Hip 2 or 3 views Right (with Pelvis when performed); Future    Urine positive for bacteria and pyuria. Macrobid as prescribed for UTI. Urine sent to lab for culture. Pain in groin likely coming from hip. Will get xray of right hip to evaluate.  Follow up as needed.

## 2023-04-28 ENCOUNTER — HOSPITAL ENCOUNTER (OUTPATIENT)
Dept: RADIOLOGY | Facility: HOSPITAL | Age: 73
Discharge: HOME | End: 2023-04-28
Payer: MEDICARE

## 2023-04-28 DIAGNOSIS — M25.551 RIGHT HIP PAIN: ICD-10-CM

## 2023-04-28 PROCEDURE — 73502 X-RAY EXAM HIP UNI 2-3 VIEWS: CPT | Mod: TC,RT

## 2023-04-28 NOTE — PROGRESS NOTES
Please let patient know she has arthritis in her hips which is likely the cause of her pain. She can use over the counter ibuprofen or tylenol arthritis for pain.

## 2023-04-29 LAB — BACTERIA UR CULT: ABNORMAL

## 2023-06-17 ENCOUNTER — PATIENT MESSAGE (OUTPATIENT)
Dept: ADMINISTRATIVE | Facility: OTHER | Age: 73
End: 2023-06-17
Payer: MEDICARE

## 2023-07-12 ENCOUNTER — HOSPITAL ENCOUNTER (OUTPATIENT)
Dept: RADIOLOGY | Facility: HOSPITAL | Age: 73
Discharge: HOME | End: 2023-07-12
Attending: FAMILY MEDICINE
Payer: MEDICARE

## 2023-07-12 ENCOUNTER — OFFICE VISIT (OUTPATIENT)
Dept: FAMILY MEDICINE | Facility: CLINIC | Age: 73
End: 2023-07-12
Payer: MEDICARE

## 2023-07-12 VITALS
DIASTOLIC BLOOD PRESSURE: 80 MMHG | TEMPERATURE: 97 F | HEART RATE: 63 BPM | BODY MASS INDEX: 18.7 KG/M2 | OXYGEN SATURATION: 97 % | WEIGHT: 116.38 LBS | SYSTOLIC BLOOD PRESSURE: 136 MMHG | HEIGHT: 66 IN

## 2023-07-12 DIAGNOSIS — R91.1 PULMONARY NODULE: ICD-10-CM

## 2023-07-12 DIAGNOSIS — Z12.31 BREAST CANCER SCREENING BY MAMMOGRAM: ICD-10-CM

## 2023-07-12 DIAGNOSIS — R10.11 RUQ PAIN: ICD-10-CM

## 2023-07-12 DIAGNOSIS — E03.9 HYPOTHYROIDISM, UNSPECIFIED TYPE: ICD-10-CM

## 2023-07-12 DIAGNOSIS — Z13.820 SCREENING FOR OSTEOPOROSIS: ICD-10-CM

## 2023-07-12 DIAGNOSIS — F31.9 BIPOLAR 1 DISORDER: ICD-10-CM

## 2023-07-12 DIAGNOSIS — Z98.84 HISTORY OF GASTRIC BYPASS: ICD-10-CM

## 2023-07-12 DIAGNOSIS — R63.4 ABNORMAL WEIGHT LOSS: ICD-10-CM

## 2023-07-12 DIAGNOSIS — R91.1 SOLITARY PULMONARY NODULE: ICD-10-CM

## 2023-07-12 DIAGNOSIS — R41.89 COGNITIVE IMPAIRMENT: ICD-10-CM

## 2023-07-12 DIAGNOSIS — I10 HYPERTENSION, ESSENTIAL: ICD-10-CM

## 2023-07-12 DIAGNOSIS — R10.13 EPIGASTRIC PAIN: ICD-10-CM

## 2023-07-12 DIAGNOSIS — Z78.0 MENOPAUSE: ICD-10-CM

## 2023-07-12 DIAGNOSIS — I48.0 PAROXYSMAL ATRIAL FIBRILLATION: ICD-10-CM

## 2023-07-12 DIAGNOSIS — E78.00 PURE HYPERCHOLESTEROLEMIA: ICD-10-CM

## 2023-07-12 DIAGNOSIS — Z79.01 ANTICOAGULANT LONG-TERM USE: Primary | ICD-10-CM

## 2023-07-12 PROCEDURE — 71046 X-RAY EXAM CHEST 2 VIEWS: CPT | Mod: TC

## 2023-07-12 PROCEDURE — 99214 OFFICE O/P EST MOD 30 MIN: CPT | Mod: S$GLB,,, | Performed by: FAMILY MEDICINE

## 2023-07-12 PROCEDURE — 99214 PR OFFICE/OUTPT VISIT, EST, LEVL IV, 30-39 MIN: ICD-10-PCS | Mod: S$GLB,,, | Performed by: FAMILY MEDICINE

## 2023-07-12 RX ORDER — TRAZODONE HYDROCHLORIDE 100 MG/1
TABLET ORAL
Qty: 180 TABLET | Refills: 1 | Status: SHIPPED | OUTPATIENT
Start: 2023-07-12 | End: 2024-02-07

## 2023-07-12 RX ORDER — BUPROPION HYDROCHLORIDE 300 MG/1
300 TABLET ORAL DAILY
Qty: 90 TABLET | Refills: 1 | Status: SHIPPED | OUTPATIENT
Start: 2023-07-12 | End: 2024-02-07

## 2023-07-12 RX ORDER — LEVOTHYROXINE SODIUM 100 UG/1
100 TABLET ORAL DAILY
Qty: 90 TABLET | Refills: 1 | Status: SHIPPED | OUTPATIENT
Start: 2023-07-12 | End: 2024-02-07

## 2023-07-12 RX ORDER — APIXABAN 5 MG/1
5 TABLET, FILM COATED ORAL 2 TIMES DAILY
Qty: 180 TABLET | Refills: 1 | Status: SHIPPED | OUTPATIENT
Start: 2023-07-12 | End: 2023-11-27

## 2023-07-12 RX ORDER — MEGESTROL ACETATE 40 MG/1
40 TABLET ORAL 3 TIMES DAILY
Qty: 270 TABLET | Refills: 1 | Status: SHIPPED | OUTPATIENT
Start: 2023-07-12 | End: 2024-03-13

## 2023-07-12 RX ORDER — DRONEDARONE 400 MG/1
400 TABLET, FILM COATED ORAL 2 TIMES DAILY
Qty: 180 TABLET | Refills: 1 | Status: SHIPPED | OUTPATIENT
Start: 2023-07-12 | End: 2024-01-23 | Stop reason: SDUPTHER

## 2023-07-12 RX ORDER — POTASSIUM CHLORIDE 20 MEQ/1
20 TABLET, EXTENDED RELEASE ORAL DAILY
Qty: 90 TABLET | Refills: 1 | Status: SHIPPED | OUTPATIENT
Start: 2023-07-12 | End: 2023-09-29

## 2023-07-12 RX ORDER — DIVALPROEX SODIUM 500 MG/1
1000 TABLET, FILM COATED, EXTENDED RELEASE ORAL DAILY
Qty: 180 TABLET | Refills: 1 | Status: SHIPPED | OUTPATIENT
Start: 2023-07-12 | End: 2024-02-07

## 2023-07-12 RX ORDER — RABEPRAZOLE SODIUM 20 MG/1
20 TABLET, DELAYED RELEASE ORAL 2 TIMES DAILY
Qty: 180 TABLET | Refills: 1 | Status: SHIPPED | OUTPATIENT
Start: 2023-07-12 | End: 2024-02-21 | Stop reason: SDUPTHER

## 2023-07-12 NOTE — PATIENT INSTRUCTIONS
Megace 40 mg will be sent after clinic     CT chest abd pelvis - [facility] will call

## 2023-07-15 PROBLEM — R91.1 PULMONARY NODULE: Status: ACTIVE | Noted: 2023-07-15

## 2023-07-15 NOTE — PROGRESS NOTES
Subjective:       Patient ID: Kasandra Jones is a 72 y.o. female.    Chief Complaint: Follow-up    Abnormal weight loss.  Has continued to drop weight.  She is anticoagulated has hypothyroidism.  Bipolar disorder.  Some cognitive impairment.  Hyperlipidemia hypertension paroxysmal atrial fib which is rare.  BMI is 18.8130 lb now down to 116.  Since last seen in January.  No appetite no taste for food.  Early satiety.  Nausea but with no abdominal pain.  Does have a pulmonary nodule noted previously.  On studies by others.  She is a former smoker back in her 20s but less than 10 years.  Workup previously as all been negative.  She is taking vitamins.  She is had gastric bypass previously.  Colonoscopy was September of 2022 and was okay.  She does have little right upper quadrant discomfort.    Physical examination.  Vital signs are noted.  No acute distress.  Accompanied by spouse.  Chest clear.  Heart regular rate rhythm.  Abdomen bowel sounds are positive very slight right upper quadrant tenderness.  Extremities without edema.      Objective:        Assessment:       1. Anticoagulant long-term use    2. Hypertension, essential    3. Abnormal weight loss    4. Hypothyroidism, unspecified type    5. Bipolar 1 disorder    6. Cognitive impairment    7. Pure hypercholesterolemia    8. Paroxysmal atrial fibrillation    9. BMI less than 19,adult    10. Pulmonary nodule    11. RUQ pain    12. Menopause    13. Screening for osteoporosis    14. Breast cancer screening by mammogram    15. Solitary pulmonary nodule    16. Epigastric pain    17. History of gastric bypass        Plan:       Anticoagulant long-term use    Hypertension, essential  -     CBC Auto Differential; Future; Expected date: 07/12/2023  -     Comprehensive Metabolic Panel; Future; Expected date: 07/12/2023  -     Celiac Disease Panel; Future; Expected date: 07/12/2023    Abnormal weight loss  -     Ambulatory referral/consult to Gastroenterology; Future;  Expected date: 07/19/2023    Hypothyroidism, unspecified type  -     TSH; Future; Expected date: 07/12/2023    Bipolar 1 disorder    Cognitive impairment    Pure hypercholesterolemia    Paroxysmal atrial fibrillation    BMI less than 19,adult    Pulmonary nodule  -     X-Ray Chest PA And Lateral; Future; Expected date: 07/12/2023    RUQ pain  -     Celiac Disease Panel; Future; Expected date: 07/12/2023    Menopause  -     DXA Bone Density Axial Skeleton 1 or more sites; Future; Expected date: 07/12/2023    Screening for osteoporosis  -     DXA Bone Density Axial Skeleton 1 or more sites; Future; Expected date: 07/12/2023    Breast cancer screening by mammogram  -     Mammo Digital Screening Bilat; Future; Expected date: 09/04/2023    Solitary pulmonary nodule  -     CT Chest Without Contrast; Future; Expected date: 07/12/2023    Epigastric pain  -     CT Abdomen Pelvis With Contrast; Future; Expected date: 07/12/2023    History of gastric bypass    Other orders  -     buPROPion (WELLBUTRIN XL) 300 MG 24 hr tablet; Take 1 tablet (300 mg total) by mouth once daily.  Dispense: 90 tablet; Refill: 1  -     divalproex 500 MG Tb24; Take 2 tablets (1,000 mg total) by mouth once daily.  Dispense: 180 tablet; Refill: 1  -     ELIQUIS 5 mg Tab; Take 1 tablet (5 mg total) by mouth 2 (two) times daily.  Dispense: 180 tablet; Refill: 1  -     levothyroxine (SYNTHROID) 100 MCG tablet; Take 1 tablet (100 mcg total) by mouth once daily.  Dispense: 90 tablet; Refill: 1  -     MULTAQ 400 mg Tab; Take 1 tablet (400 mg total) by mouth 2 (two) times daily.  Dispense: 180 tablet; Refill: 1  -     potassium chloride SA (K-DUR,KLOR-CON) 20 MEQ tablet; Take 1 tablet (20 mEq total) by mouth once daily.  Dispense: 90 tablet; Refill: 1  -     RABEprazole (ACIPHEX) 20 mg tablet; Take 1 tablet (20 mg total) by mouth 2 (two) times daily.  Dispense: 180 tablet; Refill: 1  -     traZODone (DESYREL) 100 MG tablet; TAKE TWO TABLETS PO NIGHTLY   Dispense: 180 tablet; Refill: 1  -     megestroL (MEGACE) 40 MG Tab; Take 1 tablet (40 mg total) by mouth 3 (three) times daily.  Dispense: 270 tablet; Refill: 1    Refill all of her medications.  Psychiatric medicines 2.  Refill her anticoagulants.  Her Multaq.  Levothyroxine.  DEXA scan.  Mammogram.  Colonoscopy report from Dr. Jordan silva.  CT of the chest abdomen and pelvis with contrast.  To Dr. Jordan silva for possible upper endoscopy.  Get a chest x-ray also.  CBC CMP TSH and sprue panel.  Megace 40 mg t.i.d. with meals.

## 2023-07-17 ENCOUNTER — PATIENT OUTREACH (OUTPATIENT)
Dept: ADMINISTRATIVE | Facility: HOSPITAL | Age: 73
End: 2023-07-17
Payer: MEDICARE

## 2023-07-17 NOTE — PROGRESS NOTES
Population Health Chart Review & Patient Outreach Details:     Reason for Outreach Encounter:     [x]  Non-Compliant Report   []  Payor Report ([employer], PHN, BCBS, MSSP, MCIP, C, etc.)   []  Pre-Visit Chart Review     Updates Requested / Reviewed:     [x]  Care Everywhere    [x]     []  External Sources ([facility], [facility], DIS, etc.)   []  Care Team Updated    Patient Outreach Method:    []  Telephone Outreach Completed   [] Successful   [] Left Voicemail   [] Unable to Contact (wrong number, no voicemail)  []  [facility] Portal Outreach Sent  []  Letter Outreach Mailed  [x]  Fax Sent for External Records  []  External Records Upload    Health Maintenance Topics Addressed and Outreach Outcomes / Actions Taken:        []      Breast Cancer Screening []  Mammo Scheduled      []  External Records Requested     []  Added Reminder to Complete to Upcoming Primary Care Appt Notes     []  Patient Declined     []  Patient Will Call Back to Schedule     []  Patient Will Schedule with External Provider / Order Routed if Applicable             []       Cervical Cancer Screening []  Pap Scheduled      []  External Records Requested     []  Added Reminder to Complete to Upcoming Primary Care Appt Notes     []  Patient Declined     []  Patient Will Call Back to Schedule     []  Patient Will Schedule with External Provider               [x]          Colorectal Cancer Screening []  Colonoscopy Case Request or Referral Placed     [x]  External Records Requested     []  Added Reminder to Complete to Upcoming Primary Care Appt Notes     []  Patient Declined     []  Patient Will Call Back to Schedule     []  Patient Will Schedule with External Provider     []  Fit Kit Mailed (add the SmartPhrase under additional notes)     []  Reminded Patient to Complete Home Test             []      Diabetic Eye Exam []  Eye Camera Scheduled or Optometry Referral Placed     []  External Records Requested     []  Added Reminder to Complete to  Upcoming Primary Care Appt Notes     []  Patient Declined     []  Patient Will Call Back to Schedule     []  Patient Will Schedule with External Provider             []      Blood Pressure Control []  Primary Care Follow Up Visit Scheduled     []  Remote Blood Pressure Reading Captured     []  Added Reminder to Complete to Upcoming Primary Care Appt Notes     []  Patient Declined     []  Patient Will Call Back / Patient Will Send Portal Message with Reading     []  Patient Will Call Back to Schedule Provider Visit             []       HbA1c & Other Labs []  Lab Appt Scheduled for Due Labs     []  Primary Care Follow Up Visit Scheduled      []  Reminded Patient to Complete Home Test     []  Added Reminder to Complete to Upcoming Primary Care Appt Notes     []  Patient Declined     []  Patient Will Call Back to Schedule     []  Patient Will Schedule with External Provider / Order Routed if Applicable           []    Schedule Primary Care Appt []  Primary Care Appt Scheduled     []  Patient Declined     []  Patient Will Call Back to Schedule     []  Pt Established with External Provider & Updated Care Team             []      Medication Adherence []  Primary Care Appointment Scheduled     []  Added Reminder to Upcoming Primary Care Appt Notes     []  Patient Reminded to  Prescription     []  Patient Declined, Provider Notified if Needed     []  Sent Provider Message to Review and/or Add Exclusion to Problem List             []      Osteoporosis Screening []  DXA Appointment Scheduled     []  External Records Requested     []  Added Reminder to Complete to Upcoming Primary Care Appt Notes     []  Patient Declined     []  Patient Will Call Back to Schedule     []  Patient Will Schedule with External Provider / Order Routed if Applicable     Additional Care Coordinator Notes:         Further Action Needed If Patient Returns Outreach:

## 2023-07-17 NOTE — LETTER
AUTHORIZATION FOR RELEASE OF   CONFIDENTIAL INFORMATION    Dear Darius Vasques MD,    We are seeing Kasandra Jones, date of birth 1950, in the clinic at [facility]. Sanjiv Ford III, MD is the patient's PCP. Kasandra Jones has an outstanding lab/procedure at the time we reviewed her chart. In order to help keep her health information updated, she has authorized us to request the following medical record(s):        (  )  MAMMOGRAM                                      (X)  COLONOSCOPY/PATH/RECALL      (  )  PAP SMEAR                                          (  )  OUTSIDE LAB RESULTS     (  )  DEXA SCAN                                          (  )  EYE EXAM            (  )  FOOT EXAM                                          (  )  ENTIRE RECORD     (  )  OUTSIDE IMMUNIZATIONS                 (  )  _______________         Please fax records to [facility], Clinton H Sharp III, MD, [phone]    If you have any questions, please contact Savage Swenson LPN Care Coordinator  at [phone].            Patient Name: Kasandra Jones  : 1950  Patient Phone #: [phone]

## 2023-07-18 ENCOUNTER — PATIENT OUTREACH (OUTPATIENT)
Dept: ADMINISTRATIVE | Facility: HOSPITAL | Age: 73
End: 2023-07-18
Payer: MEDICARE

## 2023-07-19 ENCOUNTER — TELEPHONE (OUTPATIENT)
Dept: FAMILY MEDICINE | Facility: CLINIC | Age: 73
End: 2023-07-19
Payer: MEDICARE

## 2023-07-19 NOTE — TELEPHONE ENCOUNTER
----- Message from Sanjiv Ford III, MD sent at 7/16/2023 10:48 AM CDT -----  Weakly positive test for sprue.  See her GI again.  B12 level if not done.  Blood cells are large.  FOLLOW-UP AS RECOMMENDED

## 2023-07-19 NOTE — TELEPHONE ENCOUNTER
----- Message from Sanjiv Ford III, MD sent at 7/12/2023 11:08 PM CDT -----  NORMAL followup as needed.

## 2023-07-19 NOTE — TELEPHONE ENCOUNTER
----- Message from Sanjiv Ford III, MD sent at 7/12/2023 11:28 PM CDT -----  Albumin is low.  Increase protein intake.  FOLLOW-UP AS RECOMMENDED

## 2023-08-02 ENCOUNTER — HOSPITAL ENCOUNTER (OUTPATIENT)
Dept: RADIOLOGY | Facility: HOSPITAL | Age: 73
Discharge: HOME | End: 2023-08-02
Attending: FAMILY MEDICINE
Payer: MEDICARE

## 2023-08-02 DIAGNOSIS — R91.1 SOLITARY PULMONARY NODULE: ICD-10-CM

## 2023-08-02 DIAGNOSIS — R10.13 EPIGASTRIC PAIN: ICD-10-CM

## 2023-08-02 PROCEDURE — 74177 CT ABD & PELVIS W/CONTRAST: CPT | Mod: 26,,, | Performed by: RADIOLOGY

## 2023-08-02 PROCEDURE — 25500020 PHARM REV CODE 255

## 2023-08-02 PROCEDURE — 71260 CT THORAX DX C+: CPT | Mod: 26,,, | Performed by: RADIOLOGY

## 2023-08-02 PROCEDURE — 71260 CT CHEST ABDOMEN PELVIS WITH CONTRAST (XPD): ICD-10-PCS | Mod: 26,,, | Performed by: RADIOLOGY

## 2023-08-02 PROCEDURE — 74177 CT CHEST ABDOMEN PELVIS WITH CONTRAST (XPD): ICD-10-PCS | Mod: 26,,, | Performed by: RADIOLOGY

## 2023-08-02 PROCEDURE — 71260 CT THORAX DX C+: CPT | Mod: TC

## 2023-08-02 RX ADMIN — IOHEXOL 75 ML: 350 INJECTION, SOLUTION INTRAVENOUS at 01:08

## 2023-08-11 ENCOUNTER — OFFICE VISIT (OUTPATIENT)
Dept: URGENT CARE | Facility: CLINIC | Age: 73
End: 2023-08-11
Payer: MEDICARE

## 2023-08-11 VITALS
OXYGEN SATURATION: 99 % | HEIGHT: 66 IN | RESPIRATION RATE: 20 BRPM | TEMPERATURE: 99 F | SYSTOLIC BLOOD PRESSURE: 121 MMHG | HEART RATE: 81 BPM | BODY MASS INDEX: 18.8 KG/M2 | WEIGHT: 117 LBS | DIASTOLIC BLOOD PRESSURE: 73 MMHG

## 2023-08-11 DIAGNOSIS — S99.921A INJURY OF RIGHT FOOT, INITIAL ENCOUNTER: ICD-10-CM

## 2023-08-11 DIAGNOSIS — S69.91XA HAND INJURY, RIGHT, INITIAL ENCOUNTER: ICD-10-CM

## 2023-08-11 DIAGNOSIS — M25.511 ACUTE PAIN OF RIGHT SHOULDER: ICD-10-CM

## 2023-08-11 DIAGNOSIS — M25.551 ACUTE RIGHT HIP PAIN: ICD-10-CM

## 2023-08-11 DIAGNOSIS — S93.601A SPRAIN OF RIGHT FOOT, INITIAL ENCOUNTER: ICD-10-CM

## 2023-08-11 DIAGNOSIS — S61.210A LACERATION OF RIGHT INDEX FINGER WITHOUT FOREIGN BODY WITHOUT DAMAGE TO NAIL, INITIAL ENCOUNTER: ICD-10-CM
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
## 2023-08-11 PROCEDURE — 73630 X-RAY EXAM OF FOOT: CPT | Mod: RT,S$GLB,, | Performed by: RADIOLOGY

## 2023-08-11 PROCEDURE — 73502 XR HIP WITH PELVIS WHEN PERFORMED, 2 OR 3  VIEWS RIGHT: ICD-10-PCS | Mod: RT,S$GLB,, | Performed by: RADIOLOGY

## 2023-08-11 PROCEDURE — 73630 XR FOOT COMPLETE 3 VIEW RIGHT: ICD-10-PCS | Mod: RT,S$GLB,, | Performed by: RADIOLOGY

## 2023-08-11 PROCEDURE — 12002 PR RESUP NPTERF WND BODY 2.6-7.5 CM: ICD-10-PCS | Mod: S$GLB,,, | Performed by: NURSE PRACTITIONER

## 2023-08-11 PROCEDURE — 73130 X-RAY EXAM OF HAND: CPT | Mod: RT,S$GLB,, | Performed by: RADIOLOGY

## 2023-08-11 PROCEDURE — 99214 PR OFFICE/OUTPT VISIT, EST, LEVL IV, 30-39 MIN: ICD-10-PCS | Mod: 25,S$GLB,, | Performed by: NURSE PRACTITIONER

## 2023-08-11 PROCEDURE — 12002 RPR S/N/AX/GEN/TRNK2.6-7.5CM: CPT | Mod: S$GLB,,, | Performed by: NURSE PRACTITIONER

## 2023-08-11 PROCEDURE — 99214 OFFICE O/P EST MOD 30 MIN: CPT | Mod: 25,S$GLB,, | Performed by: NURSE PRACTITIONER

## 2023-08-11 PROCEDURE — 73502 X-RAY EXAM HIP UNI 2-3 VIEWS: CPT | Mod: RT,S$GLB,, | Performed by: RADIOLOGY

## 2023-08-11 PROCEDURE — 73130 XR HAND COMPLETE 3 VIEW RIGHT: ICD-10-PCS | Mod: RT,S$GLB,, | Performed by: RADIOLOGY

## 2023-08-11 RX ORDER — DOXYCYCLINE 100 MG/1
100 CAPSULE ORAL 2 TIMES DAILY
Qty: 14 CAPSULE | Refills: 0 | Status: SHIPPED | OUTPATIENT
Start: 2023-08-11 | End: 2023-08-18

## 2023-08-11 RX ORDER — HYDROCODONE BITARTRATE AND ACETAMINOPHEN 5; 325 MG/1; MG/1
1 TABLET ORAL EVERY 6 HOURS PRN
Qty: 14 TABLET | Refills: 0 | Status: SHIPPED | OUTPATIENT
Start: 2023-08-11 | End: 2023-10-18

## 2023-08-11 RX ORDER — MUPIROCIN 20 MG/G
OINTMENT TOPICAL 3 TIMES DAILY
Qty: 22 G | Refills: 0 | Status: SHIPPED | OUTPATIENT
Start: 2023-08-11 | End: 2024-03-13

## 2023-08-11 NOTE — PROCEDURES
Laceration Repair    Date/Time: 8/11/2023 8:00 AM    Performed by: Tiana Huitron NP  Authorized by: Tiana Huitron NP  Consent Done: Not Needed  Body area: upper extremity  Location details: right index finger  Laceration length: 3 cm  Foreign bodies: no foreign bodies  Tendon involvement: none  Nerve involvement: none  Vascular damage: no  Anesthesia: local infiltration    Anesthesia:  Local Anesthetic: lidocaine 1% without epinephrine  Anesthetic total: 3 mL    Patient sedated: no  Preparation: Patient was prepped and draped in the usual sterile fashion.  Irrigation solution: saline  Irrigation method: jet lavage  Amount of cleaning: standard  Debridement: none  Degree of undermining: none  Skin closure: 5-0 nylon  Number of sutures: 3  Technique: simple  Approximation: close  Approximation difficulty: simple  Dressing: antibiotic ointment, splint for protection, non-stick sterile dressing and Steri-Strips  Patient tolerance: Patient tolerated the procedure well with no immediate complications

## 2023-08-11 NOTE — PROGRESS NOTES
"Subjective:      Patient ID: Kasandra Jones is a 72 y.o. female.    Vitals:  height is 5' 6" (1.676 m) and weight is 53.1 kg (117 lb). Her temperature is 99.1 °F (37.3 °C). Her blood pressure is 121/73 and her pulse is 81. Her respiration is 20 and oxygen saturation is 99%.     Chief Complaint: Fall    Pt states" c/o after fall yesterday with bruise on her R foot and R hand abrasions after falling out of a van. Took extra strength tylenol but does not help and also put neosporin on area on hand."     Last tetanus 10/2022     Fall  Pertinent negatives include no headaches or loss of consciousness.       Musculoskeletal:  Positive for pain, trauma (fall yesterday out of van), joint pain (right hip) and joint swelling (right ankle/foot).   Skin:  Positive for wound, abrasion (right middle and ring finger), laceration (right index finger), erythema (right middle finger) and bruising (right foot).   Neurological:  Negative for headaches, altered mental status and loss of consciousness.   Psychiatric/Behavioral:  Negative for altered mental status.       Objective:     Physical Exam   Constitutional: She is oriented to person, place, and time.  Non-toxic appearance. She does not appear ill. No distress.   HENT:   Head: Normocephalic and atraumatic.   Nose: Nose normal.   Mouth/Throat: Mucous membranes are moist.   Eyes: Conjunctivae are normal.   Neck: Neck supple.   Pulmonary/Chest: Effort normal. No respiratory distress.   Abdominal: Normal appearance.   Musculoskeletal:         General: Swelling, tenderness and signs of injury (right hand) present. No deformity.      Right shoulder: She exhibits tenderness (generalized, mild). She exhibits normal range of motion, no bony tenderness, no swelling and normal pulse.      Right elbow: Normal.     Right wrist: Normal.      Cervical back: She exhibits no tenderness.      Right upper arm: Normal.      Right forearm: Normal.      Right hand: She exhibits tenderness and " swelling. She exhibits normal range of motion, no bony tenderness and normal capillary refill. Normal sensation noted.      Comments: Sitting in wheelchair due to pain right foot to bare weight.  Exam limited   Neurological: no focal deficit. She is alert and oriented to person, place, and time.   Skin: Skin is warm, dry and no rash. Capillary refill takes 2 to 3 seconds. bruising (right lateral foot), erythema (right middle finger) and lesion         Comments: See attached photos   Psychiatric: Her behavior is normal. Mood normal.   Nursing note and vitals reviewed.chaperone present                   Right hand xray:FINDINGS:  Mild joint space narrowing is seen at the D IP joints and at the 1st carpal/metacarpal joint.  No definite fracture subluxation is seen about the right hand.  Soft tissue swelling overlies the dorsum the distal metacarpal regions.    Right hip xray: FINDINGS:  Degenerative changes are seen in the lower lumbar region.  Acetabular osteophyte formation is seen in both hips suggesting osteoarthritis.  No definite acute fractures identified.  There is some mildly prominent air-filled small bowel loops visualized in the abdomen.  The pattern is non-specific.    Right foot xray: FINDINGS:  No fracture or dislocation.  There are small dorsal and plantar heel spurs.  Assessment:     1. Fall, initial encounter    2. Hand injury, right, initial encounter    3. Acute right hip pain    4. Injury of right foot, initial encounter    5. Acute pain of right shoulder    6. Laceration of right index finger without foreign body without damage to nail, initial encounter    7. Sprain of right foot, initial encounter        Plan:       Fall, initial encounter  -     XR FOOT COMPLETE 3 VIEW RIGHT; Future; Expected date: 08/11/2023  -     X-Ray Hip 2 or 3 views Right (with Pelvis when performed); Future; Expected date: 08/11/2023  -     XR HAND COMPLETE 3 VIEW RIGHT; Future; Expected date: 08/11/2023    Hand injury,  right, initial encounter  -     XR HAND COMPLETE 3 VIEW RIGHT; Future; Expected date: 08/11/2023  -     HYDROcodone-acetaminophen (NORCO) 5-325 mg per tablet; Take 1 tablet by mouth every 6 (six) hours as needed for Pain.  Dispense: 14 tablet; Refill: 0    Acute right hip pain  -     X-Ray Hip 2 or 3 views Right (with Pelvis when performed); Future; Expected date: 08/11/2023  -     HYDROcodone-acetaminophen (NORCO) 5-325 mg per tablet; Take 1 tablet by mouth every 6 (six) hours as needed for Pain.  Dispense: 14 tablet; Refill: 0    Injury of right foot, initial encounter  -     XR FOOT COMPLETE 3 VIEW RIGHT; Future; Expected date: 08/11/2023  -     HYDROcodone-acetaminophen (NORCO) 5-325 mg per tablet; Take 1 tablet by mouth every 6 (six) hours as needed for Pain.  Dispense: 14 tablet; Refill: 0    Acute pain of right shoulder    Laceration of right index finger without foreign body without damage to nail, initial encounter  -     doxycycline (MONODOX) 100 MG capsule; Take 1 capsule (100 mg total) by mouth 2 (two) times daily. for 7 days  Dispense: 14 capsule; Refill: 0  -     mupirocin (BACTROBAN) 2 % ointment; Apply topically 3 (three) times daily.  Dispense: 22 g; Refill: 0  -     HYDROcodone-acetaminophen (NORCO) 5-325 mg per tablet; Take 1 tablet by mouth every 6 (six) hours as needed for Pain.  Dispense: 14 tablet; Refill: 0  -     Laceration Repair    Sprain of right foot, initial encounter

## 2023-08-11 NOTE — PATIENT INSTRUCTIONS
Ibuprofen or Tylenol over-the-counter as directed for pain.    Norco for breakthrough pain not relieved by ibuprofen or Tylenol as above.    Keep the current dressing on your fingers for a minimum of 24 hours then you can remove and begin to clean the areas twice daily with antibacterial soap and water, allowed to air dry well then apply mupirocin ointment.  You can leave open to air when at home in a cool clean environment however cover with a clean bandage when going out or into an area where you think it may become soiled.    Watch for signs and symptoms of infection and seek medical re-evaluation if symptoms emerge.    Doxycycline twice a day for 7 days.  Always take with food and a full glass of water.  Do not lay down for 1 hour after taking each dose and protect your skin against the sun while taking this medication    Limit activity not to aggravate symptoms.  Keep right foot elevated as often as possible.    Ice to the affected areas for 15-20 minutes every 2-4 hours for pain/swelling/inflammation.        Return to clinic in 10-14 days for suture removal

## 2023-08-24 ENCOUNTER — OFFICE VISIT (OUTPATIENT)
Dept: URGENT CARE | Facility: CLINIC | Age: 73
End: 2023-08-24
Payer: MEDICARE

## 2023-08-24 VITALS
HEART RATE: 91 BPM | WEIGHT: 117 LBS | BODY MASS INDEX: 18.8 KG/M2 | DIASTOLIC BLOOD PRESSURE: 65 MMHG | TEMPERATURE: 98 F | HEIGHT: 66 IN | OXYGEN SATURATION: 97 % | RESPIRATION RATE: 16 BRPM | SYSTOLIC BLOOD PRESSURE: 119 MMHG

## 2023-08-24 DIAGNOSIS — Z48.02 VISIT FOR SUTURE REMOVAL: Primary | ICD-10-CM

## 2023-08-24 PROCEDURE — 99214 OFFICE O/P EST MOD 30 MIN: CPT | Mod: S$GLB,,,

## 2023-08-24 PROCEDURE — 99214 PR OFFICE/OUTPT VISIT, EST, LEVL IV, 30-39 MIN: ICD-10-PCS | Mod: S$GLB,,,

## 2023-08-24 NOTE — PROCEDURES
Suture Removal    Date/Time: 8/24/2023 2:00 PM  Location procedure was performed: [facility]    Performed by: Henry Marshall FNP  Authorized by: Henry Marshall FNP  Body area: upper extremity  Location details: right index finger  Wound Appearance: clean, well healed, no drainage and nontender  Sutures Removed: 3  Post-removal: no dressing applied  Complications: No  Estimated blood loss (mL): 0  Specimens: No  Implants: No  Patient tolerance: Patient tolerated the procedure well with no immediate complications

## 2023-08-24 NOTE — PROGRESS NOTES
"Subjective:      Patient ID: Kasandra Jones is a 73 y.o. female.    Vitals:  height is 5' 6" (1.676 m) and weight is 53.1 kg (117 lb). Her temperature is 97.7 °F (36.5 °C). Her blood pressure is 119/65 and her pulse is 91. Her respiration is 16 and oxygen saturation is 97%.     Chief Complaint: Suture / Staple Removal    Ms Slaughter, presents to clinic for suture removal.  On 08/11/2023 patient experienced a fall resulted in a right hand 2nd finger laceration.  She has 3 sutures placed at this time.  She is completed entire course of antibiotics.  She is performing adequate wound care.  Laceration is well approximated, there is no surrounding erythema there is minimal bruising present.  Range of motion is intact.      Constitution: Negative for fever.   HENT:  Negative for congestion, sore throat and trouble swallowing.    Neck: Negative for painful lymph nodes and neck swelling.   Cardiovascular:  Negative for palpitations.   Eyes:  Negative for double vision and blurred vision.   Gastrointestinal:  Negative for nausea, vomiting and diarrhea.   Endocrine: cold intolerance, heat intolerance and excessive thirst.   Genitourinary:  Negative for dysuria and frequency.   Musculoskeletal:  Negative for pain and muscle ache.   Skin:  Positive for wound.   Allergic/Immunologic: Negative for environmental allergies and seasonal allergies.   Neurological:  Negative for dizziness and altered mental status.   Hematologic/Lymphatic: Negative for swollen lymph nodes.   Psychiatric/Behavioral:  Negative for altered mental status.       Objective:     Physical Exam   Constitutional: She is oriented to person, place, and time. She appears well-developed. She is cooperative.  Non-toxic appearance. She does not appear ill. No distress. normal  HENT:   Head: Normocephalic and atraumatic.   Ears:   Right Ear: Hearing normal.   Left Ear: Hearing normal.   Nose: Nose normal. No mucosal edema or nasal deformity. No epistaxis. Right sinus " exhibits no maxillary sinus tenderness and no frontal sinus tenderness. Left sinus exhibits no maxillary sinus tenderness and no frontal sinus tenderness.   Mouth/Throat: Uvula is midline, oropharynx is clear and moist and mucous membranes are normal. Mucous membranes are moist. No trismus in the jaw. Normal dentition. No uvula swelling. Oropharynx is clear.   Eyes: Conjunctivae and lids are normal. Pupils are equal, round, and reactive to light. Extraocular movement intact   Neck: Trachea normal and phonation normal. Neck supple.   Cardiovascular: Normal rate, regular rhythm, normal heart sounds and normal pulses.   Pulmonary/Chest: Effort normal and breath sounds normal.   Abdominal: Normal appearance.   Musculoskeletal: Normal range of motion.         General: Normal range of motion.      Right hand: Right index finger: Injuries: laceration (Resolved).        Hands:    Lymphadenopathy:     She has no cervical adenopathy.   Neurological: no focal deficit. She is alert and oriented to person, place, and time. She exhibits normal muscle tone.   Skin: Skin is warm, dry, intact and not diaphoretic. Capillary refill takes 2 to 3 seconds.   Psychiatric: Her speech is normal and behavior is normal. Mood, judgment and thought content normal.   Nursing note and vitals reviewed.      Assessment:     1. Visit for suture removal        Plan:       Visit for suture removal  -     Suture Removal

## 2023-08-28 ENCOUNTER — HOSPITAL ENCOUNTER (OUTPATIENT)
Dept: RADIOLOGY | Facility: HOSPITAL | Age: 73
Discharge: HOME | End: 2023-08-28
Attending: FAMILY MEDICINE
Payer: MEDICARE

## 2023-08-28 VITALS — WEIGHT: 117.06 LBS | BODY MASS INDEX: 18.81 KG/M2 | HEIGHT: 66 IN

## 2023-08-28 DIAGNOSIS — Z12.31 BREAST CANCER SCREENING BY MAMMOGRAM: ICD-10-CM

## 2023-08-28 DIAGNOSIS — Z78.0 MENOPAUSE: ICD-10-CM

## 2023-08-28 DIAGNOSIS — Z13.820 SCREENING FOR OSTEOPOROSIS: ICD-10-CM

## 2023-08-28 PROCEDURE — 77067 SCR MAMMO BI INCL CAD: CPT | Mod: TC,PO

## 2023-08-28 PROCEDURE — 77080 DXA BONE DENSITY AXIAL: CPT | Mod: TC,PO

## 2023-09-29 ENCOUNTER — TELEPHONE (OUTPATIENT)
Dept: FAMILY MEDICINE | Facility: CLINIC | Age: 73
End: 2023-09-29

## 2023-09-29 ENCOUNTER — OFFICE VISIT (OUTPATIENT)
Dept: FAMILY MEDICINE | Facility: CLINIC | Age: 73
End: 2023-09-29
Payer: MEDICARE

## 2023-09-29 VITALS
OXYGEN SATURATION: 99 % | HEART RATE: 70 BPM | WEIGHT: 126.56 LBS | SYSTOLIC BLOOD PRESSURE: 122 MMHG | BODY MASS INDEX: 20.34 KG/M2 | DIASTOLIC BLOOD PRESSURE: 74 MMHG | TEMPERATURE: 97 F | HEIGHT: 66 IN
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
## 2023-09-29 DIAGNOSIS — N39.0 URINARY TRACT INFECTION WITHOUT HEMATURIA, SITE UNSPECIFIED: Primary | ICD-10-CM

## 2023-09-29 DIAGNOSIS — M85.80 OSTEOPENIA, UNSPECIFIED LOCATION: ICD-10-CM

## 2023-09-29 DIAGNOSIS — E27.8 ADRENAL NODULE: ICD-10-CM

## 2023-09-29 LAB
BILIRUBIN, UA POC OHS: NEGATIVE
BLOOD, UA POC OHS: NEGATIVE
CLARITY, UA POC OHS: CLEAR
COLOR, UA POC OHS: YELLOW
GLUCOSE, UA POC OHS: NEGATIVE
KETONES, UA POC OHS: 15
LEUKOCYTES, UA POC OHS: ABNORMAL
NITRITE, UA POC OHS: NEGATIVE
PH, UA POC OHS: 5.5
PROTEIN, UA POC OHS: NEGATIVE
SPECIFIC GRAVITY, UA POC OHS: >=1.03
UROBILINOGEN, UA POC OHS: 0.2

## 2023-09-29 PROCEDURE — 81003 POCT URINALYSIS(INSTRUMENT): ICD-10-PCS | Mod: QW,S$GLB,, | Performed by: FAMILY MEDICINE

## 2023-09-29 PROCEDURE — 99214 PR OFFICE/OUTPT VISIT, EST, LEVL IV, 30-39 MIN: ICD-10-PCS | Mod: S$GLB,,, | Performed by: FAMILY MEDICINE

## 2023-09-29 PROCEDURE — 81003 URINALYSIS AUTO W/O SCOPE: CPT | Mod: QW,S$GLB,, | Performed by: FAMILY MEDICINE

## 2023-09-29 PROCEDURE — 99214 OFFICE O/P EST MOD 30 MIN: CPT | Mod: S$GLB,,, | Performed by: FAMILY MEDICINE

## 2023-09-29 RX ORDER — NITROFURANTOIN 25; 75 MG/1; MG/1
100 CAPSULE ORAL 2 TIMES DAILY
Qty: 10 CAPSULE | Refills: 0 | Status: SHIPPED | OUTPATIENT
Start: 2023-09-29 | End: 2023-10-18

## 2023-09-29 RX ORDER — POTASSIUM CHLORIDE 750 MG/1
10 TABLET, EXTENDED RELEASE ORAL 2 TIMES DAILY
Qty: 90 TABLET | Refills: 1 | Status: SHIPPED | OUTPATIENT
Start: 2023-09-29 | End: 2024-01-23 | Stop reason: SDUPTHER

## 2023-09-29 RX ORDER — ALENDRONATE SODIUM 70 MG/1
70 TABLET ORAL
Qty: 12 TABLET | Refills: 3 | Status: SHIPPED | OUTPATIENT
Start: 2023-09-29 | End: 2024-09-28

## 2023-09-29 NOTE — TELEPHONE ENCOUNTER
----- Message from Abigail Hoang sent at 9/29/2023  4:53 PM CDT -----  Contact: pt [phone]  Type: Needs Medical Advice  Who Called:  Pt     Pharmacy name and phone #:    [facility] - [place], [place] - [address]  [id number]  [place] [address]  Phone: [phone] Fax: [phone]      Best Call Back Number: [phone]    Additional Information: Pt stated pharmacy has received no rx for her uti today. Pls [place] back and advise

## 2023-10-02 NOTE — PROGRESS NOTES
Subjective:       Patient ID: Kasandra Jones is a 73 y.o. female.    Chief Complaint: Follow-up and Dysuria    Follow-up of her weight loss.  BMI is up slightly.  She is gained 10 lb eating better with the Megace 40 mg t.i.d..  Tolerating it well.  Urinary tract infection today.  Symptoms often on dysuria for a week now.  No fever chills.  Did not have sprue.  Apparently had upper endoscopy done which we do not have a copy of.  Done by Dr. Jordan silva.  TSH was okay CMP was okay CBC MCV elevated at 103 mammogram was okay also.  Had a fall.  Sutures in 1 finger.  Was getting out of van and fell.  X-ray of the hip was negative.  Feeling okay now.  Osteoporosis-3.2 and -1.5.  On calcium and vitamin-D.  Needs treatment.  Adrenal nodule.  Appears to be benign.  She is also choking on her potassium 20 mEq pills.  Needs a smaller tablet.  Urinalysis is negative for nitrites positive for leukocytes.    Physical examination.  Vital signs noted.  Neck without bruit.  Chest clear.  Heart regular rate rhythm.  Abdomen bowel sounds are positive soft nontender.  Extremities without edema positive pedal pulses.        Objective:        Assessment:       1. Urinary tract infection without hematuria, site unspecified    2. BMI 20.0-20.9, adult    3. Fall, initial encounter    4. Adrenal nodule    5. Osteopenia, unspecified location        Plan:       Urinary tract infection without hematuria, site unspecified  -     POCT Urinalysis(Instrument)    BMI 20.0-20.9, adult    Fall, initial encounter    Adrenal nodule    Osteopenia, unspecified location    Other orders  -     alendronate (FOSAMAX) 70 MG tablet; Take 1 tablet (70 mg total) by mouth every 7 days.  Dispense: 12 tablet; Refill: 3  -     potassium chloride SA (K-DUR,KLOR-CON M) 10 MEQ tablet; Take 1 tablet (10 mEq total) by mouth 2 (two) times daily.  Dispense: 90 tablet; Refill: 1  -     nitrofurantoin, macrocrystal-monohydrate, (MACROBID) 100 MG capsule; Take 1 capsule (100 mg  total) by mouth 2 (two) times daily.  Dispense: 10 capsule; Refill: 0    Fosamax 70 mg weekly.  Twelve with 3 refills.  Potassium chloride 10 mEq 90 with a refill.  Continue the Megace b.i.d..  Macrobid b.i.d. for 10 days culture sensitivity.  Follow-up 3 months.  Follow the adrenal nodule.

## 2023-10-17 ENCOUNTER — OFFICE VISIT (OUTPATIENT)
Dept: FAMILY MEDICINE | Facility: CLINIC | Age: 73
End: 2023-10-17
Payer: MEDICARE

## 2023-10-17 VITALS
WEIGHT: 126.31 LBS | TEMPERATURE: 98 F | BODY MASS INDEX: 20.3 KG/M2 | HEIGHT: 66 IN | SYSTOLIC BLOOD PRESSURE: 134 MMHG | HEART RATE: 71 BPM | OXYGEN SATURATION: 98 % | DIASTOLIC BLOOD PRESSURE: 80 MMHG | RESPIRATION RATE: 14 BRPM

## 2023-10-17 DIAGNOSIS — R35.0 URINARY FREQUENCY: Primary | ICD-10-CM

## 2023-10-17 DIAGNOSIS — N39.46 MIXED STRESS AND URGE URINARY INCONTINENCE: ICD-10-CM

## 2023-10-17 DIAGNOSIS — N32.81 OVERACTIVE BLADDER: ICD-10-CM

## 2023-10-17 LAB
BILIRUBIN, UA POC OHS: NEGATIVE
BLOOD, UA POC OHS: NEGATIVE
CLARITY, UA POC OHS: CLEAR
COLOR, UA POC OHS: YELLOW
GLUCOSE, UA POC OHS: NEGATIVE
KETONES, UA POC OHS: NEGATIVE
LEUKOCYTES, UA POC OHS: NEGATIVE
NITRITE, UA POC OHS: NEGATIVE
PH, UA POC OHS: 5.5
PROTEIN, UA POC OHS: NEGATIVE
SPECIFIC GRAVITY, UA POC OHS: 1.02
UROBILINOGEN, UA POC OHS: 0.2

## 2023-10-17 PROCEDURE — 99215 OFFICE O/P EST HI 40 MIN: CPT | Mod: PBBFAC,PO

## 2023-10-17 PROCEDURE — 99999 PR PBB SHADOW E&M-EST. PATIENT-LVL V: ICD-10-PCS | Mod: PBBFAC,,,

## 2023-10-17 PROCEDURE — 81003 URINALYSIS AUTO W/O SCOPE: CPT | Mod: PBBFAC,PO

## 2023-10-17 PROCEDURE — 87086 URINE CULTURE/COLONY COUNT: CPT

## 2023-10-17 PROCEDURE — 99214 PR OFFICE/OUTPT VISIT, EST, LEVL IV, 30-39 MIN: ICD-10-PCS | Mod: S$PBB,,,

## 2023-10-17 PROCEDURE — 99999PBSHW POCT URINALYSIS(INSTRUMENT): Mod: PBBFAC,,,

## 2023-10-17 PROCEDURE — 99999 PR PBB SHADOW E&M-EST. PATIENT-LVL V: CPT | Mod: PBBFAC,,,

## 2023-10-17 PROCEDURE — 99214 OFFICE O/P EST MOD 30 MIN: CPT | Mod: S$PBB,,,

## 2023-10-17 PROCEDURE — 99999PBSHW POCT URINALYSIS(INSTRUMENT): ICD-10-PCS | Mod: PBBFAC,,,

## 2023-10-17 RX ORDER — OXYBUTYNIN CHLORIDE 5 MG/1
5 TABLET, EXTENDED RELEASE ORAL DAILY
Qty: 30 TABLET | Refills: 2 | Status: SHIPPED | OUTPATIENT
Start: 2023-10-17 | End: 2024-01-03

## 2023-10-17 NOTE — PATIENT INSTRUCTIONS
2-3 weeks from now give me a call and let me know how things are going with the new medication (oxybutynin)       Hai Slaughter,     If you are due for any health screening(s) below please notify me so we can arrange them to be ordered and scheduled to maintain your health. Most healthy patients complete it. Don't lose out on improving your health.     All of your core healthy metrics are met.

## 2023-10-17 NOTE — PROGRESS NOTES
Subjective:       Patient ID: Kasandra Jones is a 73 y.o. female.    Chief Complaint: Urinary Frequency    Urinary Frequency   This is a chronic problem. The current episode started more than 1 year ago. The problem occurs every urination. The problem has been gradually worsening. The patient is experiencing no pain. There has been no fever. Associated symptoms include frequency and urgency. She has tried antibiotics for the symptoms. Improvement on treatment: Resolved the pain she had but she chronically deals with frequency.   Using 8-10 depends daily. Frustrated with symptoms. Also frustrated with having to go to so many appointments. Doing radiation therapy 3 times a week on top of other appointments. Frustrated with the amount of medications she takes daily.     Past Medical History:   Diagnosis Date    Cataract        Review of patient's allergies indicates:   Allergen Reactions    Niacin preparations     Penicillins          Current Outpatient Medications:     alendronate (FOSAMAX) 70 MG tablet, Take 1 tablet (70 mg total) by mouth every 7 days., Disp: 12 tablet, Rfl: 3    ALPRAZolam (XANAX) 0.5 MG tablet, Take 1 tablet (0.5 mg total) by mouth daily as needed for Anxiety. (Patient taking differently: Take 0.5 mg by mouth daily as needed for Anxiety. PRN), Disp: 30 tablet, Rfl: 0    buPROPion (WELLBUTRIN XL) 300 MG 24 hr tablet, Take 1 tablet (300 mg total) by mouth once daily., Disp: 90 tablet, Rfl: 1    cetirizine (ZYRTEC) 10 MG tablet, Take 10 mg by mouth once daily., Disp: , Rfl:     cholecalciferol, vitamin D3, 125 mcg (5,000 unit) capsule, , Disp: , Rfl:     copper gluconate 2 mg Tab, Take by mouth., Disp: , Rfl:     cyanocobalamin-cobamamide (B12) 5,000-100 mcg Lozg, , Disp: , Rfl:     diphenoxylate-atropine 2.5-0.025 mg (LOMOTIL) 2.5-0.025 mg per tablet, Take 1 tablet by mouth 4 (four) times daily as needed for Diarrhea., Disp: 30 tablet, Rfl: 1    divalproex 500 MG Tb24, Take 2 tablets (1,000 mg  "total) by mouth once daily., Disp: 180 tablet, Rfl: 1    ELIQUIS 5 mg Tab, Take 1 tablet (5 mg total) by mouth 2 (two) times daily., Disp: 180 tablet, Rfl: 1    ferrous sulfate 325 (65 FE) MG EC tablet, , Disp: , Rfl:     levothyroxine (SYNTHROID) 100 MCG tablet, Take 1 tablet (100 mcg total) by mouth once daily., Disp: 90 tablet, Rfl: 1    magnesium oxide (MAG-OX) 400 mg (241.3 mg magnesium) tablet, , Disp: , Rfl:     megestroL (MEGACE) 40 MG Tab, Take 1 tablet (40 mg total) by mouth 3 (three) times daily., Disp: 270 tablet, Rfl: 1    MULTAQ 400 mg Tab, Take 1 tablet (400 mg total) by mouth 2 (two) times daily., Disp: 180 tablet, Rfl: 1    mupirocin (BACTROBAN) 2 % ointment, Apply topically 3 (three) times daily., Disp: 22 g, Rfl: 0    nitrofurantoin, macrocrystal-monohydrate, (MACROBID) 100 MG capsule, Take 1 capsule (100 mg total) by mouth 2 (two) times daily., Disp: 10 capsule, Rfl: 0    potassium chloride SA (K-DUR,KLOR-CON M) 10 MEQ tablet, Take 1 tablet (10 mEq total) by mouth 2 (two) times daily., Disp: 90 tablet, Rfl: 1    RABEprazole (ACIPHEX) 20 mg tablet, Take 1 tablet (20 mg total) by mouth 2 (two) times daily., Disp: 180 tablet, Rfl: 1    traZODone (DESYREL) 100 MG tablet, TAKE TWO TABLETS PO NIGHTLY, Disp: 180 tablet, Rfl: 1    vit C,U-Oe-apmjx-lutein-zeaxan (PRESERVISION AREDS 2) 250-90-40-1 mg Cap, , Disp: , Rfl:     HYDROcodone-acetaminophen (NORCO) 5-325 mg per tablet, Take 1 tablet by mouth every 6 (six) hours as needed for Pain. (Patient not taking: Reported on 9/29/2023), Disp: 14 tablet, Rfl: 0    oxybutynin (DITROPAN-XL) 5 MG TR24, Take 1 tablet (5 mg total) by mouth once daily., Disp: 30 tablet, Rfl: 2    Review of Systems   Genitourinary:  Positive for frequency and urgency.       Objective:      /80 (BP Location: Right arm, Patient Position: Sitting, BP Method: Medium (Manual))   Pulse 71   Temp 97.9 °F (36.6 °C) (Oral)   Resp 14   Ht 5' 6" (1.676 m)   Wt 57.3 kg (126 lb 5.2 oz) "   LMP  (LMP Unknown)   SpO2 98%   BMI 20.39 kg/m²   Physical Exam  Vitals reviewed.   Constitutional:       General: She is not in acute distress.     Appearance: Normal appearance. She is not ill-appearing, toxic-appearing or diaphoretic.   HENT:      Head: Normocephalic.      Right Ear: External ear normal.      Left Ear: External ear normal.      Nose: Nose normal. No congestion or rhinorrhea.      Mouth/Throat:      Mouth: Mucous membranes are moist.      Pharynx: Oropharynx is clear.   Eyes:      General: No scleral icterus.        Right eye: No discharge.         Left eye: No discharge.      Extraocular Movements: Extraocular movements intact.      Conjunctiva/sclera: Conjunctivae normal.   Cardiovascular:      Rate and Rhythm: Normal rate and regular rhythm.      Pulses: Normal pulses.      Heart sounds: Normal heart sounds. No murmur heard.     No friction rub. No gallop.   Pulmonary:      Effort: Pulmonary effort is normal. No respiratory distress.      Breath sounds: Normal breath sounds. No wheezing, rhonchi or rales.   Chest:      Chest wall: No tenderness.   Musculoskeletal:         General: No swelling, tenderness or deformity. Normal range of motion.      Cervical back: Normal range of motion.      Right lower leg: No edema.      Left lower leg: No edema.   Skin:     General: Skin is warm and dry.      Capillary Refill: Capillary refill takes less than 2 seconds.      Coloration: Skin is not jaundiced.      Findings: Bruising present. No erythema, lesion or rash.   Neurological:      Mental Status: She is alert and oriented to person, place, and time.      Gait: Gait abnormal.   Psychiatric:         Mood and Affect: Mood normal.         Behavior: Behavior normal.         Thought Content: Thought content normal.         Judgment: Judgment normal.      Comments: Tearful          Assessment:       1. Urinary frequency    2. Overactive bladder    3. Mixed stress and urge urinary incontinence         Plan:       Urinary frequency  -     Urine culture  -     POCT Urinalysis(Instrument)  -     oxybutynin (DITROPAN-XL) 5 MG TR24; Take 1 tablet (5 mg total) by mouth once daily.  Dispense: 30 tablet; Refill: 2  -     Ambulatory referral/consult to Physical/Occupational Therapy; Future; Expected date: 10/24/2023    Overactive bladder  -     oxybutynin (DITROPAN-XL) 5 MG TR24; Take 1 tablet (5 mg total) by mouth once daily.  Dispense: 30 tablet; Refill: 2  -     Ambulatory referral/consult to Physical/Occupational Therapy; Future; Expected date: 10/24/2023    Mixed stress and urge urinary incontinence  -     Ambulatory referral/consult to Physical/Occupational Therapy; Future; Expected date: 10/24/2023        Will call me with update in 2-3 weeks.          Javed Mccloud PA-C  Family Medicine Physician Assistant                I spent a total of 20 minutes on the day of the visit.This includes face to face time and non-face to face time preparing to see the patient (eg, review of tests), obtaining and/or reviewing separately obtained history, documenting clinical information in the electronic or other health record, independently interpreting results and communicating results to the patient/family/caregiver, or care coordinator.      We have addressed [4] Moderate: 1 or more chronic illnesses with exacerbation, progression, or side effects of treatment / 2 or more stable chronic illnesses / 1 undiagnosed new problem with uncertain prognosis / 1 acute illness with systemic symptoms / 1 acute complicated injury  The complexity of the data reviewed and analyzed for this visit was [3] Limited (Reviewed prior external note, ordered unique testing or reviewed the results of each unique test)   The risk of complications and/or morbidity or mortality are [4] Moderate risk (I.e. prescription drug management / decision regarding minor surgery with identified pt or procedure risk factors / decision regarding elective major  surgery without identified pt or procedure risk factors / diagnosis or treatment significantly limited by social determinants of health)   The level of Medical Decision Making for this visit is [4] Moderate

## 2023-10-18 ENCOUNTER — LAB VISIT (OUTPATIENT)
Dept: LAB | Facility: HOSPITAL | Age: 73
End: 2023-10-18
Attending: NURSE PRACTITIONER
Payer: MEDICARE

## 2023-10-18 ENCOUNTER — OFFICE VISIT (OUTPATIENT)
Dept: NEUROLOGY | Facility: CLINIC | Age: 73
End: 2023-10-18
Payer: MEDICARE

## 2023-10-18 VITALS
HEART RATE: 75 BPM | DIASTOLIC BLOOD PRESSURE: 74 MMHG | HEIGHT: 66 IN | SYSTOLIC BLOOD PRESSURE: 155 MMHG | RESPIRATION RATE: 16 BRPM | BODY MASS INDEX: 20.39 KG/M2 | WEIGHT: 126.88 LBS

## 2023-10-18 DIAGNOSIS — R41.3 OTHER AMNESIA: ICD-10-CM

## 2023-10-18 DIAGNOSIS — I48.0 PAROXYSMAL ATRIAL FIBRILLATION: ICD-10-CM

## 2023-10-18 DIAGNOSIS — R26.9 GAIT DISORDER: ICD-10-CM

## 2023-10-18 DIAGNOSIS — R22.9 LOCALIZED SWELLING, MASS AND LUMP, UNSPECIFIED: Primary | ICD-10-CM

## 2023-10-18 DIAGNOSIS — R41.89 COGNITIVE IMPAIRMENT: ICD-10-CM

## 2023-10-18 DIAGNOSIS — E61.0 COPPER DEFICIENCY: ICD-10-CM

## 2023-10-18 DIAGNOSIS — Z98.84 HISTORY OF GASTRIC BYPASS: ICD-10-CM

## 2023-10-18 DIAGNOSIS — G62.9 POLYNEUROPATHY: ICD-10-CM

## 2023-10-18 DIAGNOSIS — G95.89 SPINAL CORD MASS: ICD-10-CM

## 2023-10-18 DIAGNOSIS — R42 DYSEQUILIBRIUM: ICD-10-CM

## 2023-10-18 LAB
CREAT SERPL-MCNC: 0.7 MG/DL (ref 0.5–1.4)
EST. GFR  (NO RACE VARIABLE): >60 ML/MIN/1.73 M^2

## 2023-10-18 PROCEDURE — 36415 COLL VENOUS BLD VENIPUNCTURE: CPT | Mod: PO | Performed by: NURSE PRACTITIONER

## 2023-10-18 PROCEDURE — 99999 PR PBB SHADOW E&M-EST. PATIENT-LVL V: ICD-10-PCS | Mod: PBBFAC,,, | Performed by: NURSE PRACTITIONER

## 2023-10-18 PROCEDURE — 99214 PR OFFICE/OUTPT VISIT, EST, LEVL IV, 30-39 MIN: ICD-10-PCS | Mod: S$PBB,,, | Performed by: NURSE PRACTITIONER

## 2023-10-18 PROCEDURE — 99215 OFFICE O/P EST HI 40 MIN: CPT | Mod: PBBFAC,PO | Performed by: NURSE PRACTITIONER

## 2023-10-18 PROCEDURE — 99214 OFFICE O/P EST MOD 30 MIN: CPT | Mod: S$PBB,,, | Performed by: NURSE PRACTITIONER

## 2023-10-18 PROCEDURE — 99999 PR PBB SHADOW E&M-EST. PATIENT-LVL V: CPT | Mod: PBBFAC,,, | Performed by: NURSE PRACTITIONER

## 2023-10-18 PROCEDURE — 82565 ASSAY OF CREATININE: CPT | Performed by: NURSE PRACTITIONER

## 2023-10-18 NOTE — ASSESSMENT & PLAN NOTE
Surveillance imaging ordered today  Referral placed to [facility] team per her request to establish more local care

## 2023-10-18 NOTE — ASSESSMENT & PLAN NOTE
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

## 2023-10-18 NOTE — PATIENT INSTRUCTIONS
Orders placed for the repeat imaging of the brain and neck to monitor the lesion.   Referral to [facility] placed -- let us know if you do not hear from them to schedule an appt     Continue the supplements as you are doing.     Follow up with me as needed

## 2023-10-18 NOTE — PROGRESS NOTES
NEUROLOGY  Outpatient Follow Up Visit     [facility]  [address] [facility], [place] [address]  [phone] (office) / [phone] (fax)    Patient Name:  Kasandra Jones  :  1950  MR #:  [id number]  Acct #:  [id number]    Date of  Visit: 10/18/2023    Other Physicians:  Sanjiv Ford III, MD (Primary Care Physician)      CHIEF COMPLAINT: Dizziness (Follow up )      Interval history:  10/18/23:  Kasandra Jones is a 72 y.o. R-handed female seen in f/u for dizziness     Medical history is significant for anxiety, bipolar 1 disorder, HLD, pAfib, B12 deficiency, gastric bypass surgery, vitamin D deficiency    No further dizziness. No fall since August. Using rolling walker to ambulate.     Continues on copper and B6 supplementation. Same sensory loss in feet, not progressing.     BP has been better, not low.     Taking Megace as needed. Helped her gain some weight. Off it now, has gained at least 10 lbs.     Her memory is stable. No change in prior LOF.  handles finances and does help with her medications because she has so many.     Intermittent sleep issues. Rx trazodone. Has been having urinary frequency, so Rx for oxybutynin given yesterday. Hasn't started yet.     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 Would like to establish care with [facility] team rather than travel to [facility].     23:  Kasandra Jones is a 72 y.o. R-handed female seen in f/u for dizziness     Medical history is significant for anxiety, bipolar 1 disorder, HLD, pAfib, B12 deficiency, gastric bypass surgery, vitamin D deficiency    Serologies were significant for copper and B6 deficiencies.     Currently taking B6 100 mg daily and copper 2 mg daily (completed step wise titration as directed). Repeat copper levels done recently showed improvement.     Last fall was in mid March. She had a CT of the head that was acutely negative.     She reports significant improvement in  "dizziness and imbalance. Overall, states that she is feeling much better. She is walking better, states that she isn't as reliant on her cane. She still uses it to prevent falls.     Recently discharged from outpatient PT. Notes indicate that she made progress, but impaired proprioception and knee weakness limited potential for further rehab. Recommended continued use of walker.     Orthostatic at last visit from sit to stand. Continues on Multaq due to history of Afib. Remains off all other BP agents. Denies any further positional symptoms. Has been able to increase PO intake some. Able to tolerate small portions at each meal time now. Feels that she stays well hydrated.     Was told by cardiology to take meclizine BID scheduled. Makes her tired. She wants to stop it.     No cognitive change since last visit. Remains independent with ADLs and most iADLs. Has been able to cook more recently. Does have some trouble balancing checkbook, so  handles this and she checks behind him. Able to take her Rx without help. Doesn't drive due to chronic issues / dizziness, doesn't really like to drive. Sleeping better since last visit also.     HISTORY OF PRESENT ILLNESS 2/16/23:  Kasandra Jones is a 73 y.o. R-handed female seen in consultation for dizziness per No ref. provider found    Medical history is significant for anxiety, bipolar 1 disorder, HLD, pAfib, B12 deficiency, gastric bypass surgery, vitamin D deficiency    She notes dizziness and imbalance. It started approximately 1 year ago and is getting worse.   She feels "woozy" just sitting in a chair, but symptoms are much worse with standing or ambulating. She feels like she is swaying when she is standing upright. She often times falls straight backwards and  will have to catch her. She doesn't feel like she is going to pass out and hasn't had any LOC. No sensation of room spinning. It is worse with any change in direction or height, like stepping off a " curb. When she first wakes up, she seems to be her strongest, but worsens as the day progresses.     She had a fall earlier this week. She was walking in her room and just fell backwards. Prior to this, had not fallen within the last 6 months but has had several near falls.     She appears to be shuffling her feet more per her . She uses a cane.     She denies numbness or tingling in the LE.     She feels tired and weak all the time but denies any focal weakness in the LE.     Denies HA, vision change, or speech change. She has a lot of nausea, but no vomiting. Has chronic hearing loss and has hearing aids. No change from her baseline.     She started PT Monday to help her weakness and imbalance.     She has a mass on her spine that has been present for at least a year per report. She is followed by Dr. Jass Harris for this.     She initially saw her cardiologist and her PCP for her concerns. She was then referred to ENT, who referred her to neurology.     She used to have high BP, but then had issues with BP dropping and was taken off of all Rx.     She takes Depakote 1000 mg daily for BPD. She takes Xanax 0.5 mg as needed. Her current Rx has lasted her for over a year, though. There hasn't been any change in her medications to correlate with her symptoms.     She takes B12 orally every day.     She has tremor in the R hand when she is using it to eat or write. None at rest.      finds that she is easily confused. She has ST memory loss. Onset was 1-2 years ago and is progressively worse. She is independent with ADLs and most iADLs. She has weakness, fatigue and SOB that limit her ability to stand or exert too long. She takes her medications without difficulty. She has executive dysfunction. No language changes.     She has never slept well at night. She makes up for it during the day with naps.     Allergies:  Review of patient's allergies indicates:   Allergen Reactions    Niacin preparations      Penicillins        Current Medications:  Current Outpatient Medications   Medication Sig Dispense Refill    alendronate (FOSAMAX) 70 MG tablet Take 1 tablet (70 mg total) by mouth every 7 days. 12 tablet 3    ALPRAZolam (XANAX) 0.5 MG tablet Take 1 tablet (0.5 mg total) by mouth daily as needed for Anxiety. (Patient taking differently: Take 0.5 mg by mouth daily as needed for Anxiety. PRN) 30 tablet 0    buPROPion (WELLBUTRIN XL) 300 MG 24 hr tablet Take 1 tablet (300 mg total) by mouth once daily. 90 tablet 1    cetirizine (ZYRTEC) 10 MG tablet Take 10 mg by mouth once daily.      cholecalciferol, vitamin D3, 125 mcg (5,000 unit) capsule       copper gluconate 2 mg Tab Take by mouth.      cyanocobalamin-cobamamide (B12) 5,000-100 mcg Lozg       diphenoxylate-atropine 2.5-0.025 mg (LOMOTIL) 2.5-0.025 mg per tablet Take 1 tablet by mouth 4 (four) times daily as needed for Diarrhea. 30 tablet 1    divalproex 500 MG Tb24 Take 2 tablets (1,000 mg total) by mouth once daily. 180 tablet 1    ELIQUIS 5 mg Tab Take 1 tablet (5 mg total) by mouth 2 (two) times daily. 180 tablet 1    ferrous sulfate 325 (65 FE) MG EC tablet       levothyroxine (SYNTHROID) 100 MCG tablet Take 1 tablet (100 mcg total) by mouth once daily. 90 tablet 1    magnesium oxide (MAG-OX) 400 mg (241.3 mg magnesium) tablet       megestroL (MEGACE) 40 MG Tab Take 1 tablet (40 mg total) by mouth 3 (three) times daily. 270 tablet 1    MULTAQ 400 mg Tab Take 1 tablet (400 mg total) by mouth 2 (two) times daily. 180 tablet 1    mupirocin (BACTROBAN) 2 % ointment Apply topically 3 (three) times daily. 22 g 0    oxybutynin (DITROPAN-XL) 5 MG TR24 Take 1 tablet (5 mg total) by mouth once daily. 30 tablet 2    potassium chloride SA (K-DUR,KLOR-CON M) 10 MEQ tablet Take 1 tablet (10 mEq total) by mouth 2 (two) times daily. 90 tablet 1    RABEprazole (ACIPHEX) 20 mg tablet Take 1 tablet (20 mg total) by mouth 2 (two) times daily. 180 tablet 1    traZODone (DESYREL)  "100 MG tablet TAKE TWO TABLETS PO NIGHTLY 180 tablet 1    vit C,U-Rs-fayvp-lutein-zeaxan (PRESERVISION AREDS 2) 250-90-40-1 mg Cap        No current facility-administered medications for this visit.       Past Medical History:  Past Medical History:   Diagnosis Date    Cataract        Past Surgical History:  Past Surgical History:   Procedure Laterality Date    EYE SURGERY Bilateral 2020 August    cataract removal    HYSTERECTOMY      TOTAL REDUCTION MAMMOPLASTY  2002       Family History:  family history includes Breast cancer in her maternal aunt.    Social History:   reports that she has never smoked. She has never used smokeless tobacco. She reports that she does not drink alcohol and does not use drugs.      REVIEW OF SYSTEMS:  As per HPI    PHYSICAL EXAM:  BP (!) 155/74 (BP Location: Right arm, Patient Position: Sitting, BP Method: Medium (Automatic))   Pulse 75   Resp 16   Ht 5' 6" (1.676 m)   Wt 57.6 kg (126 lb 14 oz)   LMP  (LMP Unknown)   BMI 20.48 kg/m²     General: Well groomed. No acute distress.  Pulmonary: Normal effort and rate.   Musculoskeletal: No obvious joint deformities, moves all extremities well.  Extremities: No clubbing, cyanosis or edema.     Neurological exam:  Mental status: Awake and alert.  Oriented to person, place, time and situation. Recent memory impaired on DR. Decreased attention and concentration. Normal fund of knowledge.   Speech/Language: Fluent and appropriate. No dysarthria or aphasia on conversation. Able to follow complex commands.   Cranial nerves (II-XII): Visual fields full. Extraocular movements intact, no ptosis, no nystagmus. Face symmetric. Hearing grossly intact. Palate deferred. Shoulder shrug normal bilaterally. Normal tongue protrusion.   Motor: 5 out of 5 strength throughout the upper and lower extremities bilaterally. Normal bulk and tone. No abnormal movements or drift.   Sensation: Decreased to pin and vibration to the ankles. Abnormal proprioception. "   DTR: 2+ at the biceps, mute at the knees.   Coordination: Finger-nose-finger testing intact bilaterally. JAMILA normal bilaterally. Mild BUE postural and intent tremor. No resting tremor. Negative Romberg.  Gait: Stands unassisted on second attempt. Base is wide. Holds onto surroundings. Mild shuffling.       DIAGNOSTIC DATA:  I have personally reviewed provider notes, labs and imaging made available to me today.     Reported onset of weakness, lightheadedness and imbalance in 11/2021 to cardiology. 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 Harris. She reported to Dr. Harris that her imbalance had been ongoing for at lest a couple of years. After further imaging, the area in question was deemed to be a nodule of uncertain behavior rather than an aneurysm. She underwent a metastatic evaluation (Neuroaxial MRIs, CT CAP) which was negative.     In the midst of this, she was also found to be orthostatic and her BP medications were discontinued. She also had a Holter study to evaluate her symptoms, which didn't show any significant arrhythmia.     Imaging:  MRI Brain w wo 1/4/23:      IMPRESSION:  1. Stable appearance of the examination with evidence of a fairly small surface lesions along the ventral edge of the cervical cord along the leptomeningeal surface of the proximal cervical cord at and slightly below the craniocervical junction without significant interval change upon comparison may reflect a small primary cord lesion or a small senescent meningioma. No appreciable change upon comparison.  2. Chronic cerebral atrophy and superimposed chronic deep white matter ischemia and small foci of elevated T2 signal reflective of small chronic microvascular infarcts or small areas of gliosis. Moderate  2. No evidence of pathologic enhancement postcontrast elsewhere.    MRI C spine w wo 7/19/22:    IMPRESSION:  1.  Multilevel discogenic and facet  degenerative changes of the cervical spine as described.  2.  3 x 3 x 3 mm enhancing mass along the posterior margin of the spinal cord at the level the foramen magnum. Findings could reflect a small meningioma, or other soft tissue mass.    MRI brain w wo 1/2022:  FINDINGS:  MRI of the brain demonstrates a 5 mm exophytic enhancing nodule along the posterior aspect of the medulla at the cervicomedullary junction.  This produces edema in the posterior aspect of medulla as seen on FLAIR and T2 imaging.  It does not appear associated with an artery to suggest aneurysm.  There is susceptibility on the SWI images indicating that it has internal hemorrhage.  No other enhancing nodule is seen in the brain.  No other edema is seen.     Small 5 mm cystic area is seen in the right paramedian suzette, not identified on FLAIR imaging.  This may represent an incidental parenchymal cyst, less likely a prior lacunar infarct.     Skull base structures show no acute lesions.  Bilateral surgical changes consistent cataract surgery.     3D time-of-flight MRA of the brain was performed.  No aneurysm, AVM, or stenosis is seen.  The lesion at the craniocervical junction was not included on noncontrast MRA, so contrast enhanced MRA was performed.  This shows the enhancing nodule, because it was performed following contrast.  There is no artery seen leading to the enhancing mass, however.     Impression:  5 mm enhancing hemorrhagic nodule at the craniocervical junction posteriorly.  This appears to be parenchymal and exophytic.  Differential considerations include primary or secondary to mass lesions such as hemorrhagic metastasis or hemangioblastoma.  Further investigation recommended.     No other acute findings within the brain.     No aneurysm, stenosis or AVM.     MRA brain 1/2022:  Impression:  5 mm enhancing hemorrhagic nodule at the craniocervical junction posteriorly.  This appears to be parenchymal and exophytic.  Differential  considerations include primary or secondary to mass lesions such as hemorrhagic metastasis or hemangioblastoma.  Further investigation recommended.     No other acute findings within the brain.    CTA head and neck 12/2021:  IMPRESSION:  1. A 4 mm enhancing mass along the dorsal aspect of the cervicomedullary junction, suspicious for aneurysm. Neurosurgical referral is recommended.  2. Mild atheromatous plaque of the carotid arteries, with no carotid or vertebral arterial stenosis.  3. No significant abnormality of the intracranial arterial vasculature.     Cardiac:  EKG 12/2022: NSR    24 hr Holter 10/2022:  The diary was properly completed. There were occasional hookup related artifacts. Overall, the study was of good quality. The tape was adequate (1 days , 24 hours, 0 minutes). There was an episode of fast heartbeat reported. The corresponding rhythm strips revealed the following: During the event (finished eating), the rhythm was sinus rhythm at 87 bpm with PVCs. There was an episode of heart beating hard reported. The corresponding rhythm strips revealed the following: During the event (sitting), the rhythm was sinus rhythm at 77 bpm with PVCs. There was an episode of flutter, feel shaky reported. The corresponding rhythm strips revealed the following: During the event (at computer), the rhythm was sinus rhythm at 72 bpm with PVCs. There was an episode of dizzy, almost fell reported. The corresponding rhythm strips revealed the following: During the event (sitting up), the rhythm was sinus rhythm at 87 bpm with PACs & PVCs. There was an episode of heart racing, sob reported. The corresponding rhythm strips revealed the following: During the event (putting food away), the rhythm was sinus rhythm at 75 bpm with PVCs. There was an episode of heart racing reported. The corresponding rhythm strips revealed the following: During the event (cooking), the rhythm was sinus rhythm at 90 bpm with PVCs.  Predominant  "Rhythm Sinus rhythm with heart rates varying between 47 and 107 BPM with an average of 70BPM. Maximum heart rate recorded at: 09:34 CDT on day 1. Minimum heart rate recorded at 07:33 CDT on day 1.  Ventricular Arrhythmias There were frequent mostly monomorphic PVCs totalling 3866 and averaging 80.54 per hour. There were 1 polymorphic couplets.  Supraventricular Arrhythmias There were very rare monomorphic PACs totalling 53 and averaging 1.1 per hour, 19 supraventricular bigeminy episodes and 45 supraventricular trigeminy episodes.  There was 1 run of non-sustained of PACs and lasting to 3 beats.       Labs:  Lab Results   Component Value Date    WBC 5.25 07/12/2023    HGB 14.3 07/12/2023    HCT 44.3 07/12/2023     07/12/2023     (H) 07/12/2023    RDW 13.2 07/12/2023     Lab Results   Component Value Date     07/12/2023    K 4.3 07/12/2023     07/12/2023    CO2 30 (H) 07/12/2023    BUN 15 07/12/2023    CREATININE 0.7 07/12/2023     07/12/2023    CALCIUM 9.2 07/12/2023    MG 2.0 03/23/2023     Lab Results   Component Value Date    PROT 6.1 07/12/2023    ALBUMIN 3.3 (L) 07/12/2023    BILITOT 0.6 07/12/2023    AST 28 07/12/2023    ALKPHOS 76 07/12/2023    ALT 36 07/12/2023     No results found for: "INR", "PROTIME", "PTT"  Lab Results   Component Value Date    CHOL 170 09/29/2022    HDL 74 09/29/2022    LDLCALC 82.4 09/29/2022    TRIG 68 09/29/2022    CHOLHDL 43.5 09/29/2022     Lab Results   Component Value Date    LABA1C 5.1 04/02/2018      Lab Results   Component Value Date    YBMZFGMQ11 >1500 (H) 10/03/2022     No results found for: "FOLATE"  Lab Results   Component Value Date    TSH 2.070 07/12/2023     Component      Latest Ref Rng & Units 6/27/2022   Valproic Acid Lvl      50.0 - 100.0 ug/mL 40.0 (L)     Component      Latest Ref Rng & Units 4/7/2023 3/2/2023 2/16/2023   Protein, Serum      6.0 - 8.4 g/dL   5.7 (L)   Albumin grams/dl      3.35 - 5.55 g/dL   3.34 (L)   Alpha-1 " grams/dl      0.17 - 0.41 g/dL   0.26   Alpha-2      0.43 - 0.99 g/dL   0.44   Beta      0.50 - 1.10 g/dL   0.74   Gamma      0.67 - 1.58 g/dL   0.92   Vitamin E      9.0 - 29.0 mg/L  10.2    Vitamin E (Gamma-Tocopherol)      0.5 - 4.9 mg/L  0.7    Valproic Acid Lvl      50.0 - 100.0 ug/mL   30.6 (L)   Methlymalonic Acid      <0.40 umol/L   0.11   CHARITO Screen      Negative <1:80   Negative <1:80   Immunofix Interp.         SEE COMMENT   Thiamine      38 - 122 ug/L   66   Vitamin B6      5 - 50 ug/L   4 (L)   Copper      80 - 158 ug/dL 87  283 (L)   CERULOPLASMIN      19.0 - 39.0 mg/dL 19.5  12.0 (L)   CPK      20 - 180 U/L   38   Sed Rate      0 - 36 mm/Hr   <2   CRP      0.0 - 8.2 mg/L   1.0   Pathologist Interpretation GIBSON         REVIEWED   Pathologist Interpretation SPE         REVIEWED   Zinc, Serum-ALT      44 - 115 ug/dL  83        ASSESSMENT & PLAN:  Kasandra Jones is a 73 y.o. R-handed female seen in f/u for dizziness and memory loss.     Problem List Items Addressed This Visit          Neuro    Cognitive impairment    Overview     MMSE  27/30 April 2023    MRI brain Jan 2023 shows generalized atrophy and mild to moderate microangiopathy          Current Assessment & Plan     Stable per pt and   Continue efforts to maximize vascular health  Continue supplement regimen   No safety concerns in the home per pt and  -- she doesn't drive          Spinal cord mass    Overview     Noted in the posterior medulla at the cervicomedullary junction 12/2021  Stable on imaging as of 1/2023  Metastatic evaluation (neuroaxial MRI, CT CAP) negative   Followed by Dr. Harris in NSGY -- presumably benign          Current Assessment & Plan     Surveillance imaging ordered today  Referral placed to [facility] team per her request to establish more local care          Polyneuropathy       Cardiac/Vascular    Paroxysmal atrial fibrillation    Current Assessment & Plan     On DOAC for stroke prevention             Endocrine    History of gastric bypass    Copper deficiency       Other    Dysequilibrium    Current Assessment & Plan     Multifactorial -- orthostasis, multiple deficiencies leading to neuropathy   Improved, stable   Discussed safety, walk at all times            Gait disorder     Other Visit Diagnoses       Localized swelling, mass and lump, unspecified    -  Primary    Other amnesia                    Follow up: PRN    I spent a total of 30 minutes on the day of the visit.    This includes face to face time with the patient, as well as non-face to face time preparing for and completing the visit (review of prior diagnostic testing and clinical notes, obtaining or reviewing history, documenting clinical information in the EMR, independently interpreting and communicating results to the patient/family and coordinating ongoing care).           I appreciate the opportunity to participate in the care of this patient. Please feel free to contact me with any concerns or questions.       Anu Borden, [facility]  [facility]  [address] [place] [address]

## 2023-10-18 NOTE — ASSESSMENT & PLAN NOTE
Stable per pt and   Continue efforts to maximize vascular health  Continue supplement regimen   No safety concerns in the home per pt and  -- she doesn't drive

## 2023-10-19 LAB
BACTERIA UR CULT: NORMAL
BACTERIA UR CULT: NORMAL

## 2023-10-25 ENCOUNTER — TELEPHONE (OUTPATIENT)
Dept: NEUROSURGERY | Facility: CLINIC | Age: 73
End: 2023-10-25
Payer: MEDICARE

## 2023-11-03 ENCOUNTER — HOSPITAL ENCOUNTER (OUTPATIENT)
Dept: RADIOLOGY | Facility: HOSPITAL | Age: 73
Discharge: HOME | End: 2023-11-03
Attending: NURSE PRACTITIONER
Payer: MEDICARE

## 2023-11-03 DIAGNOSIS — R22.9 LOCALIZED SWELLING, MASS AND LUMP, UNSPECIFIED: ICD-10-CM

## 2023-11-03 DIAGNOSIS — R41.3 OTHER AMNESIA: ICD-10-CM

## 2023-11-03 PROCEDURE — 70553 MRI BRAIN STEM W/O & W/DYE: CPT | Mod: 26,,, | Performed by: RADIOLOGY

## 2023-11-03 PROCEDURE — 72156 MRI NECK SPINE W/O & W/DYE: CPT | Mod: TC,PO

## 2023-11-03 PROCEDURE — 25500020 PHARM REV CODE 255: Mod: PO | Performed by: NURSE PRACTITIONER

## 2023-11-03 PROCEDURE — 72156 MRI NECK SPINE W/O & W/DYE: CPT | Mod: 26,,, | Performed by: RADIOLOGY

## 2023-11-03 PROCEDURE — A9585 GADOBUTROL INJECTION: HCPCS | Mod: PO | Performed by: NURSE PRACTITIONER

## 2023-11-03 PROCEDURE — 70553 MRI BRAIN W WO CONTRAST: ICD-10-PCS | Mod: 26,,, | Performed by: RADIOLOGY

## 2023-11-03 PROCEDURE — 72156 MRI CERVICAL SPINE W WO CONTRAST: ICD-10-PCS | Mod: 26,,, | Performed by: RADIOLOGY

## 2023-11-03 PROCEDURE — 70553 MRI BRAIN STEM W/O & W/DYE: CPT | Mod: TC,PO

## 2023-11-03 RX ORDER — GADOBUTROL 604.72 MG/ML
5 INJECTION INTRAVENOUS
Status: COMPLETED | OUTPATIENT
Start: 2023-11-03 | End: 2023-11-03

## 2023-11-03 RX ADMIN — GADOBUTROL 5 ML: 604.72 INJECTION INTRAVENOUS at 02:11

## 2023-11-07 ENCOUNTER — TELEPHONE (OUTPATIENT)
Dept: FAMILY MEDICINE | Facility: CLINIC | Age: 73
End: 2023-11-07
Payer: MEDICARE

## 2023-11-07 NOTE — TELEPHONE ENCOUNTER
Pau Zamora [facility] Staff    ----- Message from Pau Zamora sent at 11/7/2023 10:44 AM CST -----  Type:  Needs Medical Advice    Who Called: pt    Would the patient rather a call back or a response via [facility]? Call back    Best Call Back Number: [phone]      Additional Information: pt said when she saw Rogers the last time he told her to call him in a few weeks so thats what she is doing. She isnt sure why she said she is just doing what she is told.      Please call Back to advise. Thanks!

## 2023-11-07 NOTE — TELEPHONE ENCOUNTER
"Patient performed office visit on 10-17-23 related to urinary frequency.  Order for Oxybutynin placed.  Patient states she is taking this medication daily.  States she has notices "some improvement in symptoms."  Patient states in the past she has used 8-12 depends pads daily for incontinent episodes.  Patient states she is not using 4-5 depends pads daily.  Patient states Mr. Mccloud advised he was starting her on a low dose of Oxybutynin. Patient is requesting to know if Mr. Mccloud would like to increase the dose slightly to see if it would be more effective.  Writer advised patient Mr. Mccloud is out of the office until Friday 11-10-23.  Offered to send message to PCP for further assistance.  Patient confirms she would like to send message to Mr. Mccloud for follow up upon his return.    "

## 2023-11-14 ENCOUNTER — OFFICE VISIT (OUTPATIENT)
Dept: NEUROSURGERY | Facility: CLINIC | Age: 73
End: 2023-11-14
Payer: MEDICARE

## 2023-11-14 VITALS
RESPIRATION RATE: 18 BRPM | BODY MASS INDEX: 20.25 KG/M2 | SYSTOLIC BLOOD PRESSURE: 149 MMHG | WEIGHT: 126 LBS | HEART RATE: 79 BPM | DIASTOLIC BLOOD PRESSURE: 73 MMHG | HEIGHT: 66 IN

## 2023-11-14 DIAGNOSIS — G95.89 SPINAL CORD MASS: ICD-10-CM

## 2023-11-14 PROCEDURE — 99215 PR OFFICE/OUTPT VISIT, EST, LEVL V, 40-54 MIN: ICD-10-PCS | Mod: S$PBB,,, | Performed by: NEUROLOGICAL SURGERY

## 2023-11-14 PROCEDURE — 99215 OFFICE O/P EST HI 40 MIN: CPT | Mod: S$PBB,,, | Performed by: NEUROLOGICAL SURGERY

## 2023-11-14 NOTE — PROGRESS NOTES
Neurosurgery History & Physical    Patient ID: Kasandra Jones is a 73 y.o. female.    Chief Complaint   Patient presents with    Back Pain     Patient present today as spinal pain; review imaging        HPI:  Ms. Jones is a 73 year old female who had intracranial imaging a couple of years ago after having an episode of mental status change.  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  This was followed by Jass Harris over time with serial imaging every few months.  This extended to yearly.  Dr. Harris eventually said there was no need to continue to follow.  She would like to establish care with a Neurosurgeon on the [facility] thus she has been referred to establish care.    Review of Systems   Constitutional:  Negative for activity change and fever.   HENT:  Negative for rhinorrhea, tinnitus, trouble swallowing and voice change.    Eyes:  Negative for visual disturbance.   Respiratory:  Negative for shortness of breath.    Cardiovascular:  Negative for chest pain.   Gastrointestinal:  Negative for nausea and vomiting.   Endocrine: Negative for cold intolerance, heat intolerance, polydipsia, polyphagia and polyuria.   Genitourinary:  Negative for decreased urine volume, frequency and urgency.   Musculoskeletal:  Negative for back pain, neck pain and neck stiffness.   Neurological:  Negative for dizziness, tremors, seizures, syncope, facial asymmetry, speech difficulty, weakness, light-headedness, numbness and headaches.   Psychiatric/Behavioral:  Negative for confusion.        Past Medical History:   Diagnosis Date    Cataract      Social History     Socioeconomic History    Marital status:    Occupational History    Occupation: RETIRED   Tobacco Use    Smoking status: Never    Smokeless tobacco: Never   Substance and Sexual Activity    Alcohol use: Never    Drug use: Never    Sexual activity: Yes     Partners: Male     Social Determinants of Health      Stress: No Stress Concern Present (9/20/2019)    [employer] - Occupational Stress Questionnaire     Feeling of Stress : Not at all     Family History   Problem Relation Age of Onset    Breast cancer Maternal Aunt      Review of patient's allergies indicates:   Allergen Reactions    Niacin preparations     Penicillins        Current Outpatient Medications:     alendronate (FOSAMAX) 70 MG tablet, Take 1 tablet (70 mg total) by mouth every 7 days., Disp: 12 tablet, Rfl: 3    ALPRAZolam (XANAX) 0.5 MG tablet, Take 1 tablet (0.5 mg total) by mouth daily as needed for Anxiety. (Patient taking differently: Take 0.5 mg by mouth daily as needed for Anxiety. PRN), Disp: 30 tablet, Rfl: 0    buPROPion (WELLBUTRIN XL) 300 MG 24 hr tablet, Take 1 tablet (300 mg total) by mouth once daily., Disp: 90 tablet, Rfl: 1    cetirizine (ZYRTEC) 10 MG tablet, Take 10 mg by mouth once daily., Disp: , Rfl:     cholecalciferol, vitamin D3, 125 mcg (5,000 unit) capsule, , Disp: , Rfl:     copper gluconate 2 mg Tab, Take by mouth., Disp: , Rfl:     cyanocobalamin-cobamamide (B12) 5,000-100 mcg Lozg, , Disp: , Rfl:     diphenoxylate-atropine 2.5-0.025 mg (LOMOTIL) 2.5-0.025 mg per tablet, Take 1 tablet by mouth 4 (four) times daily as needed for Diarrhea., Disp: 30 tablet, Rfl: 1    divalproex 500 MG Tb24, Take 2 tablets (1,000 mg total) by mouth once daily., Disp: 180 tablet, Rfl: 1    ELIQUIS 5 mg Tab, Take 1 tablet (5 mg total) by mouth 2 (two) times daily., Disp: 180 tablet, Rfl: 1    ferrous sulfate 325 (65 FE) MG EC tablet, , Disp: , Rfl:     levothyroxine (SYNTHROID) 100 MCG tablet, Take 1 tablet (100 mcg total) by mouth once daily., Disp: 90 tablet, Rfl: 1    magnesium oxide (MAG-OX) 400 mg (241.3 mg magnesium) tablet, , Disp: , Rfl:     megestroL (MEGACE) 40 MG Tab, Take 1 tablet (40 mg total) by mouth 3 (three) times daily., Disp: 270 tablet, Rfl: 1    MULTAQ 400 mg Tab, Take 1 tablet (400 mg total)  "by mouth 2 (two) times daily., Disp: 180 tablet, Rfl: 1    mupirocin (BACTROBAN) 2 % ointment, Apply topically 3 (three) times daily., Disp: 22 g, Rfl: 0    oxybutynin (DITROPAN-XL) 5 MG TR24, Take 1 tablet (5 mg total) by mouth once daily., Disp: 30 tablet, Rfl: 2    potassium chloride SA (K-DUR,KLOR-CON M) 10 MEQ tablet, Take 1 tablet (10 mEq total) by mouth 2 (two) times daily., Disp: 90 tablet, Rfl: 1    RABEprazole (ACIPHEX) 20 mg tablet, Take 1 tablet (20 mg total) by mouth 2 (two) times daily., Disp: 180 tablet, Rfl: 1    traZODone (DESYREL) 100 MG tablet, TAKE TWO TABLETS PO NIGHTLY, Disp: 180 tablet, Rfl: 1    vit C,W-Op-pcazm-lutein-zeaxan (PRESERVISION AREDS 2) 250-90-40-1 mg Cap, , Disp: , Rfl:   Blood pressure (!) 149/73, pulse 79, resp. rate 18, height 5' 6" (1.676 m), weight 57.2 kg (126 lb).      Neurologic Exam     Mental Status   Oriented to person, place, and time.   Attention: normal.   Speech: speech is normal   Level of consciousness: alert  Knowledge: good.     Cranial Nerves     CN III, IV, VI   Extraocular motions are normal.     CN VII   Facial expression full, symmetric.     Motor Exam   Muscle bulk: normal  Overall muscle tone: normal    Strength   Strength 5/5 throughout.     Sensory Exam   Light touch normal.     Gait, Coordination, and Reflexes     Gait  Gait: normal    Coordination   Romberg: negative      Physical Exam  Eyes:      Extraocular Movements: EOM normal.   Neurological:      Mental Status: She is oriented to person, place, and time.      Motor: Motor strength is normal.     Coordination: Romberg Test normal.      Gait: Gait is intact.   Psychiatric:         Speech: Speech normal.         Imaging:  MRI of the brain with and without contrast and MRI of the cervical spine dated 11/3/2023 is personally reviewed and discussed with the patient.  There is a lesion at the left dorsal cervical medullary junction.  This is stable when compared to MRI for the brain from " 01/04/2023.    Assessment/Plan:   Ms. Jones is a 73 year old female with incidentally found, stable brainstem lesion.    Plan to follow-up in 12 months with MRI brain with and without contrast

## 2023-11-27 ENCOUNTER — OFFICE VISIT (OUTPATIENT)
Dept: CARDIOLOGY | Facility: CLINIC | Age: 73
End: 2023-11-27
Payer: MEDICARE

## 2023-11-27 VITALS
HEIGHT: 66 IN | SYSTOLIC BLOOD PRESSURE: 136 MMHG | HEART RATE: 85 BPM | BODY MASS INDEX: 19.44 KG/M2 | WEIGHT: 121 LBS | DIASTOLIC BLOOD PRESSURE: 80 MMHG | RESPIRATION RATE: 16 BRPM | OXYGEN SATURATION: 99 %

## 2023-11-27 DIAGNOSIS — I65.29 STENOSIS OF CAROTID ARTERY, UNSPECIFIED LATERALITY: ICD-10-CM

## 2023-11-27 DIAGNOSIS — I48.0 PAROXYSMAL ATRIAL FIBRILLATION: Primary | ICD-10-CM

## 2023-11-27 DIAGNOSIS — R26.9 GAIT DISORDER: ICD-10-CM

## 2023-11-27 DIAGNOSIS — E78.2 MIXED HYPERLIPIDEMIA: ICD-10-CM

## 2023-11-27 DIAGNOSIS — K21.9 GASTROESOPHAGEAL REFLUX DISEASE WITHOUT ESOPHAGITIS: ICD-10-CM

## 2023-11-27 DIAGNOSIS — Z98.84 HISTORY OF GASTRIC BYPASS: ICD-10-CM

## 2023-11-27 DIAGNOSIS — E03.2 HYPOTHYROIDISM DUE TO NON-MEDICATION EXOGENOUS SUBSTANCES: ICD-10-CM

## 2023-11-27 DIAGNOSIS — I10 HYPERTENSION, ESSENTIAL: ICD-10-CM

## 2023-11-27 PROCEDURE — 99999 PR PBB SHADOW E&M-EST. PATIENT-LVL IV: CPT | Mod: PBBFAC,,, | Performed by: INTERNAL MEDICINE

## 2023-11-27 PROCEDURE — 99214 PR OFFICE/OUTPT VISIT, EST, LEVL IV, 30-39 MIN: ICD-10-PCS | Mod: S$PBB,,, | Performed by: INTERNAL MEDICINE

## 2023-11-27 PROCEDURE — 99214 OFFICE O/P EST MOD 30 MIN: CPT | Mod: PBBFAC,PN | Performed by: INTERNAL MEDICINE

## 2023-11-27 PROCEDURE — 99999 PR PBB SHADOW E&M-EST. PATIENT-LVL IV: ICD-10-PCS | Mod: PBBFAC,,, | Performed by: INTERNAL MEDICINE

## 2023-11-27 PROCEDURE — 99214 OFFICE O/P EST MOD 30 MIN: CPT | Mod: S$PBB,,, | Performed by: INTERNAL MEDICINE

## 2023-11-27 RX ORDER — CLOPIDOGREL BISULFATE 75 MG/1
75 TABLET ORAL DAILY
Qty: 30 TABLET | Refills: 11 | Status: SHIPPED | OUTPATIENT
Start: 2023-11-27 | End: 2024-01-23 | Stop reason: SDUPTHER

## 2023-11-27 NOTE — ASSESSMENT & PLAN NOTE
She is not tolerating any lipid lowering agents.  Continue to maintain on diet alone.  In fact she was low and BMI a liberalized her intake her weight has gone up to 121

## 2023-11-27 NOTE — PROGRESS NOTES
Subjective:    Patient ID:  Kasandra Jones is a 73 y.o. female patient here for evaluation Follow-up and Fatigue (She has been having weakness I her legs and her balance has been off. She is having some falls)      History of Present Illness:     Patient is a 73-year-old who has lot of fatigue and tiredness tends to sleep a lot apparently.  She has gait issues she is using a Rollator for balance.  Generalized fatigue is noted.  Denies having chest discomfort no palpitations no nausea vomiting noted.  No diarrhea noted.        Review of patient's allergies indicates:   Allergen Reactions    Niacin preparations     Penicillins        Past Medical History:   Diagnosis Date    Cataract      Past Surgical History:   Procedure Laterality Date    EYE SURGERY Bilateral 2020 August    cataract removal    HYSTERECTOMY      TOTAL REDUCTION MAMMOPLASTY  2002     Social History     Tobacco Use    Smoking status: Never    Smokeless tobacco: Never   Substance Use Topics    Alcohol use: Never    Drug use: Never        Review of Systems:    As noted in HPI in addition      REVIEW OF SYSTEMS  Positive for generalized fatigue and tiredness  CARDIOVASCULAR: No recent chest pain, palpitations, arm, neck, or jaw pain  RESPIRATORY: No recent fever, cough chills, SOB or congestion  : No blood in the urine  GI: No Nausea, vomiting, constipation, diarrhea, blood, or reflux.  MUSCULOSKELETAL: No myalgias  NEURO: No lightheadedness or dizziness some unsteady gait  EYES: No Double vision, blurry, vision or headache              Objective        Vitals:    11/27/23 1032   BP: 136/80   Pulse: 85   Resp: 16       LIPIDS - LAST 2   Lab Results   Component Value Date    CHOL 170 09/29/2022    CHOL 171 06/27/2022    HDL 74 09/29/2022    HDL 74 06/27/2022    LDLCALC 82.4 09/29/2022    LDLCALC 84.0 06/27/2022    TRIG 68 09/29/2022    TRIG 65 06/27/2022    CHOLHDL 43.5 09/29/2022    CHOLHDL 43.3 06/27/2022       CBC - LAST 2  Lab Results   Component  "Value Date    WBC 5.25 07/12/2023    WBC 5.50 03/23/2023    RBC 4.31 07/12/2023    RBC 4.17 03/23/2023    HGB 14.3 07/12/2023    HGB 13.5 03/23/2023    HCT 44.3 07/12/2023    HCT 42.7 03/23/2023     (H) 07/12/2023     (H) 03/23/2023    MCH 33.2 (H) 07/12/2023    MCH 32.4 (H) 03/23/2023    MCHC 32.3 07/12/2023    MCHC 31.6 (L) 03/23/2023    RDW 13.2 07/12/2023    RDW 14.3 03/23/2023     07/12/2023     03/23/2023    MPV 11.1 07/12/2023    MPV 10.6 03/23/2023    GRAN 2.8 07/12/2023    GRAN 54.1 07/12/2023    LYMPH 1.9 07/12/2023    LYMPH 35.4 07/12/2023    MONO 0.4 07/12/2023    MONO 8.4 07/12/2023    BASO 0.03 07/12/2023    BASO 0.02 03/23/2023    NRBC 0 07/12/2023    NRBC 0 03/23/2023       CHEMISTRY & LIVER FUNCTION - LAST 2  Lab Results   Component Value Date     07/12/2023     03/23/2023    K 4.3 07/12/2023    K 4.1 03/23/2023     07/12/2023     03/23/2023    CO2 30 (H) 07/12/2023    CO2 32 (H) 03/23/2023    ANIONGAP 6 (L) 07/12/2023    ANIONGAP 6 (L) 03/23/2023    BUN 15 07/12/2023    BUN 17 03/23/2023    CREATININE 0.7 10/18/2023    CREATININE 0.7 07/12/2023     07/12/2023     03/23/2023    CALCIUM 9.2 07/12/2023    CALCIUM 9.1 03/23/2023    MG 2.0 03/23/2023    MG 1.8 06/28/2019    ALBUMIN 3.3 (L) 07/12/2023    ALBUMIN 3.4 (L) 03/23/2023    PROT 6.1 07/12/2023    PROT 6.3 03/23/2023    ALKPHOS 76 07/12/2023    ALKPHOS 95 03/23/2023    ALT 36 07/12/2023    ALT 31 03/23/2023    AST 28 07/12/2023    AST 26 03/23/2023    BILITOT 0.6 07/12/2023    BILITOT 0.8 03/23/2023        CARDIAC PROFILE - LAST 2  Lab Results   Component Value Date    CPK 38 02/16/2023    CPK 56 06/28/2019    TROPONINI <0.030 06/28/2019        COAGULATION - LAST 2  No results found for: "LABPT", "INR", "APTT"    ENDOCRINE & PSA - LAST 2  Lab Results   Component Value Date    TSH 2.070 07/12/2023    TSH 3.510 01/03/2023        ECHOCARDIOGRAM RESULTS  Results for orders placed " during the hospital encounter of 12/09/21    Echo    Interpretation Summary  · The left ventricle is normal in size with mildly decreased systolic function.  · The estimated ejection fraction is 48%.  · Grade I left ventricular diastolic dysfunction.  · Normal right ventricular size with normal right ventricular systolic function.  · Mild left atrial enlargement.  · Mild mitral regurgitation.  · Normal central venous pressure (3 mmHg).  · The estimated PA systolic pressure is 30 mmHg.      CURRENT/PREVIOUS VISIT EKG  Results for orders placed or performed in visit on 12/16/22   IN OFFICE EKG 12-LEAD (to [facility])    Collection Time: 12/16/22 10:49 AM    Narrative    Test Reason : I48.0,    Vent. Rate : 068 BPM     Atrial Rate : 068 BPM     P-R Int : 146 ms          QRS Dur : 100 ms      QT Int : 414 ms       P-R-T Axes : 058 022 059 degrees     QTc Int : 440 ms    Normal sinus rhythm  Minimal voltage criteria for LVH, may be normal variant ( Egan product )  Borderline Abnormal ECG  When compared with ECG of 16-JUN-2022 13:48,  No significant change was found  Confirmed by Aline CROCKETT, Geovanny MATHEWS ([id number]) on 12/23/2022 4:42:09 PM    Referred By:             Confirmed By:Geovanny Mireles MD     No valid procedures specified.   Results for orders placed during the hospital encounter of 07/09/21    Nuclear Stress - Cardiology Interpreted    Interpretation Summary    Normal myocardial perfusion scan. There is no evidence of myocardial ischemia or infarction.    The gated perfusion images showed an ejection fraction of 73% post stress. Normal ejection fraction is greater than 53%.    There is normal wall motion post stress.    LV cavity size is  and normal at stress.    The EKG portion of this study is negative for ischemia.    The patient reported no chest pain during the stress test.    There were no arrhythmias during stress.    No valid procedures specified.    PHYSICAL EXAM  CONSTITUTIONAL: Well built, well nourished in no  apparent distress  NECK: no carotid bruit, no JVD  LUNGS: CTA  CHEST WALL: no tenderness  HEART: regular rate and rhythm, S1, S2 normal, no murmur, click, rub or gallop   ABDOMEN: soft, non-tender; bowel sounds normal; no masses,  no organomegaly  EXTREMITIES: Extremities normal, no edema, no calf tenderness noted  NEURO: AAO X 3    I HAVE REVIEWED :    The vital signs, nurses notes, and all the pertinent radiology and labs.        Current Outpatient Medications   Medication Instructions    alendronate (FOSAMAX) 70 mg, Oral, Every 7 days    ALPRAZolam (XANAX) 0.5 mg, Oral, Daily PRN    buPROPion (WELLBUTRIN XL) 300 mg, Oral, Daily    cetirizine (ZYRTEC) 10 mg, Oral, Daily    cholecalciferol, vitamin D3, 125 mcg (5,000 unit) capsule No dose, route, or frequency recorded.    copper gluconate 2 mg Tab Oral    cyanocobalamin-cobamamide (B12) 5,000-100 mcg Lozg No dose, route, or frequency recorded.    diphenoxylate-atropine 2.5-0.025 mg (LOMOTIL) 2.5-0.025 mg per tablet 1 tablet, Oral, 4 times daily PRN    divalproex ER (DEPAKOTE ER) 1,000 mg, Oral, Daily    ferrous sulfate 325 (65 FE) MG EC tablet No dose, route, or frequency recorded.    levothyroxine (SYNTHROID) 100 mcg, Oral, Daily    magnesium oxide (MAG-OX) 400 mg (241.3 mg magnesium) tablet No dose, route, or frequency recorded.    megestroL (MEGACE) 40 mg, Oral, 3 times daily    MULTAQ 400 mg, Oral, 2 times daily    mupirocin (BACTROBAN) 2 % ointment Topical (Top), 3 times daily    oxybutynin (DITROPAN-XL) 5 mg, Oral, Daily    potassium chloride SA (K-DUR,KLOR-CON M) 10 MEQ tablet 10 mEq, Oral, 2 times daily    RABEprazole (ACIPHEX) 20 mg, Oral, 2 times daily    traZODone (DESYREL) 100 MG tablet TAKE TWO TABLETS PO NIGHTLY    vit C,S-Rb-fubxd-lutein-zeaxan (PRESERVISION AREDS 2) 250-90-40-1 mg Cap No dose, route, or frequency recorded.          Assessment & Plan     Paroxysmal atrial fibrillation  Patient has history of paroxysmal atrial fibrillation however she  is at risk for falls with some unsteady gait and Rollator.  Reportedly she did have 1/minor incident today in the office when she found that the Rollator move forward and she fell forward.  Recommend to stop the Eliquis because of risk of fall and head injury and intracranial bleed may start on Plavix 75 mg once a day.  As an alternative she is at risk still for thromboembolic events but lower risk for bleeding given the current clinical situation.  In the meanwhile continue on Multaq 400 mg twice a day    Hypertension, essential  Her blood pressure is stable at 130 6/80 mmHg.  I have encouraged her to keep herself adequately hydrated.    Hyperlipidemia  She is not tolerating any lipid lowering agents.  Continue to maintain on diet alone.  In fact she was low and BMI a liberalized her intake her weight has gone up to 121    Stenosis of carotid artery  Because of carotid lesions recommend to avoid excessive lowering of blood pressure    History of gastric bypass  Encouraged her to continue on B12 iron supplements and also use    Hypothyroidism  Continue on Synthroid supplements at 100 mcg daily she is due to follow-up Dr. Ford next week.    Gait disorder  She would altered gait disorder recommend to minimize the risk of intracranial bleed by cutting back on withholding Eliquis and will change it to Plavix if tolerated    Gastroesophageal reflux disease  Continue on conservative treatment appears to be stable          No follow-ups on file.

## 2023-11-27 NOTE — ASSESSMENT & PLAN NOTE
Her blood pressure is stable at 130 6/80 mmHg.  I have encouraged her to keep herself adequately hydrated.

## 2023-11-27 NOTE — ASSESSMENT & PLAN NOTE
Patient has history of paroxysmal atrial fibrillation however she is at risk for falls with some unsteady gait and Rollator.  Reportedly she did have 1/minor incident today in the office when she found that the Rollator move forward and she fell forward.  Recommend to stop the Eliquis because of risk of fall and head injury and intracranial bleed may start on Plavix 75 mg once a day.  As an alternative she is at risk still for thromboembolic events but lower risk for bleeding given the current clinical situation.  In the meanwhile continue on Multaq 400 mg twice a day

## 2023-11-27 NOTE — ASSESSMENT & PLAN NOTE
She would altered gait disorder recommend to minimize the risk of intracranial bleed by cutting back on withholding Eliquis and will change it to Plavix if tolerated

## 2023-11-28 ENCOUNTER — TELEPHONE (OUTPATIENT)
Dept: CARDIOLOGY | Facility: CLINIC | Age: 73
End: 2023-11-28
Payer: MEDICARE

## 2023-11-28 NOTE — TELEPHONE ENCOUNTER
----- Message from Abigail Hoang sent at 11/28/2023  2:20 PM CST -----  Contact: pt [phone]  Type:  Patient Returning Call    Who Called:  Pt   Who Left Message for Patient:  NA   Does the patient know what this is regarding?:  No   Best Call Back Number:  [phone]    Additional Information:  Pt stated missed a call from the office. She stated she had a vm but she can not access it.

## 2023-12-06 ENCOUNTER — CLINICAL SUPPORT (OUTPATIENT)
Dept: REHABILITATION | Facility: HOSPITAL | Age: 73
End: 2023-12-06
Payer: MEDICARE

## 2023-12-06 DIAGNOSIS — N32.81 OVERACTIVE BLADDER: ICD-10-CM

## 2023-12-06 DIAGNOSIS — N39.8 DYSFUNCTIONAL VOIDING OF URINE: ICD-10-CM

## 2023-12-06 DIAGNOSIS — R35.0 URINARY FREQUENCY: ICD-10-CM

## 2023-12-06 DIAGNOSIS — M62.89 PELVIC FLOOR DYSFUNCTION: Primary | ICD-10-CM

## 2023-12-06 DIAGNOSIS — N39.46 MIXED STRESS AND URGE URINARY INCONTINENCE: ICD-10-CM

## 2023-12-06 PROCEDURE — 97163 PT EVAL HIGH COMPLEX 45 MIN: CPT | Mod: PO

## 2023-12-06 PROCEDURE — 97530 THERAPEUTIC ACTIVITIES: CPT | Mod: PO

## 2023-12-06 NOTE — PLAN OF CARE
"[facility]  Pelvic Health Physical Therapy Initial Evaluation    Date: 2023   Name: Kasandra Jones  Clinic Number: [id number]  Therapy Diagnosis:   Encounter Diagnoses   Name Primary?    Urinary frequency     Overactive bladder     Mixed stress and urge urinary incontinence     Pelvic floor dysfunction Yes    Dysfunctional voiding of urine      Physician: Javed Mccloud PA-C    Physician Orders: PT Eval and Treat   Medical Diagnosis from Referral: Urinary frequency [R35.0], Overactive bladder [N32.81], Mixed stress and urge urinary incontinence [N39.46]   Evaluation Date: 2023  Authorization Period Expiration: 10/16/2024  Plan of Care Expiration: 2024  Visit # / Visits authorized:     Time In: 2:30  Time Out: 3:15  Total Appointment Time (timed & untimed codes): 45 minutes    Precautions: fall risk     Subjective     Date of onset: last year     History of current condition - Kasandra reports: "I go through a case of Depends each month." Symptoms started about a year ago, but she's always had bladder control issues, but prior to a year ago she never needed Depends.   She started medication for her bladder but does not think it has helped. She has not seen a urologist.     OB/GYN History:  and vaginal delivery      Bladder/Bowel History:   Frequency of urination:   Daytime: "frequently" - does not think she would be able to wait 2 hrs between voids; patient estimates she could wait about 45 minutes.            Nighttime: varies; about 2-3x. Also has urinary leakage while she's sleeping, about every night, usually full emptying   Difficulty initiating urine stream: No  Urine stream: strong  Complete emptying: Yes  Bladder leakage: Yes - multiple times day   Frequency of incidents: multiple times/day, even when she's sleeping.   Amount leaked (urine): varies between a few drops to full emptying.   Urinary Urgency: Yes, happens at least once/day   Frequency of bowel movements: " patient states she's had bowel issues in the past but it's better now; goes regularly and it's easy to pass.   Form of protection: brief  Number of pads required in 24 hours: 2-3, varies on the day. They are usually pretty heavy when she changes.        Medical History: Kasandra  has a past medical history of Cataract.     Surgical History: Kasandra Jones  has a past surgical history that includes Hysterectomy; Total Reduction Mammoplasty (2002); and Eye surgery (Bilateral, 2020 August).    Medications: Kasandra has a current medication list which includes the following prescription(s): alendronate, alprazolam, bupropion, cetirizine, cholecalciferol (vitamin d3), clopidogrel, copper gluconate, b12, diphenoxylate-atropine 2.5-0.025 mg, divalproex er, ferrous sulfate, levothyroxine, magnesium oxide, megestrol, multaq, mupirocin, oxybutynin, potassium chloride sa, rabeprazole, trazodone, and vit c,h-ai-eoewi-lutein-zeaxan.    Allergies:   Review of patient's allergies indicates:   Allergen Reactions    Niacin preparations     Penicillins         Prior Therapy/Previous treatment included: none for this condition   Prior Level of Function: independent   Current Level of Function: see above     Types of fluid intake: water: at least 2 bottles/day; also drinks coffee (1/2 cup) and orange juice (1 small glass), Coke (1/2 can or more/day)        Pts goals: resolve urinary dysfunction     OBJECTIVE     See EMR under MEDIA for written consent provided 12/6/2023  Chaperone: declined      VAGINAL PELVIC FLOOR EXAM    EXTERNAL ASSESSMENT  Introitus: WNL  Skin condition: redness noted - labia majora and over vulvar tissue   Scarring: none   Sensation: WNL   Pain: none  Voluntary contraction: nil  Voluntary relaxation: nil  Involuntary contraction: nil  Bearing down: visible drop  Perineal descent: present      INTERNAL ASSESSMENT  Pain: tender areas noted as follows: bilateral levator ani    Sensation: able to localized pressure  appropriately   Vaginal vault: stenotic   Muscle Bulk: hypertonus   Muscle Power: 1/5 with verbal cues for holding back gas      Quality of contraction: decreased hold   Specificity: patient contracts: glutes    Coordination: tends to hold breath during PFM contration       TREATMENT     Treatment Time In: 2:45  Treatment Time Out: 3:15  Total Treatment time (time-based codes) separate from Evaluation: 30 minutes    Therapeutic Activity Patient participated in dynamic functional therapeutic activities to improve functional performance for 30 minutes. Including: Education as described below.     Patient Education provided:   general anatomy/physiology of urinary/ bowel  system and benefits of treatment were discussed with the pt. Additionally, anatomy/physiology of pelvic floor, posture/body mechanices, diaphragmatic breathing, kegels, and fluid intake/dietary modifications were reviewed.     Home Exercises provided:  Written Home Exercises provided: yes.  Exercises were reviewed and Kasandra was able to demonstrate them prior to the end of the session.    Kasandra demonstrated good  understanding of the education provided.     See EMR under Patient Instructions for exercises provided 12/6/2023.    Assessment     Kasandra is a 73 y.o. female referred to outpatient Physical Therapy with a medical diagnosis of Urinary frequency [R35.0], Overactive bladder [N32.81], Mixed stress and urge urinary incontinence [N39.46] . Pt presents with altered posture, poor knowledge of body mechanics and posture, poor trunk stability, decreased pelvic muscle strength, decreased endurance of the pelvic muscles, decreased phasic ability of the pelvic muscles, increased tension of the pelvic muscles, poor quality of pelvic muscle contraction, increased frequency of urination, increased nocturia, poor coordination of pelvic floor muscles during ADL's leading to urinary or fecal leakage, poor fluid intake, dysfunctional voiding, and unable to  co-contract or co-relax abdominal wall and pelvic floor muscles.   Patient states that she does not want any more physical therapy sessions after the 2 appointments she already has on the schedule. She also is not interested in referral to urology or urogyn. She states after the last year she is tired of always going to doctors' appointments.      Pt prognosis is Fair.   Pt will benefit from skilled outpatient Physical Therapy to address the deficits stated above and in the chart below, provide pt/family education, and to maximize pt's level of independence.     Plan of care discussed with patient: Yes  Pt's spiritual, cultural and educational needs considered and patient is agreeable to the plan of care and goals as stated below:     Anticipated Barriers for therapy: age; co-morbidities; not interested in participation in full plan of care     Medical Necessity is demonstrated by the following  History  Co-morbidities and personal factors that may impact the plan of care [] LOW: no personal factors / co-morbidities  [] MODERATE: 1-2 personal factors / co-morbidities  [x] HIGH: 3+ personal factors / co-morbidities    Moderate / High Support Documentation:   Co-morbidities affecting plan of care:  has a past medical history of Cataract.     Personal Factors:   age  coping style     Examination  Body Structures and Functions, activity limitations and participation restrictions that may impact the plan of care [] LOW: addressing 1-2 elements  [] MODERATE: 3+ elements  [x] HIGH: 4+ elements (please support below)    Moderate / High Support Documentation: see evaluation      Clinical Presentation [] LOW: stable  [] MODERATE: Evolving  [x] HIGH: Unstable     Decision Making/ Complexity Score: high         Goals:  Short Term Goals: 2 weeks   - Pt will demonstrate excellent knowledge and adherence to HEP to facilitate optimal recovery.  - Pt will demonstrate proper PFM contraction, relaxation, and lengthening coordinated  with TA and breath for improved muscle coordination needed for functional activity.      Long Term Goals: 4 weeks   - Pt will demonstrate excellent knowledge and adherence to HEP for continued self-maintenance of symptoms.  - Pt will report voiding interval of 2 hours for improved ADL tolerance.  - Pt will report ability to delay urinary urge for at least 15 minutes to maintain continence with ADL/IADLs.   - Pt will report needing </= 1 pad/day indicating improved PFM function needed to maintain continence  - Pt will demonstrate PFM strength of at least 2/5 MMT for improved strength needed to maintain continence.       Plan     Plan of care Certification: 12/6/2023 to 1/6/2024.    Outpatient Physical Therapy 1 times weekly for 4 weeks to include the following interventions: therapeutic exercises, therapeutic activity, neuromuscular re-education, manual therapy, modalities PRN, patient/family education, dry needling, and self care/home management    Maritza Santos, PT, DPT, WCS

## 2023-12-06 NOTE — PROGRESS NOTES
"[facility]  Pelvic Health Physical Therapy Initial Evaluation    Date: 2023   Name: Kasandra Jones  Clinic Number: [id number]  Therapy Diagnosis:   Encounter Diagnoses   Name Primary?    Urinary frequency     Overactive bladder     Mixed stress and urge urinary incontinence     Pelvic floor dysfunction Yes    Dysfunctional voiding of urine      Physician: Javed Mccloud PA-C    Physician Orders: PT Eval and Treat   Medical Diagnosis from Referral: Urinary frequency [R35.0], Overactive bladder [N32.81], Mixed stress and urge urinary incontinence [N39.46]   Evaluation Date: 2023  Authorization Period Expiration: 10/16/2024  Plan of Care Expiration: 2024  Visit # / Visits authorized:     Time In: 2:30  Time Out: 3:15  Total Appointment Time (timed & untimed codes): 45 minutes    Precautions: fall risk     Subjective     Date of onset: last year     History of current condition - Kasandra reports: "I go through a case of Depends each month." Symptoms started about a year ago, but she's always had bladder control issues, but prior to a year ago she never needed Depends.   She started medication for her bladder but does not think it has helped. She has not seen a urologist.     OB/GYN History:  and vaginal delivery      Bladder/Bowel History:   Frequency of urination:   Daytime: "frequently" - does not think she would be able to wait 2 hrs between voids; patient estimates she could wait about 45 minutes.            Nighttime: varies; about 2-3x. Also has urinary leakage while she's sleeping, about every night, usually full emptying   Difficulty initiating urine stream: No  Urine stream: strong  Complete emptying: Yes  Bladder leakage: Yes - multiple times day   Frequency of incidents: multiple times/day, even when she's sleeping.   Amount leaked (urine): varies between a few drops to full emptying.   Urinary Urgency: Yes, happens at least once/day   Frequency of bowel movements: " patient states she's had bowel issues in the past but it's better now; goes regularly and it's easy to pass.   Form of protection: brief  Number of pads required in 24 hours: 2-3, varies on the day. They are usually pretty heavy when she changes.        Medical History: Kasandra  has a past medical history of Cataract.     Surgical History: Kasandra Jones  has a past surgical history that includes Hysterectomy; Total Reduction Mammoplasty (2002); and Eye surgery (Bilateral, 2020 August).    Medications: Kasandra has a current medication list which includes the following prescription(s): alendronate, alprazolam, bupropion, cetirizine, cholecalciferol (vitamin d3), clopidogrel, copper gluconate, b12, diphenoxylate-atropine 2.5-0.025 mg, divalproex er, ferrous sulfate, levothyroxine, magnesium oxide, megestrol, multaq, mupirocin, oxybutynin, potassium chloride sa, rabeprazole, trazodone, and vit c,e-sf-kfncl-lutein-zeaxan.    Allergies:   Review of patient's allergies indicates:   Allergen Reactions    Niacin preparations     Penicillins         Prior Therapy/Previous treatment included: none for this condition   Prior Level of Function: independent   Current Level of Function: see above     Types of fluid intake: water: at least 2 bottles/day; also drinks coffee (1/2 cup) and orange juice (1 small glass), Coke (1/2 can or more/day)        Pts goals: resolve urinary dysfunction     OBJECTIVE     See EMR under MEDIA for written consent provided 12/6/2023  Chaperone: declined      VAGINAL PELVIC FLOOR EXAM    EXTERNAL ASSESSMENT  Introitus: WNL  Skin condition: redness noted - labia majora and over vulvar tissue   Scarring: none   Sensation: WNL   Pain: none  Voluntary contraction:  nil  Voluntary relaxation: nil  Involuntary contraction: nil  Bearing down: visible drop  Perineal descent: present      INTERNAL ASSESSMENT  Pain: tender areas noted as follows: bilateral levator ani    Sensation: able to localized pressure  appropriately   Vaginal vault: stenotic   Muscle Bulk: hypertonus   Muscle Power: 1/5 with verbal cues for holding back gas      Quality of contraction: decreased hold   Specificity: patient contracts: glutes    Coordination: tends to hold breath during PFM contration       TREATMENT     Treatment Time In: 2:45  Treatment Time Out: 3:15  Total Treatment time (time-based codes) separate from Evaluation: 30 minutes    Therapeutic Activity Patient participated in dynamic functional therapeutic activities to improve functional performance for 30 minutes. Including: Education as described below.     Patient Education provided:   general anatomy/physiology of urinary/ bowel  system and benefits of treatment were discussed with the pt. Additionally, anatomy/physiology of pelvic floor, posture/body mechanices, diaphragmatic breathing, kegels, and fluid intake/dietary modifications were reviewed.     Home Exercises provided:  Written Home Exercises provided: yes.  Exercises were reviewed and Kasandra was able to demonstrate them prior to the end of the session.    Kasandra demonstrated good  understanding of the education provided.     See EMR under Patient Instructions for exercises provided 12/6/2023.    Assessment     Kasandra is a 73 y.o. female referred to outpatient Physical Therapy with a medical diagnosis of Urinary frequency [R35.0], Overactive bladder [N32.81], Mixed stress and urge urinary incontinence [N39.46] . Pt presents with altered posture, poor knowledge of body mechanics and posture, poor trunk stability, decreased pelvic muscle strength, decreased endurance of the pelvic muscles, decreased phasic ability of the pelvic muscles, increased tension of the pelvic muscles, poor quality of pelvic muscle contraction, increased frequency of urination, increased nocturia, poor coordination of pelvic floor muscles during ADL's leading to urinary or fecal leakage, poor fluid intake, dysfunctional voiding, and unable to  co-contract or co-relax abdominal wall and pelvic floor muscles.   Patient states that she does not want any more physical therapy sessions after the 2 appointments she already has on the schedule. She also is not interested in referral to urology or urogyn. She states after the last year she is tired of always going to doctors' appointments.      Pt prognosis is Fair.   Pt will benefit from skilled outpatient Physical Therapy to address the deficits stated above and in the chart below, provide pt/family education, and to maximize pt's level of independence.     Plan of care discussed with patient: Yes  Pt's spiritual, cultural and educational needs considered and patient is agreeable to the plan of care and goals as stated below:     Anticipated Barriers for therapy: age; co-morbidities; not interested in participation in full plan of care     Medical Necessity is demonstrated by the following  History  Co-morbidities and personal factors that may impact the plan of care [] LOW: no personal factors / co-morbidities  [] MODERATE: 1-2 personal factors / co-morbidities  [x] HIGH: 3+ personal factors / co-morbidities    Moderate / High Support Documentation:   Co-morbidities affecting plan of care:  has a past medical history of Cataract.     Personal Factors:   age  coping style     Examination  Body Structures and Functions, activity limitations and participation restrictions that may impact the plan of care [] LOW: addressing 1-2 elements  [] MODERATE: 3+ elements  [x] HIGH: 4+ elements (please support below)    Moderate / High Support Documentation: see evaluation      Clinical Presentation [] LOW: stable  [] MODERATE: Evolving  [x] HIGH: Unstable     Decision Making/ Complexity Score: high         Goals:  Short Term Goals: 2 weeks   - Pt will demonstrate excellent knowledge and adherence to HEP to facilitate optimal recovery.  - Pt will demonstrate proper PFM contraction, relaxation, and lengthening coordinated  with TA and breath for improved muscle coordination needed for functional activity.      Long Term Goals: 4 weeks   - Pt will demonstrate excellent knowledge and adherence to HEP for continued self-maintenance of symptoms.  - Pt will report voiding interval of 2 hours for improved ADL tolerance.  - Pt will report ability to delay urinary urge for at least 15 minutes to maintain continence with ADL/IADLs.   - Pt will report needing </= 1 pad/day indicating improved PFM function needed to maintain continence  - Pt will demonstrate PFM strength of at least 2/5 MMT for improved strength needed to maintain continence.       Plan     Plan of care Certification: 12/6/2023 to 1/6/2024.    Outpatient Physical Therapy 1 times weekly for 4 weeks to include the following interventions: therapeutic exercises, therapeutic activity, neuromuscular re-education, manual therapy, modalities PRN, patient/family education, dry needling, and self care/home management    Maritza Santos, PT, DPT, WCS

## 2023-12-12 ENCOUNTER — CLINICAL SUPPORT (OUTPATIENT)
Dept: REHABILITATION | Facility: HOSPITAL | Age: 73
End: 2023-12-12
Payer: MEDICARE

## 2023-12-12 DIAGNOSIS — M62.89 PELVIC FLOOR DYSFUNCTION: Primary | ICD-10-CM

## 2023-12-12 DIAGNOSIS — N39.8 DYSFUNCTIONAL VOIDING OF URINE: ICD-10-CM

## 2023-12-12 PROCEDURE — 97112 NEUROMUSCULAR REEDUCATION: CPT | Mod: PO,CQ

## 2023-12-12 NOTE — PROGRESS NOTES
"  Pelvic Health Physical Therapy   Treatment Note     Name: Kasandra Jones  Clinic Number: [id number]    Therapy Diagnosis:   Encounter Diagnoses   Name Primary?    Pelvic floor dysfunction Yes    Dysfunctional voiding of urine      Physician: Javed Mccloud PA-C    Visit Date: 12/12/2023  Physician Orders: PT Eval and Treat   Medical Diagnosis from Referral: Urinary frequency [R35.0], Overactive bladder [N32.81], Mixed stress and urge urinary incontinence [N39.46]   Evaluation Date: 12/6/2023  Authorization Period Expiration: 10/16/2024  Plan of Care Expiration: 1/6/2024  Visit # / Visits authorized: 1/ 1     Time In: 2:30  Time Out: 3:15  Total Appointment Time (timed & untimed codes): 45 minutes     Precautions: fall risk   Subjective     Pt reports: I"m only going to come 3 times. I'm sick of doctor visits."  She was compliant with home exercise program.  Response to previous treatment: good  Functional change: first visit following eval     Pain: 0/10  Location:  n/a     Objective     Kasandra participated in neuromuscular re-education activities to develop Coordination, Control, Down training, Posture, Proprioception, Kinesthetic, Balance, and Sense for 40 minutes including:     Seated kegel 5 sec hold 10 sec relax 20 x   Bridges with Pelvic Floor and glute engagement 2 x 10   Add squeezes 5 sec hold 2 x 10   Hip abduction in hooklying GTB 2 x 10   Standing hip abduction 10 x   Standin hip extension 10 x     Kasandra participated in dynamic functional therapeutic activities to improve functional performance for 5  minutes, including:     Sit to stand with cues for intraabdominal pressure management  x 5        Home Exercises Provided and Patient Education Provided     Education provided:   -   Discussed progression of plan of care with patient; educated pt in activity modification; reviewed HEP with pt. Pt demonstrated and verbalized understanding of all instruction and was not provided with a handout of HEP (see " Patient Instructions).  -     Written Home Exercises Provided:  will be issued at next visit  .  Exercises were reviewed and Kasandra was able to demonstrate them prior to the end of the session.  Kasandra demonstrated good  understanding of the education provided.     See EMR under Patient Instructions for exercises provided  next visit  .    Assessment     Today's treatment focused on instruction of exercises that can be performed at home. If patient does not have any soreness or questions she will be issued a home exercise program at next visit.     Kasandra Is progressing well towards her goals.   Pt prognosis is Guarded as she does not intend to continue therapy after next visit     Pt will continue to benefit from skilled outpatient physical therapy to address the deficits listed in the problem list box on initial evaluation, provide pt/family education and to maximize pt's level of independence in the home and community environment.     Pt's spiritual, cultural and educational needs considered and pt agreeable to plan of care and goals.     Anticipated barriers to physical therapy: age; co-morbidities; not interested in participation in full plan of care     Goals: Goals:  Short Term Goals: 2 weeks   - Pt will demonstrate excellent knowledge and adherence to HEP to facilitate optimal recovery.  - Pt will demonstrate proper PFM contraction, relaxation, and lengthening coordinated with TA and breath for improved muscle coordination needed for functional activity.        Long Term Goals: 4 weeks   - Pt will demonstrate excellent knowledge and adherence to HEP for continued self-maintenance of symptoms.  - Pt will report voiding interval of 2 hours for improved ADL tolerance.  - Pt will report ability to delay urinary urge for at least 15 minutes to maintain continence with ADL/IADLs.   - Pt will report needing </= 1 pad/day indicating improved PFM function needed to maintain continence  - Pt will demonstrate PFM strength  of at least 2/5 MMT for improved strength needed to maintain continence.        Plan     Plan of care Certification: 12/6/2023 to 1/6/2024.     Outpatient Physical Therapy 1 times weekly for 4 weeks    Jyoti Dozier, PTA

## 2023-12-13 ENCOUNTER — DOCUMENTATION ONLY (OUTPATIENT)
Dept: REHABILITATION | Facility: HOSPITAL | Age: 73
End: 2023-12-13

## 2023-12-20 ENCOUNTER — CLINICAL SUPPORT (OUTPATIENT)
Dept: REHABILITATION | Facility: HOSPITAL | Age: 73
End: 2023-12-20
Payer: MEDICARE

## 2023-12-20 DIAGNOSIS — N39.8 DYSFUNCTIONAL VOIDING OF URINE: ICD-10-CM

## 2023-12-20 DIAGNOSIS — M62.89 PELVIC FLOOR DYSFUNCTION: Primary | ICD-10-CM

## 2023-12-20 PROCEDURE — 97530 THERAPEUTIC ACTIVITIES: CPT | Mod: PO,CQ

## 2023-12-20 PROCEDURE — 97112 NEUROMUSCULAR REEDUCATION: CPT | Mod: PO,CQ

## 2023-12-20 NOTE — PROGRESS NOTES
Pelvic Health Physical Therapy   Treatment Note     Name: Kasandra Jones  Clinic Number: [id number]    Therapy Diagnosis:   Encounter Diagnoses   Name Primary?    Pelvic floor dysfunction Yes    Dysfunctional voiding of urine      Physician: Javed Mccloud PA-C    Visit Date: 12/20/2023  Physician Orders: PT Eval and Treat   Medical Diagnosis from Referral: Urinary frequency [R35.0], Overactive bladder [N32.81], Mixed stress and urge urinary incontinence [N39.46]   Evaluation Date: 12/6/2023  Authorization Period Expiration: 10/16/2024  Plan of Care Expiration: 1/6/2024  Visit # / Visits authorized: 2/ 20     Time In: 2:30  Time Out: 3:10  Total Appointment Time (timed & untimed codes): 40 minutes     Precautions: fall risk   Subjective     Pt reports: Her bladder control has gotten better. She can actually feel the urge when she needs to urinate and can make it to the bathroom approx 25% of the time.     She was compliant with home exercise program.  Response to previous treatment: good  Functional change: as noted    Pain: 0/10  Location:  n/a     Objective     Kasandra participated in neuromuscular re-education activities to develop Coordination, Control, Down training, Posture, Proprioception, Kinesthetic, Balance, and Sense for 30 minutes including:     Seated kegel over towel roll  5 sec hold 10 sec relax 20 x   Bridges with Pelvic Floor and glute engagement 2 x 10   Add squeezes 5 sec hold 2 x 10   Hip abduction in hooklying GTB 3 x 10   Standing hip abduction 10 x 2  Standin hip extension 10 x 2    Kasandra participated in dynamic functional therapeutic activities to improve functional performance for 10  minutes, including:     Sit to stand with cues for intraabdominal pressure management 2  x 5    Education on Pressure management during bed mobility      Home Exercises Provided and Patient Education Provided     Education provided:   -   Discussed progression of plan of care with patient; educated pt in  activity modification; reviewed HEP with pt. Pt demonstrated and verbalized understanding of all instruction and was not provided with a handout of HEP (see Patient Instructions).  -     Written Home Exercises Provided:  will be issued at next visit  .  Exercises were reviewed and Kasandra was able to demonstrate them prior to the end of the session.  Kasandra demonstrated good  understanding of the education provided.     See EMR under Patient Instructions for exercises provided  next visit  .    Assessment     Kasandra notes with poor activity tolerance evident by inability to perform more than 2 exercises at a time without needing an active therapeutic rest break. Education was provided pelvic floor activation with activities during her rest breaks. She requested today to be her last visit therefore updated Home Exercise Program was provided. Will notify supervising Physical Therapy of pts wishes to discharge.       Kasandra Is progressing well towards her goals.   Pt prognosis is Guarded as she does not intend to continue therapy after next visit     Pt will continue to benefit from skilled outpatient physical therapy to address the deficits listed in the problem list box on initial evaluation, provide pt/family education and to maximize pt's level of independence in the home and community environment.     Pt's spiritual, cultural and educational needs considered and pt agreeable to plan of care and goals.     Anticipated barriers to physical therapy: age; co-morbidities; not interested in participation in full plan of care     Goals: Goals:  Short Term Goals: 2 weeks   - Pt will demonstrate excellent knowledge and adherence to HEP to facilitate optimal recovery.  - Pt will demonstrate proper PFM contraction, relaxation, and lengthening coordinated with TA and breath for improved muscle coordination needed for functional activity.        Long Term Goals: 4 weeks   - Pt will demonstrate excellent knowledge and adherence to  HEP for continued self-maintenance of symptoms.  - Pt will report voiding interval of 2 hours for improved ADL tolerance.  - Pt will report ability to delay urinary urge for at least 15 minutes to maintain continence with ADL/IADLs.   - Pt will report needing </= 1 pad/day indicating improved PFM function needed to maintain continence  - Pt will demonstrate PFM strength of at least 2/5 MMT for improved strength needed to maintain continence.        Plan     Plan of care Certification: 12/6/2023 to 1/6/2024.     Outpatient Physical Therapy 1 times weekly for 4 weeks    Polina Watson, PTA

## 2024-01-03 DIAGNOSIS — N32.81 OVERACTIVE BLADDER: ICD-10-CM

## 2024-01-03 DIAGNOSIS — R35.0 URINARY FREQUENCY: ICD-10-CM

## 2024-01-03 RX ORDER — OXYBUTYNIN CHLORIDE 5 MG/1
5 TABLET, EXTENDED RELEASE ORAL
Qty: 30 TABLET | Refills: 2 | Status: SHIPPED | OUTPATIENT
Start: 2024-01-03 | End: 2024-02-18 | Stop reason: SDUPTHER

## 2024-01-11 DIAGNOSIS — Z00.00 ENCOUNTER FOR MEDICARE ANNUAL WELLNESS EXAM: ICD-10-CM

## 2024-01-23 RX ORDER — POTASSIUM CHLORIDE 750 MG/1
10 TABLET, EXTENDED RELEASE ORAL 2 TIMES DAILY
Qty: 90 TABLET | Refills: 1 | Status: SHIPPED | OUTPATIENT
Start: 2024-01-23 | End: 2024-04-01

## 2024-01-23 RX ORDER — DRONEDARONE 400 MG/1
400 TABLET, FILM COATED ORAL 2 TIMES DAILY
Qty: 180 TABLET | Refills: 1 | Status: SHIPPED | OUTPATIENT
Start: 2024-01-23 | End: 2024-06-11

## 2024-01-23 NOTE — TELEPHONE ENCOUNTER
Care Due:                  Date            Visit Type   Department     Provider  --------------------------------------------------------------------------------                                EP -                              [facility]  Last Visit: 09-      [facility]  Next Visit: None Scheduled  None         None Found                                                            Last  Test          Frequency    Reason                     Performed    Due Date  --------------------------------------------------------------------------------    Mg Level....  12 months..  alendronate..............  03- 03-    Phosphate...  12 months..  alendronate..............  Not Found    Overdue    Valproic      12 months..  divalproex...............  02- 02-  Acid (serum)    Vitamin D...  12 months..  alendronate..............  10-   09-    [facility] Embedded Care Due Messages. Reference number: [id number].   1/23/2024 7:24:59 AM CST

## 2024-01-24 RX ORDER — CLOPIDOGREL BISULFATE 75 MG/1
75 TABLET ORAL DAILY
Qty: 90 TABLET | Refills: 3 | Status: SHIPPED | OUTPATIENT
Start: 2024-01-24 | End: 2024-06-05

## 2024-02-07 RX ORDER — DIVALPROEX SODIUM 500 MG/1
1000 TABLET, FILM COATED, EXTENDED RELEASE ORAL
Qty: 180 TABLET | Refills: 3 | Status: SHIPPED | OUTPATIENT
Start: 2024-02-07 | End: 2024-04-01

## 2024-02-07 RX ORDER — TRAZODONE HYDROCHLORIDE 100 MG/1
200 TABLET ORAL NIGHTLY
Qty: 180 TABLET | Refills: 3 | Status: SHIPPED | OUTPATIENT
Start: 2024-02-07 | End: 2024-03-13

## 2024-02-07 RX ORDER — BUPROPION HYDROCHLORIDE 300 MG/1
300 TABLET ORAL DAILY
Qty: 90 TABLET | Refills: 2 | Status: SHIPPED | OUTPATIENT
Start: 2024-02-07

## 2024-02-07 RX ORDER — LEVOTHYROXINE SODIUM 100 UG/1
100 TABLET ORAL DAILY
Qty: 90 TABLET | Refills: 1 | Status: SHIPPED | OUTPATIENT
Start: 2024-02-07

## 2024-02-07 NOTE — TELEPHONE ENCOUNTER
No care due was identified.  [facility] Embedded Care Due Messages. Reference number: [id number].   2/07/2024 2:37:29 PM CST

## 2024-02-07 NOTE — TELEPHONE ENCOUNTER
Refill Routing Note   Medication(s) are not appropriate for processing by [facility] for the following reason(s):        Outside of protocol: non-delegated    [facility] action(s):  Route  Approve      Medication Therapy Plan:         Appointments  past 12m or future 3m with PCP    Date Provider   Last Visit   9/29/2023 Sanjiv Ford III, MD   Next Visit   Visit date not found Sanjiv Ford III, MD   ED visits in past 90 days: 0        Note composed:3:36 PM 02/07/2024

## 2024-02-18 DIAGNOSIS — N32.81 OVERACTIVE BLADDER: ICD-10-CM

## 2024-02-18 DIAGNOSIS — R35.0 URINARY FREQUENCY: ICD-10-CM

## 2024-02-18 NOTE — TELEPHONE ENCOUNTER
Patient is requesting to transfer prescription to [facility].  Please advise. Thank you.     LR--1-3-24        LOV--10-17-23       FOV--None Noted

## 2024-02-19 RX ORDER — OXYBUTYNIN CHLORIDE 5 MG/1
5 TABLET, EXTENDED RELEASE ORAL DAILY
Qty: 90 TABLET | Refills: 1 | Status: SHIPPED | OUTPATIENT
Start: 2024-02-19 | End: 2024-02-21 | Stop reason: SDUPTHER

## 2024-02-21 DIAGNOSIS — R35.0 URINARY FREQUENCY: ICD-10-CM

## 2024-02-21 DIAGNOSIS — N32.81 OVERACTIVE BLADDER: ICD-10-CM

## 2024-02-21 RX ORDER — OXYBUTYNIN CHLORIDE 5 MG/1
5 TABLET, EXTENDED RELEASE ORAL DAILY
Qty: 90 TABLET | Refills: 1 | Status: SHIPPED | OUTPATIENT
Start: 2024-02-21 | End: 2024-06-19

## 2024-02-21 RX ORDER — RABEPRAZOLE SODIUM 20 MG/1
20 TABLET, DELAYED RELEASE ORAL 2 TIMES DAILY
Qty: 180 TABLET | Refills: 1 | Status: SHIPPED | OUTPATIENT
Start: 2024-02-21 | End: 2024-04-27 | Stop reason: SDUPTHER

## 2024-02-21 NOTE — TELEPHONE ENCOUNTER
No care due was identified.  [facility] Embedded Care Due Messages. Reference number: [id number].   2/21/2024 6:42:24 AM CST

## 2024-03-11 ENCOUNTER — TELEPHONE (OUTPATIENT)
Dept: FAMILY MEDICINE | Facility: CLINIC | Age: 74
End: 2024-03-11
Payer: MEDICARE

## 2024-03-11 NOTE — TELEPHONE ENCOUNTER
Advised stay on low dose till she come in Wednesday 1pm to see riky     ----- Message from Terry Shay sent at 3/11/2024  9:02 AM CDT -----  Contact: Self  Type:  Same Day Appointment Request    Caller is requesting a same day appointment.  Caller declined first available appointment listed below.      Name of Caller:  Patient  When is the first available appointment?  3/28  Symptoms:  Med review/issues  Best Call Back Number:  [phone]   Additional Information:

## 2024-03-13 ENCOUNTER — OFFICE VISIT (OUTPATIENT)
Dept: FAMILY MEDICINE | Facility: CLINIC | Age: 74
End: 2024-03-13
Payer: MEDICARE

## 2024-03-13 VITALS
HEART RATE: 65 BPM | BODY MASS INDEX: 19.61 KG/M2 | DIASTOLIC BLOOD PRESSURE: 88 MMHG | OXYGEN SATURATION: 99 % | WEIGHT: 122 LBS | HEIGHT: 66 IN | SYSTOLIC BLOOD PRESSURE: 136 MMHG | TEMPERATURE: 98 F

## 2024-03-13 DIAGNOSIS — F41.9 ANXIETY: Primary | ICD-10-CM

## 2024-03-13 DIAGNOSIS — I70.0 AORTIC ATHEROSCLEROSIS: ICD-10-CM

## 2024-03-13 DIAGNOSIS — R26.9 GAIT DISORDER: ICD-10-CM

## 2024-03-13 DIAGNOSIS — E55.9 VITAMIN D DEFICIENCY: ICD-10-CM

## 2024-03-13 DIAGNOSIS — I48.0 PAROXYSMAL ATRIAL FIBRILLATION: ICD-10-CM

## 2024-03-13 DIAGNOSIS — R42 DYSEQUILIBRIUM: ICD-10-CM

## 2024-03-13 DIAGNOSIS — Z79.01 ANTICOAGULANT LONG-TERM USE: ICD-10-CM

## 2024-03-13 DIAGNOSIS — Z00.00 ANNUAL PHYSICAL EXAM: ICD-10-CM

## 2024-03-13 DIAGNOSIS — K21.00 GASTROESOPHAGEAL REFLUX DISEASE WITH ESOPHAGITIS WITHOUT HEMORRHAGE: ICD-10-CM

## 2024-03-13 DIAGNOSIS — F31.9 BIPOLAR 1 DISORDER: ICD-10-CM

## 2024-03-13 DIAGNOSIS — I10 HYPERTENSION, ESSENTIAL: ICD-10-CM

## 2024-03-13 DIAGNOSIS — E27.8 ADRENAL NODULE: ICD-10-CM

## 2024-03-13 DIAGNOSIS — G95.89 SPINAL CORD MASS: ICD-10-CM

## 2024-03-13 DIAGNOSIS — D51.9 ANEMIA DUE TO VITAMIN B12 DEFICIENCY, UNSPECIFIED B12 DEFICIENCY TYPE: ICD-10-CM

## 2024-03-13 DIAGNOSIS — J43.9 PULMONARY EMPHYSEMA, UNSPECIFIED EMPHYSEMA TYPE: ICD-10-CM

## 2024-03-13 DIAGNOSIS — E03.9 HYPOTHYROIDISM, UNSPECIFIED TYPE: ICD-10-CM

## 2024-03-13 PROBLEM — E27.9 ADRENAL NODULE: Status: ACTIVE | Noted: 2024-03-13

## 2024-03-13 PROBLEM — E78.5 HYPERLIPIDEMIA: Status: RESOLVED | Noted: 2019-09-21 | Resolved: 2024-03-13

## 2024-03-13 PROCEDURE — 99999 PR PBB SHADOW E&M-EST. PATIENT-LVL III: CPT | Mod: PBBFAC,,, | Performed by: NURSE PRACTITIONER

## 2024-03-13 PROCEDURE — 99213 OFFICE O/P EST LOW 20 MIN: CPT | Mod: PBBFAC,PN | Performed by: NURSE PRACTITIONER

## 2024-03-13 PROCEDURE — 99214 OFFICE O/P EST MOD 30 MIN: CPT | Mod: S$PBB,,, | Performed by: NURSE PRACTITIONER

## 2024-03-13 NOTE — PROGRESS NOTES
"    SUBJECTIVE:      Patient ID: Kasandra Jones is a 73 y.o. female.    Chief Complaint: Medication Problem    HPI  Is here for follow up  Thinks has sinus infection  Denies fever  Denies chills- but always cold  Denies chest pain  Denies sob  Has cough- sometimes has mucus production- sometimes green sometimes clear  Has had cough for months    Is due for covid vacc   When took 2 depakote and 2 of the trazodone - felt "drunk"; went cold turkey then  realized and had placed back on one each  Feels okay on wellbutrin  Urinates during the night   Goes to bed between 7 and 8 pm  Still feels "drunk" will decrease trazodone to 50 mg q night  Will hold off on tweeking the depakote until levels come back  Doesn't take the xanax that frequently    Has vit d def  Has vit b 12 def  Has hypothyroidism  Is on depakote- has bipolar I and anxiety  Has a fib  Has hypertension  Has anticoag use  Has gerd  Has gait issues    Is on xanax  checked nonsuicidal  Is on well butrin  Is on depakote  Is on trazodone for insomnia  Is on synthroid hypothyroidism  Is on fosmax- ostepneia  Is on plavix- cad  Is on iron  Is on kcl  Is on ditropan- still getting up to the bathroom at night  Is on aciphex for gerd  Is on copper  Is on vit d3  Is on zyrtec for allergies    Is 122 pounds today; has lost weight recently        Past Surgical History:   Procedure Laterality Date    EYE SURGERY Bilateral 2020 August    cataract removal    HYSTERECTOMY      TOTAL REDUCTION MAMMOPLASTY  2002     Family History   Problem Relation Age of Onset    Breast cancer Maternal Aunt       Social History     Socioeconomic History    Marital status:    Occupational History    Occupation: RETIRED   Tobacco Use    Smoking status: Never    Smokeless tobacco: Never   Substance and Sexual Activity    Alcohol use: Never    Drug use: Never    Sexual activity: Yes     Partners: Male     Social Determinants of Health     Stress: No Stress Concern " Present (9/20/2019)    [facility]ational [facility] - Occupational Stress Questionnaire     Feeling of Stress : Not at all     Current Outpatient Medications   Medication Sig Dispense Refill    alendronate (FOSAMAX) 70 MG tablet Take 1 tablet (70 mg total) by mouth every 7 days. 12 tablet 3    ALPRAZolam (XANAX) 0.5 MG tablet Take 1 tablet (0.5 mg total) by mouth daily as needed for Anxiety. (Patient taking differently: Take 0.5 mg by mouth daily as needed for Anxiety. PRN) 30 tablet 0    buPROPion (WELLBUTRIN XL) 300 MG 24 hr tablet Take 1 tablet (300 mg total) by mouth once daily. 90 tablet 2    cetirizine (ZYRTEC) 10 MG tablet Take 10 mg by mouth once daily.      cholecalciferol, vitamin D3, 125 mcg (5,000 unit) capsule       clopidogreL (PLAVIX) 75 mg tablet Take 1 tablet (75 mg total) by mouth once daily. 90 tablet 3    copper gluconate 2 mg Tab Take by mouth.      cyanocobalamin-cobamamide (B12) 5,000-100 mcg Lozg       diphenoxylate-atropine 2.5-0.025 mg (LOMOTIL) 2.5-0.025 mg per tablet Take 1 tablet by mouth 4 (four) times daily as needed for Diarrhea. 30 tablet 1    divalproex ER (DEPAKOTE ER) 500 MG Tb24 TAKE 2 TABLETS ONCE DAILY (Patient taking differently: Take 500 mg by mouth.) 180 tablet 3    ferrous sulfate 325 (65 FE) MG EC tablet       levothyroxine (SYNTHROID) 100 MCG tablet Take 1 tablet (100 mcg total) by mouth once daily. 90 tablet 1    magnesium oxide (MAG-OX) 400 mg (241.3 mg magnesium) tablet       MULTAQ 400 mg Tab Take 1 tablet (400 mg total) by mouth 2 (two) times daily. 180 tablet 1    oxybutynin (DITROPAN-XL) 5 MG TR24 Take 1 tablet (5 mg total) by mouth once daily. 90 tablet 1    potassium chloride SA (K-DUR,KLOR-CON M) 10 MEQ tablet Take 1 tablet (10 mEq total) by mouth 2 (two) times daily. 90 tablet 1    RABEprazole (ACIPHEX) 20 mg tablet Take 1 tablet (20 mg total) by mouth 2 (two) times daily. 180 tablet 1    vit C,R-Bz-ohggs-lutein-zeaxan  "(PRESERVISION AREDS 2) 250-90-40-1 mg Cap        No current facility-administered medications for this visit.     Review of patient's allergies indicates:   Allergen Reactions    Niacin preparations     Penicillins       Past Medical History:   Diagnosis Date    Cataract      Past Surgical History:   Procedure Laterality Date    EYE SURGERY Bilateral 2020 August    cataract removal    HYSTERECTOMY      TOTAL REDUCTION MAMMOPLASTY  2002       Review of Systems   HENT:  Positive for congestion and ear pain.    Respiratory:  Positive for cough.     OBJECTIVE:      Vitals:    03/13/24 1314   BP: 136/88   Pulse: 65   Temp: 97.8 °F (36.6 °C)   SpO2: 99%   Weight: 55.3 kg (122 lb)   Height: 5' 6" (1.676 m)     Physical Exam  Vitals and nursing note reviewed.   Constitutional:       Appearance: Normal appearance. She is normal weight.   HENT:      Head: Normocephalic and atraumatic.      Right Ear: Tympanic membrane, ear canal and external ear normal.      Left Ear: Tympanic membrane, ear canal and external ear normal.      Nose: Nose normal.      Mouth/Throat:      Mouth: Mucous membranes are moist.      Pharynx: Oropharynx is clear.   Eyes:      Pupils: Pupils are equal, round, and reactive to light.   Neck:      Comments: No thrills no bruits  No abnormal neck masses felt  Cardiovascular:      Rate and Rhythm: Normal rate and regular rhythm.      Comments: S1 s2 nsr  No edema noted on bilateral lower ext  Pulmonary:      Effort: Pulmonary effort is normal.      Breath sounds: Normal breath sounds.   Musculoskeletal:      Cervical back: Normal range of motion.   Skin:     General: Skin is warm and dry.   Neurological:      General: No focal deficit present.      Mental Status: She is alert and oriented to person, place, and time. Mental status is at baseline.      Comments: Uses walker   Psychiatric:         Mood and Affect: Mood normal.         Behavior: Behavior normal.         Thought Content: Thought content " normal.         Judgment: Judgment normal.      Comments: nonsuicidal      Assessment:       1. Anxiety    2. Bipolar 1 disorder    3. Hypertension, essential    4. Hypothyroidism, unspecified type    5. Vitamin D deficiency    6. Gastroesophageal reflux disease with esophagitis without hemorrhage    7. Dysequilibrium    8. Gait disorder    9. Anemia due to vitamin B12 deficiency, unspecified B12 deficiency type    10. Anticoagulant long-term use    11. Paroxysmal atrial fibrillation    12. Adrenal nodule    13. Pulmonary emphysema, unspecified emphysema type    14. Spinal cord mass    15. Aortic atherosclerosis    16. Annual physical exam        Plan:       Anxiety  -     VALPROIC ACID; Future; Expected date: 03/13/2024    Bipolar 1 disorder  -     VALPROIC ACID; Future; Expected date: 03/13/2024    Hypertension, essential  -     Comprehensive Metabolic Panel; Future; Expected date: 03/13/2024  -     Lipid Panel; Future; Expected date: 03/13/2024    Hypothyroidism, unspecified type  -     TSH; Future; Expected date: 03/13/2024    Vitamin D deficiency  -     CBC Auto Differential; Future; Expected date: 03/13/2024  -     Vitamin D; Future; Expected date: 03/13/2024    Gastroesophageal reflux disease with esophagitis without hemorrhage    Dysequilibrium    Gait disorder    Anemia due to vitamin B12 deficiency, unspecified B12 deficiency type  -     Vitamin B12; Future; Expected date: 03/13/2024    Anticoagulant long-term use  -     CBC Auto Differential; Future; Expected date: 03/13/2024    Paroxysmal atrial fibrillation    Adrenal nodule    Pulmonary emphysema, unspecified emphysema type    Spinal cord mass    Aortic atherosclerosis    Annual physical exam  -     Lipid Panel; Future; Expected date: 03/13/2024    Take medications as ordered  Get fasting labs done at [facility]  Take one depakote q evening  Take 1/2 or 50 mg trazodone q evening  Follow up in one month or sooner if needed      No follow-ups on file.       3/13/2024 ADARSH Bradley, FNP

## 2024-03-14 ENCOUNTER — LAB VISIT (OUTPATIENT)
Dept: LAB | Facility: HOSPITAL | Age: 74
End: 2024-03-14
Attending: NURSE PRACTITIONER
Payer: MEDICARE

## 2024-03-14 DIAGNOSIS — E55.9 VITAMIN D DEFICIENCY: ICD-10-CM

## 2024-03-14 DIAGNOSIS — Z79.01 ANTICOAGULANT LONG-TERM USE: ICD-10-CM

## 2024-03-14 DIAGNOSIS — E03.9 HYPOTHYROIDISM, UNSPECIFIED TYPE: ICD-10-CM

## 2024-03-14 DIAGNOSIS — F41.9 ANXIETY: ICD-10-CM

## 2024-03-14 DIAGNOSIS — D51.9 ANEMIA DUE TO VITAMIN B12 DEFICIENCY, UNSPECIFIED B12 DEFICIENCY TYPE: ICD-10-CM

## 2024-03-14 DIAGNOSIS — I10 HYPERTENSION, ESSENTIAL: ICD-10-CM

## 2024-03-14 DIAGNOSIS — F31.9 BIPOLAR 1 DISORDER: ICD-10-CM

## 2024-03-14 DIAGNOSIS — Z00.00 ANNUAL PHYSICAL EXAM: ICD-10-CM

## 2024-03-14 LAB
25(OH)D3+25(OH)D2 SERPL-MCNC: 82 NG/ML (ref 30–96)
ALBUMIN SERPL BCP-MCNC: 3.7 G/DL (ref 3.5–5.2)
ALP SERPL-CCNC: 87 U/L (ref 55–135)
ALT SERPL W/O P-5'-P-CCNC: 21 U/L (ref 10–44)
ANION GAP SERPL CALC-SCNC: 5 MMOL/L (ref 8–16)
AST SERPL-CCNC: 18 U/L (ref 10–40)
BASOPHILS # BLD AUTO: 0.03 K/UL (ref 0–0.2)
BASOPHILS NFR BLD: 0.6 % (ref 0–1.9)
BILIRUB SERPL-MCNC: 0.4 MG/DL (ref 0.1–1)
BUN SERPL-MCNC: 12 MG/DL (ref 8–23)
CALCIUM SERPL-MCNC: 8.9 MG/DL (ref 8.7–10.5)
CHLORIDE SERPL-SCNC: 107 MMOL/L (ref 95–110)
CHOLEST SERPL-MCNC: 167 MG/DL (ref 120–199)
CHOLEST/HDLC SERPL: 1.9 {RATIO} (ref 2–5)
CO2 SERPL-SCNC: 30 MMOL/L (ref 23–29)
CREAT SERPL-MCNC: 0.7 MG/DL (ref 0.5–1.4)
DIFFERENTIAL METHOD BLD: ABNORMAL
EOSINOPHIL # BLD AUTO: 0.1 K/UL (ref 0–0.5)
EOSINOPHIL NFR BLD: 1.6 % (ref 0–8)
ERYTHROCYTE [DISTWIDTH] IN BLOOD BY AUTOMATED COUNT: 12.5 % (ref 11.5–14.5)
EST. GFR  (NO RACE VARIABLE): >60 ML/MIN/1.73 M^2
GLUCOSE SERPL-MCNC: 100 MG/DL (ref 70–110)
HCT VFR BLD AUTO: 41.7 % (ref 37–48.5)
HDLC SERPL-MCNC: 88 MG/DL (ref 40–75)
HDLC SERPL: 52.7 % (ref 20–50)
HGB BLD-MCNC: 13.2 G/DL (ref 12–16)
IMM GRANULOCYTES # BLD AUTO: 0.01 K/UL (ref 0–0.04)
IMM GRANULOCYTES NFR BLD AUTO: 0.2 % (ref 0–0.5)
LDLC SERPL CALC-MCNC: 63.4 MG/DL (ref 63–159)
LYMPHOCYTES # BLD AUTO: 1.9 K/UL (ref 1–4.8)
LYMPHOCYTES NFR BLD: 38.2 % (ref 18–48)
MCH RBC QN AUTO: 32.8 PG (ref 27–31)
MCHC RBC AUTO-ENTMCNC: 31.7 G/DL (ref 32–36)
MCV RBC AUTO: 104 FL (ref 82–98)
MONOCYTES # BLD AUTO: 0.3 K/UL (ref 0.3–1)
MONOCYTES NFR BLD: 6.6 % (ref 4–15)
NEUTROPHILS # BLD AUTO: 2.6 K/UL (ref 1.8–7.7)
NEUTROPHILS NFR BLD: 52.8 % (ref 38–73)
NONHDLC SERPL-MCNC: 79 MG/DL
NRBC BLD-RTO: 0 /100 WBC
PLATELET # BLD AUTO: 229 K/UL (ref 150–450)
PMV BLD AUTO: 10.4 FL (ref 9.2–12.9)
POTASSIUM SERPL-SCNC: 4.2 MMOL/L (ref 3.5–5.1)
PROT SERPL-MCNC: 6.2 G/DL (ref 6–8.4)
RBC # BLD AUTO: 4.03 M/UL (ref 4–5.4)
SODIUM SERPL-SCNC: 142 MMOL/L (ref 136–145)
TRIGL SERPL-MCNC: 78 MG/DL (ref 30–150)
TSH SERPL DL<=0.005 MIU/L-ACNC: 2.39 UIU/ML (ref 0.34–5.6)
VALPROATE SERPL-MCNC: 21.5 UG/ML (ref 50–100)
VIT B12 SERPL-MCNC: >1500 PG/ML (ref 210–950)
WBC # BLD AUTO: 4.98 K/UL (ref 3.9–12.7)

## 2024-03-14 PROCEDURE — 80061 LIPID PANEL: CPT | Performed by: NURSE PRACTITIONER

## 2024-03-14 PROCEDURE — 82306 VITAMIN D 25 HYDROXY: CPT | Performed by: NURSE PRACTITIONER

## 2024-03-14 PROCEDURE — 80053 COMPREHEN METABOLIC PANEL: CPT | Performed by: NURSE PRACTITIONER

## 2024-03-14 PROCEDURE — 80164 ASSAY DIPROPYLACETIC ACD TOT: CPT | Performed by: NURSE PRACTITIONER

## 2024-03-14 PROCEDURE — 36415 COLL VENOUS BLD VENIPUNCTURE: CPT | Performed by: NURSE PRACTITIONER

## 2024-03-14 PROCEDURE — 85025 COMPLETE CBC W/AUTO DIFF WBC: CPT | Performed by: NURSE PRACTITIONER

## 2024-03-14 PROCEDURE — 84443 ASSAY THYROID STIM HORMONE: CPT | Performed by: NURSE PRACTITIONER

## 2024-03-14 PROCEDURE — 82607 VITAMIN B-12: CPT | Performed by: NURSE PRACTITIONER

## 2024-03-22 ENCOUNTER — TELEPHONE (OUTPATIENT)
Dept: ADMINISTRATIVE | Facility: CLINIC | Age: 74
End: 2024-03-22
Payer: MEDICARE

## 2024-03-25 ENCOUNTER — OFFICE VISIT (OUTPATIENT)
Dept: FAMILY MEDICINE | Facility: CLINIC | Age: 74
End: 2024-03-25
Payer: MEDICARE

## 2024-03-25 VITALS
TEMPERATURE: 98 F | OXYGEN SATURATION: 98 % | DIASTOLIC BLOOD PRESSURE: 66 MMHG | WEIGHT: 121.5 LBS | BODY MASS INDEX: 19.53 KG/M2 | HEART RATE: 72 BPM | SYSTOLIC BLOOD PRESSURE: 111 MMHG | HEIGHT: 66 IN

## 2024-03-25 DIAGNOSIS — I10 HYPERTENSION, ESSENTIAL: ICD-10-CM

## 2024-03-25 DIAGNOSIS — Z74.09 OTHER REDUCED MOBILITY: ICD-10-CM

## 2024-03-25 DIAGNOSIS — R26.9 ABNORMALITY OF GAIT AND MOBILITY: ICD-10-CM

## 2024-03-25 DIAGNOSIS — J84.10 CALCIFIED GRANULOMA OF LUNG: ICD-10-CM

## 2024-03-25 DIAGNOSIS — Z00.00 ENCOUNTER FOR PREVENTIVE HEALTH EXAMINATION: Primary | ICD-10-CM

## 2024-03-25 DIAGNOSIS — H91.90 HEARING LOSS, UNSPECIFIED HEARING LOSS TYPE, UNSPECIFIED LATERALITY: ICD-10-CM

## 2024-03-25 DIAGNOSIS — F31.9 BIPOLAR 1 DISORDER: ICD-10-CM

## 2024-03-25 PROCEDURE — 99214 OFFICE O/P EST MOD 30 MIN: CPT | Mod: PBBFAC,PO | Performed by: NURSE PRACTITIONER

## 2024-03-25 PROCEDURE — G0439 PPPS, SUBSEQ VISIT: HCPCS | Mod: ,,, | Performed by: NURSE PRACTITIONER

## 2024-03-25 PROCEDURE — 99999 PR PBB SHADOW E&M-EST. PATIENT-LVL IV: CPT | Mod: PBBFAC,,, | Performed by: NURSE PRACTITIONER

## 2024-03-25 NOTE — PROGRESS NOTES
"  aKsandra Jones presented for a  Medicare AWV and comprehensive Health Risk Assessment today. The following components were reviewed and updated:    Medical history  Family History  Social history  Allergies and Current Medications  Health Risk Assessment  Health Maintenance  Care Team         ** See Completed Assessments for Annual Wellness Visit within the encounter summary.**         The following assessments were completed:  Living Situation  CAGE  Depression Screening  Timed Get Up and Go  Whisper Test  Cognitive Function Screening  Nutrition Screening  ADL Screening  PAQ Screening    Clock in media   Opioid documentation:      Patient does not have a current opioid prescription.        Vitals:    03/25/24 1614   BP: 111/66   Pulse: 72   Temp: 97.6 °F (36.4 °C)   TempSrc: Oral   SpO2: 98%   Weight: 55.1 kg (121 lb 7.6 oz)   Height: 5' 6" (1.676 m)     Body mass index is 19.61 kg/m².  Physical Exam  Constitutional:       Appearance: She is well-developed.   HENT:      Head: Normocephalic and atraumatic.      Nose: Nose normal.   Eyes:      General: Lids are normal.      Conjunctiva/sclera: Conjunctivae normal.   Cardiovascular:      Rate and Rhythm: Normal rate.   Pulmonary:      Effort: Pulmonary effort is normal.   Neurological:      Mental Status: She is alert and oriented to person, place, and time.   Psychiatric:         Speech: Speech normal.         Behavior: Behavior normal.               Diagnoses and health risks identified today and associated recommendations/orders:    1. Encounter for preventive health examination  Discussed health maintenance guidelines appropriate for age.        2. Other reduced mobility  Stable, continue to monitor     3. Bipolar 1 disorder  Stable, continue to monitor   Followed by pcp    4. Hearing loss, unspecified hearing loss type, unspecified laterality  Has hearing aids but does not wear them     5. Hypertension, essential  Controlled, continue current medication " regimen  Low salt diet  Increase physical activity  Followed by pcp      6. Calcified granuloma of lung  Stable, continue to monitor     7. Abnormality of gait and mobility  Stable, continue use of assistive device       Provided Kasandra with a 5-10 year written screening schedule and personal prevention plan. Recommendations were developed using the USPSTF age appropriate recommendations. Education, counseling, and referrals were provided as needed. After Visit Summary printed and given to patient which includes a list of additional screenings\tests needed.    Follow up for One year for Annual Wellness Visit.    Kamini Vernon NP      I offered to discuss advanced care planning, including how to pick a person who would make decisions for you if you were unable to make them for yourself, called a health care power of , and what kind of decisions you might make such as use of life sustaining treatments such as ventilators and tube feeding when faced with a life limiting illness recorded on a living will that they will need to know. (How you want to be cared for as you near the end of your natural life)     X  Patient has advanced directives written and agrees to provide copies to the institution.

## 2024-03-25 NOTE — PATIENT INSTRUCTIONS
Counseling and Referral of Other Preventative  (Italic type indicates deductible and co-insurance are waived)    Patient Name: Kasandra Jones  Today's Date: 3/25/2024    Health Maintenance       Date Due Completion Date    High Dose Statin Never done ---    COVID-19 Vaccine (6 - 2023-24 season) 09/01/2023 10/3/2022    DEXA Scan 08/28/2026 8/28/2023    Lipid Panel 03/14/2029 3/14/2024    TETANUS VACCINE 10/03/2032 10/3/2022        No orders of the defined types were placed in this encounter.    The following information is provided to all patients.  This information is to help you find resources for any of the problems found today that may be affecting your health:                  Living healthy guide: www.[facility].[place].gov      Understanding Diabetes: www.diabetes.org      Eating healthy: www.cdc.gov/healthyweight      [facility] home safety checklist: www.cdc.gov/steadi/patient.html      Agency on Aging: www.goea.[place].gov      Alcoholics anonymous (AA): www.aa.org      Physical Activity: www.eugenio.nih.gov/ur2ogqr      Tobacco use: www.quitwithusla.org

## 2024-03-31 NOTE — TELEPHONE ENCOUNTER
Care Due:                  Date            Visit Type   Department     Provider  --------------------------------------------------------------------------------                                EP [facility]  Last Visit: 09-      [facility]   [facility]  Next Visit: None Scheduled  None         None Found                                                            Last  Test          Frequency    Reason                     Performed    Due Date  --------------------------------------------------------------------------------    Mg Level....  12 months..  alendronate..............  03- 03-    Phosphate...  12 months..  alendronate..............  Not Found    Overdue    [employer] Embedded Care Due Messages. Reference number: [id number].   3/31/2024 6:18:39 AM CDT

## 2024-04-01 ENCOUNTER — HOSPITAL ENCOUNTER (OUTPATIENT)
Facility: HOSPITAL | Age: 74
LOS: 1 days | Discharge: HOME | End: 2024-04-01
Attending: EMERGENCY MEDICINE | Admitting: INTERNAL MEDICINE
Payer: MEDICARE

## 2024-04-01 VITALS
WEIGHT: 125 LBS | HEART RATE: 81 BPM | RESPIRATION RATE: 20 BRPM | BODY MASS INDEX: 20.09 KG/M2 | TEMPERATURE: 98 F | SYSTOLIC BLOOD PRESSURE: 145 MMHG | HEIGHT: 66 IN | OXYGEN SATURATION: 98 % | DIASTOLIC BLOOD PRESSURE: 67 MMHG

## 2024-04-01 DIAGNOSIS — I48.91 ATRIAL FIBRILLATION WITH RAPID VENTRICULAR RESPONSE: Primary | ICD-10-CM

## 2024-04-01 DIAGNOSIS — S09.90XA INJURY OF HEAD, INITIAL ENCOUNTER: ICD-10-CM
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
## 2024-04-01 DIAGNOSIS — R00.2 PALPITATIONS: ICD-10-CM

## 2024-04-01 LAB
ALBUMIN SERPL BCP-MCNC: 3.7 G/DL (ref 3.5–5.2)
ALP SERPL-CCNC: 92 U/L (ref 55–135)
ALT SERPL W/O P-5'-P-CCNC: 28 U/L (ref 10–44)
ANION GAP SERPL CALC-SCNC: 8 MMOL/L (ref 8–16)
AST SERPL-CCNC: 22 U/L (ref 10–40)
BASOPHILS # BLD AUTO: 0.05 K/UL (ref 0–0.2)
BASOPHILS NFR BLD: 0.5 % (ref 0–1.9)
BILIRUB SERPL-MCNC: 0.3 MG/DL (ref 0.1–1)
BILIRUB UR QL STRIP: NEGATIVE
BNP SERPL-MCNC: 104 PG/ML (ref 0–99)
BUN SERPL-MCNC: 12 MG/DL (ref 8–23)
CALCIUM SERPL-MCNC: 8.5 MG/DL (ref 8.7–10.5)
CHLORIDE SERPL-SCNC: 105 MMOL/L (ref 95–110)
CLARITY UR: CLEAR
CO2 SERPL-SCNC: 25 MMOL/L (ref 23–29)
COLOR UR: COLORLESS
CREAT SERPL-MCNC: 0.6 MG/DL (ref 0.5–1.4)
DIFFERENTIAL METHOD BLD: ABNORMAL
EOSINOPHIL # BLD AUTO: 0.1 K/UL (ref 0–0.5)
EOSINOPHIL NFR BLD: 0.8 % (ref 0–8)
ERYTHROCYTE [DISTWIDTH] IN BLOOD BY AUTOMATED COUNT: 13.1 % (ref 11.5–14.5)
EST. GFR  (NO RACE VARIABLE): >60 ML/MIN/1.73 M^2
GLUCOSE SERPL-MCNC: 96 MG/DL (ref 70–110)
GLUCOSE UR QL STRIP: NEGATIVE
HCT VFR BLD AUTO: 41.4 % (ref 37–48.5)
HGB BLD-MCNC: 13.3 G/DL (ref 12–16)
HGB UR QL STRIP: NEGATIVE
IMM GRANULOCYTES # BLD AUTO: 0.02 K/UL (ref 0–0.04)
IMM GRANULOCYTES NFR BLD AUTO: 0.2 % (ref 0–0.5)
KETONES UR QL STRIP: NEGATIVE
LEUKOCYTE ESTERASE UR QL STRIP: NEGATIVE
LYMPHOCYTES # BLD AUTO: 1.5 K/UL (ref 1–4.8)
LYMPHOCYTES NFR BLD: 15.6 % (ref 18–48)
MAGNESIUM SERPL-MCNC: 2 MG/DL (ref 1.6–2.6)
MCH RBC QN AUTO: 33.1 PG (ref 27–31)
MCHC RBC AUTO-ENTMCNC: 32.1 G/DL (ref 32–36)
MCV RBC AUTO: 103 FL (ref 82–98)
MONOCYTES # BLD AUTO: 0.8 K/UL (ref 0.3–1)
MONOCYTES NFR BLD: 7.9 % (ref 4–15)
NEUTROPHILS # BLD AUTO: 7.2 K/UL (ref 1.8–7.7)
NEUTROPHILS NFR BLD: 75 % (ref 38–73)
NITRITE UR QL STRIP: NEGATIVE
NRBC BLD-RTO: 0 /100 WBC
OHS QRS DURATION: 88 MS
OHS QRS DURATION: 92 MS
OHS QTC CALCULATION: 419 MS
OHS QTC CALCULATION: 450 MS
PH UR STRIP: 7 [PH] (ref 5–8)
PLATELET # BLD AUTO: 211 K/UL (ref 150–450)
PMV BLD AUTO: 10.9 FL (ref 9.2–12.9)
POTASSIUM SERPL-SCNC: 4.1 MMOL/L (ref 3.5–5.1)
PROT SERPL-MCNC: 6.2 G/DL (ref 6–8.4)
PROT UR QL STRIP: NEGATIVE
RBC # BLD AUTO: 4.02 M/UL (ref 4–5.4)
SODIUM SERPL-SCNC: 138 MMOL/L (ref 136–145)
SP GR UR STRIP: 1 (ref 1–1.03)
TROPONIN I SERPL HS-MCNC: 5 PG/ML (ref 0–14.9)
TSH SERPL DL<=0.005 MIU/L-ACNC: 3.56 UIU/ML (ref 0.34–5.6)
URN SPEC COLLECT METH UR: ABNORMAL
UROBILINOGEN UR STRIP-ACNC: NEGATIVE EU/DL
VALPROATE SERPL-MCNC: 22.6 UG/ML (ref 50–100)
WBC # BLD AUTO: 9.65 K/UL (ref 3.9–12.7)

## 2024-04-01 PROCEDURE — 84443 ASSAY THYROID STIM HORMONE: CPT | Performed by: STUDENT IN AN ORGANIZED HEALTH CARE EDUCATION/TRAINING PROGRAM

## 2024-04-01 PROCEDURE — 93005 ELECTROCARDIOGRAM TRACING: CPT | Performed by: GENERAL PRACTICE

## 2024-04-01 PROCEDURE — 93010 ELECTROCARDIOGRAM REPORT: CPT | Mod: 76,,, | Performed by: GENERAL PRACTICE

## 2024-04-01 PROCEDURE — 97116 GAIT TRAINING THERAPY: CPT

## 2024-04-01 PROCEDURE — 85025 COMPLETE CBC W/AUTO DIFF WBC: CPT | Performed by: EMERGENCY MEDICINE

## 2024-04-01 PROCEDURE — 97165 OT EVAL LOW COMPLEX 30 MIN: CPT

## 2024-04-01 PROCEDURE — 25000003 PHARM REV CODE 250: Performed by: STUDENT IN AN ORGANIZED HEALTH CARE EDUCATION/TRAINING PROGRAM

## 2024-04-01 PROCEDURE — 80164 ASSAY DIPROPYLACETIC ACD TOT: CPT | Performed by: STUDENT IN AN ORGANIZED HEALTH CARE EDUCATION/TRAINING PROGRAM

## 2024-04-01 PROCEDURE — 96374 THER/PROPH/DIAG INJ IV PUSH: CPT

## 2024-04-01 PROCEDURE — 97161 PT EVAL LOW COMPLEX 20 MIN: CPT

## 2024-04-01 PROCEDURE — 99285 EMERGENCY DEPT VISIT HI MDM: CPT | Mod: 25

## 2024-04-01 PROCEDURE — G0378 HOSPITAL OBSERVATION PER HR: HCPCS

## 2024-04-01 PROCEDURE — 81003 URINALYSIS AUTO W/O SCOPE: CPT | Performed by: EMERGENCY MEDICINE

## 2024-04-01 PROCEDURE — 84484 ASSAY OF TROPONIN QUANT: CPT | Performed by: EMERGENCY MEDICINE

## 2024-04-01 PROCEDURE — 80053 COMPREHEN METABOLIC PANEL: CPT | Performed by: EMERGENCY MEDICINE

## 2024-04-01 PROCEDURE — 97535 SELF CARE MNGMENT TRAINING: CPT

## 2024-04-01 PROCEDURE — 93010 ELECTROCARDIOGRAM REPORT: CPT | Mod: ,,, | Performed by: GENERAL PRACTICE

## 2024-04-01 PROCEDURE — 83880 ASSAY OF NATRIURETIC PEPTIDE: CPT | Performed by: EMERGENCY MEDICINE

## 2024-04-01 PROCEDURE — 25000003 PHARM REV CODE 250: Performed by: EMERGENCY MEDICINE

## 2024-04-01 PROCEDURE — 83735 ASSAY OF MAGNESIUM: CPT | Performed by: EMERGENCY MEDICINE

## 2024-04-01 PROCEDURE — 96376 TX/PRO/DX INJ SAME DRUG ADON: CPT

## 2024-04-01 RX ORDER — SODIUM,POTASSIUM PHOSPHATES 280-250MG
2 POWDER IN PACKET (EA) ORAL
Status: DISCONTINUED | OUTPATIENT
Start: 2024-04-01 | End: 2024-04-01 | Stop reason: HOSPADM

## 2024-04-01 RX ORDER — DIVALPROEX SODIUM 500 MG/1
500 TABLET, FILM COATED, EXTENDED RELEASE ORAL DAILY
Start: 2024-04-01

## 2024-04-01 RX ORDER — ENOXAPARIN SODIUM 100 MG/ML
40 INJECTION SUBCUTANEOUS EVERY 24 HOURS
Status: DISCONTINUED | OUTPATIENT
Start: 2024-04-01 | End: 2024-04-01 | Stop reason: HOSPADM

## 2024-04-01 RX ORDER — TALC
6 POWDER (GRAM) TOPICAL NIGHTLY PRN
Status: DISCONTINUED | OUTPATIENT
Start: 2024-04-01 | End: 2024-04-01 | Stop reason: HOSPADM

## 2024-04-01 RX ORDER — POTASSIUM CHLORIDE 750 MG/1
10 TABLET, EXTENDED RELEASE ORAL 2 TIMES DAILY
Qty: 180 TABLET | Refills: 1 | Status: SHIPPED | OUTPATIENT
Start: 2024-04-01

## 2024-04-01 RX ORDER — ACETAMINOPHEN 325 MG/1
650 TABLET ORAL EVERY 8 HOURS PRN
Status: DISCONTINUED | OUTPATIENT
Start: 2024-04-01 | End: 2024-04-01 | Stop reason: HOSPADM

## 2024-04-01 RX ORDER — ONDANSETRON HYDROCHLORIDE 2 MG/ML
4 INJECTION, SOLUTION INTRAVENOUS EVERY 8 HOURS PRN
Status: DISCONTINUED | OUTPATIENT
Start: 2024-04-01 | End: 2024-04-01 | Stop reason: HOSPADM

## 2024-04-01 RX ORDER — LEVOTHYROXINE SODIUM 100 UG/1
100 TABLET ORAL
Status: DISCONTINUED | OUTPATIENT
Start: 2024-04-01 | End: 2024-04-01 | Stop reason: HOSPADM

## 2024-04-01 RX ORDER — ALPRAZOLAM 0.25 MG/1
0.5 TABLET ORAL DAILY PRN
Status: DISCONTINUED | OUTPATIENT
Start: 2024-04-01 | End: 2024-04-01 | Stop reason: HOSPADM

## 2024-04-01 RX ORDER — PYRIDOXINE HCL (VITAMIN B6) 100 MG
50 TABLET ORAL DAILY
COMMUNITY

## 2024-04-01 RX ORDER — PANTOPRAZOLE SODIUM 40 MG/1
40 TABLET, DELAYED RELEASE ORAL
Status: DISCONTINUED | OUTPATIENT
Start: 2024-04-01 | End: 2024-04-01 | Stop reason: HOSPADM

## 2024-04-01 RX ORDER — DILTIAZEM HYDROCHLORIDE 5 MG/ML
10 INJECTION INTRAVENOUS
Status: COMPLETED | OUTPATIENT
Start: 2024-04-01 | End: 2024-04-01

## 2024-04-01 RX ORDER — ALPRAZOLAM 0.5 MG/1
0.5 TABLET ORAL DAILY PRN
Start: 2024-04-01 | End: 2024-05-15 | Stop reason: SDUPTHER

## 2024-04-01 RX ORDER — LANOLIN ALCOHOL/MO/W.PET/CERES
800 CREAM (GRAM) TOPICAL
Status: DISCONTINUED | OUTPATIENT
Start: 2024-04-01 | End: 2024-04-01 | Stop reason: HOSPADM

## 2024-04-01 RX ORDER — CLOPIDOGREL BISULFATE 75 MG/1
75 TABLET ORAL DAILY
Status: DISCONTINUED | OUTPATIENT
Start: 2024-04-01 | End: 2024-04-01 | Stop reason: HOSPADM

## 2024-04-01 RX ORDER — BUPROPION HYDROCHLORIDE 150 MG/1
300 TABLET ORAL DAILY
Status: DISCONTINUED | OUTPATIENT
Start: 2024-04-01 | End: 2024-04-01 | Stop reason: HOSPADM

## 2024-04-01 RX ORDER — SODIUM CHLORIDE 0.9 % (FLUSH) 0.9 %
10 SYRINGE (ML) INJECTION
Status: DISCONTINUED | OUTPATIENT
Start: 2024-04-01 | End: 2024-04-01 | Stop reason: HOSPADM

## 2024-04-01 RX ORDER — TRAZODONE HYDROCHLORIDE 100 MG/1
200 TABLET ORAL NIGHTLY
COMMUNITY

## 2024-04-01 RX ORDER — DIVALPROEX SODIUM 500 MG/1
500 TABLET, FILM COATED, EXTENDED RELEASE ORAL DAILY
Status: DISCONTINUED | OUTPATIENT
Start: 2024-04-01 | End: 2024-04-01 | Stop reason: HOSPADM

## 2024-04-01 RX ADMIN — CLOPIDOGREL BISULFATE 75 MG: 75 TABLET, FILM COATED ORAL at 07:04

## 2024-04-01 RX ADMIN — LEVOTHYROXINE SODIUM 100 MCG: 0.1 TABLET ORAL at 07:04

## 2024-04-01 RX ADMIN — DIVALPROEX SODIUM 500 MG: 500 TABLET, FILM COATED, EXTENDED RELEASE ORAL at 07:04

## 2024-04-01 RX ADMIN — PANTOPRAZOLE SODIUM 40 MG: 40 TABLET, DELAYED RELEASE ORAL at 07:04

## 2024-04-01 RX ADMIN — DILTIAZEM HYDROCHLORIDE 10 MG: 5 INJECTION INTRAVENOUS at 03:04

## 2024-04-01 RX ADMIN — DRONEDARONE 400 MG: 400 TABLET, FILM COATED ORAL at 07:04

## 2024-04-01 RX ADMIN — DEXTROSE MONOHYDRATE 5 MG/HR: 50 INJECTION, SOLUTION INTRAVENOUS at 04:04

## 2024-04-01 RX ADMIN — BUPROPION HYDROCHLORIDE 300 MG: 150 TABLET, EXTENDED RELEASE ORAL at 07:04

## 2024-04-01 NOTE — HPI
Patient is a 73-year-old female with history of atrial fibrillation on Plavix, bipolar disorder, GERD, hypothyroidism, frequent falls presents to the emergency room after a ground level fall.  Patient reports going to the bathroom and prior to sitting down on the toilet  fell forward hit her head.  Denies loss of consciousness.  Denies lightheadedness or dizziness prior to falling.  Significant other at bedside reports helping the patient up, mentation at baseline.  CT head negative for acute intracranial abnormality, large left frontal scalp hematoma.  On admission patient was found to be in atrial fibrillation with RVR with heart rates in the 130s.  Given Cardizem push and started on drip with some improvement in heart rates.  Heart rates remain elevated.  Patient being admitted for further management of atrial fibrillation.  Per chart review patient was recently taken off Eliquis by cardiologist for concern of frequent falls, placed on Plavix.  Patient reports compliance with medication at home.

## 2024-04-01 NOTE — HOSPITAL COURSE
Patient is a 73 year old female admitted with a mechanical fall. Patient was trying to sit on the commode and lost balance and fell. Hit her head on the floor. Patient was brought in to ED. She was in Afib RVR, she converted to sinus after 10 mg Cardizem. She has a know history of Afib. She is asymptomatic. Patient worked with therapy prior to discharge.

## 2024-04-01 NOTE — ED PROVIDER NOTES
Encounter Date: 4/1/2024       History     Chief Complaint   Patient presents with    Fall     Patient presents emergency department reported fall into her bathtub striking her head on the assist bar and then the wall sustaining 2 separate hematomas want to her forehead to her left parietal region she denies any loss of consciousness she has history of atrial fibrillation arrival emergency department was found to be in AFib with RVR patient states that they stopped her Eliquis a month ago she is currently taking Plavix patient denies any palpitations shortness of breath or chest pain patient's  reports that she has been having difficulty with her balance having frequent falls for the last year        Review of patient's allergies indicates:   Allergen Reactions    Niacin preparations     Penicillins      Past Medical History:   Diagnosis Date    Cataract      Past Surgical History:   Procedure Laterality Date    EYE SURGERY Bilateral 2020 August    cataract removal    HYSTERECTOMY      TOTAL REDUCTION MAMMOPLASTY  2002     Family History   Problem Relation Age of Onset    Breast cancer Maternal Aunt      Social History     Tobacco Use    Smoking status: Never    Smokeless tobacco: Never   Substance Use Topics    Alcohol use: Never    Drug use: Never     Review of Systems   Constitutional:  Negative for chills and fever.   Cardiovascular:  Negative for chest pain and palpitations.   Musculoskeletal:  Negative for back pain and neck pain.   Neurological:  Positive for headaches.   All other systems reviewed and are negative.      Physical Exam     Initial Vitals [04/01/24 0250]   BP Pulse Resp Temp SpO2   (!) 152/81 (!) 132 20 98.4 °F (36.9 °C) 97 %      MAP       --         Physical Exam    Constitutional: She appears well-developed and well-nourished. No distress.   HENT:   Head: Normocephalic.   Right Ear: External ear normal.   Left Ear: External ear normal.   Mouth/Throat: Oropharynx is clear and moist.    2 separate hematomas 1 to the left parietal in 1 to the left forehead no lacerations   Eyes: Conjunctivae and EOM are normal. Pupils are equal, round, and reactive to light.   Neck: Neck supple.   Normal range of motion.  Cardiovascular:  Normal heart sounds and intact distal pulses.           Irregularly irregular tachycardic   Pulmonary/Chest: Breath sounds normal. No respiratory distress.   Abdominal: Abdomen is soft. Bowel sounds are normal. There is no abdominal tenderness.   Musculoskeletal:         General: No edema. Normal range of motion.      Cervical back: Normal range of motion and neck supple.     Neurological: She is alert and oriented to person, place, and time. She has normal strength. GCS score is 15. GCS eye subscore is 4. GCS verbal subscore is 5. GCS motor subscore is 6.   Skin: Skin is warm and dry. Capillary refill takes less than 2 seconds. No rash noted.   Psychiatric: She has a normal mood and affect. Her behavior is normal.         ED Course   Procedures  Labs Reviewed   CBC W/ AUTO DIFFERENTIAL - Abnormal; Notable for the following components:       Result Value     (*)     MCH 33.1 (*)     Gran % 75.0 (*)     Lymph % 15.6 (*)     All other components within normal limits   COMPREHENSIVE METABOLIC PANEL - Abnormal; Notable for the following components:    Calcium 8.5 (*)     All other components within normal limits   B-TYPE NATRIURETIC PEPTIDE - Abnormal; Notable for the following components:     (*)     All other components within normal limits   MAGNESIUM   TROPONIN I HIGH SENSITIVITY   URINALYSIS, REFLEX TO URINE CULTURE   TSH   VALPROIC ACID        ECG Results              EKG 12-lead (In process)        Collection Time Result Time QRS Duration OHS QTC Calculation    04/01/24 02:57:39 04/01/24 05:07:20 88 419                     In process by Interface, Lab In [facility] (04/01/24 05:07:29)                   Narrative:    Test Reason : W19.XXXA,    Vent. Rate : 133 BPM      Atrial Rate : 150 BPM     P-R Int : 000 ms          QRS Dur : 088 ms      QT Int : 282 ms       P-R-T Axes : 000 056 069 degrees     QTc Int : 419 ms    Atrial fibrillation with rapid ventricular response  Nonspecific ST abnormality  Abnormal ECG  When compared with ECG of 16-DEC-2022 10:49,  Atrial fibrillation has replaced Sinus rhythm  Vent. rate has increased BY  65 BPM  ST now depressed in Inferior leads  ST now depressed in Anterior leads    Referred By: [email or web address]   SELF           Confirmed By:                                   Imaging Results              CT Cervical Spine Without Contrast (Final result)  Result time 04/01/24 04:32:53      Final result by Noemi Collins MD (04/01/24 04:32:53)                   Narrative:    EXAM:  CT Head and Cervical Spine Without Intravenous Contrast    CLINICAL HISTORY:  The patient is 73 years old and is Female; Head trauma, minor (Age >= 65y)    TECHNIQUE:  Axial computed tomography images of the head/brain and cervical spine without intravenous contrast.  Sagittal and coronal reformatted images were created and reviewed.  This CT exam was performed using one or more of the following dose reduction techniques:  automated exposure control, adjustment of the mA and/or kV according to patient size, and/or use of iterative reconstruction technique.    COMPARISON:  CT Head dated 3/23/2023, MR Brain dated 1/4/2023, MR Cervical spine dated 7/19/2022    FINDINGS:  BRAIN:  Generalized cerebral atrophy with commensurate dilatation of the ventricles. Low-attenuation in the periventricular, subcortical, and deep white matter nonspecific but likely reflecting chronic small vessel ischemic disease.  No hemorrhage.  VENTRICLES: See above.  SKULL:  No acute fracture.  SINUSES:  Unremarkable as visualized.  No acute sinusitis.  MASTOID AIR CELLS:  Unremarkable as visualized.  No mastoid effusion.  VERTEBRAE:  Nonspecific straightening of the normal cervical lordosis. No acute  cervical spine fracture.  DISCS/SPINAL CANAL/NEURAL FORAMINA:  Mild chronic multilevel degenerative changes. No high-grade spinal canal stenosis. Multilevel uncovertebral and facet joint arthropathy without high-grade neural foraminal encroachment.  SOFT TISSUES:  Large left frontal scalp hematoma and left periorbital soft tissue swelling/contusion.  OTHER: Known small enhancing mass along the posterior spinal cord at the level of the foramen magnum better appreciated on prior MRI of the cervical spine with contrast.    IMPRESSION:  No acute intracranial findings. No acute cervical spine fracture.    Large left frontal scalp hematoma and left periorbital soft tissue swelling/contusion.    Electronically signed by:  Noemi Collins MD  04/01/2024 04:32 AM CDT Workstation: [id number]                                     CT Head Without Contrast (Final result)  Result time 04/01/24 04:32:53      Final result by Noemi Collins MD (04/01/24 04:32:53)                   Narrative:    EXAM:  CT Head and Cervical Spine Without Intravenous Contrast    CLINICAL HISTORY:  The patient is 73 years old and is Female; Head trauma, minor (Age >= 65y)    TECHNIQUE:  Axial computed tomography images of the head/brain and cervical spine without intravenous contrast.  Sagittal and coronal reformatted images were created and reviewed.  This CT exam was performed using one or more of the following dose reduction techniques:  automated exposure control, adjustment of the mA and/or kV according to patient size, and/or use of iterative reconstruction technique.    COMPARISON:  CT Head dated 3/23/2023, MR Brain dated 1/4/2023, MR Cervical spine dated 7/19/2022    FINDINGS:  BRAIN:  Generalized cerebral atrophy with commensurate dilatation of the ventricles. Low-attenuation in the periventricular, subcortical, and deep white matter nonspecific but likely reflecting chronic small vessel ischemic disease.  No hemorrhage.  VENTRICLES:  See above.  SKULL:  No acute fracture.  SINUSES:  Unremarkable as visualized.  No acute sinusitis.  MASTOID AIR CELLS:  Unremarkable as visualized.  No mastoid effusion.  VERTEBRAE:  Nonspecific straightening of the normal cervical lordosis. No acute cervical spine fracture.  DISCS/SPINAL CANAL/NEURAL FORAMINA:  Mild chronic multilevel degenerative changes. No high-grade spinal canal stenosis. Multilevel uncovertebral and facet joint arthropathy without high-grade neural foraminal encroachment.  SOFT TISSUES:  Large left frontal scalp hematoma and left periorbital soft tissue swelling/contusion.  OTHER: Known small enhancing mass along the posterior spinal cord at the level of the foramen magnum better appreciated on prior MRI of the cervical spine with contrast.    IMPRESSION:  No acute intracranial findings. No acute cervical spine fracture.    Large left frontal scalp hematoma and left periorbital soft tissue swelling/contusion.    Electronically signed by:  Noemi Collins MD  04/01/2024 04:32 AM CDT Workstation: [id number]                                     Medications   diltiaZEM 100 mg in dextrose 5% 100 mL IVPB (ready to mix) (non-titrating) (5 mg/hr Intravenous New Bag 4/1/24 0293)   ALPRAZolam tablet 0.5 mg (has no administration in time range)   buPROPion TB24 tablet 300 mg (has no administration in time range)   clopidogreL tablet 75 mg (has no administration in time range)   divalproex ER 24 hr tablet 500 mg (has no administration in time range)   levothyroxine tablet 100 mcg (has no administration in time range)   dronedarone tablet 400 mg (has no administration in time range)   pantoprazole EC tablet 40 mg (has no administration in time range)   sodium chloride 0.9% flush 10 mL (has no administration in time range)   melatonin tablet 6 mg (has no administration in time range)   enoxaparin injection 40 mg (has no administration in time range)   acetaminophen tablet 650 mg (has no administration  in time range)   ondansetron injection 4 mg (has no administration in time range)   potassium bicarbonate disintegrating tablet 50 mEq (has no administration in time range)   potassium bicarbonate disintegrating tablet 35 mEq (has no administration in time range)   potassium bicarbonate disintegrating tablet 60 mEq (has no administration in time range)   magnesium oxide tablet 800 mg (has no administration in time range)   magnesium oxide tablet 800 mg (has no administration in time range)   potassium, sodium phosphates 280-160-250 mg packet 2 packet (has no administration in time range)   potassium, sodium phosphates 280-160-250 mg packet 2 packet (has no administration in time range)   potassium, sodium phosphates 280-160-250 mg packet 2 packet (has no administration in time range)   lactated ringers bolus 500 mL (has no administration in time range)   diltiaZEM injection 10 mg (10 mg Intravenous Given 4/1/24 0328)     Medical Decision Making  CT scan of the patient's head and cervical spine showed no acute injury patient was placed on Cardizem drip at arrival emergency department she was given a bolus of Cardizem 10 mg IV laboratory evaluation reviewed I have discussed patient's findings with  who will evaluate patient in the emergency department for admission    Amount and/or Complexity of Data Reviewed  Labs: ordered. Decision-making details documented in ED Course.  Radiology: ordered. Decision-making details documented in ED Course.  ECG/medicine tests: ordered. Decision-making details documented in ED Course.    Risk  Prescription drug management.  Decision regarding hospitalization.              Attending Attestation:         Attending Critical Care:   Critical Care Times:   Direct Patient Care (initial evaluation, reassessments, and time considering the case)................................................................12 minutes.   Ordering, Reviewing, and Interpreting Diagnostic  Studies...............................................................................................................6 minutes.   Documentation..................................................................................................................................................................................7 minutes.   Consultation with other Physicians. .................................................................................................................................................6 minutes.   ==============================================================  Total Critical Care Time - exclusive of procedural time: 31 minutes.  ==============================================================                                 Clinical Impression:  Final diagnoses:  [W19.XXXA] Fall  [I48.91] Atrial fibrillation with rapid ventricular response (Primary)  [S09.90XA] Injury of head, initial encounter          ED Disposition Condition    Admit Morris Sheikh MD  04/01/24 0526

## 2024-04-01 NOTE — PT/OT/SLP EVAL
Physical Therapy Evaluation    Patient Name:  Kasandra Jones   MRN:  [id number]    Recommendations:     Discharge Recommendations: Low Intensity Therapy (with 24/7 supervision from spouse)   Discharge Equipment Recommendations: bedside commode   Barriers to discharge:  low BP and dizziness/nausea with standing trials     Assessment:     Kasandra Jones is a 73 y.o. female admitted with a medical diagnosis of Atrial fibrillation with rapid ventricular response.  She presents with the following impairments/functional limitations: weakness, impaired endurance, impaired self care skills, impaired functional mobility, impaired balance, gait instability, decreased lower extremity function, decreased safety awareness, pain, impaired cardiopulmonary response to activity, impaired skin. Patient is agreeable to participation with PT evaluation. She reports headache at rest. She lives with spouse and uses a hurrycane for ambulation at baseline. BP at rest of 135/60. She requires SBA for supine to sit and CGA for sit to stand with hurrycane. Patient reports dizziness upon standing and returned to sitting EOB with BP of 96/54 and remained sitting EOB until dizziness passed. Second trial of sit to stand with patient again dizzy upon standing and returned to sitting with BP of 95/50. She then sat EOB for 10 minutes performing ankle pumps. Third trial of sit to stand with RW and patient denying dizziness while standing at EOB for 1 minute. She ambulated 15' with RW, Greta, and patient reporting increased instability with gait versus baseline. She denies dizziness, but reports feeling nauseous and returned to bed with RN present and all needs met.     Rehab Prognosis: Good; patient would benefit from acute skilled PT services to address these deficits and reach maximum level of function.    Recent Surgery: * No surgery found *      Plan:     During this hospitalization, patient to be seen 5 x/week to address the identified rehab  impairments via gait training, therapeutic activities, therapeutic exercises and progress toward the following goals:    Plan of Care Expires:  05/01/24    Subjective   States her granddaughter is getting  at their house on Sunday and she has a lot to do  Spouse states patient does well during the day, but has increased unsteadiness going to the bathroom at night and has been considering getting a purewick - educated on BSC as alternative option  Chief Complaint: dizzy with standing and nauseated after gait   Patient/Family Comments/goals: go home and sleep   Pain/Comfort:  Pain Rating 1:  (not rated)  Location 1:  (head)  Pain Addressed 1: Reposition, Distraction    Patients cultural, spiritual, Yarsani conflicts given the current situation:      Living Environment:  Patient [facility] with [facility]   Prior to admission, patients level of function was Divine with Hurrycane (uses rollator if feeling off balance). She endorses 3 falls in the past year attributed to balance and neuropathy. She had previous OPPT. She does not drive.  Equipment used at home: cane, quad, walker, rolling, rollator, shower chair.  DME owned (not currently used): none.  Upon discharge, patient will have assistance from spouse.    Objective:     Communicated with ALTAGRACIA Dominguez prior to session.  Patient found HOB elevated with blood pressure cuff, PureWick, peripheral IV, pulse ox (continuous), telemetry  upon PT entry to room.    General Precautions: Standard, fall  Orthopedic Precautions:N/A   Braces: N/A  Respiratory Status: Room air    Exams:  RLE Strength: WFL  LLE Strength: WFL    Functional Mobility:  Bed Mobility:     Supine to Sit: stand by assistance  Transfers:     Sit to Stand:  contact guard assistance with rolling walker  Gait: 15' with RW, Greta, and patient reporting increased instability with gait versus baseline.       AM-PAC 6 CLICK MOBILITY  Total Score:20       Treatment & Education:  Patient was educated  on the importance of OOB activity and functional mobility to negate negative effects of prolonged bed rest during hospitalization, safe transfers and ambulation, and D/C planning     Patient left HOB elevated with all lines intact, call button in reach, and RN present.    GOALS:   Multidisciplinary Problems       Physical Therapy Goals          Problem: Physical Therapy    Goal Priority Disciplines Outcome Goal Variances Interventions   Physical Therapy Goal     PT, PT/OT      Description: Goals to be met by: 24    Patient will increase functional independence with mobility by performin. Supine to sit with Supervision  2. Sit to stand transfer with Supervision  3. Bed to chair transfer with Supervision using Rolling Walker  4. Gait  x 250 feet with Supervision using Rolling Walker.   5. Lower extremity exercise program x20 reps per handout, with supervision                       History:     Past Medical History:   Diagnosis Date    Cataract        Past Surgical History:   Procedure Laterality Date    EYE SURGERY Bilateral 2020    cataract removal    HYSTERECTOMY      TOTAL REDUCTION MAMMOPLASTY         Time Tracking:     PT Received On: 24  PT Start Time: 0954     PT Stop Time: 1019  PT Total Time (min): 25 min     Billable Minutes: Evaluation 10 and Gait Training 15      2024

## 2024-04-01 NOTE — PLAN OF CARE
[facility] - Emergency Dept  Initial Discharge Assessment       Primary Care Provider: Sanjiv Ford III, MD    Admission Diagnosis: Fall [W19.XXXA]  Atrial fibrillation with rapid ventricular response [I48.91]    Admission Date: 4/1/2024  Expected Discharge Date: 4/1/2024     completed discharge assessment with pt and pt's  at bedside. Pt's preferred pharmacy is [facility] in [place] or [facility]. Pt AAOx4s. Pt lives at home with her spouse (Yeyo Jones [phone] ). Demographics, PCP, and insurance verified. No home health. No dialysis. Pt completes ADLs with assistance of DME previously listed. Pt to discharge home via spouse transport. Pt has no other needs to be addressed at this time.      Transition of Care Barriers: None    Payor: MEDICARE / Plan: MEDICARE PART A & B / Product Type: Government /     Extended Emergency Contact Information  Primary Emergency Contact: Yeyo Jones   [facility]  Home Phone: [phone]  Mobile Phone: [phone]  Relation: Spouse  Preferred language: English   needed? No    Discharge Plan A: Home with family  Discharge Plan B: Home with family      [facility] - [place], [place] - [address] [place] [address]  [address] [facility] [address]  Phone: [phone] Fax: [phone]    [email or web address] - [place], [place] - [address] [address] [address]  [address]  [place] [address]  Phone: [phone] Fax: [phone]      Initial Assessment (most recent)       Adult Discharge Assessment - 04/01/24 1243          Discharge Assessment    Assessment Type Discharge Planning Assessment     Confirmed/corrected address, phone number and insurance Yes     Confirmed Demographics Correct on Facesheet     Source of Information patient     When was your last doctors appointment? --   2 weeks ago    Does patient/caregiver understand observation status Yes     Communicated OSMAR with  patient/caregiver Yes     Reason For Admission Atrial fibrillation with rapid ventricular response     People in Home significant other     Facility Arrived From: Home     Do you have help at home or someone to help you manage your care at home? Yes     Who are your caregiver(s) and their phone number(s)? Yeyo Jones (Spouse) [phone]     Prior to hospitilization cognitive status: Alert/Oriented     Current cognitive status: Alert/Oriented     Walking or Climbing Stairs Difficulty yes     Walking or Climbing Stairs ambulation difficulty, requires equipment     Mobility Management straight cane, rolling walker     Dressing/Bathing Difficulty yes     Dressing/Bathing bathing difficulty, requires equipment     Dressing/Bathing Management Shower chair, grab bars     Home Accessibility wheelchair accessible     Home Layout Able to live on 1st floor     Equipment Currently Used at Home shower chair;grab bar;walker, rolling;blood pressure machine;cane, straight     Readmission within 30 days? No     Patient currently being followed by outpatient case management? No     Do you currently have service(s) that help you manage your care at home? No     Do you take prescription medications? Yes     Do you have prescription coverage? Yes     Coverage MEDICARE - MEDICARE PART A & B     Do you have any problems affording any of your prescribed medications? No     Is the patient taking medications as prescribed? yes     Who is going to help you get home at discharge? Yeyo Jones (spouse) [phone]     How do you get to doctors appointments? family or friend will provide   Yeyo Jones (spouse) [phone]    Are you on dialysis? No     Do you take coumadin? No     Discharge Plan A Home with family     Discharge Plan B Home with family     DME Needed Upon Discharge  none     Discharge Plan discussed with: Spouse/sig other;Patient     Name(s) and Number(s) Yeyo Jones (spouse) [phone]     Transition of Care Barriers  None        OTHER    Name(s) of People in Home Pt and Yeyo Jones (spouse) [phone]

## 2024-04-01 NOTE — TELEPHONE ENCOUNTER
Refill Decision Note   Kasandra Karen  is requesting a refill authorization.  Brief Assessment and Rationale for Refill:  Approve     Medication Therapy Plan:         Comments:     Note composed:1:59 PM 04/01/2024

## 2024-04-01 NOTE — SUBJECTIVE & OBJECTIVE
Past Medical History:   Diagnosis Date    Cataract        Past Surgical History:   Procedure Laterality Date    EYE SURGERY Bilateral 2020 August    cataract removal    HYSTERECTOMY      TOTAL REDUCTION MAMMOPLASTY  2002       Review of patient's allergies indicates:   Allergen Reactions    Niacin preparations     Penicillins        No current facility-administered medications on file prior to encounter.     Current Outpatient Medications on File Prior to Encounter   Medication Sig    alendronate (FOSAMAX) 70 MG tablet Take 1 tablet (70 mg total) by mouth every 7 days.    ALPRAZolam (XANAX) 0.5 MG tablet Take 1 tablet (0.5 mg total) by mouth daily as needed for Anxiety. (Patient taking differently: Take 0.5 mg by mouth daily as needed for Anxiety. PRN)    buPROPion (WELLBUTRIN XL) 300 MG 24 hr tablet Take 1 tablet (300 mg total) by mouth once daily.    cetirizine (ZYRTEC) 10 MG tablet Take 10 mg by mouth once daily.    cholecalciferol, vitamin D3, 125 mcg (5,000 unit) capsule     clopidogreL (PLAVIX) 75 mg tablet Take 1 tablet (75 mg total) by mouth once daily.    copper gluconate 2 mg Tab Take by mouth.    cyanocobalamin-cobamamide (B12) 5,000-100 mcg Lozg     diphenoxylate-atropine 2.5-0.025 mg (LOMOTIL) 2.5-0.025 mg per tablet Take 1 tablet by mouth 4 (four) times daily as needed for Diarrhea.    divalproex ER (DEPAKOTE ER) 500 MG Tb24 TAKE 2 TABLETS ONCE DAILY (Patient taking differently: Take 500 mg by mouth.)    ferrous sulfate 325 (65 FE) MG EC tablet     levothyroxine (SYNTHROID) 100 MCG tablet Take 1 tablet (100 mcg total) by mouth once daily.    magnesium oxide (MAG-OX) 400 mg (241.3 mg magnesium) tablet     MULTAQ 400 mg Tab Take 1 tablet (400 mg total) by mouth 2 (two) times daily.    oxybutynin (DITROPAN-XL) 5 MG TR24 Take 1 tablet (5 mg total) by mouth once daily.    potassium chloride SA (K-DUR,KLOR-CON M) 10 MEQ tablet Take 1 tablet (10 mEq total) by mouth 2 (two) times daily.    RABEprazole  (ACIPHEX) 20 mg tablet Take 1 tablet (20 mg total) by mouth 2 (two) times daily.    vit C,W-Nq-cmhjd-lutein-zeaxan (PRESERVISION AREDS 2) 250-90-40-1 mg Cap      Family History       Problem Relation (Age of Onset)    Breast cancer Maternal Aunt          Tobacco Use    Smoking status: Never    Smokeless tobacco: Never   Substance and Sexual Activity    Alcohol use: Never    Drug use: Never    Sexual activity: Yes     Partners: Male     Review of Systems   Constitutional:  Negative for appetite change and diaphoresis.   Respiratory:  Negative for chest tightness and shortness of breath.    Cardiovascular:  Negative for chest pain and palpitations.   Gastrointestinal:  Negative for abdominal distention and abdominal pain.   Genitourinary:  Negative for difficulty urinating.   Neurological:  Negative for dizziness, syncope and light-headedness.   Psychiatric/Behavioral:  Negative for agitation and behavioral problems.      Objective:     Vital Signs (Most Recent):  Temp: 98.4 °F (36.9 °C) (04/01/24 0250)  Pulse: (!) 132 (04/01/24 0250)  Resp: 20 (04/01/24 0250)  BP: (!) 152/81 (04/01/24 0250)  SpO2: 97 % (04/01/24 0250) Vital Signs (24h Range):  Temp:  [98.4 °F (36.9 °C)] 98.4 °F (36.9 °C)  Pulse:  [132] 132  Resp:  [20] 20  SpO2:  [97 %] 97 %  BP: (152)/(81) 152/81     Weight: 56.7 kg (125 lb)  Body mass index is 20.18 kg/m².     Physical Exam  Constitutional:       General: She is not in acute distress.     Appearance: She is not ill-appearing.   HENT:      Head:      Comments: Left frontal hematoma, center hematoma on the top of the head     Mouth/Throat:      Mouth: Mucous membranes are dry.   Cardiovascular:      Rate and Rhythm: Tachycardia present. Rhythm irregular.   Pulmonary:      Effort: No respiratory distress.      Breath sounds: No wheezing.   Abdominal:      Tenderness: There is no abdominal tenderness.   Musculoskeletal:         General: No swelling.   Skin:     General: Skin is warm and dry.       Coloration: Skin is not jaundiced.   Neurological:      General: No focal deficit present.      Mental Status: She is alert and oriented to person, place, and time.                Significant Labs: All pertinent labs within the past 24 hours have been reviewed.  CBC:   Recent Labs   Lab 04/01/24  0312   WBC 9.65   HGB 13.3   HCT 41.4        CMP:   Recent Labs   Lab 04/01/24 0312      K 4.1      CO2 25   GLU 96   BUN 12   CREATININE 0.6   CALCIUM 8.5*   PROT 6.2   ALBUMIN 3.7   BILITOT 0.3   ALKPHOS 92   AST 22   ALT 28   ANIONGAP 8       Significant Imaging: I have reviewed all pertinent imaging results/findings within the past 24 hours.

## 2024-04-01 NOTE — ASSESSMENT & PLAN NOTE
Patient with Paroxysmal (<7 days) atrial fibrillation which is uncontrolled currently with  multaq . Patient is currently in atrial fibrillation.[id number] Score: 4.  On Plavix for anticoagulation per cardiologist, has frequent falls.

## 2024-04-01 NOTE — Clinical Note
Diagnosis: Atrial fibrillation with rapid ventricular response [[id number]]  Future Attending Provider: DEJA FINNEGAN [[id number]]  Place in Observation: [facility] [[id number]]

## 2024-04-01 NOTE — PT/OT/SLP EVAL
Occupational Therapy   Evaluation and Discharge Note    Name: Kasandra Jones  MRN: [id number]  Admitting Diagnosis: Atrial fibrillation with rapid ventricular response  Recent Surgery: * No surgery found *    The primary encounter diagnosis was Atrial fibrillation with rapid ventricular response. Diagnoses of Fall, Injury of head, initial encounter, and Palpitations were also pertinent to this visit.    Recommendations:     Discharge Recommendations: Low Intensity Therapy  Discharge Equipment Recommendations: none  Barriers to discharge:  None    Assessment:     Kasandra Jones is a 73 y.o. female with a medical diagnosis of Atrial fibrillation with rapid ventricular response. At this time, patient is functioning at their prior level of function and does not require further acute OT services.     Plan:     During this hospitalization, patient does not require further acute OT services.  Please re-consult if situation changes.    Plan of Care Reviewed with: spouse, patient    Subjective     Chief Complaint: none  Patient/Family Comments/goals: I am ready to go home.    Occupational Profile:  Living Environment: pt lives with spouse in a H with no steps; walk n shower and t/s combo access with TTB with sliding seat; built n seat in walk n shower.  Previous level of function: Mod I ADLS from SPC or  RW depending how she is feeling. Spouse drives and does household chore. Pt does meal prep. Spouse reported pt's memory has been declining.   Roles and Routines: frequent falls, pt not using walking device all the time per spouse. She does hand a chronic RLE drag and mostly falling during the night when ambulating to bathroom.   Equipment Used at home: wheelchair, shower chair, raised toilet, bath bench, rollator, cane, straight, grab bar, walker, rolling  Assistance upon Discharge: spouse, home all, day with pt.    Pain/Comfort:  Pain Rating 1: 0/10  Pain Rating Post-Intervention 1: 0/10    Patients cultural, spiritual,  Druze conflicts given the current situation: no    Objective:     Communicated with: nurse prior to session.  Patient found HOB elevated with telemetry, blood pressure cuff upon OT entry to room.    General Precautions: Standard, fall, hearing impaired  Orthopedic Precautions: N/A  Braces: N/A  Respiratory Status: Room air     Occupational Performance:    Bed Mobility:    Patient completed Scooting/Bridging with modified independence  Patient completed Supine to Sit with modified independence  Patient completed Sit to Supine with modified independence    Functional Mobility/Transfers:  Patient completed Sit <> Stand Transfer with supervision  with  rolling walker   Functional Mobility: ambulated supervision, dragging RLE, short steps, unsteady, no LOB using RW; pt nopt safe to ambulate with SPC.     Activities of Daily Living:  Feeding:  independence .  Grooming: independence seated  Lower Body Dressing: modified independence shoes  Toileting: declined use.      Cognitive/Visual Perceptual:  Cognitive/Psychosocial Skills:     -       Oriented to: Person and Place   -       Follows Commands/attention:Follows one-step commands  -       Communication: clear/fluent  -       Memory: Impaired LTM and Poor immediate recall  -       Safety awareness/insight to disability: impaired   -       Mood/Affect/Coping skills/emotional control: Cooperative  Visual/Perceptual:      -Intact glasses    Physical Exam:  Balance:    -       sitting: good  standing; fair plus  dynamic: fair  Postural examination/scapula alignment:    -       Rounded shoulders  Skin integrity: Bruising of L temporal  Dominant hand:    -       right  Upper Extremity Range of Motion:     -       Right Upper Extremity: WFL  -       Left Upper Extremity: WFL  Upper Extremity Strength:    -       Right Upper Extremity: WFL  -       Left Upper Extremity: WFL   Strength:    -       Right Upper Extremity: WFL  -       Left Upper Extremity: WFL    [facility] 6  Click ADL:  AMPAC Total Score: 23    Treatment & Education:  Purpose of OT and POC  Pt. seen for self care retraining and functional mobility retraining with adapted techniques and modifications as stated above.  Pt and spouse provided with extensive education and demonstration of fall prevention strategies for home and recommendations for pt use RW at all times and sit on shower chair when bathing/showering, remove throw rugs.  All questions and concerns addressed within scope.    Patient left HOB elevated with all lines intact, call button in reach, spouse, nurse notified, and spouse present    GOALS:   Multidisciplinary Problems       Occupational Therapy Goals       Not on file                    History:     Past Medical History:   Diagnosis Date    Cataract          Past Surgical History:   Procedure Laterality Date    EYE SURGERY Bilateral 2020 August    cataract removal    HYSTERECTOMY      TOTAL REDUCTION MAMMOPLASTY  2002       Time Tracking:     OT Date of Treatment: 04/01/24  OT Start Time: 1244  OT Stop Time: 1308  OT Total Time (min): 24 min    Billable Minutes:Evaluation 10  Self Care/Home Management 14  Total Time 24    4/1/2024

## 2024-04-01 NOTE — DISCHARGE SUMMARY
[facility] - Emergency Dept  Hospital Medicine  Discharge Summary      Patient Name: Kasandra Jones  MRN: [id number]  [facility]: [id number]  Patient Class: IP- Inpatient  Admission Date: 4/1/2024  Hospital Length of Stay: 0 days  Discharge Date and Time:  04/01/2024 10:24 AM  Attending Physician: Sunita Bain MD   Discharging Provider: Sunita Bain MD  Primary Care Provider: Sanjiv Ford III, MD    Primary Care Team: Networked reference to record PCT     HPI:   Patient is a 73-year-old female with history of atrial fibrillation on Plavix, bipolar disorder, GERD, hypothyroidism, frequent falls presents to the emergency room after a ground level fall.  Patient reports going to the bathroom and prior to sitting down on the toilet  fell forward hit her head.  Denies loss of consciousness.  Denies lightheadedness or dizziness prior to falling.  Significant other at bedside reports helping the patient up, mentation at baseline.  CT head negative for acute intracranial abnormality, large left frontal scalp hematoma.  On admission patient was found to be in atrial fibrillation with RVR with heart rates in the 130s.  Given Cardizem push and started on drip with some improvement in heart rates.  Heart rates remain elevated.  Patient being admitted for further management of atrial fibrillation.  Per chart review patient was recently taken off Eliquis by cardiologist for concern of frequent falls, placed on Plavix.  Patient reports compliance with medication at home.    * No surgery found *      Hospital Course:   Patient is a 73 year old female admitted with a mechanical fall. Patient was trying to sit on the commode and lost balance and fell. Hit her head on the floor. Patient was brought in to ED. She was in Afib RVR, she converted to sinus after 10 mg Cardizem. She has a know history of Afib. She is asymptomatic. Patient worked with therapy prior to discharge.      Goals of Care Treatment  Preferences:  Code Status: DNR    Physical Exam  Constitutional:       General: She is not in acute distress.     Appearance: She is not ill-appearing.   HENT:      Head:      Comments: Left frontal hematoma, center hematoma on the top of the head     Mouth/Throat:      Mouth: Mucous membranes are dry.   Cardiovascular:      Rate and Rhythm: regular   Pulmonary:      Effort: No respiratory distress.      Breath sounds: No wheezing.   Abdominal:      Tenderness: There is no abdominal tenderness.   Musculoskeletal:         General: No swelling.   Skin:     General: Skin is warm and dry.      Coloration: Skin is not jaundiced.   Neurological:      General: No focal deficit present.      Mental Status: She is alert and oriented to person, place, and time.     Consults:   Consults (From admission, onward)          Status Ordering Provider     Inpatient consult to Cardiology  Once        Provider:  Francesco Blakely MD    Acknowledged ROBB BARBA            Cardiac/Vascular  * Atrial fibrillation with rapid ventricular response  Converted to sinus   Resume home meds     Orthopedic  Fall  PT/OT  Uses cane and walker at home   Frequent falls        Final Active Diagnoses:    Diagnosis Date Noted POA    PRINCIPAL PROBLEM:  Atrial fibrillation with rapid ventricular response [I48.91] 04/01/2024 Yes    Fall [W19.XXXA] 04/01/2024 Yes    Gastroesophageal reflux disease [K21.9] 03/01/2021 Yes    Paroxysmal atrial fibrillation [I48.0] 09/21/2019 Yes    Hypertension, essential [I10] 09/21/2019 Yes    Bipolar 1 disorder [F31.9] 09/21/2019 Yes    Hypothyroidism [E03.9] 09/21/2019 Yes      Problems Resolved During this Admission:       Discharged Condition: good    Disposition: Home or Self Care    Follow Up:   Follow-up Information       Sanjiv Ford III, MD Follow up in 1 week(s).    Specialty: Family Medicine  Contact information:  [address] [place] [address]  [address]  [facility] [address] [phone]                           Patient  Instructions:   No discharge procedures on file.    Significant Diagnostic Studies: Labs: CMP   Recent Labs   Lab 04/01/24  0312      K 4.1      CO2 25   GLU 96   BUN 12   CREATININE 0.6   CALCIUM 8.5*   PROT 6.2   ALBUMIN 3.7   BILITOT 0.3   ALKPHOS 92   AST 22   ALT 28   ANIONGAP 8    and CBC   Recent Labs   Lab 04/01/24  0312   WBC 9.65   HGB 13.3   HCT 41.4          Pending Diagnostic Studies:       None           Medications:  Reconciled Home Medications:      Medication List        CHANGE how you take these medications      diphenoxylate-atropine 2.5-0.025 mg 2.5-0.025 mg per tablet  Commonly known as: LOMOTIL  Take 1 tablet by mouth 4 (four) times daily as needed for Diarrhea.  What changed: how much to take     divalproex  MG Tb24  Commonly known as: DEPAKOTE ER  Take 1 tablet (500 mg total) by mouth once daily.  What changed:   how much to take  when to take this            CONTINUE taking these medications      alendronate 70 MG tablet  Commonly known as: FOSAMAX  Take 1 tablet (70 mg total) by mouth every 7 days.     ALPRAZolam 0.5 MG tablet  Commonly known as: XANAX  Take 1 tablet (0.5 mg total) by mouth daily as needed for Anxiety. PRN     B12 5,000-100 mcg Lozg  Generic drug: cyanocobalamin-cobamamide  Take 1 tablet by mouth once daily.     buPROPion 300 MG 24 hr tablet  Commonly known as: WELLBUTRIN XL  Take 1 tablet (300 mg total) by mouth once daily.     CALCIUM 600 ORAL  Take 1 tablet by mouth 2 (two) times a day.     cetirizine 10 MG tablet  Commonly known as: ZYRTEC  Take 10 mg by mouth once daily.     cholecalciferol (vitamin D3) 125 mcg (5,000 unit) capsule  Take 5,000 Units by mouth once daily.     clopidogreL 75 mg tablet  Commonly known as: PLAVIX  Take 1 tablet (75 mg total) by mouth once daily.     copper gluconate 2 mg Tab  Take 1 tablet by mouth once daily.     ferrous sulfate 325 (65 FE) MG EC tablet  Take 325 mg by mouth once daily.     levothyroxine 100 MCG  tablet  Commonly known as: SYNTHROID  Take 1 tablet (100 mcg total) by mouth once daily.     magnesium oxide 400 mg (241.3 mg magnesium) tablet  Commonly known as: MAG-OX  Take 400 mg by mouth once daily.     MULTAQ 400 mg Tab  Generic drug: dronedarone  Take 1 tablet (400 mg total) by mouth 2 (two) times daily.     oxybutynin 5 MG Tr24  Commonly known as: DITROPAN-XL  Take 1 tablet (5 mg total) by mouth once daily.     potassium chloride SA 10 MEQ tablet  Commonly known as: K-DUR,KLOR-CON M  Take 1 tablet (10 mEq total) by mouth 2 (two) times daily.     RABEprazole 20 mg tablet  Commonly known as: ACIPHEX  Take 1 tablet (20 mg total) by mouth 2 (two) times daily.     traZODone 100 MG tablet  Commonly known as: DESYREL  Take 200 mg by mouth every evening.     vit C,B-Lh-hlmag-lutein-zeaxan 250-90-40-1 mg Cap  Commonly known as: PRESERVISION AREDS 2  Take 1 capsule by mouth 2 (two) times a day.     VITAMIN B-6 100 MG Tab  Generic drug: pyridoxine (vitamin B6)  Take 50 mg by mouth once daily.              Indwelling Lines/Drains at time of discharge:   Lines/Drains/Airways       None                   Time spent on the discharge of patient: 40 minutes         Sunita Bain MD  Department of Hospital Medicine  [facility] - Emergency Dept

## 2024-04-01 NOTE — ASSESSMENT & PLAN NOTE
Reports compliance with medication   On Multaq at home and Plavix   Follows with Dr. Blakely outpatient   Given IV Cardizem push, started on Cardizem drip   Consult cardiology   Resume home medications  Wean Cardizem as tolerated

## 2024-04-01 NOTE — ED NOTES
Discharged patient to vehicle brought via wheelchair at the request of dorothy mane rn for patient.

## 2024-04-01 NOTE — PLAN OF CARE
04/01/24 1247   RODRIGUEZ Message   Medicare Outpatient and Observation Notification regarding financial responsibility Given to patient/caregiver;Explained to patient/caregiver;Signed/date by patient/caregiver   Date RODRIGUEZ was signed 04/01/24   Time RODRIGUEZ was signed 1135     Pt was explained RODRIGUEZ. Pt verbalized understanding of RODRIGUEZ and signed. RODRIGUEZ scanned to .

## 2024-04-01 NOTE — H&P
[facility] - Emergency Dept  Hospital Medicine  History & Physical    Patient Name: Kasandra Jones  MRN: [id number]  Patient Class: IP- Inpatient  Admission Date: 4/1/2024  Attending Physician: Charo Yanes MD   Primary Care Provider: Sanjiv Ford III, MD         Patient information was obtained from patient, relative(s), and ER records.     Subjective:     Principal Problem:Atrial fibrillation with rapid ventricular response    Chief Complaint:   Chief Complaint   Patient presents with    Fall        HPI: Patient is a 73-year-old female with history of atrial fibrillation on Plavix, bipolar disorder, GERD, hypothyroidism, frequent falls presents to the emergency room after a ground level fall.  Patient reports going to the bathroom and prior to sitting down on the toilet  fell forward hit her head.  Denies loss of consciousness.  Denies lightheadedness or dizziness prior to falling.  Significant other at bedside reports helping the patient up, mentation at baseline.  CT head negative for acute intracranial abnormality, large left frontal scalp hematoma.  On admission patient was found to be in atrial fibrillation with RVR with heart rates in the 130s.  Given Cardizem push and started on drip with some improvement in heart rates.  Heart rates remain elevated.  Patient being admitted for further management of atrial fibrillation.  Per chart review patient was recently taken off Eliquis by cardiologist for concern of frequent falls, placed on Plavix.  Patient reports compliance with medication at home.    Past Medical History:   Diagnosis Date    Cataract        Past Surgical History:   Procedure Laterality Date    EYE SURGERY Bilateral 2020 August    cataract removal    HYSTERECTOMY      TOTAL REDUCTION MAMMOPLASTY  2002       Review of patient's allergies indicates:   Allergen Reactions    Niacin preparations     Penicillins        No current facility-administered medications on file prior to  encounter.     Current Outpatient Medications on File Prior to Encounter   Medication Sig    alendronate (FOSAMAX) 70 MG tablet Take 1 tablet (70 mg total) by mouth every 7 days.    ALPRAZolam (XANAX) 0.5 MG tablet Take 1 tablet (0.5 mg total) by mouth daily as needed for Anxiety. (Patient taking differently: Take 0.5 mg by mouth daily as needed for Anxiety. PRN)    buPROPion (WELLBUTRIN XL) 300 MG 24 hr tablet Take 1 tablet (300 mg total) by mouth once daily.    cetirizine (ZYRTEC) 10 MG tablet Take 10 mg by mouth once daily.    cholecalciferol, vitamin D3, 125 mcg (5,000 unit) capsule     clopidogreL (PLAVIX) 75 mg tablet Take 1 tablet (75 mg total) by mouth once daily.    copper gluconate 2 mg Tab Take by mouth.    cyanocobalamin-cobamamide (B12) 5,000-100 mcg Lozg     diphenoxylate-atropine 2.5-0.025 mg (LOMOTIL) 2.5-0.025 mg per tablet Take 1 tablet by mouth 4 (four) times daily as needed for Diarrhea.    divalproex ER (DEPAKOTE ER) 500 MG Tb24 TAKE 2 TABLETS ONCE DAILY (Patient taking differently: Take 500 mg by mouth.)    ferrous sulfate 325 (65 FE) MG EC tablet     levothyroxine (SYNTHROID) 100 MCG tablet Take 1 tablet (100 mcg total) by mouth once daily.    magnesium oxide (MAG-OX) 400 mg (241.3 mg magnesium) tablet     MULTAQ 400 mg Tab Take 1 tablet (400 mg total) by mouth 2 (two) times daily.    oxybutynin (DITROPAN-XL) 5 MG TR24 Take 1 tablet (5 mg total) by mouth once daily.    potassium chloride SA (K-DUR,KLOR-CON M) 10 MEQ tablet Take 1 tablet (10 mEq total) by mouth 2 (two) times daily.    RABEprazole (ACIPHEX) 20 mg tablet Take 1 tablet (20 mg total) by mouth 2 (two) times daily.    vit C,Q-Fl-tmngn-lutein-zeaxan (PRESERVISION AREDS 2) 250-90-40-1 mg Cap      Family History       Problem Relation (Age of Onset)    Breast cancer Maternal Aunt          Tobacco Use    Smoking status: Never    Smokeless tobacco: Never   Substance and Sexual Activity    Alcohol use: Never    Drug use: Never    Sexual  activity: Yes     Partners: Male     Review of Systems   Constitutional:  Negative for appetite change and diaphoresis.   Respiratory:  Negative for chest tightness and shortness of breath.    Cardiovascular:  Negative for chest pain and palpitations.   Gastrointestinal:  Negative for abdominal distention and abdominal pain.   Genitourinary:  Negative for difficulty urinating.   Neurological:  Negative for dizziness, syncope and light-headedness.   Psychiatric/Behavioral:  Negative for agitation and behavioral problems.      Objective:     Vital Signs (Most Recent):  Temp: 98.4 °F (36.9 °C) (04/01/24 0250)  Pulse: (!) 132 (04/01/24 0250)  Resp: 20 (04/01/24 0250)  BP: (!) 152/81 (04/01/24 0250)  SpO2: 97 % (04/01/24 0250) Vital Signs (24h Range):  Temp:  [98.4 °F (36.9 °C)] 98.4 °F (36.9 °C)  Pulse:  [132] 132  Resp:  [20] 20  SpO2:  [97 %] 97 %  BP: (152)/(81) 152/81     Weight: 56.7 kg (125 lb)  Body mass index is 20.18 kg/m².     Physical Exam  Constitutional:       General: She is not in acute distress.     Appearance: She is not ill-appearing.   HENT:      Head:      Comments: Left frontal hematoma, center hematoma on the top of the head     Mouth/Throat:      Mouth: Mucous membranes are dry.   Cardiovascular:      Rate and Rhythm: Tachycardia present. Rhythm irregular.   Pulmonary:      Effort: No respiratory distress.      Breath sounds: No wheezing.   Abdominal:      Tenderness: There is no abdominal tenderness.   Musculoskeletal:         General: No swelling.   Skin:     General: Skin is warm and dry.      Coloration: Skin is not jaundiced.   Neurological:      General: No focal deficit present.      Mental Status: She is alert and oriented to person, place, and time.                Significant Labs: All pertinent labs within the past 24 hours have been reviewed.  CBC:   Recent Labs   Lab 04/01/24 0312   WBC 9.65   HGB 13.3   HCT 41.4        CMP:   Recent Labs   Lab 04/01/24 0312      K 4.1       CO2 25   GLU 96   BUN 12   CREATININE 0.6   CALCIUM 8.5*   PROT 6.2   ALBUMIN 3.7   BILITOT 0.3   ALKPHOS 92   AST 22   ALT 28   ANIONGAP 8       Significant Imaging: I have reviewed all pertinent imaging results/findings within the past 24 hours.  Assessment/Plan:     * Atrial fibrillation with rapid ventricular response  Reports compliance with medication   On Multaq at home and Plavix   Follows with Dr. Blakely outpatient   Given IV Cardizem push, started on Cardizem drip   Consult cardiology   Resume home medications  Wean Cardizem as tolerated    Fall  PT/OT  Uses cane and walker at home   Frequent falls      Gastroesophageal reflux disease  Continue PPI      Hypothyroidism  Continue Synthroid  Follow up TSH      Bipolar 1 disorder  Continue Depakote      Hypertension, essential  Noted, not on antihypertensive outpatient    Paroxysmal atrial fibrillation  Patient with Paroxysmal (<7 days) atrial fibrillation which is uncontrolled currently with  multaq . Patient is currently in atrial fibrillation.[id number] Score: 4.  On Plavix for anticoagulation per cardiologist, has frequent falls.      VTE Risk Mitigation (From admission, onward)           Ordered     enoxaparin injection 40 mg  Daily         04/01/24 0500     IP VTE HIGH RISK PATIENT  Once         04/01/24 0500     Place sequential compression device  Until discontinued         04/01/24 0500                                    Charo Yanes MD  Department of Hospital Medicine  Formerly [facility] - Emergency Dept

## 2024-04-01 NOTE — PLAN OF CARE
04/01/24 1400   Final Note   Assessment Type Final Discharge Note   Anticipated Discharge Disposition Home   What phone number can be called within the next 1-3 days to see how you are doing after discharge? [phone]   Hospital Resources/Appts/Education Provided Provided patient/caregiver with written discharge plan information;Provided education on problems/symptoms using teachback;Post-Acute resouces added to AVS   Post-Acute Status   Coverage MEDICARE - MEDICARE PART A & B   Discharge Delays None known at this time     Charts and orders reviewed. Pt to discharge home with .  sent message to pt's PCP office to schedule Pt follow-up appointment. Pt has no other needs to be addressed by case management. Pt cleared to discharge from case management standpoint.

## 2024-04-02 ENCOUNTER — PATIENT OUTREACH (OUTPATIENT)
Dept: ADMINISTRATIVE | Facility: CLINIC | Age: 74
End: 2024-04-02
Payer: MEDICARE

## 2024-04-02 NOTE — PROGRESS NOTES
C3 nurse spoke with Kasandra Jones, patient's spouse, for a TCC post hospital discharge follow up call. The patient does not have a scheduled HOSFU appointment with Sanjiv Ford III, MD  within 5-7 days post hospital discharge date 4/1/2024.  Patient's spouse declined C3 nurse assistance to schedule a HOSFU appointment with Sanjiv Ford III, MD within 5-7 days post hospital discharge and [facility].    Message sent to PCP staff requesting they contact patient and schedule follow up appointment.

## 2024-04-09 ENCOUNTER — TELEPHONE (OUTPATIENT)
Dept: PRIMARY CARE CLINIC | Facility: CLINIC | Age: 74
End: 2024-04-09
Payer: MEDICARE

## 2024-04-09 ENCOUNTER — HOSPITAL ENCOUNTER (OUTPATIENT)
Dept: RADIOLOGY | Facility: HOSPITAL | Age: 74
Discharge: HOME | End: 2024-04-09
Attending: FAMILY MEDICINE
Payer: MEDICARE

## 2024-04-09 ENCOUNTER — OFFICE VISIT (OUTPATIENT)
Dept: FAMILY MEDICINE | Facility: CLINIC | Age: 74
End: 2024-04-09
Payer: MEDICARE

## 2024-04-09 VITALS
WEIGHT: 127 LBS | SYSTOLIC BLOOD PRESSURE: 122 MMHG | BODY MASS INDEX: 20.5 KG/M2 | HEART RATE: 56 BPM | DIASTOLIC BLOOD PRESSURE: 58 MMHG | OXYGEN SATURATION: 98 % | TEMPERATURE: 98 F

## 2024-04-09 DIAGNOSIS — R41.89 COGNITIVE IMPAIRMENT: ICD-10-CM

## 2024-04-09 DIAGNOSIS — S00.03XD CONTUSION OF SCALP, SUBSEQUENT ENCOUNTER: ICD-10-CM

## 2024-04-09 DIAGNOSIS — Z51.81 VISIT FOR MONITORING PLAVIX THERAPY: ICD-10-CM

## 2024-04-09 DIAGNOSIS — S00.83XD CONTUSION OF FACE, SUBSEQUENT ENCOUNTER: ICD-10-CM

## 2024-04-09 DIAGNOSIS — F31.9 BIPOLAR 1 DISORDER: ICD-10-CM

## 2024-04-09 DIAGNOSIS — Z79.01 ANTICOAGULANT LONG-TERM USE: ICD-10-CM

## 2024-04-09 DIAGNOSIS — M79.671 RIGHT FOOT PAIN: ICD-10-CM

## 2024-04-09 DIAGNOSIS — E03.9 HYPOTHYROIDISM, UNSPECIFIED TYPE: ICD-10-CM

## 2024-04-09 DIAGNOSIS — I10 HYPERTENSION, ESSENTIAL: Primary | ICD-10-CM

## 2024-04-09 DIAGNOSIS — Z79.02 VISIT FOR MONITORING PLAVIX THERAPY: ICD-10-CM

## 2024-04-09 DIAGNOSIS — I48.91 ATRIAL FIBRILLATION WITH RAPID VENTRICULAR RESPONSE: ICD-10-CM

## 2024-04-09 DIAGNOSIS — Z99.89 USES WALKER: ICD-10-CM

## 2024-04-09 PROCEDURE — 99215 OFFICE O/P EST HI 40 MIN: CPT | Mod: PBBFAC,PN | Performed by: FAMILY MEDICINE

## 2024-04-09 PROCEDURE — 73630 X-RAY EXAM OF FOOT: CPT | Mod: 26,RT,, | Performed by: RADIOLOGY

## 2024-04-09 PROCEDURE — 99999 PR PBB SHADOW E&M-EST. PATIENT-LVL V: CPT | Mod: PBBFAC,,, | Performed by: FAMILY MEDICINE

## 2024-04-09 PROCEDURE — 99214 OFFICE O/P EST MOD 30 MIN: CPT | Mod: S$PBB,,, | Performed by: FAMILY MEDICINE

## 2024-04-09 PROCEDURE — 73630 X-RAY EXAM OF FOOT: CPT | Mod: TC,RT

## 2024-04-09 PROCEDURE — G2211 COMPLEX E/M VISIT ADD ON: HCPCS | Mod: S$PBB,,, | Performed by: FAMILY MEDICINE

## 2024-04-09 NOTE — TELEPHONE ENCOUNTER
----- Message from Kacie Lopez sent at 4/9/2024  2:08 PM CDT -----  Type:  Test Results    Who Called:  patient  Name of Test (Lab/Mammo/Etc):  xray  Date of Test:  today  Ordering Provider:  lakeisha  Where the test was performed:    Best Call Back Number:  [phone] (home)   Additional Information:

## 2024-04-10 NOTE — PROGRESS NOTES
Subjective:       Patient ID: Kasandra Jones is a 73 y.o. female.    Chief Complaint: Foot Injury (Right foot pain)    Fell on April 1st at home.  Had been in her bathroom was standing up from the toilet fell forward into the bath tub hit her head on the wall opposite striking it on grab rail.  Not on top of her head and above the left eye and around it.  It was CT of the head neck at the emergency room was negative.  Scalp contusion with a little small laceration on top of her head.  No suturing needed.  Facial contusion around the left eye.  Doing better.  Atrial fibrillation was noted in the emergency room.  History of this.  She was given diltiazem in the emergency room in resolved.  Had RVR then.  BMI is 20.5 up slightly.  Has some cognitive impairment.  Bipolar 1 disorder doing well.  Hypertension controlled.  On Plavix regularly.  Hypothyroidism.  Using walker but did not have it with her in the bathroom.  Valproic acid level 22.6 TSH 3.55.  Magnesium normal.  Glucose 104.  Potassium 4.1 MCV slightly high at 103.      Physical examination.  No acute distress.  Alert oriented.  Pupils are equal round and regular.  Neck is supple.  Bruising surrounding the left eye.  Lids are normal.  Some hematoma noted on the top of her head.  Small abrasion there which is healing.  Chest clear.  Heart regular rate rhythm.  Extremities without edema positive pulses.  Sole of the right foot.  There is what appears to be a small wart on the bottom of the 1st MTP joint.        Objective:        Assessment:       1. Hypertension, essential    2. Anticoagulant long-term use    3. Contusion of face, subsequent encounter    4. Contusion of scalp, subsequent encounter    5. BMI 20.0-20.9, adult    6. Cognitive impairment    7. Bipolar 1 disorder    8. Right foot pain    9. Atrial fibrillation with rapid ventricular response    10. Visit for monitoring Plavix therapy    11. Hypothyroidism, unspecified type    12. Uses walker         Plan:       Hypertension, essential    Anticoagulant long-term use    Contusion of face, subsequent encounter    Contusion of scalp, subsequent encounter    BMI 20.0-20.9, adult    Cognitive impairment    Bipolar 1 disorder    Right foot pain  -     X-Ray Foot Complete Right; Future; Expected date: 04/09/2024    Atrial fibrillation with rapid ventricular response  -     Cardiac Monitor - 3-15 Day Adult (Cupid Only); Future    Visit for monitoring Plavix therapy    Hypothyroidism, unspecified type    Uses walker    Holter monitor ordered.  Follow-up with Cardiology.  Use her walker regularly.  X-ray the right foot.  Over-the-counter medication for wart recommended.

## 2024-04-12 ENCOUNTER — OFFICE VISIT (OUTPATIENT)
Dept: FAMILY MEDICINE | Facility: CLINIC | Age: 74
End: 2024-04-12
Payer: MEDICARE

## 2024-04-12 ENCOUNTER — HOSPITAL ENCOUNTER (OUTPATIENT)
Dept: CARDIOLOGY | Facility: CLINIC | Age: 74
Discharge: HOME | End: 2024-04-12
Attending: FAMILY MEDICINE
Payer: MEDICARE

## 2024-04-12 VITALS
SYSTOLIC BLOOD PRESSURE: 138 MMHG | HEIGHT: 66 IN | BODY MASS INDEX: 20.51 KG/M2 | TEMPERATURE: 97 F | DIASTOLIC BLOOD PRESSURE: 82 MMHG | WEIGHT: 127.63 LBS | OXYGEN SATURATION: 98 % | HEART RATE: 56 BPM

## 2024-04-12 DIAGNOSIS — I48.91 ATRIAL FIBRILLATION WITH RAPID VENTRICULAR RESPONSE: ICD-10-CM

## 2024-04-12 DIAGNOSIS — S00.03XS HEMATOMA OF SCALP, SEQUELA: Primary | ICD-10-CM

## 2024-04-12 PROCEDURE — 99213 OFFICE O/P EST LOW 20 MIN: CPT | Mod: S$PBB,,, | Performed by: FAMILY MEDICINE

## 2024-04-12 PROCEDURE — 99999 PR PBB SHADOW E&M-EST. PATIENT-LVL V: CPT | Mod: PBBFAC,,, | Performed by: FAMILY MEDICINE

## 2024-04-12 PROCEDURE — 99215 OFFICE O/P EST HI 40 MIN: CPT | Mod: PBBFAC,PN | Performed by: FAMILY MEDICINE

## 2024-04-12 PROCEDURE — 93244 EXT ECG>48HR<7D REV&INTERPJ: CPT | Mod: ,,, | Performed by: GENERAL PRACTICE

## 2024-04-12 RX ORDER — DOXYCYCLINE 100 MG/1
100 CAPSULE ORAL 2 TIMES DAILY
Qty: 20 CAPSULE | Refills: 0 | Status: SHIPPED | OUTPATIENT
Start: 2024-04-12 | End: 2024-06-05

## 2024-04-14 PROBLEM — S00.03XA HEMATOMA OF SCALP: Status: ACTIVE | Noted: 2024-04-14

## 2024-04-15 NOTE — PROGRESS NOTES
Subjective:       Patient ID: Kasandra Jones is a 73 y.o. female.    Chief Complaint: Follow-up (Ck lump on head)    Walk-in.  Follow-up hematoma of scalp.  Holter monitor done today.  Scalp injury from her fall.  Has now become softer little warm.  Slightly tender to pressure.  But not very painful.  Also look at plantar wart again.  Got over-the-counter wart medicine but the pads cover too large and area.    Physical examination.  Plantar surface left great toe.  Near MI TP joint.  The small wart is actually still there.  The area where she has been putting the wart pad is actually adjacent to it.  Showed her the correct location.  Scalp left parietal scalp.  Slightly fluctuant area.  Slightly raised.  Little warmer than the surrounding tissues.  Not pointing no drainage.    Impression.  Hematoma of the scalp.  Uncertain if this just is inflammatory change or if it is actually getting infected.  Plantar wart.    Plan Vibramycin 100 mg b.i.d. for 10 days.  Warm compresses to the scalp hematoma.  ER over the weekend if it worsens needs to be drained.  Use a pain on wart remover on the tiny wart only left great toe plantar surface.    Objective:        Assessment:       1. Hematoma of scalp, sequela        Plan:       Hematoma of scalp, sequela    Other orders  -     doxycycline (VIBRAMYCIN) 100 MG Cap; Take 1 capsule (100 mg total) by mouth 2 (two) times a day.  Dispense: 20 capsule; Refill: 0

## 2024-04-17 ENCOUNTER — OFFICE VISIT (OUTPATIENT)
Dept: FAMILY MEDICINE | Facility: CLINIC | Age: 74
End: 2024-04-17
Payer: MEDICARE

## 2024-04-17 VITALS
TEMPERATURE: 98 F | OXYGEN SATURATION: 98 % | DIASTOLIC BLOOD PRESSURE: 88 MMHG | RESPIRATION RATE: 18 BRPM | HEIGHT: 66 IN | WEIGHT: 129.19 LBS | BODY MASS INDEX: 20.76 KG/M2 | HEART RATE: 63 BPM | SYSTOLIC BLOOD PRESSURE: 160 MMHG

## 2024-04-17 DIAGNOSIS — Z79.01 ANTICOAGULANT LONG-TERM USE: Primary | ICD-10-CM

## 2024-04-17 DIAGNOSIS — I10 HYPERTENSION, ESSENTIAL: ICD-10-CM

## 2024-04-17 PROCEDURE — 99999 PR PBB SHADOW E&M-EST. PATIENT-LVL V: CPT | Mod: PBBFAC,,, | Performed by: NURSE PRACTITIONER

## 2024-04-17 PROCEDURE — 99212 OFFICE O/P EST SF 10 MIN: CPT | Mod: S$PBB,,, | Performed by: NURSE PRACTITIONER

## 2024-04-17 PROCEDURE — 99215 OFFICE O/P EST HI 40 MIN: CPT | Mod: PBBFAC,PN | Performed by: NURSE PRACTITIONER

## 2024-04-17 NOTE — PROGRESS NOTES
SUBJECTIVE:      Patient ID: Kasandra Jones is a 73 y.o. female.    Chief Complaint: Follow-up (1 month)    HPI  Is here for follow up from fall  Hematoma on head was in 2 spots  One on forehead went down completely  One on scalp is still there but  much softer and smaller per patient  Bruises are healing well    No change in vision  No headaches  No falls    Vss; bp is elevated  Is normal at home    Denies chest pain  Deneis sob  Denies fever  Denies chills    Past Surgical History:   Procedure Laterality Date    EYE SURGERY Bilateral 2020 August    cataract removal    HYSTERECTOMY      TOTAL REDUCTION MAMMOPLASTY  2002     Family History   Problem Relation Name Age of Onset    Breast cancer Maternal Aunt        Social History     Socioeconomic History    Marital status:    Occupational History    Occupation: RETIRED   Tobacco Use    Smoking status: Never    Smokeless tobacco: Never   Substance and Sexual Activity    Alcohol use: Never    Drug use: Never    Sexual activity: Yes     Partners: Male     Social Determinants of Health     Financial Resource Strain: Low Risk  (3/25/2024)    Overall Financial Resource Strain (CARDIA)     Difficulty of Paying Living Expenses: Not hard at all   Food Insecurity: No Food Insecurity (3/25/2024)    Hunger Vital Sign     Worried About Running Out of Food in the Last Year: Never true     Ran Out of Food in the Last Year: Never true   Transportation Needs: No Transportation Needs (3/25/2024)    PRAPARE - Transportation     Lack of Transportation (Medical): No     Lack of Transportation (Non-Medical): No   Physical Activity: Inactive (3/25/2024)    Exercise Vital Sign     Days of Exercise per Week: 0 days     Minutes of Exercise per Session: 0 min   Stress: No Stress Concern Present (3/25/2024)    [employer] [place] [employer] - Occupational Stress Questionnaire     Feeling of Stress : Only a little   Social Connections: Moderately Isolated  (3/25/2024)    Social Connection and Isolation Panel [NHANES]     Frequency of Communication with Friends and Family: More than three times a week     Frequency of Social Gatherings with Friends and Family: Once a week     Attends Worship Services: Never     Active Member of Clubs or Organizations: No     Attends Club or Organization Meetings: Never     Marital Status:    Housing Stability: Low Risk  (3/25/2024)    Housing Stability Vital Sign     Unable to Pay for Housing in the Last Year: No     Number of Places Lived in the Last Year: 1     Unstable Housing in the Last Year: No     Current Outpatient Medications   Medication Sig Dispense Refill    alendronate (FOSAMAX) 70 MG tablet Take 1 tablet (70 mg total) by mouth every 7 days. 12 tablet 3    ALPRAZolam (XANAX) 0.5 MG tablet Take 1 tablet (0.5 mg total) by mouth daily as needed for Anxiety. PRN      buPROPion (WELLBUTRIN XL) 300 MG 24 hr tablet Take 1 tablet (300 mg total) by mouth once daily. 90 tablet 2    calcium carbonate (CALCIUM 600 ORAL) Take 1 tablet by mouth 2 (two) times a day.      cetirizine (ZYRTEC) 10 MG tablet Take 10 mg by mouth once daily.      cholecalciferol, vitamin D3, 125 mcg (5,000 unit) capsule Take 5,000 Units by mouth once daily.      clopidogreL (PLAVIX) 75 mg tablet Take 1 tablet (75 mg total) by mouth once daily. 90 tablet 3    copper gluconate 2 mg Tab Take 1 tablet by mouth once daily.      cyanocobalamin-cobamamide (B12) 5,000-100 mcg Lozg Take 1 tablet by mouth once daily.      diphenoxylate-atropine 2.5-0.025 mg (LOMOTIL) 2.5-0.025 mg per tablet Take 1 tablet by mouth 4 (four) times daily as needed for Diarrhea. 30 tablet 1    divalproex ER (DEPAKOTE ER) 500 MG Tb24 Take 1 tablet (500 mg total) by mouth once daily.      doxycycline (VIBRAMYCIN) 100 MG Cap Take 1 capsule (100 mg total) by mouth 2 (two) times a day. 20 capsule 0    ferrous sulfate 325 (65 FE) MG EC tablet Take 325 mg by mouth once  "daily.      levothyroxine (SYNTHROID) 100 MCG tablet Take 1 tablet (100 mcg total) by mouth once daily. 90 tablet 1    magnesium oxide (MAG-OX) 400 mg (241.3 mg magnesium) tablet Take 400 mg by mouth once daily.      MULTAQ 400 mg Tab Take 1 tablet (400 mg total) by mouth 2 (two) times daily. 180 tablet 1    oxybutynin (DITROPAN-XL) 5 MG TR24 Take 1 tablet (5 mg total) by mouth once daily. 90 tablet 1    potassium chloride SA (K-DUR,KLOR-CON M) 10 MEQ tablet TAKE 1 TABLET TWICE A  tablet 1    pyridoxine, vitamin B6, (VITAMIN B-6) 100 MG Tab Take 50 mg by mouth once daily.      RABEprazole (ACIPHEX) 20 mg tablet Take 1 tablet (20 mg total) by mouth 2 (two) times daily. 180 tablet 1    traZODone (DESYREL) 100 MG tablet Take 200 mg by mouth every evening.      vit C,S-Pn-hfbbh-lutein-zeaxan (PRESERVISION AREDS 2) 250-90-40-1 mg Cap Take 1 capsule by mouth 2 (two) times a day.       No current facility-administered medications for this visit.     Review of patient's allergies indicates:   Allergen Reactions    Niacin preparations     Penicillins       Past Medical History:   Diagnosis Date    Cataract      Past Surgical History:   Procedure Laterality Date    EYE SURGERY Bilateral 2020 August    cataract removal    HYSTERECTOMY      TOTAL REDUCTION MAMMOPLASTY  2002       Review of Systems   Skin:  Positive for wound.   All other systems reviewed and are negative.   OBJECTIVE:      Vitals:    04/17/24 1251   BP: (!) 160/88   BP Location: Left arm   Patient Position: Sitting   BP Method: Medium (Manual)   Pulse: 63   Resp: 18   Temp: 98.1 °F (36.7 °C)   SpO2: 98%   Weight: 58.6 kg (129 lb 3 oz)   Height: 5' 6" (1.676 m)     Physical Exam  Vitals and nursing note reviewed.   Constitutional:       Appearance: Normal appearance. She is normal weight.   HENT:      Head: Normocephalic and atraumatic.      Comments: See skin  Eyes:      Pupils: Pupils are equal, round, and reactive to light. "   Cardiovascular:      Rate and Rhythm: Normal rate and regular rhythm.      Pulses: Normal pulses.      Heart sounds: Normal heart sounds.   Pulmonary:      Effort: Pulmonary effort is normal.      Breath sounds: Normal breath sounds.   Musculoskeletal:      Cervical back: Normal range of motion.   Skin:     Findings: Bruising present.      Comments: Yellow healing bruising on forehead and left upper cheek; also small hematoma soft size of quarter on top of right head; nontender   Neurological:      General: No focal deficit present.      Mental Status: She is alert and oriented to person, place, and time. Mental status is at baseline.   Psychiatric:         Mood and Affect: Mood normal.         Behavior: Behavior normal.         Thought Content: Thought content normal.         Judgment: Judgment normal.      Comments: nonsuicidal      Assessment:       1. Anticoagulant long-term use    2. Hypertension, essential        Plan:       Anticoagulant long-term use    Hypertension, essential    Follow up in 3 weeks for bp if elevated at home  Hematoma healing nicely    No follow-ups on file.      4/17/2024 ADARSH Bradley, FNP

## 2024-04-27 NOTE — TELEPHONE ENCOUNTER
No care due was identified.  [facility] Embedded Care Due Messages. Reference number: [id number].   4/27/2024 6:58:12 PM CDT

## 2024-04-28 RX ORDER — RABEPRAZOLE SODIUM 20 MG/1
20 TABLET, DELAYED RELEASE ORAL 2 TIMES DAILY
Qty: 180 TABLET | Refills: 1 | Status: SHIPPED | OUTPATIENT
Start: 2024-04-28

## 2024-04-29 LAB
OHS CV EVENT MONITOR DAY: 2
OHS CV HOLTER HOOKUP DATE: NORMAL
OHS CV HOLTER HOOKUP TIME: NORMAL
OHS CV HOLTER LENGTH DECIMAL HOURS: 70
OHS CV HOLTER LENGTH HOURS: 22
OHS CV HOLTER LENGTH MINUTES: 0
OHS CV HOLTER SCAN DATE: NORMAL
OHS CV HOLTER SINUS AVERAGE HR: 65 BPM
OHS CV HOLTER SINUS MAX HR: 143 BPM
OHS CV HOLTER SINUS MIN HR: 47 BPM
OHS CV HOLTER STUDY END DATE: NORMAL
OHS CV HOLTER STUDY END TIME: NORMAL

## 2024-05-13 ENCOUNTER — TELEPHONE (OUTPATIENT)
Dept: FAMILY MEDICINE | Facility: CLINIC | Age: 74
End: 2024-05-13
Payer: MEDICARE

## 2024-05-13 ENCOUNTER — TELEPHONE (OUTPATIENT)
Dept: CARDIOLOGY | Facility: CLINIC | Age: 74
End: 2024-05-13
Payer: MEDICARE

## 2024-05-13 NOTE — TELEPHONE ENCOUNTER
----- Message from Kailee Lizama sent at 5/13/2024  9:55 AM CDT -----  Regarding: Needs Medical Clearance  Contact: patient at [phone]  Type: Needs Medical Clearance  Who Called:  patient at [phone]    Additional Information: Fax was sent from [facility] last week to see if patient could be taken off blood thinners. Please call [phone] and advise. Thank you

## 2024-05-13 NOTE — TELEPHONE ENCOUNTER
----- Message from Kailee Lizama sent at 5/13/2024  9:55 AM CDT -----  Regarding: Needs Medical Clearance  Contact: patient at [phone]  Type: Needs Medical Clearance  Who Called:  patient at [phone]    Additional Information: Fax was sent from [facility] last week to see if patient could be taken off blood thinners. Please call [phone] and advise. Thank you

## 2024-05-13 NOTE — TELEPHONE ENCOUNTER
----- Message from Amira Murillo sent at 5/13/2024  9:59 AM CDT -----  Type: Needs Medical Advice  Who Called:  pt  Symptoms (please be specific):  pt said she need to speak to the office [facility] need to know if she need to stop and cut back on some of her meds before a procedure--please call and advise  Best Call Back Number: [phone] (home)     Additional Information: thank you

## 2024-05-15 ENCOUNTER — TELEPHONE (OUTPATIENT)
Dept: FAMILY MEDICINE | Facility: CLINIC | Age: 74
End: 2024-05-15

## 2024-05-15 ENCOUNTER — OFFICE VISIT (OUTPATIENT)
Dept: FAMILY MEDICINE | Facility: CLINIC | Age: 74
End: 2024-05-15
Payer: MEDICARE

## 2024-05-15 ENCOUNTER — LAB VISIT (OUTPATIENT)
Dept: LAB | Facility: HOSPITAL | Age: 74
End: 2024-05-15
Attending: NURSE PRACTITIONER
Payer: MEDICARE

## 2024-05-15 ENCOUNTER — TELEPHONE (OUTPATIENT)
Dept: CARDIOLOGY | Facility: CLINIC | Age: 74
End: 2024-05-15
Payer: MEDICARE

## 2024-05-15 VITALS
TEMPERATURE: 97 F | DIASTOLIC BLOOD PRESSURE: 86 MMHG | WEIGHT: 130 LBS | HEIGHT: 66 IN | SYSTOLIC BLOOD PRESSURE: 158 MMHG | BODY MASS INDEX: 20.89 KG/M2 | HEART RATE: 47 BPM | OXYGEN SATURATION: 98 %

## 2024-05-15 DIAGNOSIS — E03.9 HYPOTHYROIDISM, UNSPECIFIED TYPE: ICD-10-CM

## 2024-05-15 DIAGNOSIS — F31.9 BIPOLAR 1 DISORDER: ICD-10-CM

## 2024-05-15 DIAGNOSIS — K21.00 GASTROESOPHAGEAL REFLUX DISEASE WITH ESOPHAGITIS WITHOUT HEMORRHAGE: ICD-10-CM

## 2024-05-15 DIAGNOSIS — M81.0 OSTEOPOROSIS, UNSPECIFIED OSTEOPOROSIS TYPE, UNSPECIFIED PATHOLOGICAL FRACTURE PRESENCE: ICD-10-CM

## 2024-05-15 DIAGNOSIS — Z79.83 LONG TERM USE OF ALENDRONATE THERAPY (FOSAMAX): ICD-10-CM

## 2024-05-15 DIAGNOSIS — Z79.01 ANTICOAGULANT LONG-TERM USE: ICD-10-CM

## 2024-05-15 DIAGNOSIS — M54.2 NECK PAIN: ICD-10-CM

## 2024-05-15 DIAGNOSIS — Z98.818 OTHER DENTAL PROCEDURE STATUS: ICD-10-CM

## 2024-05-15 DIAGNOSIS — I10 HYPERTENSION, ESSENTIAL: Primary | ICD-10-CM

## 2024-05-15 DIAGNOSIS — I10 HYPERTENSION, ESSENTIAL: ICD-10-CM

## 2024-05-15 DIAGNOSIS — E55.9 VITAMIN D DEFICIENCY: ICD-10-CM

## 2024-05-15 DIAGNOSIS — Z79.899 CURRENT USE OF PROTON PUMP INHIBITOR: ICD-10-CM

## 2024-05-15 DIAGNOSIS — Z99.89 USES WALKER: ICD-10-CM

## 2024-05-15 DIAGNOSIS — F41.9 ANXIETY: ICD-10-CM

## 2024-05-15 DIAGNOSIS — D51.9 ANEMIA DUE TO VITAMIN B12 DEFICIENCY, UNSPECIFIED B12 DEFICIENCY TYPE: ICD-10-CM

## 2024-05-15 DIAGNOSIS — B07.0 PLANTAR WART OF RIGHT FOOT: ICD-10-CM

## 2024-05-15 DIAGNOSIS — I48.91 ATRIAL FIBRILLATION WITH RAPID VENTRICULAR RESPONSE: ICD-10-CM

## 2024-05-15 LAB
25(OH)D3+25(OH)D2 SERPL-MCNC: 86 NG/ML (ref 30–96)
ALBUMIN SERPL BCP-MCNC: 3.9 G/DL (ref 3.5–5.2)
ALP SERPL-CCNC: 90 U/L (ref 55–135)
ALT SERPL W/O P-5'-P-CCNC: 52 U/L (ref 10–44)
ANION GAP SERPL CALC-SCNC: 6 MMOL/L (ref 8–16)
AST SERPL-CCNC: 37 U/L (ref 10–40)
BASOPHILS # BLD AUTO: 0.03 K/UL (ref 0–0.2)
BASOPHILS NFR BLD: 0.5 % (ref 0–1.9)
BILIRUB SERPL-MCNC: 0.4 MG/DL (ref 0.1–1)
BUN SERPL-MCNC: 13 MG/DL (ref 8–23)
CALCIUM SERPL-MCNC: 9.4 MG/DL (ref 8.7–10.5)
CHLORIDE SERPL-SCNC: 105 MMOL/L (ref 95–110)
CHOLEST SERPL-MCNC: 189 MG/DL (ref 120–199)
CHOLEST/HDLC SERPL: 2.1 {RATIO} (ref 2–5)
CO2 SERPL-SCNC: 31 MMOL/L (ref 23–29)
CREAT SERPL-MCNC: 0.6 MG/DL (ref 0.5–1.4)
DIFFERENTIAL METHOD BLD: ABNORMAL
EOSINOPHIL # BLD AUTO: 0.1 K/UL (ref 0–0.5)
EOSINOPHIL NFR BLD: 1.6 % (ref 0–8)
ERYTHROCYTE [DISTWIDTH] IN BLOOD BY AUTOMATED COUNT: 13.5 % (ref 11.5–14.5)
EST. GFR  (NO RACE VARIABLE): >60 ML/MIN/1.73 M^2
GLUCOSE SERPL-MCNC: 90 MG/DL (ref 70–110)
HCT VFR BLD AUTO: 42.8 % (ref 37–48.5)
HDLC SERPL-MCNC: 90 MG/DL (ref 40–75)
HDLC SERPL: 47.6 % (ref 20–50)
HGB BLD-MCNC: 13.9 G/DL (ref 12–16)
IMM GRANULOCYTES # BLD AUTO: 0.01 K/UL (ref 0–0.04)
IMM GRANULOCYTES NFR BLD AUTO: 0.2 % (ref 0–0.5)
LDLC SERPL CALC-MCNC: 77 MG/DL (ref 63–159)
LYMPHOCYTES # BLD AUTO: 1.8 K/UL (ref 1–4.8)
LYMPHOCYTES NFR BLD: 32 % (ref 18–48)
MAGNESIUM SERPL-MCNC: 1.9 MG/DL (ref 1.6–2.6)
MCH RBC QN AUTO: 32.9 PG (ref 27–31)
MCHC RBC AUTO-ENTMCNC: 32.5 G/DL (ref 32–36)
MCV RBC AUTO: 101 FL (ref 82–98)
MONOCYTES # BLD AUTO: 0.5 K/UL (ref 0.3–1)
MONOCYTES NFR BLD: 8.2 % (ref 4–15)
NEUTROPHILS # BLD AUTO: 3.2 K/UL (ref 1.8–7.7)
NEUTROPHILS NFR BLD: 57.5 % (ref 38–73)
NONHDLC SERPL-MCNC: 99 MG/DL
NRBC BLD-RTO: 0 /100 WBC
PLATELET # BLD AUTO: 240 K/UL (ref 150–450)
PMV BLD AUTO: 11.1 FL (ref 9.2–12.9)
POTASSIUM SERPL-SCNC: 4.1 MMOL/L (ref 3.5–5.1)
PROT SERPL-MCNC: 6.6 G/DL (ref 6–8.4)
RBC # BLD AUTO: 4.23 M/UL (ref 4–5.4)
SODIUM SERPL-SCNC: 142 MMOL/L (ref 136–145)
TRIGL SERPL-MCNC: 110 MG/DL (ref 30–150)
TSH SERPL DL<=0.005 MIU/L-ACNC: 1.97 UIU/ML (ref 0.34–5.6)
VALPROATE SERPL-MCNC: 17 UG/ML (ref 50–100)
VIT B12 SERPL-MCNC: >1500 PG/ML (ref 210–950)
WBC # BLD AUTO: 5.59 K/UL (ref 3.9–12.7)

## 2024-05-15 PROCEDURE — 82607 VITAMIN B-12: CPT | Performed by: NURSE PRACTITIONER

## 2024-05-15 PROCEDURE — 82306 VITAMIN D 25 HYDROXY: CPT | Performed by: NURSE PRACTITIONER

## 2024-05-15 PROCEDURE — 99999 PR PBB SHADOW E&M-EST. PATIENT-LVL V: CPT | Mod: PBBFAC,,, | Performed by: NURSE PRACTITIONER

## 2024-05-15 PROCEDURE — 99215 OFFICE O/P EST HI 40 MIN: CPT | Mod: PBBFAC,PN | Performed by: NURSE PRACTITIONER

## 2024-05-15 PROCEDURE — 80164 ASSAY DIPROPYLACETIC ACD TOT: CPT | Performed by: NURSE PRACTITIONER

## 2024-05-15 PROCEDURE — 80061 LIPID PANEL: CPT | Performed by: NURSE PRACTITIONER

## 2024-05-15 PROCEDURE — 83735 ASSAY OF MAGNESIUM: CPT | Performed by: NURSE PRACTITIONER

## 2024-05-15 PROCEDURE — 85025 COMPLETE CBC W/AUTO DIFF WBC: CPT | Performed by: NURSE PRACTITIONER

## 2024-05-15 PROCEDURE — 99214 OFFICE O/P EST MOD 30 MIN: CPT | Mod: S$PBB,,, | Performed by: NURSE PRACTITIONER

## 2024-05-15 PROCEDURE — 84443 ASSAY THYROID STIM HORMONE: CPT | Performed by: NURSE PRACTITIONER

## 2024-05-15 PROCEDURE — 80053 COMPREHEN METABOLIC PANEL: CPT | Performed by: NURSE PRACTITIONER

## 2024-05-15 PROCEDURE — 36415 COLL VENOUS BLD VENIPUNCTURE: CPT | Performed by: NURSE PRACTITIONER

## 2024-05-15 RX ORDER — TIZANIDINE 4 MG/1
4 TABLET ORAL 3 TIMES DAILY PRN
Qty: 30 TABLET | Refills: 2 | Status: SHIPPED | OUTPATIENT
Start: 2024-05-15 | End: 2024-05-25

## 2024-05-15 RX ORDER — CYCLOBENZAPRINE HCL 10 MG
10 TABLET ORAL 3 TIMES DAILY PRN
Qty: 30 TABLET | Refills: 2 | Status: SHIPPED | OUTPATIENT
Start: 2024-05-15 | End: 2024-05-15

## 2024-05-15 RX ORDER — ALPRAZOLAM 0.5 MG/1
0.5 TABLET ORAL DAILY PRN
Qty: 30 TABLET | Refills: 2 | Status: SHIPPED | OUTPATIENT
Start: 2024-05-15

## 2024-05-15 RX ORDER — PREDNISONE 20 MG/1
20 TABLET ORAL 2 TIMES DAILY
Qty: 10 TABLET | Refills: 0 | Status: SHIPPED | OUTPATIENT
Start: 2024-05-15

## 2024-05-15 RX ORDER — LOSARTAN POTASSIUM 25 MG/1
25 TABLET ORAL DAILY
Qty: 90 TABLET | Refills: 1 | Status: SHIPPED | OUTPATIENT
Start: 2024-05-15 | End: 2024-06-05

## 2024-05-15 NOTE — PROGRESS NOTES
SUBJECTIVE:      Patient ID: Kasandra Jones is a 73 y.o. female.    Chief Complaint: Follow-up (Plantar wart)    HPI  Is here for bp check  Denies chest pain  Denies sob  Denies fever  Denies chills  Denies headaches  Denies changes in vision  Uses walker    Is not currently on any bp medications  Bp is still slightly elevated  Has bradycardia- is asymptomatic  Is on plavix as well and fosamax  Has plantar wart- salycitic acid otc is not working  Ears have been itching as  well- allergies  And has dental work scheduled  Labs from march were stable  Also has neck pain from fall- ct cervical spine was neg  Has been having anxiety as well- would like xanax- has worked before in past;  checked; nonsuicidal            Past Surgical History:   Procedure Laterality Date    EYE SURGERY Bilateral 2020 August    cataract removal    HYSTERECTOMY      TOTAL REDUCTION MAMMOPLASTY  2002     Family History   Problem Relation Name Age of Onset    Breast cancer Maternal Aunt        Social History     Socioeconomic History    Marital status:    Occupational History    Occupation: RETIRED   Tobacco Use    Smoking status: Never    Smokeless tobacco: Never   Substance and Sexual Activity    Alcohol use: Never    Drug use: Never    Sexual activity: Yes     Partners: Male     Social Determinants of Health     Financial Resource Strain: Low Risk  (3/25/2024)    Overall Financial Resource Strain (CARDIA)     Difficulty of Paying Living Expenses: Not hard at all   Food Insecurity: No Food Insecurity (3/25/2024)    Hunger Vital Sign     Worried About Running Out of Food in the Last Year: Never true     Ran Out of Food in the Last Year: Never true   Transportation Needs: No Transportation Needs (3/25/2024)    PRAPARE - Transportation     Lack of Transportation (Medical): No     Lack of Transportation (Non-Medical): No   Physical Activity: Inactive (3/25/2024)    Exercise Vital Sign     Days of Exercise per Week:  0 days     Minutes of Exercise per Session: 0 min   Stress: No Stress Concern Present (3/25/2024)    [employer] [place] [employer] - Occupational Stress Questionnaire     Feeling of Stress : Only a little   Housing Stability: Low Risk  (3/25/2024)    Housing Stability Vital Sign     Unable to Pay for Housing in the Last Year: No     Number of Places Lived in the Last Year: 1     Unstable Housing in the Last Year: No     Current Outpatient Medications   Medication Sig Dispense Refill    alendronate (FOSAMAX) 70 MG tablet Take 1 tablet (70 mg total) by mouth every 7 days. 12 tablet 3    buPROPion (WELLBUTRIN XL) 300 MG 24 hr tablet Take 1 tablet (300 mg total) by mouth once daily. 90 tablet 2    calcium carbonate (CALCIUM 600 ORAL) Take 1 tablet by mouth 2 (two) times a day.      cetirizine (ZYRTEC) 10 MG tablet Take 10 mg by mouth once daily.      cholecalciferol, vitamin D3, 125 mcg (5,000 unit) capsule Take 5,000 Units by mouth once daily.      clopidogreL (PLAVIX) 75 mg tablet Take 1 tablet (75 mg total) by mouth once daily. 90 tablet 3    copper gluconate 2 mg Tab Take 1 tablet by mouth once daily.      cyanocobalamin-cobamamide (B12) 5,000-100 mcg Lozg Take 1 tablet by mouth once daily.      diphenoxylate-atropine 2.5-0.025 mg (LOMOTIL) 2.5-0.025 mg per tablet Take 1 tablet by mouth 4 (four) times daily as needed for Diarrhea. 30 tablet 1    divalproex ER (DEPAKOTE ER) 500 MG Tb24 Take 1 tablet (500 mg total) by mouth once daily.      doxycycline (VIBRAMYCIN) 100 MG Cap Take 1 capsule (100 mg total) by mouth 2 (two) times a day. 20 capsule 0    ferrous sulfate 325 (65 FE) MG EC tablet Take 325 mg by mouth once daily.      levothyroxine (SYNTHROID) 100 MCG tablet Take 1 tablet (100 mcg total) by mouth once daily. 90 tablet 1    magnesium oxide (MAG-OX) 400 mg (241.3 mg magnesium) tablet Take 400 mg by mouth once daily.      MULTAQ 400 mg Tab Take 1 tablet (400 mg total) by mouth 2  "(two) times daily. 180 tablet 1    oxybutynin (DITROPAN-XL) 5 MG TR24 Take 1 tablet (5 mg total) by mouth once daily. 90 tablet 1    potassium chloride SA (K-DUR,KLOR-CON M) 10 MEQ tablet TAKE 1 TABLET TWICE A  tablet 1    pyridoxine, vitamin B6, (VITAMIN B-6) 100 MG Tab Take 50 mg by mouth once daily.      RABEprazole (ACIPHEX) 20 mg tablet Take 1 tablet (20 mg total) by mouth 2 (two) times daily. 180 tablet 1    traZODone (DESYREL) 100 MG tablet Take 200 mg by mouth every evening.      vit C,U-Sk-pakdt-lutein-zeaxan (PRESERVISION AREDS 2) 250-90-40-1 mg Cap Take 1 capsule by mouth 2 (two) times a day.      ALPRAZolam (XANAX) 0.5 MG tablet Take 1 tablet (0.5 mg total) by mouth daily as needed for Anxiety. PRN 30 tablet 2    losartan (COZAAR) 25 MG tablet Take 1 tablet (25 mg total) by mouth once daily. 90 tablet 1    predniSONE (DELTASONE) 20 MG tablet Take 1 tablet (20 mg total) by mouth 2 (two) times daily. 10 tablet 0    tiZANidine (ZANAFLEX) 4 MG tablet Take 1 tablet (4 mg total) by mouth 3 (three) times daily as needed (muscle spasm). 30 tablet 2     No current facility-administered medications for this visit.     Review of patient's allergies indicates:   Allergen Reactions    Niacin preparations     Penicillins       Past Medical History:   Diagnosis Date    Cataract      Past Surgical History:   Procedure Laterality Date    EYE SURGERY Bilateral 2020 August    cataract removal    HYSTERECTOMY      TOTAL REDUCTION MAMMOPLASTY  2002       Review of Systems   HENT:  Positive for congestion.         Ears itch   Skin:         Plantar wart      OBJECTIVE:      Vitals:    05/15/24 1140   BP: (!) 158/86   Pulse: (!) 47   Temp: 97.3 °F (36.3 °C)   SpO2: 98%   Weight: 59 kg (130 lb)   Height: 5' 6" (1.676 m)     Physical Exam  Vitals and nursing note reviewed.   Constitutional:       Appearance: Normal appearance. She is normal weight.   HENT:      Head: Normocephalic and atraumatic.      Right " Ear: Tympanic membrane, ear canal and external ear normal.      Left Ear: Tympanic membrane, ear canal and external ear normal.      Nose: Nose normal.      Mouth/Throat:      Mouth: Mucous membranes are moist.      Pharynx: Oropharynx is clear.   Eyes:      Pupils: Pupils are equal, round, and reactive to light.   Neck:      Comments: Neck pain with limited ROM bilateral movement and moving up and down  Cardiovascular:      Rate and Rhythm: Regular rhythm. Bradycardia present.      Pulses: Normal pulses.      Heart sounds: Normal heart sounds.      Comments: S1 s2 pal cardia asymptomatic  No bilateral lower ext edema noted  Pulmonary:      Effort: Pulmonary effort is normal.      Breath sounds: Normal breath sounds.   Skin:     General: Skin is warm and dry.      Comments: Plantar wart on right foot bottom   Neurological:      General: No focal deficit present.      Mental Status: She is alert and oriented to person, place, and time. Mental status is at baseline.      Comments: Uses walker   Psychiatric:         Mood and Affect: Mood normal.         Behavior: Behavior normal.         Thought Content: Thought content normal.         Judgment: Judgment normal.      Comments: nonsuicidal      Assessment:       1. Hypertension, essential    2. Bipolar 1 disorder    3. Atrial fibrillation with rapid ventricular response    4. Hypothyroidism, unspecified type    5. Uses walker    6. Anxiety    7. Vitamin D deficiency    8. Gastroesophageal reflux disease with esophagitis without hemorrhage    9. Anemia due to vitamin B12 deficiency, unspecified B12 deficiency type    10. Anticoagulant long-term use    11. Current use of proton pump inhibitor    12. Plantar wart of right foot    13. Neck pain    14. Long term use of alendronate therapy (Fosamax)    15. Other dental procedure status    16. Osteoporosis, unspecified osteoporosis type, unspecified pathological fracture presence        Plan:       Hypertension, essential  -      Comprehensive Metabolic Panel; Future; Expected date: 05/15/2024  -     Lipid Panel; Future; Expected date: 05/15/2024  -     losartan (COZAAR) 25 MG tablet; Take 1 tablet (25 mg total) by mouth once daily.  Dispense: 90 tablet; Refill: 1    Bipolar 1 disorder  -     VALPROIC ACID; Future; Expected date: 05/15/2024    Atrial fibrillation with rapid ventricular response    Hypothyroidism, unspecified type  -     TSH; Future; Expected date: 05/15/2024    Uses walker    Anxiety  -     ALPRAZolam (XANAX) 0.5 MG tablet; Take 1 tablet (0.5 mg total) by mouth daily as needed for Anxiety. PRN  Dispense: 30 tablet; Refill: 2    Vitamin D deficiency  -     Vitamin D; Future; Expected date: 05/15/2024    Gastroesophageal reflux disease with esophagitis without hemorrhage    Anemia due to vitamin B12 deficiency, unspecified B12 deficiency type  -     CBC Auto Differential; Future; Expected date: 05/15/2024  -     VITAMIN B12; Future; Expected date: 05/15/2024    Anticoagulant long-term use  -     CBC Auto Differential; Future; Expected date: 05/15/2024    Current use of proton pump inhibitor  -     Magnesium; Future; Expected date: 05/29/2024    Plantar wart of right foot  -     Ambulatory referral/consult to Podiatry; Future; Expected date: 05/15/2024    Neck pain  -     Discontinue: cyclobenzaprine (FLEXERIL) 10 MG tablet; Take 1 tablet (10 mg total) by mouth 3 (three) times daily as needed for Muscle spasms.  Dispense: 30 tablet; Refill: 2  -     predniSONE (DELTASONE) 20 MG tablet; Take 1 tablet (20 mg total) by mouth 2 (two) times daily.  Dispense: 10 tablet; Refill: 0  -     tiZANidine (ZANAFLEX) 4 MG tablet; Take 1 tablet (4 mg total) by mouth 3 (three) times daily as needed (muscle spasm).  Dispense: 30 tablet; Refill: 2    Long term use of alendronate therapy (Fosamax)  -     C TELOPEPTIDE (CTX), SERUM; Future; Expected date: 05/15/2024    Other dental procedure status  -     C TELOPEPTIDE (CTX), SERUM; Future;  Expected date: 05/15/2024    Osteoporosis, unspecified osteoporosis type, unspecified pathological fracture presence  -     C TELOPEPTIDE (CTX), SERUM; Future; Expected date: 05/15/2024      [facility] in June [facility]    Stop plavix x 1 week  No asa no advil no ibuprofen no motrin no alieve for 1 week- tylenol okay  Stop fosamax now until further directed  Get c telopeptide ctx serum lab today  Get other fasting labs in June   Cleared for dental procedure pending lab result    Take xanax prn for anxiety    Take prednisone am and pm for 5 days for neck pain  Take zanaflex prn for neck pain- may make drowsy    Start losartan 25 mg daily for bp    See dr edy malone or dr page for plantar wart that won't go away    Follow up in 3 weeks or sooner if needed      Follow up in about 4 weeks (around 6/12/2024), or if symptoms worsen or fail to improve.      5/15/2024 Amianta Mireles, ADARSH, FNP

## 2024-05-15 NOTE — LETTER
.  [facility]  [address]  [address]  [place] [address]  Phone: [phone]  Fax: [phone] Date: 05/15/2024    Patient: ENRIQUE DURAN              MRN#:[id number]  : 1950  Referring Physician: [facility]           Procedure: CLEANING AND PERIODONTAL TREATMENT    Current Outpatient Medications   Medication Sig Dispense Refill    alendronate (FOSAMAX) 70 MG tablet Take 1 tablet (70 mg total) by mouth every 7 days. 12 tablet 3    ALPRAZolam (XANAX) 0.5 MG tablet Take 1 tablet (0.5 mg total) by mouth daily as needed for Anxiety. PRN 30 tablet 2    buPROPion (WELLBUTRIN XL) 300 MG 24 hr tablet Take 1 tablet (300 mg total) by mouth once daily. 90 tablet 2    calcium carbonate (CALCIUM 600 ORAL) Take 1 tablet by mouth 2 (two) times a day.      cetirizine (ZYRTEC) 10 MG tablet Take 10 mg by mouth once daily.      cholecalciferol, vitamin D3, 125 mcg (5,000 unit) capsule Take 5,000 Units by mouth once daily.      clopidogreL (PLAVIX) 75 mg tablet Take 1 tablet (75 mg total) by mouth once daily. 90 tablet 3    copper gluconate 2 mg Tab Take 1 tablet by mouth once daily.      cyanocobalamin-cobamamide (B12) 5,000-100 mcg Lozg Take 1 tablet by mouth once daily.      diphenoxylate-atropine 2.5-0.025 mg (LOMOTIL) 2.5-0.025 mg per tablet Take 1 tablet by mouth 4 (four) times daily as needed for Diarrhea. 30 tablet 1    divalproex ER (DEPAKOTE ER) 500 MG Tb24 Take 1 tablet (500 mg total) by mouth once daily.      doxycycline (VIBRAMYCIN) 100 MG Cap Take 1 capsule (100 mg total) by mouth 2 (two) times a day. 20 capsule 0    ferrous sulfate 325 (65 FE) MG EC tablet Take 325 mg by mouth once daily.      levothyroxine (SYNTHROID) 100 MCG tablet Take 1 tablet (100 mcg total) by mouth once daily. 90 tablet 1    losartan (COZAAR) 25 MG tablet Take 1 tablet (25 mg total) by mouth once daily. 90 tablet 1    magnesium oxide (MAG-OX) 400 mg (241.3 mg magnesium)  tablet Take 400 mg by mouth once daily.      MULTAQ 400 mg Tab Take 1 tablet (400 mg total) by mouth 2 (two) times daily. 180 tablet 1    oxybutynin (DITROPAN-XL) 5 MG TR24 Take 1 tablet (5 mg total) by mouth once daily. 90 tablet 1    potassium chloride SA (K-DUR,KLOR-CON M) 10 MEQ tablet TAKE 1 TABLET TWICE A  tablet 1    predniSONE (DELTASONE) 20 MG tablet Take 1 tablet (20 mg total) by mouth 2 (two) times daily. 10 tablet 0    pyridoxine, vitamin B6, (VITAMIN B-6) 100 MG Tab Take 50 mg by mouth once daily.      RABEprazole (ACIPHEX) 20 mg tablet Take 1 tablet (20 mg total) by mouth 2 (two) times daily. 180 tablet 1    tiZANidine (ZANAFLEX) 4 MG tablet Take 1 tablet (4 mg total) by mouth 3 (three) times daily as needed (muscle spasm). 30 tablet 2    traZODone (DESYREL) 100 MG tablet Take 200 mg by mouth every evening.      vit C,U-Vk-pdzuk-lutein-zeaxan (PRESERVISION AREDS 2) 250-90-40-1 mg Cap Take 1 capsule by mouth 2 (two) times a day.       No current facility-administered medications for this visit.       This patient has been assessed for risk factors for clearance of surgery with the following stipulations:    [x] No Contraindications.      [x] Recommendations for CLOPIDOGREL: Hold X 5 DAYS.    [] Patient is MODERATE RISK    [] Cleared for surgery with the following restrictions:    [] Not Cleared for surgery due to the following reasons:    If you have any questions regarding the above, please contact my office at [phone]    Clearing Physician:

## 2024-05-15 NOTE — LETTER
Dr. Ford - [address] [address]  Phone: [phone]  Fax: [phone] May 15, 2024     Patient: Kasandra Jones   YOB: 1950   Date of Visit: 5/15/2024       To Whom It May Concern:    Patient cleared for dental procedure. Hold Plavix 1 week prior to procedure. Hold fosamax starting now.  If you have any questions or concerns, please don't hesitate to contact my office.    Sincerely,      Sanjiv rivera M.D.

## 2024-05-15 NOTE — TELEPHONE ENCOUNTER
----- Message from Mili Louie sent at 5/15/2024  9:35 AM CDT -----  Regarding: advise  Contact: pt  Type: Needs Medical Advice  Who Called:  patient   Symptoms (please be specific):  medical clearance from dentist   How long has patient had these symptoms:    Pharmacy name and phone #:    Best Call Back Number: [phone]    Additional Information: needs it sent to the dental office they fax it over already  .

## 2024-05-15 NOTE — TELEPHONE ENCOUNTER
----- Message from Kacie Lopez sent at 5/15/2024  9:31 AM CDT -----  Type: Needs Medical Advice  Who Called:  patient  Best Call Back Number: [phone] (home)   Additional Information: needs a clearance sent to the dentist for dental work, regarding her blood thinner, [facility] sent the paperwork earlier this month, fax# [phone]

## 2024-06-05 ENCOUNTER — OFFICE VISIT (OUTPATIENT)
Dept: FAMILY MEDICINE | Facility: CLINIC | Age: 74
End: 2024-06-05
Payer: MEDICARE

## 2024-06-05 VITALS
TEMPERATURE: 98 F | WEIGHT: 134.94 LBS | SYSTOLIC BLOOD PRESSURE: 140 MMHG | HEART RATE: 77 BPM | RESPIRATION RATE: 18 BRPM | DIASTOLIC BLOOD PRESSURE: 98 MMHG | OXYGEN SATURATION: 98 % | BODY MASS INDEX: 21.69 KG/M2 | HEIGHT: 66 IN

## 2024-06-05 DIAGNOSIS — I10 HYPERTENSION, ESSENTIAL: Primary | ICD-10-CM

## 2024-06-05 DIAGNOSIS — Z79.01 ANTICOAGULANT LONG-TERM USE: ICD-10-CM

## 2024-06-05 DIAGNOSIS — E03.9 HYPOTHYROIDISM, UNSPECIFIED TYPE: ICD-10-CM

## 2024-06-05 DIAGNOSIS — F31.9 BIPOLAR 1 DISORDER: ICD-10-CM

## 2024-06-05 DIAGNOSIS — D51.9 ANEMIA DUE TO VITAMIN B12 DEFICIENCY, UNSPECIFIED B12 DEFICIENCY TYPE: ICD-10-CM

## 2024-06-05 PROCEDURE — 99215 OFFICE O/P EST HI 40 MIN: CPT | Mod: PBBFAC,PN | Performed by: NURSE PRACTITIONER

## 2024-06-05 PROCEDURE — 99999 PR PBB SHADOW E&M-EST. PATIENT-LVL V: CPT | Mod: PBBFAC,,, | Performed by: NURSE PRACTITIONER

## 2024-06-05 PROCEDURE — 99213 OFFICE O/P EST LOW 20 MIN: CPT | Mod: S$PBB,,, | Performed by: NURSE PRACTITIONER

## 2024-06-05 RX ORDER — LOSARTAN POTASSIUM 50 MG/1
50 TABLET ORAL DAILY
Qty: 90 TABLET | Refills: 1 | Status: SHIPPED | OUTPATIENT
Start: 2024-06-05 | End: 2025-06-05

## 2024-06-05 NOTE — PROGRESS NOTES
SUBJECTIVE:      Patient ID: Kasandra Jones is a 73 y.o. female.    Chief Complaint: Follow-up (3 week)    HPI  Is here for bp follow up  Is on plavix and fosamax  Is on xanax prn and zanaflex prn  Is on losartan 25 mg daily    Labs look stable  Will repeat for in 3 months at [facility]    Bp is elevated  173/82 the other day per patient  Will increase losartan to 50 mg      Past Surgical History:   Procedure Laterality Date    EYE SURGERY Bilateral 2020 August    cataract removal    HYSTERECTOMY      TOTAL REDUCTION MAMMOPLASTY  2002     Family History   Problem Relation Name Age of Onset    Breast cancer Maternal Aunt        Social History     Socioeconomic History    Marital status:    Occupational History    Occupation: RETIRED   Tobacco Use    Smoking status: Never    Smokeless tobacco: Never   Substance and Sexual Activity    Alcohol use: Never    Drug use: Never    Sexual activity: Yes     Partners: Male     Social Determinants of Health     Financial Resource Strain: Low Risk  (3/25/2024)    Overall Financial Resource Strain (CARDIA)     Difficulty of Paying Living Expenses: Not hard at all   Food Insecurity: No Food Insecurity (3/25/2024)    Hunger Vital Sign     Worried About Running Out of Food in the Last Year: Never true     Ran Out of Food in the Last Year: Never true   Transportation Needs: No Transportation Needs (3/25/2024)    PRAPARE - Transportation     Lack of Transportation (Medical): No     Lack of Transportation (Non-Medical): No   Physical Activity: Inactive (3/25/2024)    Exercise Vital Sign     Days of Exercise per Week: 0 days     Minutes of Exercise per Session: 0 min   Stress: No Stress Concern Present (3/25/2024)    [employer] [place] [employer] - Occupational Stress Questionnaire     Feeling of Stress : Only a little   Housing Stability: Low Risk  (3/25/2024)    Housing Stability Vital Sign     Unable to Pay for Housing in the Last Year: No     Number of Places Lived in  the Last Year: 1     Unstable Housing in the Last Year: No     Current Outpatient Medications   Medication Sig Dispense Refill    ALPRAZolam (XANAX) 0.5 MG tablet Take 1 tablet (0.5 mg total) by mouth daily as needed for Anxiety. PRN 30 tablet 2    buPROPion (WELLBUTRIN XL) 300 MG 24 hr tablet Take 1 tablet (300 mg total) by mouth once daily. 90 tablet 2    calcium carbonate (CALCIUM 600 ORAL) Take 1 tablet by mouth 2 (two) times a day.      cetirizine (ZYRTEC) 10 MG tablet Take 10 mg by mouth once daily.      cholecalciferol, vitamin D3, 125 mcg (5,000 unit) capsule Take 5,000 Units by mouth once daily.      copper gluconate 2 mg Tab Take 1 tablet by mouth once daily.      cyanocobalamin-cobamamide (B12) 5,000-100 mcg Lozg Take 1 tablet by mouth once daily.      diphenoxylate-atropine 2.5-0.025 mg (LOMOTIL) 2.5-0.025 mg per tablet Take 1 tablet by mouth 4 (four) times daily as needed for Diarrhea. 30 tablet 1    divalproex ER (DEPAKOTE ER) 500 MG Tb24 Take 1 tablet (500 mg total) by mouth once daily.      ferrous sulfate 325 (65 FE) MG EC tablet Take 325 mg by mouth once daily.      levothyroxine (SYNTHROID) 100 MCG tablet Take 1 tablet (100 mcg total) by mouth once daily. 90 tablet 1    magnesium oxide (MAG-OX) 400 mg (241.3 mg magnesium) tablet Take 400 mg by mouth once daily.      MULTAQ 400 mg Tab Take 1 tablet (400 mg total) by mouth 2 (two) times daily. 180 tablet 1    oxybutynin (DITROPAN-XL) 5 MG TR24 Take 1 tablet (5 mg total) by mouth once daily. 90 tablet 1    potassium chloride SA (K-DUR,KLOR-CON M) 10 MEQ tablet TAKE 1 TABLET TWICE A  tablet 1    predniSONE (DELTASONE) 20 MG tablet Take 1 tablet (20 mg total) by mouth 2 (two) times daily. 10 tablet 0    pyridoxine, vitamin B6, (VITAMIN B-6) 100 MG Tab Take 50 mg by mouth once daily.      RABEprazole (ACIPHEX) 20 mg tablet Take 1 tablet (20 mg total) by mouth 2 (two) times daily. 180 tablet 1    traZODone (DESYREL) 100 MG tablet Take 200 mg by  "mouth every evening.      vit C,S-Jd-hwllc-lutein-zeaxan (PRESERVISION AREDS 2) 250-90-40-1 mg Cap Take 1 capsule by mouth 2 (two) times a day.      alendronate (FOSAMAX) 70 MG tablet Take 1 tablet (70 mg total) by mouth every 7 days. (Patient not taking: Reported on 6/5/2024) 12 tablet 3    clopidogreL (PLAVIX) 75 mg tablet Take 1 tablet (75 mg total) by mouth once daily. (Patient not taking: Reported on 6/5/2024) 90 tablet 3    doxycycline (VIBRAMYCIN) 100 MG Cap Take 1 capsule (100 mg total) by mouth 2 (two) times a day. 20 capsule 0    losartan (COZAAR) 50 MG tablet Take 1 tablet (50 mg total) by mouth once daily. 90 tablet 1     No current facility-administered medications for this visit.     Review of patient's allergies indicates:   Allergen Reactions    Niacin preparations     Penicillins       Past Medical History:   Diagnosis Date    Cataract      Past Surgical History:   Procedure Laterality Date    EYE SURGERY Bilateral 2020 August    cataract removal    HYSTERECTOMY      TOTAL REDUCTION MAMMOPLASTY  2002       Review of Systems   All other systems reviewed and are negative.     OBJECTIVE:      Vitals:    06/05/24 1240   BP: (!) 140/98   BP Location: Left arm   Patient Position: Sitting   BP Method: Medium (Manual)   Pulse: 77   Resp: 18   Temp: 97.5 °F (36.4 °C)   SpO2: 98%   Weight: 61.2 kg (134 lb 14.7 oz)   Height: 5' 6" (1.676 m)     Physical Exam  Vitals and nursing note reviewed.   Constitutional:       Appearance: Normal appearance. She is normal weight.   HENT:      Head: Normocephalic and atraumatic.   Eyes:      Pupils: Pupils are equal, round, and reactive to light.   Cardiovascular:      Rate and Rhythm: Regular rhythm. Bradycardia present.      Pulses: Normal pulses.      Heart sounds: Normal heart sounds.   Pulmonary:      Effort: Pulmonary effort is normal.      Breath sounds: Normal breath sounds.   Musculoskeletal:      Cervical back: Normal range of motion.   Skin:     General: Skin is " warm and dry.   Neurological:      General: No focal deficit present.      Mental Status: She is alert and oriented to person, place, and time. Mental status is at baseline.   Psychiatric:         Mood and Affect: Mood normal.         Behavior: Behavior normal.         Thought Content: Thought content normal.         Judgment: Judgment normal.      Comments: nonsuicidal        Assessment:       1. Hypertension, essential    2. Bipolar 1 disorder    3. Hypothyroidism, unspecified type    4. Anemia due to vitamin B12 deficiency, unspecified B12 deficiency type    5. Anticoagulant long-term use        Plan:       Hypertension, essential  -     Comprehensive Metabolic Panel; Future; Expected date: 06/05/2024  -     CBC Auto Differential; Future; Expected date: 06/05/2024  -     Lipid Panel; Future; Expected date: 06/05/2024  -     losartan (COZAAR) 50 MG tablet; Take 1 tablet (50 mg total) by mouth once daily.  Dispense: 90 tablet; Refill: 1    Bipolar 1 disorder  -     VALPROIC ACID; Future; Expected date: 06/05/2024    Hypothyroidism, unspecified type  -     TSH; Future; Expected date: 06/19/2024    Anemia due to vitamin B12 deficiency, unspecified B12 deficiency type  -     CBC Auto Differential; Future; Expected date: 06/05/2024    Anticoagulant long-term use  -     CBC Auto Differential; Future; Expected date: 06/05/2024      Get fasting labs [facility] of sept [facility]  Continue medications   Increase losartan to 50 mg daily  Follow up in 3 weeks or sooner if needed  No follow-ups on file.      6/5/2024 ADARSH Bradley, FNP

## 2024-06-11 RX ORDER — DRONEDARONE 400 MG/1
1 TABLET, FILM COATED ORAL 2 TIMES DAILY
Qty: 180 TABLET | Refills: 3 | Status: SHIPPED | OUTPATIENT
Start: 2024-06-11

## 2024-06-11 NOTE — TELEPHONE ENCOUNTER
Refill Routing Note   Medication(s) are not appropriate for processing by [facility] for the following reason(s):        Outside of protocol    [facility] action(s):  Route               Appointments  past 12m or future 3m with PCP    Date Provider   Last Visit   4/12/2024 Sanjiv Ford III, MD   Next Visit   Visit date not found Sanjiv Ford III, MD   ED visits in past 90 days: 0        Note composed:10:57 AM 06/11/2024

## 2024-06-17 DIAGNOSIS — R35.0 URINARY FREQUENCY: ICD-10-CM

## 2024-06-17 DIAGNOSIS — N32.81 OVERACTIVE BLADDER: ICD-10-CM

## 2024-06-19 RX ORDER — OXYBUTYNIN CHLORIDE 5 MG/1
5 TABLET, EXTENDED RELEASE ORAL
Qty: 30 TABLET | Refills: 2 | Status: SHIPPED | OUTPATIENT
Start: 2024-06-19

## 2024-06-26 ENCOUNTER — OFFICE VISIT (OUTPATIENT)
Dept: PODIATRY | Facility: CLINIC | Age: 74
End: 2024-06-26
Payer: MEDICARE

## 2024-06-26 VITALS — BODY MASS INDEX: 22.25 KG/M2 | HEIGHT: 66 IN | WEIGHT: 138.44 LBS

## 2024-06-26 DIAGNOSIS — M79.671 RIGHT FOOT PAIN: ICD-10-CM

## 2024-06-26 DIAGNOSIS — L85.1 ACQUIRED KERATODERMA: Primary | ICD-10-CM

## 2024-06-26 DIAGNOSIS — B07.0 PLANTAR WART OF RIGHT FOOT: ICD-10-CM

## 2024-06-26 PROCEDURE — 99203 OFFICE O/P NEW LOW 30 MIN: CPT | Mod: S$PBB,,, | Performed by: PODIATRIST

## 2024-06-26 PROCEDURE — 99214 OFFICE O/P EST MOD 30 MIN: CPT | Mod: PBBFAC,PN | Performed by: PODIATRIST

## 2024-06-26 PROCEDURE — 99999 PR PBB SHADOW E&M-EST. PATIENT-LVL IV: CPT | Mod: PBBFAC,,, | Performed by: PODIATRIST

## 2024-06-26 NOTE — PROGRESS NOTES
"  [address] [facility] [place] [address]  Phone: [phone]   [phone]    Patient's PCP:Sanjiv Ford III, MD  Referring Provider: Aminata Mireles    Subjective:      Chief Complaint:: Plantar Warts (Patient is here with complaints of Plantar wart on right foot, has been treating it with OTC  Compound W Gel that seems to be helping. Also has pain in the top of her feet with numbness & itching in her right lower leg x 3 months)    HPI  Kasandra Jones is a 73 y.o. female who presents today with a complaint of Right Foot Plantar wart, also has pain in the top of her foot with numbness & itching in the lower leg . The current episode started 6 months ago.  The symptoms include pain when walking. Probable cause of complaint wart.  The symptoms are aggravated by walking, shoes, socks. The problem has improved. Treatment to date have included OTC Compound W Gel which provided some relief.         Vitals:    06/26/24 1402   Weight: 62.8 kg (138 lb 7.2 oz)   Height: 5' 6" (1.676 m)   PainSc:   3   PainLoc: Foot      Shoe Size:     Past Surgical History:   Procedure Laterality Date    EYE SURGERY Bilateral 2020 August    cataract removal    HYSTERECTOMY      TOTAL REDUCTION MAMMOPLASTY  2002     Past Medical History:   Diagnosis Date    Cataract      Family History   Problem Relation Name Age of Onset    Breast cancer Maternal Aunt          Social History:   Marital Status:   Alcohol History:  reports no history of alcohol use.  Tobacco History:  reports that she has never smoked. She has never used smokeless tobacco.  Drug History:  reports no history of drug use.    Review of patient's allergies indicates:   Allergen Reactions    Niacin preparations     Penicillins        Current Outpatient Medications   Medication Sig Dispense Refill    ALPRAZolam (XANAX) 0.5 MG tablet Take 1 tablet (0.5 mg total) by mouth daily as needed for Anxiety. PRN 30 tablet 2    buPROPion (WELLBUTRIN XL) 300 MG 24 hr " tablet Take 1 tablet (300 mg total) by mouth once daily. 90 tablet 2    calcium carbonate (CALCIUM 600 ORAL) Take 1 tablet by mouth 2 (two) times a day.      cetirizine (ZYRTEC) 10 MG tablet Take 10 mg by mouth once daily.      cholecalciferol, vitamin D3, 125 mcg (5,000 unit) capsule Take 5,000 Units by mouth once daily.      copper gluconate 2 mg Tab Take 1 tablet by mouth once daily.      cyanocobalamin-cobamamide (B12) 5,000-100 mcg Lozg Take 1 tablet by mouth once daily.      diphenoxylate-atropine 2.5-0.025 mg (LOMOTIL) 2.5-0.025 mg per tablet Take 1 tablet by mouth 4 (four) times daily as needed for Diarrhea. 30 tablet 1    divalproex ER (DEPAKOTE ER) 500 MG Tb24 Take 1 tablet (500 mg total) by mouth once daily.      ferrous sulfate 325 (65 FE) MG EC tablet Take 325 mg by mouth once daily.      levothyroxine (SYNTHROID) 100 MCG tablet Take 1 tablet (100 mcg total) by mouth once daily. 90 tablet 1    losartan (COZAAR) 50 MG tablet Take 1 tablet (50 mg total) by mouth once daily. 90 tablet 1    magnesium oxide (MAG-OX) 400 mg (241.3 mg magnesium) tablet Take 400 mg by mouth once daily.      MULTAQ 400 mg Tab TAKE 1 TABLET TWICE A  tablet 3    oxybutynin (DITROPAN-XL) 5 MG TR24 TAKE ONE TABLET BY MOUTH ONCE DAILY 30 tablet 2    potassium chloride SA (K-DUR,KLOR-CON M) 10 MEQ tablet TAKE 1 TABLET TWICE A  tablet 1    predniSONE (DELTASONE) 20 MG tablet Take 1 tablet (20 mg total) by mouth 2 (two) times daily. 10 tablet 0    pyridoxine, vitamin B6, (VITAMIN B-6) 100 MG Tab Take 50 mg by mouth once daily.      RABEprazole (ACIPHEX) 20 mg tablet Take 1 tablet (20 mg total) by mouth 2 (two) times daily. 180 tablet 1    traZODone (DESYREL) 100 MG tablet Take 200 mg by mouth every evening.      vit C,X-Kr-tykcc-lutein-zeaxan (PRESERVISION AREDS 2) 250-90-40-1 mg Cap Take 1 capsule by mouth 2 (two) times a day.       No current facility-administered medications for this visit.       Review of Systems    Constitutional:  Negative for chills, fatigue, fever and unexpected weight change.   HENT:  Negative for hearing loss and trouble swallowing.    Eyes:  Negative for photophobia and visual disturbance.   Respiratory:  Negative for cough, shortness of breath and wheezing.    Cardiovascular:  Negative for chest pain, palpitations and leg swelling.   Gastrointestinal:  Negative for abdominal pain and nausea.   Genitourinary:  Negative for dysuria and frequency.   Musculoskeletal:  Negative for arthralgias, back pain, gait problem, joint swelling and myalgias.   Skin:  Negative for rash and wound.   Neurological:  Negative for tremors, seizures, speech difficulty, weakness and headaches.   Hematological:  Does not bruise/bleed easily.         Objective:        Physical Exam:   Foot Exam    General  General Appearance: appears stated age and healthy   Orientation: alert and oriented to person, place, and time   Affect: appropriate   Gait: unimpaired       Right Foot/Ankle     Inspection and Palpation  Ecchymosis: none  Tenderness: great toe metatarsophalangeal joint   Swelling: none   Arch: normal  Skin Exam: callus; no drainage, no ulcer and no erythema   Neurovascular  Dorsalis pedis: 2+  Posterior tibial: 2+  Capillary Refill: 2+  Varicose veins: not present  Saphenous nerve sensation: normal  Tibial nerve sensation: normal  Superficial peroneal nerve sensation: normal  Deep peroneal nerve sensation: normal  Sural nerve sensation: normal    Edema  Type of edema: non-pitting    Muscle Strength  Ankle dorsiflexion: 5  Ankle plantar flexion: 5  Ankle inversion: 5  Ankle eversion: 5  Great toe extension: 5  Great toe flexion: 5    Range of Motion    Normal right ankle ROM    Tests  Anterior drawer: negative   Talar tilt: negative   PT Tinel's sign: negative    Paresthesia: negative        Physical Exam  Cardiovascular:      Pulses:           Dorsalis pedis pulses are 2+ on the right side.        Posterior tibial pulses  are 2+ on the right side.   Feet:      Right foot:      Skin integrity: Callus present. No ulcer or erythema.               Right Ankle/Foot Exam     Tenderness   The patient is tender to palpation of the great toe metatarsophalangeal joint.    Range of Motion   The patient has normal right ankle ROM.        Muscle Strength   Right Lower Extremity   Ankle Dorsiflexion:  5   Plantar flexion:  5/5    Vascular Exam     Right Pulses  Dorsalis Pedis:      2+  Posterior Tibial:      2+           Imaging: none            Assessment:       1. Acquired keratoderma    2. Plantar wart of right foot    3. Right foot pain      Plan:   Acquired keratoderma    Plantar wart of right foot  -     Ambulatory referral/consult to Podiatry    Right foot pain      Follow up if symptoms worsen or fail to improve.    Procedures        I trimmed the remaining callus underlying the left 1st metatarsal head as a courtesy.  Patient tolerated well.  I discussed with her that I do not see any remaining deeper lesion.  I recommend a pumice stone and urea cream for continued management of the callus.  Counseling:     I provided patient education verbally regarding:   Patient diagnosis, treatment options, as well as alternatives, risks, and benefits.     This note was created using Dragon voice recognition software that occasionally misinterpreted phrases or words.

## 2024-07-23 ENCOUNTER — OFFICE VISIT (OUTPATIENT)
Dept: FAMILY MEDICINE | Facility: CLINIC | Age: 74
End: 2024-07-23
Payer: MEDICARE

## 2024-07-23 VITALS
TEMPERATURE: 98 F | RESPIRATION RATE: 18 BRPM | BODY MASS INDEX: 23.26 KG/M2 | HEIGHT: 66 IN | DIASTOLIC BLOOD PRESSURE: 88 MMHG | SYSTOLIC BLOOD PRESSURE: 150 MMHG | OXYGEN SATURATION: 99 % | HEART RATE: 70 BPM | WEIGHT: 144.75 LBS

## 2024-07-23 DIAGNOSIS — G47.00 INSOMNIA, UNSPECIFIED TYPE: ICD-10-CM

## 2024-07-23 DIAGNOSIS — I10 HYPERTENSION, ESSENTIAL: Primary | ICD-10-CM

## 2024-07-23 PROCEDURE — 99999 PR PBB SHADOW E&M-EST. PATIENT-LVL V: CPT | Mod: PBBFAC,,,

## 2024-07-23 PROCEDURE — 99215 OFFICE O/P EST HI 40 MIN: CPT | Mod: PBBFAC,PN

## 2024-07-23 PROCEDURE — 99214 OFFICE O/P EST MOD 30 MIN: CPT | Mod: S$PBB,,,

## 2024-07-23 RX ORDER — CLOPIDOGREL BISULFATE 75 MG/1
75 TABLET ORAL DAILY
COMMUNITY
Start: 2024-06-12

## 2024-07-23 RX ORDER — ALENDRONATE SODIUM 70 MG/1
70 TABLET ORAL
COMMUNITY
Start: 2024-06-06

## 2024-07-23 NOTE — PROGRESS NOTES
Subjective:       Patient ID: Kasandra Jones is a 73 y.o. female.    Chief Complaint: Follow-up (B/p )    Kasandra Jones 73-year-old female patient who presents to clinic to follow-up on blood pressure.  Last visit her blood pressure was elevated and her losartan dose was increased to 50 mg daily.  Patient has not been taking her blood pressures at home.  Blood pressure in clinic today is 160/92 and 150/88 on recheck.  Patient denies chest pain, palpitations, or shortness a breath Patient also states she has insomnia.  She will sometimes fall asleep around 10:00 a.m. and then by 11 or 12:00 a.m.. she is awake and stays awake the rest of the night.  She has taken melatonin in the past but has not taken any in awhile.  She also has anxiety and has Xanax at home.  She is only taking Xanax as needed which is generally not everyday.        Review of patient's allergies indicates:   Allergen Reactions    Niacin preparations     Penicillins      Social Determinants of Health     Tobacco Use: Low Risk  (7/23/2024)    Patient History     Smoking Tobacco Use: Never     Smokeless Tobacco Use: Never     Passive Exposure: Not on file   Alcohol Use: Not At Risk (3/25/2024)    AUDIT-C     Frequency of Alcohol Consumption: Never     Average Number of Drinks: Patient does not drink     Frequency of Binge Drinking: Never   Financial Resource Strain: Low Risk  (3/25/2024)    Overall Financial Resource Strain (CARDIA)     Difficulty of Paying Living Expenses: Not hard at all   Food Insecurity: No Food Insecurity (3/25/2024)    Hunger Vital Sign     Worried About Running Out of Food in the Last Year: Never true     Ran Out of Food in the Last Year: Never true   Transportation Needs: No Transportation Needs (3/25/2024)    PRAPARE - Transportation     Lack of Transportation (Medical): No     Lack of Transportation (Non-Medical): No   Physical Activity: Inactive (3/25/2024)    Exercise Vital Sign     Days of Exercise per Week: 0 days      Minutes of Exercise per Session: 0 min   Stress: No Stress Concern Present (3/25/2024)    [employer] [place] [employer] - Occupational Stress Questionnaire     Feeling of Stress : Only a little   Housing Stability: Low Risk  (3/25/2024)    Housing Stability Vital Sign     Unable to Pay for Housing in the Last Year: No     Number of Places Lived in the Last Year: 1     Unstable Housing in the Last Year: No   Depression: Low Risk  (5/15/2024)    Depression     Last PHQ-4: Flowsheet Data: 0   Utilities: Not on file   Health Literacy: Not on file   Social Isolation: Not on file      Past Medical History:   Diagnosis Date    Cataract       Past Surgical History:   Procedure Laterality Date    EYE SURGERY Bilateral 2020 August    cataract removal    HYSTERECTOMY      TOTAL REDUCTION MAMMOPLASTY  2002      Social History     Socioeconomic History    Marital status:          Current Outpatient Medications:     alendronate (FOSAMAX) 70 MG tablet, Take 70 mg by mouth every 7 days., Disp: , Rfl:     ALPRAZolam (XANAX) 0.5 MG tablet, Take 1 tablet (0.5 mg total) by mouth daily as needed for Anxiety. PRN, Disp: 30 tablet, Rfl: 2    buPROPion (WELLBUTRIN XL) 300 MG 24 hr tablet, Take 1 tablet (300 mg total) by mouth once daily., Disp: 90 tablet, Rfl: 2    calcium carbonate (CALCIUM 600 ORAL), Take 1 tablet by mouth 2 (two) times a day., Disp: , Rfl:     cetirizine (ZYRTEC) 10 MG tablet, Take 10 mg by mouth once daily., Disp: , Rfl:     cholecalciferol, vitamin D3, 125 mcg (5,000 unit) capsule, Take 5,000 Units by mouth once daily., Disp: , Rfl:     clopidogreL (PLAVIX) 75 mg tablet, Take 75 mg by mouth once daily., Disp: , Rfl:     copper gluconate 2 mg Tab, Take 1 tablet by mouth once daily., Disp: , Rfl:     cyanocobalamin-cobamamide (B12) 5,000-100 mcg Lozg, Take 1 tablet by mouth once daily., Disp: , Rfl:     diphenoxylate-atropine 2.5-0.025 mg (LOMOTIL) 2.5-0.025 mg per tablet, Take 1 tablet by mouth 4  (four) times daily as needed for Diarrhea., Disp: 30 tablet, Rfl: 1    divalproex ER (DEPAKOTE ER) 500 MG Tb24, Take 1 tablet (500 mg total) by mouth once daily., Disp: , Rfl:     ferrous sulfate 325 (65 FE) MG EC tablet, Take 325 mg by mouth once daily., Disp: , Rfl:     levothyroxine (SYNTHROID) 100 MCG tablet, Take 1 tablet (100 mcg total) by mouth once daily., Disp: 90 tablet, Rfl: 1    losartan (COZAAR) 50 MG tablet, Take 1 tablet (50 mg total) by mouth once daily., Disp: 90 tablet, Rfl: 1    magnesium oxide (MAG-OX) 400 mg (241.3 mg magnesium) tablet, Take 400 mg by mouth once daily., Disp: , Rfl:     MULTAQ 400 mg Tab, TAKE 1 TABLET TWICE A DAY, Disp: 180 tablet, Rfl: 3    oxybutynin (DITROPAN-XL) 5 MG TR24, TAKE ONE TABLET BY MOUTH ONCE DAILY, Disp: 30 tablet, Rfl: 2    potassium chloride SA (K-DUR,KLOR-CON M) 10 MEQ tablet, TAKE 1 TABLET TWICE A DAY, Disp: 180 tablet, Rfl: 1    pyridoxine, vitamin B6, (VITAMIN B-6) 100 MG Tab, Take 50 mg by mouth once daily., Disp: , Rfl:     RABEprazole (ACIPHEX) 20 mg tablet, Take 1 tablet (20 mg total) by mouth 2 (two) times daily., Disp: 180 tablet, Rfl: 1    traZODone (DESYREL) 100 MG tablet, Take 200 mg by mouth every evening., Disp: , Rfl:     vit C,P-Cw-cutlf-lutein-zeaxan (PRESERVISION AREDS 2) 250-90-40-1 mg Cap, Take 1 capsule by mouth 2 (two) times a day., Disp: , Rfl:     predniSONE (DELTASONE) 20 MG tablet, Take 1 tablet (20 mg total) by mouth 2 (two) times daily., Disp: 10 tablet, Rfl: 0    Lab Results   Component Value Date    WBC 5.59 05/15/2024    HGB 13.9 05/15/2024    HCT 42.8 05/15/2024     05/15/2024    CHOL 189 05/15/2024    TRIG 110 05/15/2024    HDL 90 (H) 05/15/2024    ALT 52 (H) 05/15/2024    AST 37 05/15/2024     05/15/2024    K 4.1 05/15/2024     05/15/2024    CREATININE 0.6 05/15/2024    BUN 13 05/15/2024    CO2 31 (H) 05/15/2024    TSH 1.966 05/15/2024       Review of Systems   Constitutional: Negative.    Cardiovascular:  Negative.  Negative for chest pain, palpitations and leg swelling.   Psychiatric/Behavioral:  Positive for sleep disturbance.        Objective:      Physical Exam  Vitals reviewed.   Constitutional:       Appearance: Normal appearance.   Cardiovascular:      Rate and Rhythm: Normal rate and regular rhythm.      Pulses: Normal pulses.      Heart sounds: Normal heart sounds.   Pulmonary:      Effort: Pulmonary effort is normal.      Breath sounds: Normal breath sounds.   Skin:     General: Skin is warm and dry.      Capillary Refill: Capillary refill takes less than 2 seconds.   Neurological:      Mental Status: She is alert.   Psychiatric:         Mood and Affect: Mood normal.         Behavior: Behavior normal.         Thought Content: Thought content normal.         Judgment: Judgment normal.         Assessment:       1. Hypertension, essential    2. Insomnia, unspecified type        Plan:       Kasandra was seen today for follow-up.    Diagnoses and all orders for this visit:    Hypertension, essential    Insomnia, unspecified type    Hypertension   - increase losartan to 100 mg daily.  Patient has several 50 mg tablets at home so we will double up on that dose.  - keep daily blood pressure log and bring to next visit.  - follow-up in 2-3 weeks or sooner if needed.      Insomnia   - take half tablet of Xanax as needed  - we will follow-up on this at next visit

## 2024-07-29 RX ORDER — ALENDRONATE SODIUM 70 MG/1
TABLET ORAL
Qty: 12 TABLET | Refills: 3 | Status: SHIPPED | OUTPATIENT
Start: 2024-07-29

## 2024-07-29 NOTE — TELEPHONE ENCOUNTER
No care due was identified.  [facility] Embedded Care Due Messages. Reference number: [id number].   7/29/2024 2:51:05 AM CDT

## 2024-07-30 NOTE — TELEPHONE ENCOUNTER
Refill Routing Note   Medication(s) are not appropriate for processing by [facility] for the following reason(s):        Outside of protocol    [facility] action(s):  Route               Appointments  past 12m or future 3m with PCP    Date Provider   Last Visit   4/12/2024 Sanjiv Ford III, MD   Next Visit   Visit date not found Sanjiv Ford III, MD   ED visits in past 90 days: 0        Note composed:7:11 PM 07/29/2024

## 2024-08-10 DIAGNOSIS — I10 HYPERTENSION, ESSENTIAL: ICD-10-CM

## 2024-08-10 RX ORDER — RABEPRAZOLE SODIUM 20 MG/1
20 TABLET, DELAYED RELEASE ORAL 2 TIMES DAILY
Qty: 180 TABLET | Refills: 1 | Status: SHIPPED | OUTPATIENT
Start: 2024-08-10

## 2024-08-10 RX ORDER — POTASSIUM CHLORIDE 750 MG/1
10 TABLET, EXTENDED RELEASE ORAL 2 TIMES DAILY
Qty: 180 TABLET | Refills: 1 | Status: SHIPPED | OUTPATIENT
Start: 2024-08-10

## 2024-08-10 RX ORDER — LOSARTAN POTASSIUM 50 MG/1
50 TABLET ORAL DAILY
Qty: 90 TABLET | Refills: 1 | Status: CANCELLED | OUTPATIENT
Start: 2024-08-10 | End: 2025-08-10

## 2024-08-10 RX ORDER — LEVOTHYROXINE SODIUM 100 UG/1
100 TABLET ORAL DAILY
Qty: 90 TABLET | Refills: 1 | Status: SHIPPED | OUTPATIENT
Start: 2024-08-10

## 2024-08-10 RX ORDER — BUPROPION HYDROCHLORIDE 300 MG/1
300 TABLET ORAL DAILY
Qty: 90 TABLET | Refills: 2 | Status: SHIPPED | OUTPATIENT
Start: 2024-08-10

## 2024-08-10 NOTE — TELEPHONE ENCOUNTER
Care Due:                  Date            Visit Type   Department     Provider  --------------------------------------------------------------------------------                                EP                               PRIMARY      [facility]  Last Visit: 04-      [facility] ([facility])   [facility]  Next Visit: None Scheduled  None         None Found                                                            Last  Test          Frequency    Reason                     Performed    Due Date  --------------------------------------------------------------------------------    Phosphate...  12 months..  alendronate..............  Not Found    Overdue    [employer] Embedded Care Due Messages. Reference number: [id number].   8/10/2024 5:45:20 PM CDT

## 2024-08-13 ENCOUNTER — HOSPITAL ENCOUNTER (OUTPATIENT)
Dept: RADIOLOGY | Facility: HOSPITAL | Age: 74
Discharge: HOME | End: 2024-08-13
Payer: MEDICARE

## 2024-08-13 ENCOUNTER — OFFICE VISIT (OUTPATIENT)
Dept: FAMILY MEDICINE | Facility: CLINIC | Age: 74
End: 2024-08-13
Payer: MEDICARE

## 2024-08-13 VITALS
WEIGHT: 147.19 LBS | OXYGEN SATURATION: 97 % | HEIGHT: 66 IN | RESPIRATION RATE: 18 BRPM | BODY MASS INDEX: 23.65 KG/M2 | TEMPERATURE: 98 F | HEART RATE: 75 BPM | SYSTOLIC BLOOD PRESSURE: 132 MMHG | DIASTOLIC BLOOD PRESSURE: 78 MMHG

## 2024-08-13 DIAGNOSIS — I10 HYPERTENSION, ESSENTIAL: ICD-10-CM

## 2024-08-13 DIAGNOSIS — K21.00 GASTROESOPHAGEAL REFLUX DISEASE WITH ESOPHAGITIS WITHOUT HEMORRHAGE: ICD-10-CM

## 2024-08-13 DIAGNOSIS — M54.6 ACUTE RIGHT-SIDED THORACIC BACK PAIN: ICD-10-CM

## 2024-08-13 DIAGNOSIS — R05.1 ACUTE COUGH: Primary | ICD-10-CM

## 2024-08-13 DIAGNOSIS — R09.82 PND (POST-NASAL DRIP): ICD-10-CM

## 2024-08-13 DIAGNOSIS — R05.1 ACUTE COUGH: ICD-10-CM

## 2024-08-13 PROCEDURE — 99214 OFFICE O/P EST MOD 30 MIN: CPT | Mod: S$PBB,,,

## 2024-08-13 PROCEDURE — 71046 X-RAY EXAM CHEST 2 VIEWS: CPT | Mod: 26,,, | Performed by: RADIOLOGY

## 2024-08-13 PROCEDURE — 72070 X-RAY EXAM THORAC SPINE 2VWS: CPT | Mod: 26,,, | Performed by: RADIOLOGY

## 2024-08-13 PROCEDURE — 99215 OFFICE O/P EST HI 40 MIN: CPT | Mod: PBBFAC,PN

## 2024-08-13 PROCEDURE — 71046 X-RAY EXAM CHEST 2 VIEWS: CPT | Mod: TC

## 2024-08-13 PROCEDURE — 72070 X-RAY EXAM THORAC SPINE 2VWS: CPT | Mod: TC

## 2024-08-13 PROCEDURE — 99999 PR PBB SHADOW E&M-EST. PATIENT-LVL V: CPT | Mod: PBBFAC,,,

## 2024-08-13 RX ORDER — FLUTICASONE PROPIONATE 50 MCG
1 SPRAY, SUSPENSION (ML) NASAL DAILY
Qty: 16 G | Refills: 2 | Status: SHIPPED | OUTPATIENT
Start: 2024-08-13

## 2024-08-13 RX ORDER — LOSARTAN POTASSIUM 100 MG/1
100 TABLET ORAL DAILY
Qty: 90 TABLET | Refills: 1 | Status: SHIPPED | OUTPATIENT
Start: 2024-08-13 | End: 2025-08-13

## 2024-08-13 RX ORDER — TIZANIDINE 4 MG/1
4 TABLET ORAL 2 TIMES DAILY PRN
Qty: 60 TABLET | Refills: 1 | Status: SHIPPED | OUTPATIENT
Start: 2024-08-13

## 2024-08-13 RX ORDER — PANTOPRAZOLE SODIUM 40 MG/1
40 TABLET, DELAYED RELEASE ORAL EVERY MORNING
Qty: 90 TABLET | Refills: 3 | Status: SHIPPED | OUTPATIENT
Start: 2024-08-13 | End: 2025-08-13

## 2024-08-13 NOTE — PROGRESS NOTES
Subjective:       Patient ID: Kasandra Jones is a 73 y.o. female.    Chief Complaint: Follow-up (3 week)    Kasandra Jones 73-year-old female patient who presents to clinic today for a three-week follow-up.  Patient's blood pressure was elevated last visit and her losartan was increased to 100 mg.  Her blood pressure today is improved at 132/78.  She did take her blood pressures at home and they were higher than this.  She did not bring her blood pressure machine in for calibration check.  She denies chest pain or shortness a breath.  She also has a couple of issues she would like addressed while here.  She woke up this morning with a pain in her upper back on the right side.  She has no injury.  She had her  massage it but the pain is still present.  She rates her pain 5/10.  He has not taken any medication.  She also complains of a cough that she has had for a long time.  This cough seems to be worsening in recent days.  She is coughing frequently and gets short of breath at times.  She recently woke up out of her sleep coughing so hard that she felt like she could not breathe.  She does have seasonal allergies and postnasal drip.  She is taking a daily antihistamine and alternates between Claritin and Zyrtec.  She also has a history of reflux and is taking aciphex.  She is taking aciphex at 2 pm daily.           Review of patient's allergies indicates:   Allergen Reactions    Niacin preparations     Penicillins      Social Determinants of Health     Tobacco Use: Low Risk  (8/13/2024)    Patient History     Smoking Tobacco Use: Never     Smokeless Tobacco Use: Never     Passive Exposure: Not on file   Alcohol Use: Not At Risk (3/25/2024)    AUDIT-C     Frequency of Alcohol Consumption: Never     Average Number of Drinks: Patient does not drink     Frequency of Binge Drinking: Never   Financial Resource Strain: Low Risk  (3/25/2024)    Overall Financial Resource Strain (CARDIA)     Difficulty of Paying  Living Expenses: Not hard at all   Food Insecurity: No Food Insecurity (3/25/2024)    Hunger Vital Sign     Worried About Running Out of Food in the Last Year: Never true     Ran Out of Food in the Last Year: Never true   Transportation Needs: No Transportation Needs (3/25/2024)    PRAPARE - Transportation     Lack of Transportation (Medical): No     Lack of Transportation (Non-Medical): No   Physical Activity: Inactive (3/25/2024)    Exercise Vital Sign     Days of Exercise per Week: 0 days     Minutes of Exercise per Session: 0 min   Stress: No Stress Concern Present (3/25/2024)    [employer] - Occupational Stress Questionnaire     Feeling of Stress : Only a little   Housing Stability: Low Risk  (3/25/2024)    Housing Stability Vital Sign     Unable to Pay for Housing in the Last Year: No     Number of Places Lived in the Last Year: 1     Unstable Housing in the Last Year: No   Depression: Low Risk  (5/15/2024)    Depression     Last PHQ-4: Flowsheet Data: 0   Utilities: Not on file   Health Literacy: Not on file   Social Isolation: Not on file      Past Medical History:   Diagnosis Date    Cataract       Past Surgical History:   Procedure Laterality Date    EYE SURGERY Bilateral 2020 August    cataract removal    HYSTERECTOMY      TOTAL REDUCTION MAMMOPLASTY  2002      Social History     Socioeconomic History    Marital status:          Current Outpatient Medications:     alendronate (FOSAMAX) 70 MG tablet, TAKE 1 TABLET EVERY 7 DAYS, Disp: 12 tablet, Rfl: 3    ALPRAZolam (XANAX) 0.5 MG tablet, Take 1 tablet (0.5 mg total) by mouth daily as needed for Anxiety. PRN, Disp: 30 tablet, Rfl: 2    buPROPion (WELLBUTRIN XL) 300 MG 24 hr tablet, Take 1 tablet (300 mg total) by mouth once daily., Disp: 90 tablet, Rfl: 2    calcium carbonate (CALCIUM 600 ORAL), Take 1 tablet by mouth 2 (two) times a day., Disp: , Rfl:     cetirizine (ZYRTEC) 10 MG tablet, Take 10 mg by mouth once  daily., Disp: , Rfl:     cholecalciferol, vitamin D3, 125 mcg (5,000 unit) capsule, Take 5,000 Units by mouth once daily., Disp: , Rfl:     clopidogreL (PLAVIX) 75 mg tablet, Take 75 mg by mouth once daily., Disp: , Rfl:     copper gluconate 2 mg Tab, Take 1 tablet by mouth once daily., Disp: , Rfl:     cyanocobalamin-cobamamide (B12) 5,000-100 mcg Lozg, Take 1 tablet by mouth once daily., Disp: , Rfl:     diphenoxylate-atropine 2.5-0.025 mg (LOMOTIL) 2.5-0.025 mg per tablet, Take 1 tablet by mouth 4 (four) times daily as needed for Diarrhea., Disp: 30 tablet, Rfl: 1    divalproex ER (DEPAKOTE ER) 500 MG Tb24, Take 1 tablet (500 mg total) by mouth once daily., Disp: , Rfl:     ferrous sulfate 325 (65 FE) MG EC tablet, Take 325 mg by mouth once daily., Disp: , Rfl:     levothyroxine (SYNTHROID) 100 MCG tablet, Take 1 tablet (100 mcg total) by mouth once daily., Disp: 90 tablet, Rfl: 1    magnesium oxide (MAG-OX) 400 mg (241.3 mg magnesium) tablet, Take 400 mg by mouth once daily., Disp: , Rfl:     MULTAQ 400 mg Tab, TAKE 1 TABLET TWICE A DAY, Disp: 180 tablet, Rfl: 3    oxybutynin (DITROPAN-XL) 5 MG TR24, TAKE ONE TABLET BY MOUTH ONCE DAILY, Disp: 30 tablet, Rfl: 2    potassium chloride SA (K-DUR,KLOR-CON M) 10 MEQ tablet, Take 1 tablet (10 mEq total) by mouth 2 (two) times daily., Disp: 180 tablet, Rfl: 1    pyridoxine, vitamin B6, (VITAMIN B-6) 100 MG Tab, Take 50 mg by mouth once daily., Disp: , Rfl:     traZODone (DESYREL) 100 MG tablet, Take 200 mg by mouth every evening., Disp: , Rfl:     vit C,N-Eg-smnmo-lutein-zeaxan (PRESERVISION AREDS 2) 250-90-40-1 mg Cap, Take 1 capsule by mouth 2 (two) times a day., Disp: , Rfl:     fluticasone propionate (FLONASE) 50 mcg/actuation nasal spray, 1 spray (50 mcg total) by Each Nostril route once daily., Disp: 16 g, Rfl: 2    losartan (COZAAR) 100 MG tablet, Take 1 tablet (100 mg total) by mouth once daily., Disp: 90 tablet, Rfl: 1    pantoprazole (PROTONIX) 40 MG tablet,  Take 1 tablet (40 mg total) by mouth every morning., Disp: 90 tablet, Rfl: 3    tiZANidine (ZANAFLEX) 4 MG tablet, Take 1 tablet (4 mg total) by mouth 2 (two) times daily as needed (back pain)., Disp: 60 tablet, Rfl: 1    Lab Results   Component Value Date    WBC 5.59 05/15/2024    HGB 13.9 05/15/2024    HCT 42.8 05/15/2024     05/15/2024    CHOL 189 05/15/2024    TRIG 110 05/15/2024    HDL 90 (H) 05/15/2024    ALT 52 (H) 05/15/2024    AST 37 05/15/2024     05/15/2024    K 4.1 05/15/2024     05/15/2024    CREATININE 0.6 05/15/2024    BUN 13 05/15/2024    CO2 31 (H) 05/15/2024    TSH 1.966 05/15/2024       Review of Systems   Constitutional:  Negative for chills and fever.   Respiratory:  Positive for cough and shortness of breath. Negative for chest tightness and wheezing.    Cardiovascular:  Negative for chest pain, palpitations and leg swelling.   Musculoskeletal:  Positive for back pain.        Pain in middle of her back primarily on the right side.  Hurts worse with certain movements.       Objective:      Physical Exam  Vitals reviewed.   Constitutional:       Appearance: Normal appearance.   Cardiovascular:      Rate and Rhythm: Normal rate and regular rhythm.      Pulses: Normal pulses.      Heart sounds: Normal heart sounds.   Pulmonary:      Effort: Pulmonary effort is normal. No respiratory distress.      Breath sounds: Normal breath sounds. No wheezing, rhonchi or rales.   Musculoskeletal:      Thoracic back: Tenderness present. No signs of trauma. Normal range of motion.        Back:    Skin:     General: Skin is warm and dry.      Capillary Refill: Capillary refill takes less than 2 seconds.   Neurological:      General: No focal deficit present.      Mental Status: She is alert.   Psychiatric:         Mood and Affect: Mood normal.         Thought Content: Thought content normal.         Judgment: Judgment normal.         Assessment:       1. Acute cough    2. Hypertension, essential     3. PND (post-nasal drip)    4. Gastroesophageal reflux disease with esophagitis without hemorrhage    5. Acute right-sided thoracic back pain        Plan:       Kasandra was seen today for follow-up.    Diagnoses and all orders for this visit:    Acute cough  -     X-Ray Chest PA And Lateral; Future    Hypertension, essential  -     losartan (COZAAR) 100 MG tablet; Take 1 tablet (100 mg total) by mouth once daily.    PND (post-nasal drip)  -     fluticasone propionate (FLONASE) 50 mcg/actuation nasal spray; 1 spray (50 mcg total) by Each Nostril route once daily.    Gastroesophageal reflux disease with esophagitis without hemorrhage  -     pantoprazole (PROTONIX) 40 MG tablet; Take 1 tablet (40 mg total) by mouth every morning.    Acute right-sided thoracic back pain  -     X-Ray Thoracic Spine AP Lateral; Future  -     tiZANidine (ZANAFLEX) 4 MG tablet; Take 1 tablet (4 mg total) by mouth 2 (two) times daily as needed (back pain).    Have chest xray today to further evaluate for cough.    Cough could be result of GERD.  Discontinue aciphex and start pantoprazole.  Take pantoprazole in morning before eating or drinking.   Continue to take claritin or zyrtec  Start using Flonase daily.    Xray of thoracic spine to further assess back pain. Exam is positive more for muscle pain vs skeletal.  Tizanidine sent to pharmacy.      Hypertension  - better controlled  - continue losartan 100 mg daily    Follow up in 2 weeks

## 2024-08-13 NOTE — PROGRESS NOTES
Xray of your lungs is normal. Follow treatment plan discussed in clinic and return to clinic as recommended.

## 2024-08-13 NOTE — PROGRESS NOTES
Some mild wear and tear in your thoracic spine but no acute changes.  Follow treatment plan discussed in clinic and follow up as recommended.

## 2024-08-16 DIAGNOSIS — F41.9 ANXIETY: ICD-10-CM

## 2024-08-16 RX ORDER — ALPRAZOLAM 0.5 MG/1
0.5 TABLET ORAL DAILY PRN
Qty: 30 TABLET | Refills: 2 | Status: SHIPPED | OUTPATIENT
Start: 2024-08-16

## 2024-08-19 DIAGNOSIS — Z12.31 ENCOUNTER FOR SCREENING MAMMOGRAM FOR MALIGNANT NEOPLASM OF BREAST: Primary | ICD-10-CM

## 2024-08-27 ENCOUNTER — OFFICE VISIT (OUTPATIENT)
Dept: FAMILY MEDICINE | Facility: CLINIC | Age: 74
End: 2024-08-27
Payer: MEDICARE

## 2024-08-27 VITALS
WEIGHT: 147.25 LBS | HEIGHT: 66 IN | DIASTOLIC BLOOD PRESSURE: 96 MMHG | BODY MASS INDEX: 23.66 KG/M2 | TEMPERATURE: 98 F | RESPIRATION RATE: 18 BRPM | OXYGEN SATURATION: 96 % | HEART RATE: 72 BPM | SYSTOLIC BLOOD PRESSURE: 150 MMHG

## 2024-08-27 DIAGNOSIS — M79.604 RIGHT LEG PAIN: ICD-10-CM

## 2024-08-27 DIAGNOSIS — M54.6 ACUTE THORACIC BACK PAIN, UNSPECIFIED BACK PAIN LATERALITY: ICD-10-CM

## 2024-08-27 DIAGNOSIS — M25.551 RIGHT HIP PAIN: ICD-10-CM

## 2024-08-27 DIAGNOSIS — K21.00 GASTROESOPHAGEAL REFLUX DISEASE WITH ESOPHAGITIS WITHOUT HEMORRHAGE: ICD-10-CM

## 2024-08-27 DIAGNOSIS — I10 HYPERTENSION, ESSENTIAL: Primary | ICD-10-CM

## 2024-08-27 PROCEDURE — 99214 OFFICE O/P EST MOD 30 MIN: CPT | Mod: S$PBB,,,

## 2024-08-27 PROCEDURE — 99215 OFFICE O/P EST HI 40 MIN: CPT | Mod: PBBFAC,PN

## 2024-08-27 PROCEDURE — 99999 PR PBB SHADOW E&M-EST. PATIENT-LVL V: CPT | Mod: PBBFAC,,,

## 2024-08-27 RX ORDER — AMLODIPINE BESYLATE 2.5 MG/1
2.5 TABLET ORAL DAILY
Qty: 30 TABLET | Refills: 0 | Status: SHIPPED | OUTPATIENT
Start: 2024-08-27 | End: 2025-08-27

## 2024-08-27 NOTE — PROGRESS NOTES
Subjective:       Patient ID: Kasandra Jones is a 74 y.o. female.    Chief Complaint: Follow-up (2 week)    Kasandra Jones is a 74 y.o. female patient that presents to clinic for 2 week follow up.  Patient was having cough that would sometimes wake her up at night.  She has a history of GERD and she was taking aciphex at 2 pm.  She was having some burning in her chest also. Aciphex was discontinued and she was started on pantoprazole 40 mg each morning before breakfast.  Her cough has improved and is not waking her up anymore but she is still having some episodes of coughing.  Chest xray was normal.  She also complained of upper back pain and was given tizanadine.  She isn't having anymore back pain but she is not sure if medication helped her.  Her blood pressures were elevated and her losartan was increased and on last visit her blood pressure improved.  She was maintained on losartan 100 mg daily.  Since last visit her blood pressures have been elevated.  140s-160s/70s-80s.  Her blood pressure in clinic today is 150/96 with a recheck of 164/94.  Her home blood pressure monitor in clinic today is 164/85.  She has no chest pain or shortness of breath. She is also complaining of pain in her right leg.  She woke up with pain going down her right leg.  She has no injury that she is aware of.  She is not taking any medications.        Narrative & Impression  EXAMINATION:  XR CHEST PA AND LATERAL     CLINICAL HISTORY:  Acute cough     FINDINGS:  PA and lateral chest is compared to 07/12/2023 shows normal cardiomediastinal silhouette.     Lungs are clear. Pulmonary vasculature is normal. No acute osseous abnormality.     Impression:     Normal chest.        Electronically signed by:Jacinta Zambrano  Date:                                            08/13/2024  Time:                                           14:34          Review of patient's allergies indicates:   Allergen Reactions    Niacin preparations     Penicillins       Social Determinants of Health     Tobacco Use: Low Risk  (9/17/2024)    Patient History     Smoking Tobacco Use: Never     Smokeless Tobacco Use: Never     Passive Exposure: Not on file   Alcohol Use: Not At Risk (3/25/2024)    AUDIT-C     Frequency of Alcohol Consumption: Never     Average Number of Drinks: Patient does not drink     Frequency of Binge Drinking: Never   Financial Resource Strain: Low Risk  (3/25/2024)    Overall Financial Resource Strain (CARDIA)     Difficulty of Paying Living Expenses: Not hard at all   Food Insecurity: No Food Insecurity (3/25/2024)    Hunger Vital Sign     Worried About Running Out of Food in the Last Year: Never true     Ran Out of Food in the Last Year: Never true   Transportation Needs: No Transportation Needs (3/25/2024)    PRAPARE - Transportation     Lack of Transportation (Medical): No     Lack of Transportation (Non-Medical): No   Physical Activity: Inactive (3/25/2024)    Exercise Vital Sign     Days of Exercise per Week: 0 days     Minutes of Exercise per Session: 0 min   Stress: No Stress Concern Present (3/25/2024)    [employer] - Occupational Stress Questionnaire     Feeling of Stress : Only a little   Housing Stability: Low Risk  (3/25/2024)    Housing Stability Vital Sign     Unable to Pay for Housing in the Last Year: No     Number of Places Lived in the Last Year: 1     Unstable Housing in the Last Year: No   Depression: Low Risk  (9/17/2024)    Depression     Last PHQ-4: Flowsheet Data: 2   Utilities: Not on file   Health Literacy: Not on file   Social Isolation: Not on file      Past Medical History:   Diagnosis Date    Cataract       Past Surgical History:   Procedure Laterality Date    EYE SURGERY Bilateral 2020 August    cataract removal    HYSTERECTOMY      TOTAL REDUCTION MAMMOPLASTY  2002      Social History     Socioeconomic History    Marital status:          Current Outpatient Medications:     alendronate  (FOSAMAX) 70 MG tablet, TAKE 1 TABLET EVERY 7 DAYS, Disp: 12 tablet, Rfl: 3    ALPRAZolam (XANAX) 0.5 MG tablet, Take 1 tablet (0.5 mg total) by mouth daily as needed for Anxiety. PRN, Disp: 30 tablet, Rfl: 2    buPROPion (WELLBUTRIN XL) 300 MG 24 hr tablet, Take 1 tablet (300 mg total) by mouth once daily., Disp: 90 tablet, Rfl: 2    calcium carbonate (CALCIUM 600 ORAL), Take 1 tablet by mouth 2 (two) times a day., Disp: , Rfl:     cetirizine (ZYRTEC) 10 MG tablet, Take 10 mg by mouth once daily., Disp: , Rfl:     cholecalciferol, vitamin D3, 125 mcg (5,000 unit) capsule, Take 5,000 Units by mouth once daily., Disp: , Rfl:     clopidogreL (PLAVIX) 75 mg tablet, Take 75 mg by mouth once daily., Disp: , Rfl:     copper gluconate 2 mg Tab, Take 1 tablet by mouth once daily., Disp: , Rfl:     cyanocobalamin-cobamamide (B12) 5,000-100 mcg Lozg, Take 1 tablet by mouth once daily., Disp: , Rfl:     diphenoxylate-atropine 2.5-0.025 mg (LOMOTIL) 2.5-0.025 mg per tablet, Take 1 tablet by mouth 4 (four) times daily as needed for Diarrhea., Disp: 30 tablet, Rfl: 1    divalproex ER (DEPAKOTE ER) 500 MG Tb24, Take 1 tablet (500 mg total) by mouth once daily., Disp: , Rfl:     ferrous sulfate 325 (65 FE) MG EC tablet, Take 325 mg by mouth once daily., Disp: , Rfl:     fluticasone propionate (FLONASE) 50 mcg/actuation nasal spray, 1 spray (50 mcg total) by Each Nostril route once daily., Disp: 16 g, Rfl: 2    levothyroxine (SYNTHROID) 100 MCG tablet, Take 1 tablet (100 mcg total) by mouth once daily., Disp: 90 tablet, Rfl: 1    losartan (COZAAR) 100 MG tablet, Take 1 tablet (100 mg total) by mouth once daily., Disp: 90 tablet, Rfl: 1    magnesium oxide (MAG-OX) 400 mg (241.3 mg magnesium) tablet, Take 400 mg by mouth once daily., Disp: , Rfl:     MULTAQ 400 mg Tab, TAKE 1 TABLET TWICE A DAY, Disp: 180 tablet, Rfl: 3    oxybutynin (DITROPAN-XL) 5 MG TR24, TAKE ONE TABLET BY MOUTH ONCE DAILY, Disp: 30 tablet, Rfl: 2    pantoprazole  (PROTONIX) 40 MG tablet, Take 1 tablet (40 mg total) by mouth every morning., Disp: 90 tablet, Rfl: 3    potassium chloride SA (K-DUR,KLOR-CON M) 10 MEQ tablet, Take 1 tablet (10 mEq total) by mouth 2 (two) times daily., Disp: 180 tablet, Rfl: 1    pyridoxine, vitamin B6, (VITAMIN B-6) 100 MG Tab, Take 50 mg by mouth once daily., Disp: , Rfl:     tiZANidine (ZANAFLEX) 4 MG tablet, Take 1 tablet (4 mg total) by mouth 2 (two) times daily as needed (back pain)., Disp: 60 tablet, Rfl: 1    traZODone (DESYREL) 100 MG tablet, Take 200 mg by mouth every evening., Disp: , Rfl:     vit C,Q-Pd-hiyce-lutein-zeaxan (PRESERVISION AREDS 2) 250-90-40-1 mg Cap, Take 1 capsule by mouth 2 (two) times a day., Disp: , Rfl:     amLODIPine (NORVASC) 2.5 MG tablet, Take 1 tablet (2.5 mg total) by mouth once daily., Disp: 30 tablet, Rfl: 0    meclizine (ANTIVERT) 25 mg tablet, Take 1 tablet (25 mg total) by mouth every 6 (six) hours., Disp: 30 tablet, Rfl: 1    Lab Results   Component Value Date    WBC 6.27 09/05/2024    HGB 14.5 09/05/2024    HCT 44.8 09/05/2024     09/05/2024    CHOL 203 (H) 09/05/2024    TRIG 107 09/05/2024    HDL 95 (H) 09/05/2024    ALT 23 09/05/2024    AST 20 09/05/2024     09/05/2024    K 4.5 09/05/2024     09/05/2024    CREATININE 0.7 09/05/2024    BUN 15 09/05/2024    CO2 30 (H) 09/05/2024    TSH 2.738 09/05/2024       Review of Systems   Constitutional:  Negative for chills and fever.   HENT: Negative.     Respiratory:  Positive for cough. Negative for shortness of breath and wheezing.    Cardiovascular:  Negative for chest pain, palpitations and leg swelling.   Gastrointestinal:  Negative for abdominal pain and reflux.   Musculoskeletal:  Positive for back pain and leg pain.       Objective:      Physical Exam  Vitals reviewed.   Constitutional:       Appearance: Normal appearance.   Cardiovascular:      Rate and Rhythm: Normal rate and regular rhythm.      Pulses: Normal pulses.            Dorsalis pedis pulses are 2+ on the right side and 2+ on the left side.      Heart sounds: Normal heart sounds.   Pulmonary:      Effort: Pulmonary effort is normal.      Breath sounds: Normal breath sounds.   Musculoskeletal:      Lumbar back: Tenderness present.      Right lower leg: No edema.      Left lower leg: No edema.   Skin:     General: Skin is warm and dry.      Capillary Refill: Capillary refill takes less than 2 seconds.   Neurological:      General: No focal deficit present.      Mental Status: She is alert.   Psychiatric:         Mood and Affect: Mood normal.         Behavior: Behavior normal.         Thought Content: Thought content normal.         Judgment: Judgment normal.         Assessment:       1. Hypertension, essential    2. Gastroesophageal reflux disease with esophagitis without hemorrhage    3. Right hip pain    4. Right leg pain    5. Acute thoracic back pain, unspecified back pain laterality        Plan:       Kasandra was seen today for follow-up.    Diagnoses and all orders for this visit:    Hypertension, essential  -     amLODIPine (NORVASC) 2.5 MG tablet; Take 1 tablet (2.5 mg total) by mouth once daily.    Gastroesophageal reflux disease with esophagitis without hemorrhage    Right hip pain    Right leg pain    Acute thoracic back pain, unspecified back pain laterality    Hypertension   - continue losartan 100 mg daily  - add in amlodipine 2.5 mg daily    GERD   - improved with pantoprazole  - continue to take medication in morning before eating   - avoid food triggers    Back pain has improved.  Recommend using heating pad along with Tylenol Arthritis for right hip and leg pain.  Follow-up in 2-3 weeks to reassess blood pressure.    This note was created using [employer] voice recognition software that occasionally misinterprets phrases or words.

## 2024-08-30 ENCOUNTER — HOSPITAL ENCOUNTER (OUTPATIENT)
Dept: RADIOLOGY | Facility: HOSPITAL | Age: 74
Discharge: HOME | End: 2024-08-30
Attending: FAMILY MEDICINE
Payer: MEDICARE

## 2024-08-30 DIAGNOSIS — Z12.31 ENCOUNTER FOR SCREENING MAMMOGRAM FOR MALIGNANT NEOPLASM OF BREAST: ICD-10-CM

## 2024-08-30 PROCEDURE — 77067 SCR MAMMO BI INCL CAD: CPT | Mod: TC,PO

## 2024-08-30 PROCEDURE — 77063 BREAST TOMOSYNTHESIS BI: CPT | Mod: 26,,, | Performed by: RADIOLOGY

## 2024-08-30 PROCEDURE — 77067 SCR MAMMO BI INCL CAD: CPT | Mod: 26,,, | Performed by: RADIOLOGY

## 2024-09-05 ENCOUNTER — LAB VISIT (OUTPATIENT)
Dept: LAB | Facility: HOSPITAL | Age: 74
End: 2024-09-05
Attending: NURSE PRACTITIONER
Payer: MEDICARE

## 2024-09-05 DIAGNOSIS — E03.9 HYPOTHYROIDISM, UNSPECIFIED TYPE: ICD-10-CM

## 2024-09-05 DIAGNOSIS — I10 HYPERTENSION, ESSENTIAL: ICD-10-CM

## 2024-09-05 DIAGNOSIS — Z79.01 ANTICOAGULANT LONG-TERM USE: ICD-10-CM

## 2024-09-05 DIAGNOSIS — D51.9 ANEMIA DUE TO VITAMIN B12 DEFICIENCY, UNSPECIFIED B12 DEFICIENCY TYPE: ICD-10-CM

## 2024-09-05 DIAGNOSIS — F31.9 BIPOLAR 1 DISORDER: ICD-10-CM

## 2024-09-05 LAB
ALBUMIN SERPL BCP-MCNC: 4 G/DL (ref 3.5–5.2)
ALP SERPL-CCNC: 73 U/L (ref 55–135)
ALT SERPL W/O P-5'-P-CCNC: 23 U/L (ref 10–44)
ANION GAP SERPL CALC-SCNC: 8 MMOL/L (ref 8–16)
AST SERPL-CCNC: 20 U/L (ref 10–40)
BASOPHILS # BLD AUTO: 0.04 K/UL (ref 0–0.2)
BASOPHILS NFR BLD: 0.6 % (ref 0–1.9)
BILIRUB SERPL-MCNC: 0.5 MG/DL (ref 0.1–1)
BUN SERPL-MCNC: 15 MG/DL (ref 8–23)
CALCIUM SERPL-MCNC: 9.3 MG/DL (ref 8.7–10.5)
CHLORIDE SERPL-SCNC: 102 MMOL/L (ref 95–110)
CHOLEST SERPL-MCNC: 203 MG/DL (ref 120–199)
CHOLEST/HDLC SERPL: 2.1 {RATIO} (ref 2–5)
CO2 SERPL-SCNC: 30 MMOL/L (ref 23–29)
CREAT SERPL-MCNC: 0.7 MG/DL (ref 0.5–1.4)
DIFFERENTIAL METHOD BLD: ABNORMAL
EOSINOPHIL # BLD AUTO: 0.2 K/UL (ref 0–0.5)
EOSINOPHIL NFR BLD: 2.9 % (ref 0–8)
ERYTHROCYTE [DISTWIDTH] IN BLOOD BY AUTOMATED COUNT: 12.8 % (ref 11.5–14.5)
EST. GFR  (NO RACE VARIABLE): >60 ML/MIN/1.73 M^2
GLUCOSE SERPL-MCNC: 99 MG/DL (ref 70–110)
HCT VFR BLD AUTO: 44.8 % (ref 37–48.5)
HDLC SERPL-MCNC: 95 MG/DL (ref 40–75)
HDLC SERPL: 46.8 % (ref 20–50)
HGB BLD-MCNC: 14.5 G/DL (ref 12–16)
IMM GRANULOCYTES # BLD AUTO: 0.01 K/UL (ref 0–0.04)
IMM GRANULOCYTES NFR BLD AUTO: 0.2 % (ref 0–0.5)
LDLC SERPL CALC-MCNC: 86.6 MG/DL (ref 63–159)
LYMPHOCYTES # BLD AUTO: 1.7 K/UL (ref 1–4.8)
LYMPHOCYTES NFR BLD: 26.6 % (ref 18–48)
MCH RBC QN AUTO: 33.2 PG (ref 27–31)
MCHC RBC AUTO-ENTMCNC: 32.4 G/DL (ref 32–36)
MCV RBC AUTO: 103 FL (ref 82–98)
MONOCYTES # BLD AUTO: 0.5 K/UL (ref 0.3–1)
MONOCYTES NFR BLD: 8.1 % (ref 4–15)
NEUTROPHILS # BLD AUTO: 3.9 K/UL (ref 1.8–7.7)
NEUTROPHILS NFR BLD: 61.6 % (ref 38–73)
NONHDLC SERPL-MCNC: 108 MG/DL
NRBC BLD-RTO: 0 /100 WBC
PLATELET # BLD AUTO: 251 K/UL (ref 150–450)
PMV BLD AUTO: 10.3 FL (ref 9.2–12.9)
POTASSIUM SERPL-SCNC: 4.5 MMOL/L (ref 3.5–5.1)
PROT SERPL-MCNC: 6.8 G/DL (ref 6–8.4)
RBC # BLD AUTO: 4.37 M/UL (ref 4–5.4)
SODIUM SERPL-SCNC: 140 MMOL/L (ref 136–145)
TRIGL SERPL-MCNC: 107 MG/DL (ref 30–150)
TSH SERPL DL<=0.005 MIU/L-ACNC: 2.74 UIU/ML (ref 0.34–5.6)
VALPROATE SERPL-MCNC: 25 UG/ML (ref 50–100)
WBC # BLD AUTO: 6.27 K/UL (ref 3.9–12.7)

## 2024-09-05 PROCEDURE — 85025 COMPLETE CBC W/AUTO DIFF WBC: CPT | Performed by: NURSE PRACTITIONER

## 2024-09-05 PROCEDURE — 80061 LIPID PANEL: CPT | Performed by: NURSE PRACTITIONER

## 2024-09-05 PROCEDURE — 80164 ASSAY DIPROPYLACETIC ACD TOT: CPT | Performed by: NURSE PRACTITIONER

## 2024-09-05 PROCEDURE — 36415 COLL VENOUS BLD VENIPUNCTURE: CPT | Performed by: NURSE PRACTITIONER

## 2024-09-05 PROCEDURE — 84443 ASSAY THYROID STIM HORMONE: CPT | Performed by: NURSE PRACTITIONER

## 2024-09-05 PROCEDURE — 80053 COMPREHEN METABOLIC PANEL: CPT | Performed by: NURSE PRACTITIONER

## 2024-09-17 ENCOUNTER — OFFICE VISIT (OUTPATIENT)
Dept: FAMILY MEDICINE | Facility: CLINIC | Age: 74
End: 2024-09-17
Payer: MEDICARE

## 2024-09-17 VITALS
DIASTOLIC BLOOD PRESSURE: 62 MMHG | HEART RATE: 61 BPM | WEIGHT: 151.38 LBS | OXYGEN SATURATION: 98 % | BODY MASS INDEX: 24.33 KG/M2 | SYSTOLIC BLOOD PRESSURE: 132 MMHG | HEIGHT: 66 IN | TEMPERATURE: 98 F

## 2024-09-17 DIAGNOSIS — E03.9 HYPOTHYROIDISM, UNSPECIFIED TYPE: ICD-10-CM

## 2024-09-17 DIAGNOSIS — I10 HYPERTENSION, ESSENTIAL: ICD-10-CM

## 2024-09-17 DIAGNOSIS — I48.91 ATRIAL FIBRILLATION, UNSPECIFIED TYPE: ICD-10-CM

## 2024-09-17 DIAGNOSIS — R42 DIZZINESS: ICD-10-CM

## 2024-09-17 DIAGNOSIS — N32.81 OVERACTIVE BLADDER: ICD-10-CM

## 2024-09-17 DIAGNOSIS — Z99.89 USES WALKER: ICD-10-CM

## 2024-09-17 DIAGNOSIS — F31.9 BIPOLAR 1 DISORDER: ICD-10-CM

## 2024-09-17 DIAGNOSIS — Z51.81 VISIT FOR MONITORING PLAVIX THERAPY: ICD-10-CM

## 2024-09-17 DIAGNOSIS — Z79.02 VISIT FOR MONITORING PLAVIX THERAPY: ICD-10-CM

## 2024-09-17 DIAGNOSIS — G62.9 POLYNEUROPATHY: Primary | ICD-10-CM

## 2024-09-17 DIAGNOSIS — Z23 NEED FOR VACCINATION: ICD-10-CM

## 2024-09-17 PROBLEM — Z79.01 ANTICOAGULANT LONG-TERM USE: Status: RESOLVED | Noted: 2019-09-21 | Resolved: 2024-09-17

## 2024-09-17 PROCEDURE — G0008 ADMIN INFLUENZA VIRUS VAC: HCPCS | Mod: PBBFAC,PN

## 2024-09-17 PROCEDURE — 90653 IIV ADJUVANT VACCINE IM: CPT | Mod: PBBFAC,PN

## 2024-09-17 PROCEDURE — 99214 OFFICE O/P EST MOD 30 MIN: CPT | Mod: S$PBB,,, | Performed by: FAMILY MEDICINE

## 2024-09-17 PROCEDURE — G2211 COMPLEX E/M VISIT ADD ON: HCPCS | Mod: S$PBB,,, | Performed by: FAMILY MEDICINE

## 2024-09-17 PROCEDURE — 99215 OFFICE O/P EST HI 40 MIN: CPT | Mod: PBBFAC,PN | Performed by: FAMILY MEDICINE

## 2024-09-17 PROCEDURE — 99999PBSHW PR PBB SHADOW TECHNICAL ONLY FILED TO HB: Mod: PBBFAC,,,

## 2024-09-17 PROCEDURE — 99999 PR PBB SHADOW E&M-EST. PATIENT-LVL V: CPT | Mod: PBBFAC,,, | Performed by: FAMILY MEDICINE

## 2024-09-17 RX ORDER — MECLIZINE HYDROCHLORIDE 25 MG/1
25 TABLET ORAL EVERY 6 HOURS
Qty: 30 TABLET | Refills: 1 | Status: SHIPPED | OUTPATIENT
Start: 2024-09-17

## 2024-09-17 RX ADMIN — INFLUENZA A VIRUS A/VICTORIA/4897/2022 IVR-238 (H1N1) ANTIGEN (FORMALDEHYDE INACTIVATED), INFLUENZA A VIRUS A/THAILAND/8/2022 IVR-237 (H3N2) ANTIGEN (FORMALDEHYDE INACTIVATED), INFLUENZA B VIRUS B/AUSTRIA/1359417/2021 BVR-26 ANTIGEN (FORMALDEHYDE INACTIVATED) 0.5 ML: 15; 15; 15 INJECTION, SUSPENSION INTRAMUSCULAR at 02:09

## 2024-09-18 ENCOUNTER — TELEPHONE (OUTPATIENT)
Dept: FAMILY MEDICINE | Facility: CLINIC | Age: 74
End: 2024-09-18
Payer: MEDICARE

## 2024-09-18 PROBLEM — N32.81 OVERACTIVE BLADDER: Status: ACTIVE | Noted: 2024-09-18

## 2024-09-18 NOTE — TELEPHONE ENCOUNTER
Spoke with pharmacy to cancel [facility]ine [facility] and called in to [facility]. Also spoke to patient and let her know calling into [facility].

## 2024-09-18 NOTE — TELEPHONE ENCOUNTER
----- Message from Kailee Lizama sent at 9/18/2024 11:37 AM CDT -----  Regarding: Needs Medical/RX Status  Contact: patient at [phone]  Type: Needs Medical/RX Status    Who Called:  patient at [phone]    Pharmacy name and phone #:    [facility] - [place], [place] - [id number]  [address]  [place] [address]  Phone: [phone] Fax: [phone]      Additional Information: patient is checking on the status of her medications for nausea and dizziness. Please call and advise. Thank you

## 2024-09-19 NOTE — PROGRESS NOTES
Subjective:       Patient ID: Kasandra Jones is a 74 y.o. female.    Chief Complaint: No chief complaint on file.    Using walker.  Balance is very poor.  Slight nausea.  Dizziness but no falls.  Has polyneuropathy.  Bipolar disorder on valproic acid level was 25 mood and affect are doing well.  Hypertension is controlled.  Atrial fibrillation doing okay.  On Plavix.  No anticoagulation.  Hypothyroidism TSH good 2.74.  Has overactive bladder.  Hypothyroidism on 100 mcg daily.  Cholesterol 203 HDL 95 and LDL is 87 very good.   stable.  CMP is normal.  Mammogram was okay in August.      Physical examination.  Vital signs noted.  No acute distress neck without bruit.  Chest clear.  Heart regular rate and rhythm.  Abdomen bowel sounds positive soft nontender.  Extremities without edema.  Gait his abnormal.  Unsteady.  Romberg is positive.        Objective:        Assessment:       1. Polyneuropathy    2. Need for vaccination    3. Dizziness    4. Uses walker    5. Bipolar 1 disorder    6. Hypertension, essential    7. Atrial fibrillation, unspecified type    8. Visit for monitoring Plavix therapy    9. Hypothyroidism, unspecified type    10. Overactive bladder        Plan:       Polyneuropathy    Need for vaccination  -     influenza (adjuvanted) (Fluad) 45 mcg/0.5 mL IM vaccine (> or = 64 yo) 0.5 mL    Dizziness    Uses walker    Bipolar 1 disorder    Hypertension, essential    Atrial fibrillation, unspecified type    Visit for monitoring Plavix therapy    Hypothyroidism, unspecified type    Overactive bladder    Other orders  -     Cancel: Influenza - Quadrivalent (Adjuvanted)  -     meclizine (ANTIVERT) 25 mg tablet; Take 1 tablet (25 mg total) by mouth every 6 (six) hours.  Dispense: 30 tablet; Refill: 1    Flu shot high-dose today.  Antivert 25 mg q.6 hours p.r.n. dizziness.  Follow-up with her neurosurgeon in November.  Follow-up with Neurology nurse practitioner at [facility] in [place].  Follow-up here in  3 months.  Use her walker regularly.

## 2024-09-21 RX ORDER — BUPROPION HYDROCHLORIDE 300 MG/1
300 TABLET ORAL DAILY
Qty: 90 TABLET | Refills: 2 | Status: SHIPPED | OUTPATIENT
Start: 2024-09-21

## 2024-09-21 NOTE — TELEPHONE ENCOUNTER
No care due was identified.  [facility] Embedded Care Due Messages. Reference number: [id number].   9/21/2024 5:45:06 PM CDT

## 2024-09-23 DIAGNOSIS — K21.00 GASTROESOPHAGEAL REFLUX DISEASE WITH ESOPHAGITIS WITHOUT HEMORRHAGE: ICD-10-CM

## 2024-09-23 RX ORDER — PANTOPRAZOLE SODIUM 40 MG/1
40 TABLET, DELAYED RELEASE ORAL EVERY MORNING
Qty: 90 TABLET | Refills: 3 | Status: SHIPPED | OUTPATIENT
Start: 2024-09-23 | End: 2025-09-23

## 2024-09-23 RX ORDER — AMLODIPINE BESYLATE 2.5 MG/1
2.5 TABLET ORAL DAILY
Qty: 90 TABLET | Refills: 3 | Status: SHIPPED | OUTPATIENT
Start: 2024-09-23

## 2024-10-01 DIAGNOSIS — M54.6 ACUTE RIGHT-SIDED THORACIC BACK PAIN: ICD-10-CM

## 2024-10-01 RX ORDER — TIZANIDINE 4 MG/1
4 TABLET ORAL 2 TIMES DAILY PRN
Qty: 60 TABLET | Refills: 1 | Status: SHIPPED | OUTPATIENT
Start: 2024-10-01

## 2024-10-08 DIAGNOSIS — R35.0 URINARY FREQUENCY: ICD-10-CM

## 2024-10-08 DIAGNOSIS — N32.81 OVERACTIVE BLADDER: ICD-10-CM

## 2024-10-08 RX ORDER — OXYBUTYNIN CHLORIDE 5 MG/1
5 TABLET, EXTENDED RELEASE ORAL
Qty: 30 TABLET | Refills: 2 | Status: SHIPPED | OUTPATIENT
Start: 2024-10-08

## 2024-10-20 DIAGNOSIS — I10 HYPERTENSION, ESSENTIAL: ICD-10-CM

## 2024-10-20 RX ORDER — LOSARTAN POTASSIUM 100 MG/1
100 TABLET ORAL DAILY
Qty: 90 TABLET | Refills: 1 | Status: SHIPPED | OUTPATIENT
Start: 2024-10-20 | End: 2025-10-20

## 2024-10-20 RX ORDER — ALENDRONATE SODIUM 70 MG/1
70 TABLET ORAL
Qty: 12 TABLET | Refills: 3 | Status: SHIPPED | OUTPATIENT
Start: 2024-10-20

## 2024-10-20 RX ORDER — PANTOPRAZOLE SODIUM 40 MG/1
40 TABLET, DELAYED RELEASE ORAL EVERY MORNING
Qty: 90 TABLET | Refills: 3 | Status: SHIPPED | OUTPATIENT
Start: 2024-10-20 | End: 2025-10-20

## 2024-10-20 NOTE — TELEPHONE ENCOUNTER
Care Due:                  Date            Visit Type   Department     Provider  --------------------------------------------------------------------------------                                [facility]  Last Visit: 09-      [facility]ARE ([facility])   [facility]  [place]                              [facility]                              [facility]  Next Visit: 12-      CARE ([facility])   [facility]  [place]                                                            Last  Test          Frequency    Reason                     Performed    Due Date  --------------------------------------------------------------------------------    Phosphate...  12 months..  alendronate..............  Not Found    Overdue    [employer] Embedded Care Due Messages. Reference number: [id number].   10/20/2024 5:33:59 AM CDT

## 2024-10-29 ENCOUNTER — TELEPHONE (OUTPATIENT)
Dept: NEUROSURGERY | Facility: CLINIC | Age: 74
End: 2024-10-29
Payer: MEDICARE

## 2024-10-29 DIAGNOSIS — G95.89 SPINAL CORD MASS: Primary | ICD-10-CM

## 2024-11-14 ENCOUNTER — HOSPITAL ENCOUNTER (OUTPATIENT)
Dept: RADIOLOGY | Facility: HOSPITAL | Age: 74
Discharge: HOME | End: 2024-11-14
Payer: MEDICARE

## 2024-11-14 DIAGNOSIS — G95.89 SPINAL CORD MASS: ICD-10-CM

## 2024-11-14 PROCEDURE — 70553 MRI BRAIN STEM W/O & W/DYE: CPT | Mod: 26,,, | Performed by: RADIOLOGY

## 2024-11-14 PROCEDURE — 25500020 PHARM REV CODE 255: Mod: PO

## 2024-11-14 PROCEDURE — A9585 GADOBUTROL INJECTION: HCPCS | Mod: PO

## 2024-11-14 PROCEDURE — 70553 MRI BRAIN STEM W/O & W/DYE: CPT | Mod: TC,PO

## 2024-11-14 RX ORDER — GADOBUTROL 604.72 MG/ML
6 INJECTION INTRAVENOUS
Status: COMPLETED | OUTPATIENT
Start: 2024-11-14 | End: 2024-11-14

## 2024-11-14 RX ADMIN — GADOBUTROL 6 ML: 604.72 INJECTION INTRAVENOUS at 12:11

## 2024-11-18 RX ORDER — MIRABEGRON 25 MG/1
25 TABLET, FILM COATED, EXTENDED RELEASE ORAL DAILY
Qty: 30 TABLET | Refills: 1 | Status: SHIPPED | OUTPATIENT
Start: 2024-11-18 | End: 2024-11-21

## 2024-11-18 NOTE — TELEPHONE ENCOUNTER
----- Message from Rico sent at 11/18/2024 10:04 AM CST -----  Regarding: Needs sooner appt  Type:  Sooner Appointment Request    Caller is requesting a sooner appointment.  Caller declined first available appointment listed below.  Caller will not accept being placed on the waitlist and is requesting a message be sent to doctor.    Name of Caller:  Pt  When is the first available appointment?  Dec 12  Symptoms:  incontinence, using restroom every 15 min,   Would the patient rather a call back or a response via [facility]? Call back  Best Call Back Number:  [phone]    Additional Information:  Pt is only available 11/18, 11/20, 11/22 please call to try and get her in the office one of these days please if possible

## 2024-11-18 NOTE — TELEPHONE ENCOUNTER
No care due was identified.  [facility] Embedded Care Due Messages. Reference number: [id number].   11/18/2024 3:26:36 PM CST

## 2024-11-19 ENCOUNTER — OFFICE VISIT (OUTPATIENT)
Dept: NEUROSURGERY | Facility: CLINIC | Age: 74
End: 2024-11-19
Payer: MEDICARE

## 2024-11-19 VITALS
BODY MASS INDEX: 24.31 KG/M2 | HEART RATE: 70 BPM | SYSTOLIC BLOOD PRESSURE: 151 MMHG | RESPIRATION RATE: 18 BRPM | WEIGHT: 151.25 LBS | DIASTOLIC BLOOD PRESSURE: 73 MMHG | HEIGHT: 66 IN

## 2024-11-19 DIAGNOSIS — G95.89 SPINAL CORD MASS: Primary | ICD-10-CM

## 2024-11-19 PROCEDURE — 99215 OFFICE O/P EST HI 40 MIN: CPT | Mod: S$PBB,,, | Performed by: NEUROLOGICAL SURGERY

## 2024-11-19 NOTE — PROGRESS NOTES
Neurosurgery History & Physical    Patient ID: Kasandra Jones is a 74 y.o. female.    No chief complaint on file.      Interval HPI 11/19/2024:  Ms. Jones returns today for follow-up.  She denies signficant new issues.    HPI 11/14/2023:  Ms. Jones is a 73 year old female who had intracranial imaging a couple of years ago after having an episode of mental status change.  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  This was followed by Jass Harris over time with serial imaging every few months.  This extended to yearly.  Dr. Harris eventually said there was no need to continue to follow.  She would like to establish care with a Neurosurgeon on the [facility] thus she has been referred to establish care.    Review of Systems   Constitutional:  Negative for activity change and fever.   Eyes:  Negative for visual disturbance.   Respiratory:  Negative for shortness of breath.    Cardiovascular:  Negative for chest pain.   Gastrointestinal:  Negative for nausea and vomiting.   Endocrine: Negative for cold intolerance, heat intolerance, polydipsia, polyphagia and polyuria.   Genitourinary:  Negative for decreased urine volume, difficulty urinating and frequency.   Musculoskeletal:  Negative for back pain, gait problem and neck pain.   Neurological:  Negative for dizziness, tremors, seizures, syncope, facial asymmetry, speech difficulty, weakness, light-headedness, numbness and headaches.   Psychiatric/Behavioral:  Negative for confusion.        Past Medical History:   Diagnosis Date    Cataract      Social History     Socioeconomic History    Marital status:    Occupational History    Occupation: RETIRED   Tobacco Use    Smoking status: Never    Smokeless tobacco: Never   Substance and Sexual Activity    Alcohol use: Never    Drug use: Never    Sexual activity: Yes     Partners: Male     Social Drivers of Health     Financial Resource Strain: Low Risk   (3/25/2024)    Overall Financial Resource Strain (CARDIA)     Difficulty of Paying Living Expenses: Not hard at all   Food Insecurity: No Food Insecurity (3/25/2024)    Hunger Vital Sign     Worried About Running Out of Food in the Last Year: Never true     Ran Out of Food in the Last Year: Never true   Transportation Needs: No Transportation Needs (3/25/2024)    PRAPARE - Transportation     Lack of Transportation (Medical): No     Lack of Transportation (Non-Medical): No   Physical Activity: Inactive (3/25/2024)    Exercise Vital Sign     Days of Exercise per Week: 0 days     Minutes of Exercise per Session: 0 min   Stress: No Stress Concern Present (3/25/2024)    [employer] - Occupational Stress Questionnaire     Feeling of Stress : Only a little   Housing Stability: Low Risk  (3/25/2024)    Housing Stability Vital Sign     Unable to Pay for Housing in the Last Year: No     Number of Places Lived in the Last Year: 1     Unstable Housing in the Last Year: No     Family History   Problem Relation Name Age of Onset    Breast cancer Maternal Aunt       Review of patient's allergies indicates:   Allergen Reactions    Niacin preparations     Penicillins        Current Outpatient Medications:     alendronate (FOSAMAX) 70 MG tablet, Take 1 tablet (70 mg total) by mouth every 7 days., Disp: 12 tablet, Rfl: 3    ALPRAZolam (XANAX) 0.5 MG tablet, Take 1 tablet (0.5 mg total) by mouth daily as needed for Anxiety. PRN, Disp: 30 tablet, Rfl: 2    amLODIPine (NORVASC) 2.5 MG tablet, Take 1 tablet (2.5 mg total) by mouth once daily., Disp: 90 tablet, Rfl: 3    buPROPion (WELLBUTRIN XL) 300 MG 24 hr tablet, Take 1 tablet (300 mg total) by mouth once daily., Disp: 90 tablet, Rfl: 2    calcium carbonate (CALCIUM 600 ORAL), Take 1 tablet by mouth 2 (two) times a day., Disp: , Rfl:     cetirizine (ZYRTEC) 10 MG tablet, Take 10 mg by mouth once daily., Disp: , Rfl:     cholecalciferol, vitamin D3, 125 mcg  (5,000 unit) capsule, Take 5,000 Units by mouth once daily., Disp: , Rfl:     clopidogreL (PLAVIX) 75 mg tablet, Take 75 mg by mouth once daily., Disp: , Rfl:     copper gluconate 2 mg Tab, Take 1 tablet by mouth once daily., Disp: , Rfl:     cyanocobalamin-cobamamide (B12) 5,000-100 mcg Lozg, Take 1 tablet by mouth once daily., Disp: , Rfl:     diphenoxylate-atropine 2.5-0.025 mg (LOMOTIL) 2.5-0.025 mg per tablet, Take 1 tablet by mouth 4 (four) times daily as needed for Diarrhea., Disp: 30 tablet, Rfl: 1    divalproex ER (DEPAKOTE ER) 500 MG Tb24, Take 1 tablet (500 mg total) by mouth once daily., Disp: , Rfl:     ferrous sulfate 325 (65 FE) MG EC tablet, Take 325 mg by mouth once daily., Disp: , Rfl:     fluticasone propionate (FLONASE) 50 mcg/actuation nasal spray, 1 spray (50 mcg total) by Each Nostril route once daily., Disp: 16 g, Rfl: 2    levothyroxine (SYNTHROID) 100 MCG tablet, Take 1 tablet (100 mcg total) by mouth once daily., Disp: 90 tablet, Rfl: 1    losartan (COZAAR) 100 MG tablet, Take 1 tablet (100 mg total) by mouth once daily., Disp: 90 tablet, Rfl: 1    magnesium oxide (MAG-OX) 400 mg (241.3 mg magnesium) tablet, Take 400 mg by mouth once daily., Disp: , Rfl:     meclizine (ANTIVERT) 25 mg tablet, Take 1 tablet (25 mg total) by mouth every 6 (six) hours., Disp: 30 tablet, Rfl: 1    mirabegron (MYRBETRIQ) 25 mg Tb24 ER tablet, Take 1 tablet (25 mg total) by mouth once daily., Disp: 30 tablet, Rfl: 1    MULTAQ 400 mg Tab, TAKE 1 TABLET TWICE A DAY, Disp: 180 tablet, Rfl: 3    oxybutynin (DITROPAN-XL) 5 MG TR24, TAKE ONE TABLET BY MOUTH ONCE DAILY, Disp: 30 tablet, Rfl: 2    pantoprazole (PROTONIX) 40 MG tablet, Take 1 tablet (40 mg total) by mouth every morning., Disp: 90 tablet, Rfl: 3    potassium chloride SA (K-DUR,KLOR-CON M) 10 MEQ tablet, Take 1 tablet (10 mEq total) by mouth 2 (two) times daily., Disp: 180 tablet, Rfl: 1    pyridoxine, vitamin B6, (VITAMIN B-6) 100 MG Tab, Take 50 mg by  mouth once daily., Disp: , Rfl:     tiZANidine (ZANAFLEX) 4 MG tablet, Take 1 tablet (4 mg total) by mouth 2 (two) times daily as needed (back pain)., Disp: 60 tablet, Rfl: 1    traZODone (DESYREL) 100 MG tablet, Take 200 mg by mouth every evening., Disp: , Rfl:     vit C,P-Xi-malkh-lutein-zeaxan (PRESERVISION AREDS 2) 250-90-40-1 mg Cap, Take 1 capsule by mouth 2 (two) times a day., Disp: , Rfl:   There were no vitals taken for this visit.      Neurological Exam  Mental Status   Oriented to person, place, and time. Speech is normal.    Cranial Nerves  CN III, IV, VI: Extraocular movements intact bilaterally.    Motor   Normal muscle tone. Strength is 5/5 throughout all four extremities.    Sensory  Light touch is normal in upper and lower extremities.     Gait   Normal gait.      Physical Exam  Eyes:      Extraocular Movements: EOM normal.   Neurological:      Mental Status: She is oriented to person, place, and time.      Motor: Motor strength is normal.     Gait: Gait is intact.   Psychiatric:         Speech: Speech normal.         Imaging:  EXAMINATION:  MRI BRAIN W WO CONTRAST     CLINICAL HISTORY:  Brain/CNS neoplasm, assess treatment response;.  Other specified diseases of spinal cord     TECHNIQUE:  Multiplanar multisequence MR imaging of the brain was performed before and after the administration of 6 mL Gadavist  intravenous contrast.     COMPARISON:  Head CT 04/01/2024.  Brain MRI 11/03/2023.     FINDINGS:  Brain: Again demonstrated is a nodular focus of enhancement along the left dorsal/lateral cervicomedullary junction which is slightly more conspicuous compared to the prior exam most recently 11/03/2023 and measuring minimally larger in transaxial dimensions common al 6 x 6 mm, previously 5 x 5 mm with more conspicuous nubbin of enhancement (series 9, image 153)..  Similar edema signal within the visualized upper cervical cord.  No new mass effect.  Similar chronic hemosiderin deposition within the  lesion suggestive of prior hemorrhage or mineralization.  Differential considerations are unchanged including potential hemangioma blastoma, meningioma, or other benign process.     Stable findings of chronic small vessel ischemic and involutional changes as well as a chronic lacunar type infarct within the right antonio suzette.  No recent infarct or hemorrhage.  Punctate focus of presumed chronic hemosiderin deposition within the right basal ganglia but appears new from prior exam.  No space-occupying extra-axial fluid collections.        Midline Structures: The midline structures of the brain are normal.  Ventricles: The ventricles, sulci and cisterns are within normal limits.  Vasculature: The vascular flow voids at the base of the brain are within normal limits.  Calvarium: The visualized osseous structures are unremarkable.  Sinuses: The paranasal sinuses are adequately aerated.  Orbits: Within normal limits.  Mastoids: The mastoid air cells are adequately aerated.  Extracranial soft tissues: The visualized extracranial soft tissues are unremarkable.     Impression:     1. Redemonstrated nodular enhancement primarily along the surface of the left upper cervical cord/cervicomedullary junction.  Small intramedullary component not excluded.  Lesion is slightly more conspicuous on the current study measuring approximately 1 mm larger but still subcentimeter in size compared to 11/03/2023.  Similar associated upper cervical cord edema.  No new mass effect.  Similar differential considerations.  Continued follow-up recommended.  2. Additional chronic findings as above.  No acute process.    Assessment/Plan:    Ms. Jones is a 73yo female with incidentally found, brain stem lesion.  On current imaging the lesion appears to have slightly enlarged.  I discussed surgical intervention for biopsy/resection which carries significant risk given its adherence to the brainstem.  At this time she would like to continue with  observation which I feel is reasonable.  We will shorten the interval for follow-up.    Plan is to follow-up in 6 months with MRI brain with and without contrast    I spent a total of 40 minutes on the day of the visit.This includes face to face time and non-face to face time preparing to see the patient (eg, review of tests), obtaining and/or reviewing separately obtained history, documenting clinical information in the electronic or other health record, independently interpreting results and communicating results to the patient/family/caregiver, or care coordinator.

## 2024-11-20 ENCOUNTER — TELEPHONE (OUTPATIENT)
Dept: FAMILY MEDICINE | Facility: CLINIC | Age: 74
End: 2024-11-20
Payer: MEDICARE

## 2024-11-21 RX ORDER — SOLIFENACIN SUCCINATE 5 MG/1
5 TABLET, FILM COATED ORAL DAILY
Qty: 30 TABLET | Refills: 0 | Status: SHIPPED | OUTPATIENT
Start: 2024-11-21

## 2024-11-21 RX ORDER — SOLIFENACIN SUCCINATE 5 MG/1
5 TABLET, FILM COATED ORAL DAILY
COMMUNITY
End: 2024-11-21 | Stop reason: SDUPTHER

## 2024-11-24 RX ORDER — AMLODIPINE BESYLATE 2.5 MG/1
2.5 TABLET ORAL DAILY
Qty: 90 TABLET | Refills: 3 | Status: SHIPPED | OUTPATIENT
Start: 2024-11-24

## 2024-11-24 RX ORDER — LEVOTHYROXINE SODIUM 100 UG/1
100 TABLET ORAL DAILY
Qty: 90 TABLET | Refills: 1 | Status: SHIPPED | OUTPATIENT
Start: 2024-11-24

## 2024-11-24 NOTE — TELEPHONE ENCOUNTER
No care due was identified.  [facility] Embedded Care Due Messages. Reference number: [id number].   11/23/2024 7:21:58 PM CST

## 2024-12-06 ENCOUNTER — TELEPHONE (OUTPATIENT)
Dept: CARDIOLOGY | Facility: CLINIC | Age: 74
End: 2024-12-06
Payer: MEDICARE

## 2024-12-06 NOTE — TELEPHONE ENCOUNTER
----- Message from Mili sent at 12/6/2024 12:21 PM CST -----  Regarding: reschedule  Contact: pt   Type: Needs Medical Advice  Who Called:  patient    Symptoms (please be specific):    How long has patient had these symptoms:    Pharmacy name and phone #:    Best Call Back Number: [phone]    Additional Information: schedule f/u til after 1/6/24

## 2024-12-16 RX ORDER — SOLIFENACIN SUCCINATE 5 MG/1
5 TABLET, FILM COATED ORAL DAILY
Qty: 30 TABLET | Refills: 0 | Status: SHIPPED | OUTPATIENT
Start: 2024-12-16

## 2024-12-16 RX ORDER — PANTOPRAZOLE SODIUM 40 MG/1
40 TABLET, DELAYED RELEASE ORAL EVERY MORNING
Qty: 90 TABLET | Refills: 3 | Status: SHIPPED | OUTPATIENT
Start: 2024-12-16 | End: 2025-12-16

## 2024-12-16 NOTE — TELEPHONE ENCOUNTER
No care due was identified.  [facility] Embedded Care Due Messages. Reference number: [id number].   12/16/2024 5:06:36 PM CST

## 2024-12-27 ENCOUNTER — OFFICE VISIT (OUTPATIENT)
Dept: FAMILY MEDICINE | Facility: CLINIC | Age: 74
End: 2024-12-27
Payer: MEDICARE

## 2024-12-27 DIAGNOSIS — I10 HYPERTENSION, ESSENTIAL: ICD-10-CM
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
## 2024-12-27 DIAGNOSIS — F41.9 ANXIETY: ICD-10-CM

## 2024-12-27 DIAGNOSIS — N32.81 OVERACTIVE BLADDER: ICD-10-CM

## 2024-12-27 DIAGNOSIS — S20.211A CONTUSION OF CHEST WALL, RIGHT, INITIAL ENCOUNTER: ICD-10-CM

## 2024-12-27 DIAGNOSIS — M81.0 OSTEOPOROSIS, UNSPECIFIED OSTEOPOROSIS TYPE, UNSPECIFIED PATHOLOGICAL FRACTURE PRESENCE: ICD-10-CM

## 2024-12-27 DIAGNOSIS — K21.9 GASTROESOPHAGEAL REFLUX DISEASE, UNSPECIFIED WHETHER ESOPHAGITIS PRESENT: Primary | ICD-10-CM

## 2024-12-27 PROCEDURE — 99999 PR PBB SHADOW E&M-EST. PATIENT-LVL V: CPT | Mod: PBBFAC,,, | Performed by: FAMILY MEDICINE

## 2024-12-27 PROCEDURE — 99214 OFFICE O/P EST MOD 30 MIN: CPT | Mod: S$PBB,,, | Performed by: FAMILY MEDICINE

## 2024-12-27 PROCEDURE — G2211 COMPLEX E/M VISIT ADD ON: HCPCS | Mod: S$PBB,,, | Performed by: FAMILY MEDICINE

## 2024-12-27 PROCEDURE — 99215 OFFICE O/P EST HI 40 MIN: CPT | Mod: PBBFAC,PN | Performed by: FAMILY MEDICINE

## 2024-12-27 RX ORDER — MIRABEGRON 50 MG/1
TABLET, FILM COATED, EXTENDED RELEASE ORAL
Qty: 90 TABLET | Refills: 0 | Status: SHIPPED | OUTPATIENT
Start: 2024-12-27

## 2024-12-27 RX ORDER — ALPRAZOLAM 0.5 MG/1
0.5 TABLET ORAL DAILY PRN
Qty: 30 TABLET | Refills: 2 | Status: SHIPPED | OUTPATIENT
Start: 2024-12-27

## 2024-12-27 RX ORDER — PANTOPRAZOLE SODIUM 40 MG/1
40 TABLET, DELAYED RELEASE ORAL EVERY MORNING
Qty: 90 TABLET | Refills: 3 | Status: SHIPPED | OUTPATIENT
Start: 2024-12-27 | End: 2025-12-27

## 2024-12-27 NOTE — PROGRESS NOTES
Subjective:       Patient ID: Kasandra Jones is a 74 y.o. female.    Chief Complaint: Fall (Injured R breast)    Had a fall 2 days ago.  Walking walker and lean toward chair and missed and fell forward.  Walker went to the left and she went to the right heating her right chest on the fireplace hearth.  No head injury.  Some bruising of her right lateral chest but the pain is little more anterior.  Little pain with respiration.  Also gastroesophageal reflux disease needs refill of pantoprazole.  Doing well no dysphagia.  Anxiety Xanax p.r.n. a proximally 1 per day.   check done.  Needs refill.  Overactive bladder.  VESIcare without relief.  Oxybutynin also did not help.  Osteoporosis on calcium and D and Fosamax.    Physical examination.  Vital signs noted.  No acute distress.  Neck is without bruit no adenopathy.  Chest clear.  Heart regular rate rhythm.  There some tenderness in the right anterior axillary line just above the breast here sheet.  Skin intact.  Extremities without edema.  Abdomen nontender.        Objective:        Assessment:       1. Gastroesophageal reflux disease, unspecified whether esophagitis present    2. Anxiety    3. Overactive bladder    4. Osteoporosis, unspecified osteoporosis type, unspecified pathological fracture presence    5. Fall, initial encounter    6. BMI 25.0-25.9,adult    7. Contusion of chest wall, right, initial encounter        Plan:       Gastroesophageal reflux disease, unspecified whether esophagitis present    Anxiety  -     ALPRAZolam (XANAX) 0.5 MG tablet; Take 1 tablet (0.5 mg total) by mouth daily as needed for Anxiety. PRN  Dispense: 30 tablet; Refill: 2    Overactive bladder    Osteoporosis, unspecified osteoporosis type, unspecified pathological fracture presence    Fall, initial encounter  -     X-Ray Chest PA And Lateral; Future; Expected date: 12/27/2024  -     X-Ray Ribs 2 View Left; Future; Expected date: 12/27/2024    BMI 25.0-25.9,adult    Contusion of  chest wall, right, initial encounter    Other orders  -     mirabegron (MYRBETRIQ) 50 mg Tb24; One tab daily. Oral  Dispense: 90 tablet; Refill: 0  -     pantoprazole (PROTONIX) 40 MG tablet; Take 1 tablet (40 mg total) by mouth every morning.  Dispense: 90 tablet; Refill: 3    Refill Protonix 40 mg daily.  Ninety with 3 refills.  Xanax 0.5 daily maximum p.r.n. 30 with 2 refills.  Try Myrbetriq 50 mg daily 90 pills.  Discontinue the VESIcare.  Chest x-ray and right rib x-ray.

## 2024-12-29 VITALS
TEMPERATURE: 98 F | WEIGHT: 158.5 LBS | DIASTOLIC BLOOD PRESSURE: 76 MMHG | SYSTOLIC BLOOD PRESSURE: 134 MMHG | BODY MASS INDEX: 25.47 KG/M2 | OXYGEN SATURATION: 99 % | HEART RATE: 80 BPM | HEIGHT: 66 IN

## 2024-12-30 ENCOUNTER — HOSPITAL ENCOUNTER (OUTPATIENT)
Dept: RADIOLOGY | Facility: HOSPITAL | Age: 74
Discharge: HOME | End: 2024-12-30
Attending: FAMILY MEDICINE
Payer: MEDICARE

## 2024-12-30 DIAGNOSIS — R07.81 RIB PAIN ON RIGHT SIDE: Primary | ICD-10-CM
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
## 2024-12-30 DIAGNOSIS — R07.81 RIB PAIN ON RIGHT SIDE: ICD-10-CM

## 2024-12-30 PROCEDURE — 71046 X-RAY EXAM CHEST 2 VIEWS: CPT | Mod: TC

## 2024-12-30 PROCEDURE — 71046 X-RAY EXAM CHEST 2 VIEWS: CPT | Mod: 26,,, | Performed by: RADIOLOGY

## 2024-12-30 PROCEDURE — 71100 X-RAY EXAM RIBS UNI 2 VIEWS: CPT | Mod: TC,RT

## 2024-12-30 PROCEDURE — 71100 X-RAY EXAM RIBS UNI 2 VIEWS: CPT | Mod: 26,RT,, | Performed by: RADIOLOGY

## 2024-12-31 NOTE — PATIENT INSTRUCTIONS
KEGELS IN SITTING  1. Sit comfortably with legs and buttocks relaxed. Lean forward (pictured below)   2. Contract and LIFT the pelvic floor muscles as if you're trying to stop the stream of urine and passage of gas.  3. Hold LIFT for 5 seconds without holding breath. You should be able to talk out loud the entire time.  4. Release the pelvic muscles right away for 10 seconds rest.  5. Repeat 10 times, 3 sets per day. Spread throughout the day.     No Kegels while urinating!             DEEP BREATHING     The diaphragm is a dome shaped muscle that forms the floor of the rib cage. It is the most efficient muscle for breathing and relaxation, although most people are not used to using the diaphragm. Diaphragmatic or belly breathing is an important technique to learn because it helps settle down or relax the autonomic nervous system. The correct use of diaphragmatic breathing can help to quiet brain activity resulting in the relaxation of all the muscles and organs of the body. This is accomplished by slow rhythmic breathing concentrated in the diaphragm muscle rather than the chest.    How to do proper relaxation breathing:    Start by lying on your back or reclining in a chair in a relaxed position. Place one hand on your chest and the other on your abdomen.  Relax your jaw by placing your tongue on the floor of your mouth and keeping your teeth slightly apart.   Take a deep breath in, letting the abdomen expand and rise while you keep your upper chest, neck and shoulders relaxed.   As you breathe out, allow your abdomen and chest to fall. Exhale completely.  It doesn't matter if you breathe in/out through your nose and/or mouth. Do whichever feels comfortable.  Remember to breathe slowly.  Do not force your breathing. Do not hold your breath.  Repeat for approximately 5 minutes every day.         "[facility] Medicine Services  Discharge Summary    Date of Service: 2024  Patient Name: Dorie Moctezuma  : 1945  MRN: [id number]    Date of Admission: 2024  Discharge Diagnosis: Fracture of femoral neck, left  Date of Discharge: 2024  Primary Care Physician: Radha Sorensen MD      Presenting Problem:   Left hip pain [M25.552]  Fracture of femoral neck, left [S72.002A]  Closed displaced subtrochanteric fracture of left femur, initial encounter [S72.22XA]  Fall, initial encounter [W19.XXXA]    Active and Resolved Hospital Problems:  Active Hospital Problems    Diagnosis POA    **Fracture of femoral neck, left [S72.002A] Yes    Hyponatremia [E87.1] Yes    Leukocytosis [D72.829] Yes    Generalized anxiety disorder [F41.1] Yes    Essential hypertension [I10] Yes    Mixed hyperlipidemia [E78.2] Yes    Acquired hypothyroidism [E03.9] Yes      Resolved Hospital Problems   No resolved problems to display.         Left femoral neck status post mechanical fall, orthopedics consulted underwent Left VASHTI on , Status post left total hip arthroplasty done on 2024  PT OT evaluation, recommended rehab  Continue on asa 81mg BID x 6 weeks as per ortho sx  Pain control       Delirium/agitation  Clearly precipitated by patient acute illness/surgery  Avoid sedatives  Continuous orientation, keep windows open, patient been afebrile, no obvious source of infection, no leukocytosis  Haldol as needed     Leukocytosis, resolved     Generalized anxiety disorder, no home meds     Essential hypertension  Controlled on Toprol-XL 25 mg p.o. daily      Acquired hypothyroidism, cont levothyroxine    Hospital Course     HPI:  \"Dorie Moctezuma is a 79 y.o. female with a CMH of hyperlipidemia, hypertension, anxiety and depression, hypothyroidism osteoporosis who presented to [facility] on 2024 with left hip pain after mechanical fall at home.  Reports family dog jumped on " "her and caused her to fall onto her left hip.  She denies hitting her head or loss of consciousness she denies any other injury or precipitating factor for the fall.  She is awake and alert and answering appropriately.  Family at bedside.  X-ray hip and pelvis per radiology shows left femoral neck fracture.  Chest x-ray was negative per radiology, EKG shows normal sinus rhythm heart rate 71, labs show sodium of 130 chloride 93 glucose 113 WBC 14.11 and afebrile, urinalysis ordered and pending.  Orthopedics was consulted from the emergency department and advised she may not have surgery until Thursday given the holiday. \"    Hospital Course: orthopedics consulted underwent Left VASHTI on 12/27, Status post left total hip arthroplasty done on 12/26/2024  PT OT evaluation, recommended rehab  Continue on asa 81mg BID x 6 weeks as per ortho sx  Pain control    Will dc to [facility], condition on dc stable    DISCHARGE Follow Up Recommendations for labs and diagnostics: Follow up with pcp in 1 week        Day of Discharge     Vital Signs:  Temp:  [97.8 °F (36.6 °C)-98 °F (36.7 °C)] 97.9 °F (36.6 °C)  Heart Rate:  [69-81] 80  Resp:  [16] 16  BP: (122-134)/(74-81) 131/81  Flow (L/min) (Oxygen Therapy):  [1] 1    Physical Exam:  Physical Exam  Constitutional:       Comments: NAD    Cardiovascular:      Comments:  RRR, S1 & S2   Pulmonary:      Comments:  Lungs CTA   Abdominal:      Comments:  ABD soft, NT             Pertinent  and/or Most Recent Results     LAB RESULTS:      Lab 12/30/24  2331 12/30/24  0340 12/29/24  0518 12/28/24  0425 12/27/24  0518 12/26/24  0005   WBC 9.57 9.59 8.28 10.12 10.14 9.92   HEMOGLOBIN 12.1 12.4 12.7 11.5* 12.0 12.4   HEMATOCRIT 35.7 36.6 37.1 32.7* 36.4 37.3   PLATELETS 297 271 251 219 208 209   NEUTROS ABS 6.41 5.80 5.45 8.08* 9.29* 7.59*   IMMATURE GRANS (ABS) 0.07* 0.04 0.02 0.03 0.04 0.04   LYMPHS ABS 1.68 2.33 1.69 1.31 0.44* 1.57   MONOS ABS 1.00* 1.02* 0.87 0.63 0.35 0.52   EOS ABS 0.35 0.33 " 0.20 0.05 0.00 0.15   MCV 92.0 91.7 92.3 90.6 91.9 94.0   PROTIME  --   --   --   --   --  14.6*   APTT  --   --   --   --   --  28.6         Lab 12/30/24  1358 12/28/24  1559 12/26/24  0005 12/25/24  0545 12/24/24  2246   SODIUM 134* 138 132* 130* 130*   POTASSIUM 4.3 4.1 4.0 4.2 4.1   CHLORIDE 96* 99 99 95* 93*   CO2 27.7 27.8 25.7 25.9 23.4   ANION GAP 10.3 11.2 7.3 9.1 13.6   BUN 12 12 10 8 9   CREATININE 0.48* 0.56* 0.67 0.65 0.61   EGFR 95.3 91.8 87.9 88.6 89.9   GLUCOSE 103* 102* 100* 131* 113*   CALCIUM 8.4* 9.3 8.6 9.2 9.4             Lab 12/26/24  0005   PROTIME 14.6*   INR 1.14*             Lab 12/26/24  0005   ABO TYPING A   RH TYPING Positive   ANTIBODY SCREEN Negative         Brief Urine Lab Results  (Last result in the past 365 days)        Color   Clarity   Blood   Leuk Est   Nitrite   Protein   CREAT   Urine HCG        12/24/24 2331 Yellow   Hazy   Negative   Negative   Negative   Negative                 Microbiology Results (last 10 days)       ** No results found for the last 240 hours. **            XR Hip With or Without Pelvis 1 View Left    Result Date: 12/26/2024  Impression: Impression: Status post left hip arthroplasty with no evidence of complication Electronically Signed: Adarsh Burgos  12/26/2024 8:12 PM EST  Workstation ID: [id number]    XR Chest 1 View    Result Date: 12/24/2024  Impression: No acute cardiopulmonary findings. Electronically Signed: Justo Talbert MD  12/24/2024 10:58 PM EST  Workstation ID: [id number]    XR Hip With or Without Pelvis 2 - 3 View Left    Result Date: 12/24/2024  Impression: Impression: Left femoral neck fracture Electronically Signed: Adarsh Burgos  12/24/2024 10:28 PM EST  Workstation ID: [id number]                 Labs Pending at Discharge:  Pending Results       None            Procedures Performed  Procedure(s):  TOTAL HIP ARTHROPLASTY ANTERIOR WITH HANA TABLE         Consults:   Consults       Date and Time Order Name Status Description    12/24/2024  11:02 PM Hospitalist (on-call MD unless specified)      12/24/2024 10:40 PM Ortho (on-call MD unless specified)            ed         Orthopaedic Progress Note      Pain well controlled. Dressing clean and dry, confused this AM     NAD  Alert but confused   Respirations are nonlabored   LLE   Dressing clean and dry  Able to DF and PF at the ankle   Sensation is intact distally   Foot is wwp   Compartments are soft and compressible      79 year old female with left femoral neck fracture   POD 2 left VASHTI   -WBAT LLE    -to chair if able  -pain control   -ice and elevation   -PT   -dvt ppx to baseline AC or to ASA 81 mg BID for 6 weeks   -please call/chat  with any questions or concerns.     Alex Hernandez MD   [facility]   [phone] - [place] Office  [phone] - [place] Office                Discharge Details        Discharge Medications        New Medications        Instructions Start Date   aspirin 81 MG chewable tablet   81 mg, Oral, 2 Times Daily      HYDROcodone-acetaminophen 5-325 MG per tablet  Commonly known as: NORCO   1 tablet, Oral, Every 6 Hours PRN      ipratropium-albuterol 0.5-2.5 mg/3 ml nebulizer  Commonly known as: DUO-NEB   3 mL, Nebulization, Once As Needed      polyethylene glycol 17 g packet  Commonly known as: MIRALAX   17 g, Oral, Daily   Start Date: January 1, 2025            Continue These Medications        Instructions Start Date   levothyroxine 50 MCG tablet  Commonly known as: SYNTHROID, LEVOTHROID   50 mcg, Oral, Daily      metoprolol succinate XL 25 MG 24 hr tablet  Commonly known as: TOPROL-XL   25 mg, Oral, Daily      PARoxetine 10 MG tablet  Commonly known as: PAXIL   TAKE 1 TABLET BY MOUTH ONCE DAILY IN THE MORNING WITH  MID-MORNING  MEDICINE               Allergies   Allergen Reactions    Benzocaine Rash    Latex, Natural Rubber Rash         Discharge Disposition: Rehab  Skilled Nursing Facility (DC - External)    Diet:  Hospital:  Diet Order    Procedures    Diet: Regular/House; Texture: Soft to Chew (NDD 3); Soft to Chew: Chopped Meat; Fluid Consistency: Thin (IDDSI 0)         Discharge Activity:   Activity Instructions       Gradually Increase Activity Until at Pre-Hospitalization Level                CODE STATUS:  Code Status and Medical Interventions: CPR (Attempt to Resuscitate); Full Support   Ordered at: 12/24/24 7645     Code Status (Patient has no pulse and is not breathing):    CPR (Attempt to Resuscitate)     Medical Interventions (Patient has pulse or is breathing):    Full Support         No future appointments.    Additional Instructions for the Follow-ups that You Need to Schedule       Discharge Follow-up with PCP   As directed       Currently Documented PCP:    Radha Sorensen MD    PCP Phone Number:    [phone]     Follow Up Details: 1 week        Discharge Follow-up with Specified Provider: surgery; 2 Weeks   As directed      To: surgery   Follow Up: 2 Weeks                Time spent on Discharge including face to face service:  35 minutes    Signature: Electronically signed by Milton Espinosa MD, 12/31/24, 14:13 EST.  [facility] Hospitalist Team

## 2025-01-07 ENCOUNTER — OFFICE VISIT (OUTPATIENT)
Dept: FAMILY MEDICINE | Facility: CLINIC | Age: 75
End: 2025-01-07
Payer: MEDICARE

## 2025-01-07 VITALS
DIASTOLIC BLOOD PRESSURE: 84 MMHG | TEMPERATURE: 96 F | WEIGHT: 160 LBS | SYSTOLIC BLOOD PRESSURE: 132 MMHG | BODY MASS INDEX: 25.82 KG/M2 | HEART RATE: 68 BPM | OXYGEN SATURATION: 97 %

## 2025-01-07 DIAGNOSIS — R26.81 UNSTEADY GAIT: Primary | ICD-10-CM

## 2025-01-07 DIAGNOSIS — R82.81 PYURIA: ICD-10-CM

## 2025-01-07 DIAGNOSIS — S20.219A CONTUSION OF CHEST WALL, UNSPECIFIED LATERALITY, INITIAL ENCOUNTER: ICD-10-CM

## 2025-01-07 DIAGNOSIS — R35.0 URINARY FREQUENCY: ICD-10-CM
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
## 2025-01-07 DIAGNOSIS — G62.9 POLYNEUROPATHY: ICD-10-CM

## 2025-01-07 DIAGNOSIS — R53.83 FATIGUE, UNSPECIFIED TYPE: ICD-10-CM

## 2025-01-07 DIAGNOSIS — R30.0 DYSURIA: ICD-10-CM

## 2025-01-07 LAB
BILIRUBIN, UA POC OHS: NEGATIVE
BLOOD, UA POC OHS: NEGATIVE
CLARITY, UA POC OHS: ABNORMAL
COLOR, UA POC OHS: YELLOW
GLUCOSE, UA POC OHS: NEGATIVE
KETONES, UA POC OHS: NEGATIVE
LEUKOCYTES, UA POC OHS: ABNORMAL
NITRITE, UA POC OHS: POSITIVE
PH, UA POC OHS: 7
PROTEIN, UA POC OHS: NEGATIVE
SPECIFIC GRAVITY, UA POC OHS: 1.01
UROBILINOGEN, UA POC OHS: 1

## 2025-01-07 PROCEDURE — 81001 URINALYSIS AUTO W/SCOPE: CPT | Performed by: NURSE PRACTITIONER

## 2025-01-07 PROCEDURE — 99999PBSHW POCT URINALYSIS(INSTRUMENT): Mod: PBBFAC,,,

## 2025-01-07 PROCEDURE — 81003 URINALYSIS AUTO W/O SCOPE: CPT | Mod: PBBFAC,PN | Performed by: NURSE PRACTITIONER

## 2025-01-07 PROCEDURE — 99999 PR PBB SHADOW E&M-EST. PATIENT-LVL V: CPT | Mod: PBBFAC,,, | Performed by: NURSE PRACTITIONER

## 2025-01-07 PROCEDURE — 99215 OFFICE O/P EST HI 40 MIN: CPT | Mod: PBBFAC,PN | Performed by: NURSE PRACTITIONER

## 2025-01-07 RX ORDER — NITROFURANTOIN 25; 75 MG/1; MG/1
100 CAPSULE ORAL 2 TIMES DAILY
Qty: 10 CAPSULE | Refills: 0 | Status: SHIPPED | OUTPATIENT
Start: 2025-01-07

## 2025-01-07 NOTE — PROGRESS NOTES
Subjective:       Patient ID: Kasandra Jones is a 74 y.o. female.    Chief Complaint: Fall and Urinary Frequency    HPI    Ms. Jones is a 74 year old female with pmh of bipolar disease, HTN, peripheral neuropathy, frequent falls.  Last visit was here for a fall on 12/27.   Fell again new years day. Hit her chest on walker. Having pain mid-sternal area, pain with breathing, coughing, movement.     Also having urinary frequency which has been going on for over a year. She has been on three different medications for overactive bladder, most recently myrbetriq which she states is not helping.  Now having burning along with the frequency which began 1 week ago. No fever, chills, flank pain, or abdominal pain.     Neuropathy pain, getting worse, never taken anything for it.   Review of patient's allergies indicates:   Allergen Reactions    Niacin preparations     Penicillins      Social Drivers of Health     Tobacco Use: Low Risk  (1/7/2025)    Patient History     Smoking Tobacco Use: Never     Smokeless Tobacco Use: Never     Passive Exposure: Not on file   Alcohol Use: Not At Risk (3/25/2024)    AUDIT-C     Frequency of Alcohol Consumption: Never     Average Number of Drinks: Patient does not drink     Frequency of Binge Drinking: Never   Financial Resource Strain: Low Risk  (3/25/2024)    Overall Financial Resource Strain (CARDIA)     Difficulty of Paying Living Expenses: Not hard at all   Food Insecurity: No Food Insecurity (3/25/2024)    Hunger Vital Sign     Worried About Running Out of Food in the Last Year: Never true     Ran Out of Food in the Last Year: Never true   Transportation Needs: No Transportation Needs (3/25/2024)    PRAPARE - Transportation     Lack of Transportation (Medical): No     Lack of Transportation (Non-Medical): No   Physical Activity: Inactive (3/25/2024)    Exercise Vital Sign     Days of Exercise per Week: 0 days     Minutes of Exercise per Session: 0 min   Stress: No Stress Concern  Present (3/25/2024)    [facility]ational [facility] - Occupational Stress Questionnaire     Feeling of Stress : Only a little   Housing Stability: Low Risk  (3/25/2024)    Housing Stability Vital Sign     Unable to Pay for Housing in the Last Year: No     Number of Places Lived in the Last Year: 1     Unstable Housing in the Last Year: No   Depression: Low Risk  (1/7/2025)    Depression     Last PHQ-4: Flowsheet Data: 2   Utilities: Not on file   Health Literacy: Not on file   Social Isolation: Not on file      Past Medical History:   Diagnosis Date    Cataract       Past Surgical History:   Procedure Laterality Date    EYE SURGERY Bilateral 2020 August    cataract removal    HYSTERECTOMY      TOTAL REDUCTION MAMMOPLASTY  2002      Social History     Socioeconomic History    Marital status:          Current Outpatient Medications:     alendronate (FOSAMAX) 70 MG tablet, Take 1 tablet (70 mg total) by mouth every 7 days., Disp: 12 tablet, Rfl: 3    ALPRAZolam (XANAX) 0.5 MG tablet, Take 1 tablet (0.5 mg total) by mouth daily as needed for Anxiety. PRN, Disp: 30 tablet, Rfl: 2    amLODIPine (NORVASC) 2.5 MG tablet, Take 1 tablet (2.5 mg total) by mouth once daily., Disp: 90 tablet, Rfl: 3    buPROPion (WELLBUTRIN XL) 300 MG 24 hr tablet, Take 1 tablet (300 mg total) by mouth once daily., Disp: 90 tablet, Rfl: 2    calcium carbonate (CALCIUM 600 ORAL), Take 1 tablet by mouth 2 (two) times a day., Disp: , Rfl:     cetirizine (ZYRTEC) 10 MG tablet, Take 10 mg by mouth once daily., Disp: , Rfl:     cholecalciferol, vitamin D3, 125 mcg (5,000 unit) capsule, Take 5,000 Units by mouth once daily., Disp: , Rfl:     clopidogreL (PLAVIX) 75 mg tablet, Take 75 mg by mouth once daily., Disp: , Rfl:     copper gluconate 2 mg Tab, Take 1 tablet by mouth once daily., Disp: , Rfl:     cyanocobalamin-cobamamide (B12) 5,000-100 mcg Lozg, Take 1 tablet by mouth once daily., Disp: , Rfl:     diphenoxylate-atropine  2.5-0.025 mg (LOMOTIL) 2.5-0.025 mg per tablet, Take 1 tablet by mouth 4 (four) times daily as needed for Diarrhea., Disp: 30 tablet, Rfl: 1    divalproex ER (DEPAKOTE ER) 500 MG Tb24, Take 1 tablet (500 mg total) by mouth once daily., Disp: , Rfl:     ferrous sulfate 325 (65 FE) MG EC tablet, Take 325 mg by mouth once daily., Disp: , Rfl:     fluticasone propionate (FLONASE) 50 mcg/actuation nasal spray, 1 spray (50 mcg total) by Each Nostril route once daily., Disp: 16 g, Rfl: 2    levothyroxine (SYNTHROID) 100 MCG tablet, Take 1 tablet (100 mcg total) by mouth once daily., Disp: 90 tablet, Rfl: 1    losartan (COZAAR) 100 MG tablet, Take 1 tablet (100 mg total) by mouth once daily., Disp: 90 tablet, Rfl: 1    magnesium oxide (MAG-OX) 400 mg (241.3 mg magnesium) tablet, Take 400 mg by mouth once daily., Disp: , Rfl:     meclizine (ANTIVERT) 25 mg tablet, Take 1 tablet (25 mg total) by mouth every 6 (six) hours., Disp: 30 tablet, Rfl: 1    mirabegron (MYRBETRIQ) 50 mg Tb24, One tab daily. Oral, Disp: 90 tablet, Rfl: 0    MULTAQ 400 mg Tab, TAKE 1 TABLET TWICE A DAY, Disp: 180 tablet, Rfl: 3    pantoprazole (PROTONIX) 40 MG tablet, Take 1 tablet (40 mg total) by mouth every morning., Disp: 90 tablet, Rfl: 3    potassium chloride SA (K-DUR,KLOR-CON M) 10 MEQ tablet, Take 1 tablet (10 mEq total) by mouth 2 (two) times daily., Disp: 180 tablet, Rfl: 1    pyridoxine, vitamin B6, (VITAMIN B-6) 100 MG Tab, Take 50 mg by mouth once daily., Disp: , Rfl:     tiZANidine (ZANAFLEX) 4 MG tablet, Take 1 tablet (4 mg total) by mouth 2 (two) times daily as needed (back pain)., Disp: 60 tablet, Rfl: 1    traZODone (DESYREL) 100 MG tablet, Take 200 mg by mouth every evening., Disp: , Rfl:     vit C,S-Vf-iyyay-lutein-zeaxan (PRESERVISION AREDS 2) 250-90-40-1 mg Cap, Take 1 capsule by mouth 2 (two) times a day., Disp: , Rfl:     nitrofurantoin, macrocrystal-monohydrate, (MACROBID) 100 MG capsule, Take 1 capsule (100 mg total) by mouth  2 (two) times daily., Disp: 10 capsule, Rfl: 0    Review of Systems   Constitutional:  Negative for chills and fever.   Respiratory:  Negative for shortness of breath and wheezing.    Cardiovascular:  Negative for chest pain, palpitations and leg swelling.        Superficial midsternal pain present   Gastrointestinal:  Negative for constipation.   Neurological:  Negative for dizziness and light-headedness.   Psychiatric/Behavioral:  Positive for depressed mood.        Objective:      Vitals:    01/07/25 1507   BP: 132/84   Pulse: 68   Temp: 96 °F (35.6 °C)   SpO2: 97%   Weight: 72.6 kg (160 lb)      Physical Exam  Constitutional:       Appearance: Normal appearance.   HENT:      Head: Normocephalic and atraumatic.   Eyes:      Conjunctiva/sclera: Conjunctivae normal.   Cardiovascular:      Rate and Rhythm: Normal rate and regular rhythm.      Pulses:           Dorsalis pedis pulses are 1+ on the right side and 1+ on the left side.      Heart sounds: Normal heart sounds.   Pulmonary:      Effort: Pulmonary effort is normal. No respiratory distress.      Breath sounds: Normal breath sounds. No wheezing.   Chest:      Chest wall: Tenderness present. No lacerations or swelling.      Comments: Tender to palpation over sternum at 5th intercostal area; no ecchymosis, edema, erythema present  Abdominal:      General: There is no distension.      Palpations: There is no mass.      Tenderness: There is no abdominal tenderness.   Musculoskeletal:      Right lower leg: No edema.      Left lower leg: No edema.      Right foot: Normal capillary refill.      Left foot: Normal capillary refill.   Feet:      Right foot:      Skin integrity: Dry skin present.      Toenail Condition: Right toenails are normal.      Left foot:      Skin integrity: Dry skin present.      Toenail Condition: Left toenails are normal.   Skin:     Findings: No erythema.   Neurological:      Mental Status: She is alert and oriented to person, place, and  time.   Psychiatric:         Behavior: Behavior normal.         Assessment:       1. Unsteady gait    2. Fatigue, unspecified type    3. Fall, initial encounter    4. Contusion of chest wall, unspecified laterality, initial encounter    5. Urinary frequency    6. Dysuria    7. Pyuria        Plan:       Kasandra was seen today for fall and urinary frequency.    Diagnoses and all orders for this visit:    Unsteady gait    -     Ambulatory Referral/Consult to Physical Therapy; Future    Fatigue, unspecified type    -explained that her bipolar medicine is likely causing fatigue which I do not feel comfortable changing. She states she does not want to change it.  Also taking trazodone in mid-afternoon, told patient to take it right before bed. If this does not help symptoms can refer to psychiatry.     Fall, initial encounter  -     Ambulatory Referral/Consult to Physical Therapy; Future     Contusion of Chest   -cxr ordered    Urinary frequency  -referred to urology    Dysuria   -ua positive for leuks and nitrites   -referred to urology for chronic urinary frequency   -sent urine for culture   -macrobid sent 5 days    Polyneuropathy   -worsening feeling in right ankle, explained that the medication to help neuropathic pain will make fatigue worse and increase risk for falls. Pt declines any tx at this time and states she can tolerate the pain.     F/u 3 month dr. Ford appt already made, sooner if needed. Patient verbalized understanding and agrees with plan.

## 2025-01-07 NOTE — PATIENT INSTRUCTIONS
They will call you  Therapy, [facility]  [address] [place] [address]  Phone: [phone]  Fax: [phone]    Referral to Urology  Polina Valdez MD  [address] [place] [address] [address]  [address]  [place] [address]  Phone: [phone]  Fax: [phone]    Antibiotics sent to the pharmacy

## 2025-01-08 LAB
BACTERIA #/AREA URNS AUTO: ABNORMAL /HPF
BILIRUB UR QL STRIP: NEGATIVE
CLARITY UR REFRACT.AUTO: ABNORMAL
COLOR UR AUTO: YELLOW
GLUCOSE UR QL STRIP: NEGATIVE
HGB UR QL STRIP: NEGATIVE
KETONES UR QL STRIP: NEGATIVE
LEUKOCYTE ESTERASE UR QL STRIP: ABNORMAL
MICROSCOPIC COMMENT: ABNORMAL
NITRITE UR QL STRIP: POSITIVE
PH UR STRIP: 7 [PH] (ref 5–8)
PROT UR QL STRIP: NEGATIVE
RBC #/AREA URNS AUTO: 1 /HPF (ref 0–4)
SP GR UR STRIP: 1.01 (ref 1–1.03)
SQUAMOUS #/AREA URNS AUTO: 1 /HPF
URN SPEC COLLECT METH UR: ABNORMAL
WBC #/AREA URNS AUTO: 50 /HPF (ref 0–5)

## 2025-01-09 ENCOUNTER — HOSPITAL ENCOUNTER (OUTPATIENT)
Dept: RADIOLOGY | Facility: HOSPITAL | Age: 75
Discharge: HOME | End: 2025-01-09
Attending: NURSE PRACTITIONER
Payer: MEDICARE

## 2025-01-09 DIAGNOSIS — S20.219A CONTUSION OF CHEST WALL, UNSPECIFIED LATERALITY, INITIAL ENCOUNTER: ICD-10-CM
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
## 2025-01-09 PROCEDURE — 71046 X-RAY EXAM CHEST 2 VIEWS: CPT | Mod: TC

## 2025-01-09 PROCEDURE — 71046 X-RAY EXAM CHEST 2 VIEWS: CPT | Mod: 26,,, | Performed by: RADIOLOGY

## 2025-01-10 ENCOUNTER — TELEPHONE (OUTPATIENT)
Dept: FAMILY MEDICINE | Facility: CLINIC | Age: 75
End: 2025-01-10
Payer: MEDICARE

## 2025-01-13 ENCOUNTER — TELEPHONE (OUTPATIENT)
Dept: FAMILY MEDICINE | Facility: CLINIC | Age: 75
End: 2025-01-13
Payer: MEDICARE
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
## 2025-01-13 DIAGNOSIS — R26.81 UNSTEADY GAIT: Primary | ICD-10-CM

## 2025-01-13 NOTE — TELEPHONE ENCOUNTER
Done     Left vm its changed to home health physical therapy           ----- Message from Santa sent at 1/13/2025 10:54 AM CST -----  Contact:   Type:  Needs Medical Advice    Who Called:      Would the patient rather a call back or a response via [facility]? Call    Best Call Back Number: [phone]    Additional Information:  states that the patient is in need of at home PT orders.  states that the patient's worst falls have been due to getting in and out of the car. Prev orders that were sent in were not for at home Pt but in office. Please call to advise     states that this is the third time he has attempted to get these orders

## 2025-01-14 PROCEDURE — G0180 MD CERTIFICATION HHA PATIENT: HCPCS | Mod: ,,, | Performed by: FAMILY MEDICINE

## 2025-01-16 ENCOUNTER — OFFICE VISIT (OUTPATIENT)
Dept: UROLOGY | Facility: CLINIC | Age: 75
End: 2025-01-16
Payer: MEDICARE

## 2025-01-16 VITALS — WEIGHT: 160.06 LBS | HEIGHT: 66 IN | BODY MASS INDEX: 25.72 KG/M2

## 2025-01-16 DIAGNOSIS — N32.81 OAB (OVERACTIVE BLADDER): ICD-10-CM

## 2025-01-16 DIAGNOSIS — R35.0 URINARY FREQUENCY: Primary | ICD-10-CM

## 2025-01-16 LAB
BILIRUBIN, UA POC OHS: NEGATIVE
BLOOD, UA POC OHS: NEGATIVE
CLARITY, UA POC OHS: CLEAR
COLOR, UA POC OHS: YELLOW
GLUCOSE, UA POC OHS: NEGATIVE
KETONES, UA POC OHS: 15
LEUKOCYTES, UA POC OHS: ABNORMAL
NITRITE, UA POC OHS: NEGATIVE
PH, UA POC OHS: 5.5
POC RESIDUAL URINE VOLUME: 13 ML (ref 0–100)
PROTEIN, UA POC OHS: 30
SPECIFIC GRAVITY, UA POC OHS: >=1.03
UROBILINOGEN, UA POC OHS: 1

## 2025-01-16 PROCEDURE — 99215 OFFICE O/P EST HI 40 MIN: CPT | Mod: PBBFAC,PO | Performed by: NURSE PRACTITIONER

## 2025-01-16 PROCEDURE — 99999PBSHW POCT URINALYSIS(INSTRUMENT): Mod: PBBFAC,,,

## 2025-01-16 PROCEDURE — 51798 US URINE CAPACITY MEASURE: CPT | Mod: PBBFAC,PO | Performed by: NURSE PRACTITIONER

## 2025-01-16 PROCEDURE — 87088 URINE BACTERIA CULTURE: CPT | Performed by: NURSE PRACTITIONER

## 2025-01-16 PROCEDURE — 87086 URINE CULTURE/COLONY COUNT: CPT | Performed by: NURSE PRACTITIONER

## 2025-01-16 PROCEDURE — 81003 URINALYSIS AUTO W/O SCOPE: CPT | Mod: PBBFAC,PO | Performed by: NURSE PRACTITIONER

## 2025-01-16 PROCEDURE — 99999PBSHW POCT BLADDER SCAN: Mod: PBBFAC,,,

## 2025-01-16 PROCEDURE — G2211 COMPLEX E/M VISIT ADD ON: HCPCS | Mod: S$PBB,,, | Performed by: NURSE PRACTITIONER

## 2025-01-16 PROCEDURE — 99215 OFFICE O/P EST HI 40 MIN: CPT | Mod: S$PBB,,, | Performed by: NURSE PRACTITIONER

## 2025-01-16 PROCEDURE — 99999 PR PBB SHADOW E&M-EST. PATIENT-LVL V: CPT | Mod: PBBFAC,,, | Performed by: NURSE PRACTITIONER

## 2025-01-16 PROCEDURE — 87186 SC STD MICRODIL/AGAR DIL: CPT | Performed by: NURSE PRACTITIONER

## 2025-01-16 RX ORDER — MIRABEGRON 50 MG/1
TABLET, FILM COATED, EXTENDED RELEASE ORAL
Qty: 90 TABLET | Refills: 0 | Status: SHIPPED | OUTPATIENT
Start: 2025-01-16 | End: 2025-01-18 | Stop reason: SDUPTHER

## 2025-01-16 NOTE — PROGRESS NOTES
[facility]  Group: José Miguel/Charity/Gume/Kip  NPs: Marie Andrade/Bernie Clemente    Note today written by:Bernie Clemente  Date of Service: 01/16/2025      CHIEF COMPLAINT: Urinary frequency.    PRESENTING ILLNESS:    Kasandra Jones is a 74 y.o. female who presents for urinary frequency. This is her initial clinic visit.    1/16/25  Pt was recently treated for UTI 1/7/25 based on UA and micro UA. No culture obtained. Treated with macrobid, completed 1/12/25  No UTI symptoms today  Only UTI symptom includes urinary frequency  Denies dysuria, gross hematuria or flank pain with UTI  Last culture proven UTI was 4/27/23    UA today sm leuk, neg blood or nitrite  PVR 13 ml    On myrbetriq 50 mg for OAB x ~2 weeks. No change in OAB symptoms so far with myrbetriq.  Voids every 15-30 minutes, + urgency, + UUI, wearing 4-5 depends per day for incontinence.  Pt is able to feel urge to void but difficulty getting to bathroom, especially with mobility issues.  Has tried 2 different OAB medications in the past with poor results, vesicare and oxybutynin.  Denies dysuria, gross hematuria, flank pain, fever, chills, nausea or vomiting.    Hx of hysterectomy   Pt thinks she had a bladder lift but unsure of date     Drinks coke, 1 bottle water per day.    Denies constipation    History of kidney stones: denies  Personal or family hx of  malignancy: unsure if mother had renal cancer but did have kidney issues, pt was 11 when she passed away. No fam hx bladder cancer  History of  trauma: denies  Smoking history: never smoked    8/2/23 CT chest abd pelvis with contrast:  The kidneys are unremarkable with the exception of a small left simple renal cyst.     Urine cultures:   Lab Results   Component Value Date    LABURIN  10/17/2023     Multiple organisms isolated. None in predominance.  Repeat if    LABURIN clinically necessary. 10/17/2023    LABURIN (A) 04/27/2023       Comment:          CULTURE, URINE, ROUTINE         Micro Number:      [id number]    Test Status:       Final    Specimen Source:   Urine    Specimen Quality:  Adequate    Result:            Greater than 100,000 CFU/mL of Escherichia coli                              E.coli                            ----------------                            INT   BELINDA     AMOX/CLAVULANATE       S     4     AMPICILLIN             R     >=32     AMP/SULBACTAM          I     16     CEFAZOLIN              NR    <=4 **2     CEFEPIME               S     <=1     CEFTAZIDIME            S     <=1     CEFTRIAXONE            S     <=1     CIPROFLOXACIN          S     <=0.25     GENTAMICIN             S     <=1     IMIPENEM               S     <=0.25     LEVOFLOXACIN           S     <=0.12     NITROFURANTOIN         S     <=16     PIP/TAZOBACTAM         S     <=4     TOBRAMYCIN             S     <=1     TRIMETHOPRIM/SULFA     S     <=20    S=Susceptible  I=Intermediate  R=Resistant  * = Not Tested  NR = Not Reported  **NN = See Therapy Comments      THERAPY COMMENTS        Note 1:      For infections other than uncomplicated UTI      caused by E. coli, K. pneumoniae or P. mirabilis:      Cefazolin is resistant if BELINDA > or = 8 mcg/mL.      (Distinguishing susceptible versus intermediate      for isolates with BELINDA < or = 4 mcg/mL requires      additional testing.)        Note 2:      For uncomplicated UTI caused by E. coli,      K. pneumoniae or P. mirabilis: Cefazolin is      susceptible if BELINDA <32 mcg/mL and predicts      susceptible to the oral agents cefaclor, cefdinir,      cefpodoxime, cefprozil, cefuroxime, cephalexin      and loracarbef.      LABURIN ESCHERICHIA COLI  >100,000 cfu/ml   (A) 01/04/2023    LABURIN (A) 12/29/2021      Comment:          CULTURE, URINE, ROUTINE         Micro Number:      [id number]    Test Status:       Final    Specimen Source:   Not given    Specimen Quality:  Adequate    Result:            Greater than 100,000  CFU/mL of Klebsiella pneumoniae                              K.pneumoniae                            ----------------                            INT   BELINDA     AMOX/CLAVULANATE       S     <=2     AMPICILLIN             R     16     AMP/SULBACTAM          S     <=2     CEFAZOLIN              NR    <=4 **2     CEFEPIME               S     <=1     CEFTRIAXONE            S     <=1     CIPROFLOXACIN          S     <=0.25     GENTAMICIN             S     <=1     IMIPENEM               S     <=0.25     LEVOFLOXACIN           S     <=0.12     NITROFURANTOIN         S     <=16     PIP/TAZOBACTAM         S     <=4     TOBRAMYCIN             S     <=1     TRIMETHOPRIM/SULFA     S     <=20    S=Susceptible  I=Intermediate  R=Resistant  * = Not Tested  NR = Not Reported  **NN = See Therapy Comments      THERAPY COMMENTS        Note 1:      For infections other than uncomplicated UTI      caused by E. coli, K. pneumoniae or P. mirabilis:      Cefazolin is resistant if BELINDA > or = 8 mcg/mL.      (Distinguishing susceptible versus intermediate      for isolates with BELINDA < or = 4 mcg/mL requires      additional testing.)        Note 2:      For uncomplicated UTI caused by E. coli,      K. pneumoniae or P. mirabilis: Cefazolin is      susceptible if BELINDA <32 mcg/mL and predicts      susceptible to the oral agents cefaclor, cefdinir,      cefpodoxime, cefprozil, cefuroxime, cephalexin      and loracarbef.      LABURIN (A) 07/23/2021      Comment:          CULTURE, URINE, ROUTINE         Micro Number:      [id number]    Test Status:       Final    Specimen Source:   URINE    Specimen Quality:  Adequate    Result:            Greater than 100,000 CFU/mL of Klebsiella pneumoniae                              K.pneumoniae                            ----------------                            INT   BELINDA     AMOX/CLAVULANATE       S     <=2     AMPICILLIN             R     16     AMP/SULBACTAM          S     <=2     CEFAZOLIN              NR     <=4 **2     CEFEPIME               S     <=1     CEFTRIAXONE            S     <=1     CIPROFLOXACIN          S     <=0.25     GENTAMICIN             S     <=1     IMIPENEM               S     <=0.25     LEVOFLOXACIN           S     <=0.12     NITROFURANTOIN         S     <=16     PIP/TAZOBACTAM         S     <=4     TOBRAMYCIN             S     <=1     TRIMETHOPRIM/SULFA     S     <=20    S=Susceptible  I=Intermediate  R=Resistant  * = Not Tested  NR = Not Reported  **NN = See Therapy Comments      THERAPY COMMENTS        Note 1:      For infections other than uncomplicated UTI      caused by E. coli, K. pneumoniae or P. mirabilis:      Cefazolin is resistant if BELINDA > or = 8 mcg/mL.      (Distinguishing susceptible versus intermediate      for isolates with BELINDA < or = 4 mcg/mL requires      additional testing.)        Note 2:      For uncomplicated UTI caused by E. coli,      K. pneumoniae or P. mirabilis: Cefazolin is      susceptible if BELINDA <32 mcg/mL and predicts      susceptible to the oral agents cefaclor, cefdinir,      cefpodoxime, cefprozil, cefuroxime, cephalexin      and loracarbef.      LABURIN  06/05/2018      Comment:        CULTURE, URINE, ROUTINE         MICRO NUMBER:      [id number]    TEST STATUS:       FINAL    SPECIMEN SOURCE:   URINE    SPECIMEN QUALITY:  ADEQUATE    RESULT:            Three or more organisms present, each greater                       than 10,000 cu/mL. May represent normal kali                       contamination from external genitalia. No further                       testing is required.           REVIEW OF SYSTEMS: as stated above in hpi    PATIENT HISTORY:  Past Medical History:   Diagnosis Date    Cataract        Past Surgical History:   Procedure Laterality Date    EYE SURGERY Bilateral 2020 August    cataract removal    HYSTERECTOMY      TOTAL REDUCTION MAMMOPLASTY  2002       Allergies:  Niacin preparations and Penicillins      PHYSICAL EXAMINATION:  Constitutional: She  is oriented to person, place, and time. She appears well-developed and well-nourished.  She is in no apparent distress.  Abdominal:  She exhibits no distension.  There is no CVA tenderness.   Neurological: She is alert and oriented to person, place, and time.   Psych: Cooperative with normal affect.        Physical Exam      LABS:    Lab Results   Component Value Date    CREATININE 0.8 11/14/2024     Lab Results   Component Value Date    LABA1C 5.1 04/02/2018         IMPRESSION:    Encounter Diagnoses   Name Primary?    Urinary frequency Yes    OAB (overactive bladder)        PLAN:  -Urine sent for culture. Will call with results  -Discussed OAB. Pt has already failed 2 OAB medications, oxybutynin and vesicare. She initially thought she started myrbetriq 8 weeks ago and plan was to start gemtesa instead. Pt then realized she just started myrbetriq 2 weeks ago, therefore has not been on long enough to see if improves symptoms.  Continue myrbetriq for at least 12 weeks.   If no improvement then plan to try gemtesa.   If no improvement with either of these medications, will need to see MD for possible bladder studies.    -Conservative measures to control urgency and frequency including   1. Avoiding/minimizing bladder irritants (see below), especially in afternoon and evening hours  Discussed bladder irritants include coffee (even decaf), tea, alcohol, soda, spicy foods, acidic juices (orange, tomato), vinegar, and artificial sweeteners/sugary beverages.  2. timed voiding - empty on a schedule (approx ~2-3 hours) in spite of need to urinate, to get ahead of urge  3. dont postponing voiding - dont hold it on purpose   4. bowel regimen as distended bowel has extrinsic compressive effect on bladder.   - any or all of the following in any combination, titrate to soft daily bowel movement without pushing or straining  - colace/stool softener capsule - once to twice daily  - miralax - 1 capful daily to start, can increase to  2x daily (or decrease to 1/2 cap daily)  - increase dietary fibery  - fiber supplements, such as metamucil  - prunes, prune juice  5. INCREASE water intake  6. Stop fluids 2 hours before bed, and urinate just before bed    -RTC 3 months    I encouraged her or any of her family members to call or email me with questions and/or concerns.    Visit today is associated with current or anticipated ongoing medical care related to this patient's single serious condition/complex condition, OAB      45 minutes of total time spent on the encounter, which includes face to face time and non-face to face time preparing to see the patient (eg, review of tests), Obtaining and/or reviewing separately obtained history, Documenting clinical information in the electronic or other health record, Independently interpreting results (not separately reported) and communicating results to the patient/family/caregiver, or Care coordination (not separately reported).

## 2025-01-16 NOTE — PATIENT INSTRUCTIONS
Continue on myrbetriq for at least 12 weeks. If no improvement can try gemtesa      Conservative measures to control urgency and frequency including   1. Avoiding/minimizing bladder irritants (see below), especially in afternoon and evening hours    Discussed bladder irritants include coffee (even decaf), tea, alcohol, soda, spicy foods, acidic juices (orange, tomato), vinegar, and artificial sweeteners/sugary beverages.    2. timed voiding - empty on a schedule (approx ~2-3 hours) in spite of need to urinate, to get ahead of urge    3. dont postponing voiding - dont hold it on purpose     4. bowel regimen as distended bowel has extrinsic compressive effect on bladder.   - any or all of the following in any combination, titrate to soft daily bowel movement without pushing or straining  - colace/stool softener capsule - once to twice daily  - miralax - 1 capful daily to start, can increase to 2x daily (or decrease to 1/2 cap daily)  - increase dietary fibery  - fiber supplements, such as metamucil  - prunes, prune juice    5. INCREASE water intake    6. Stop fluids 2 hours before bed, and urinate just before bed

## 2025-01-19 LAB — BACTERIA UR CULT: ABNORMAL

## 2025-01-21 DIAGNOSIS — R82.81 PYURIA: ICD-10-CM

## 2025-01-21 DIAGNOSIS — R35.0 URINARY FREQUENCY: ICD-10-CM

## 2025-01-21 DIAGNOSIS — R30.0 DYSURIA: ICD-10-CM

## 2025-01-21 RX ORDER — NITROFURANTOIN 25; 75 MG/1; MG/1
100 CAPSULE ORAL 2 TIMES DAILY
Qty: 10 CAPSULE | Refills: 0 | Status: SHIPPED | OUTPATIENT
Start: 2025-01-21

## 2025-01-23 RX ORDER — MIRABEGRON 50 MG/1
TABLET, FILM COATED, EXTENDED RELEASE ORAL
Qty: 90 TABLET | Refills: 0 | Status: SHIPPED | OUTPATIENT
Start: 2025-01-23

## 2025-01-28 ENCOUNTER — EXTERNAL HOME HEALTH (OUTPATIENT)
Dept: HOME HEALTH SERVICES | Facility: HOSPITAL | Age: 75
End: 2025-01-28
Payer: MEDICARE

## 2025-01-28 ENCOUNTER — OFFICE VISIT (OUTPATIENT)
Dept: CARDIOLOGY | Facility: CLINIC | Age: 75
End: 2025-01-28
Payer: MEDICARE

## 2025-01-28 VITALS
OXYGEN SATURATION: 98 % | DIASTOLIC BLOOD PRESSURE: 72 MMHG | SYSTOLIC BLOOD PRESSURE: 124 MMHG | HEIGHT: 66 IN | WEIGHT: 155 LBS | HEART RATE: 72 BPM | BODY MASS INDEX: 24.91 KG/M2 | RESPIRATION RATE: 17 BRPM

## 2025-01-28 DIAGNOSIS — I65.23 BILATERAL CAROTID ARTERY STENOSIS: ICD-10-CM

## 2025-01-28 DIAGNOSIS — I10 HYPERTENSION, ESSENTIAL: Primary | ICD-10-CM

## 2025-01-28 DIAGNOSIS — I70.0 AORTIC ATHEROSCLEROSIS: ICD-10-CM

## 2025-01-28 DIAGNOSIS — I48.91 ATRIAL FIBRILLATION, UNSPECIFIED TYPE: ICD-10-CM

## 2025-01-28 PROCEDURE — 99215 OFFICE O/P EST HI 40 MIN: CPT | Mod: PBBFAC,PN | Performed by: INTERNAL MEDICINE

## 2025-01-28 PROCEDURE — 99214 OFFICE O/P EST MOD 30 MIN: CPT | Mod: S$PBB,,, | Performed by: INTERNAL MEDICINE

## 2025-01-28 PROCEDURE — 99999 PR PBB SHADOW E&M-EST. PATIENT-LVL V: CPT | Mod: PBBFAC,,, | Performed by: INTERNAL MEDICINE

## 2025-01-28 RX ORDER — DILTIAZEM HYDROCHLORIDE 120 MG/1
120 CAPSULE, COATED, EXTENDED RELEASE ORAL DAILY
Qty: 90 CAPSULE | Refills: 3 | Status: SHIPPED | OUTPATIENT
Start: 2025-01-28 | End: 2026-01-28

## 2025-01-28 RX ORDER — LANOLIN ALCOHOL/MO/W.PET/CERES
400 CREAM (GRAM) TOPICAL DAILY
Qty: 90 TABLET | Refills: 3 | Status: SHIPPED | OUTPATIENT
Start: 2025-01-28 | End: 2026-01-28

## 2025-01-28 NOTE — PROGRESS NOTES
Subjective:    Patient ID:  Kasandra Jones is a 74 y.o. female patient here for evaluation Follow-up (Doing well pt states that she had some palpitations x1 wk)      History of Present Illness:   Patient is a 74-year-old with history of paroxysmal atrial fibrillation now in sinus rhythm history of bipolar disorder and history of pulmonary emphysema seeking follow-up evaluation.  She is doing very well from a cardiac perspective with no occurrence of any angina shortness of breath PND orthopnea she is under the care of neurologist Dr. Reeves and has been optimize therapy.    From a cardiac perspective patient denies having chest discomfort no arm neck or jaw pain no significant swelling in the lower extremities no symptoms of PND or orthopnea.  Patient claims that her pressures progressive weakness in his legs  Apparently she has significant weight loss weight was down to about 116 lb with that she has developed progressive weakness in his muscles as well.  Prior to that she was using a cane now she uses a Rollator for stability.          Review of patient's allergies indicates:   Allergen Reactions    Niacin preparations     Penicillins        Past Medical History:   Diagnosis Date    Cataract      Past Surgical History:   Procedure Laterality Date    EYE SURGERY Bilateral 2020 August    cataract removal    HYSTERECTOMY      TOTAL REDUCTION MAMMOPLASTY  2002     Social History     Tobacco Use    Smoking status: Never    Smokeless tobacco: Never   Substance Use Topics    Alcohol use: Never    Drug use: Never        Review of Systems:    As noted in HPI in addition      REVIEW OF SYSTEMS  CARDIOVASCULAR: No recent chest pain, palpitations, arm, neck, or jaw pain  RESPIRATORY: No recent fever, cough chills, SOB or congestion  : No blood in the urine  GI: No Nausea, vomiting, constipation, diarrhea, blood, or reflux.  MUSCULOSKELETAL: No myalgias  NEURO: No lightheadedness or dizziness  Progressive neurologic weakness  is noted in both lower extremities.  Lack of muscle strength  She is currently receiving some physical therapy for muscle strengthening exercises.  EYES: No Double vision, blurry, vision or headache              Objective        Vitals:    01/28/25 1416   BP: 124/72   Pulse: 72   Resp: 17       LIPIDS - LAST 2   Lab Results   Component Value Date    CHOL 203 (H) 09/05/2024    CHOL 189 05/15/2024    HDL 95 (H) 09/05/2024    HDL 90 (H) 05/15/2024    LDLCALC 86.6 09/05/2024    LDLCALC 77.0 05/15/2024    TRIG 107 09/05/2024    TRIG 110 05/15/2024    CHOLHDL 46.8 09/05/2024    CHOLHDL 47.6 05/15/2024       CBC - LAST 2  Lab Results   Component Value Date    WBC 6.27 09/05/2024    WBC 5.59 05/15/2024    RBC 4.37 09/05/2024    RBC 4.23 05/15/2024    HGB 14.5 09/05/2024    HGB 13.9 05/15/2024    HCT 44.8 09/05/2024    HCT 42.8 05/15/2024     (H) 09/05/2024     (H) 05/15/2024    MCH 33.2 (H) 09/05/2024    MCH 32.9 (H) 05/15/2024    MCHC 32.4 09/05/2024    MCHC 32.5 05/15/2024    RDW 12.8 09/05/2024    RDW 13.5 05/15/2024     09/05/2024     05/15/2024    MPV 10.3 09/05/2024    MPV 11.1 05/15/2024    GRAN 3.9 09/05/2024    GRAN 61.6 09/05/2024    LYMPH 1.7 09/05/2024    LYMPH 26.6 09/05/2024    MONO 0.5 09/05/2024    MONO 8.1 09/05/2024    BASO 0.04 09/05/2024    BASO 0.03 05/15/2024    NRBC 0 09/05/2024    NRBC 0 05/15/2024       CHEMISTRY & LIVER FUNCTION - LAST 2  Lab Results   Component Value Date     09/05/2024     05/15/2024    K 4.5 09/05/2024    K 4.1 05/15/2024     09/05/2024     05/15/2024    CO2 30 (H) 09/05/2024    CO2 31 (H) 05/15/2024    ANIONGAP 8 09/05/2024    ANIONGAP 6 (L) 05/15/2024    BUN 15 09/05/2024    BUN 13 05/15/2024    CREATININE 0.8 11/14/2024    CREATININE 0.7 09/05/2024    GLU 99 09/05/2024    GLU 90 05/15/2024    CALCIUM 9.3 09/05/2024    CALCIUM 9.4 05/15/2024    MG 1.9 05/15/2024    MG 2.0 04/01/2024    ALBUMIN 4.0 09/05/2024    ALBUMIN 3.9  "05/15/2024    PROT 6.8 09/05/2024    PROT 6.6 05/15/2024    ALKPHOS 73 09/05/2024    ALKPHOS 90 05/15/2024    ALT 23 09/05/2024    ALT 52 (H) 05/15/2024    AST 20 09/05/2024    AST 37 05/15/2024    BILITOT 0.5 09/05/2024    BILITOT 0.4 05/15/2024        CARDIAC PROFILE - LAST 2  Lab Results   Component Value Date     (H) 04/01/2024    CPK 38 02/16/2023    CPK 56 06/28/2019    TROPONINI <0.030 06/28/2019    TROPONINIHS 5.0 04/01/2024        COAGULATION - LAST 2  No results found for: "LABPT", "INR", "APTT"    ENDOCRINE & PSA - LAST 2  Lab Results   Component Value Date    TSH 2.738 09/05/2024    TSH 1.966 05/15/2024        ECHOCARDIOGRAM RESULTS  Results for orders placed during the hospital encounter of 12/09/21    Echo    Interpretation Summary  · The left ventricle is normal in size with mildly decreased systolic function.  · The estimated ejection fraction is 48%.  · Grade I left ventricular diastolic dysfunction.  · Normal right ventricular size with normal right ventricular systolic function.  · Mild left atrial enlargement.  · Mild mitral regurgitation.  · Normal central venous pressure (3 mmHg).  · The estimated PA systolic pressure is 30 mmHg.      CURRENT/PREVIOUS VISIT EKG  Results for orders placed or performed during the hospital encounter of 04/01/24   EKG 12-lead    Collection Time: 04/01/24  6:30 AM   Result Value Ref Range    QRS Duration 92 ms    [facility] QTC Calculation 450 ms    Narrative    Test Reason : R00.2,    Vent. Rate : 081 BPM     Atrial Rate : 081 BPM     P-R Int : 168 ms          QRS Dur : 092 ms      QT Int : 388 ms       P-R-T Axes : 050 054 072 degrees     QTc Int : 450 ms    Normal sinus rhythm  Normal ECG  When compared with ECG of 01-APR-2024 02:57,  Sinus rhythm has replaced Atrial fibrillation  Vent. rate has decreased BY  52 BPM  ST no longer depressed in Inferior leads  Confirmed by Geovanny Mireles MD ([id number]) on 4/1/2024 11:30:07 PM    Referred By: [email or web address]   SELF          "  Confirmed By:Geovanny Mireles MD     No valid procedures specified.   Results for orders placed during the hospital encounter of 07/09/21    Nuclear Stress - Cardiology Interpreted    Interpretation Summary    Normal myocardial perfusion scan. There is no evidence of myocardial ischemia or infarction.    The gated perfusion images showed an ejection fraction of 73% post stress. Normal ejection fraction is greater than 53%.    There is normal wall motion post stress.    LV cavity size is  and normal at stress.    The EKG portion of this study is negative for ischemia.    The patient reported no chest pain during the stress test.    There were no arrhythmias during stress.    No valid procedures specified.    PHYSICAL EXAM  CONSTITUTIONAL: Well built, well nourished in no apparent distress  NECK: no carotid bruit, no JVD  LUNGS: CTA  CHEST WALL: no tenderness  HEART: regular rate and rhythm, S1, S2 normal, no murmur, click, rub or gallop   ABDOMEN: soft, non-tender; bowel sounds normal; no masses,  no organomegaly  EXTREMITIES: Extremities normal, no edema, no calf tenderness noted  NEURO: AAO X 3    I HAVE REVIEWED :    The vital signs, nurses notes, and all the pertinent radiology and labs.    01/28/2025 EKG shows sinus rhythm within normal limits QTC is 445 milliseconds    Current Outpatient Medications   Medication Instructions    alendronate (FOSAMAX) 70 mg, Oral, Every 7 days    ALPRAZolam (XANAX) 0.5 mg, Oral, Daily PRN, PRN    amLODIPine (NORVASC) 2.5 mg, Oral, Daily    buPROPion (WELLBUTRIN XL) 300 mg, Oral, Daily    calcium carbonate (CALCIUM 600 ORAL) 1 tablet, 2 times daily    cetirizine (ZYRTEC) 10 mg, Daily    cholecalciferol (vitamin D3) 5,000 Units, Daily    clopidogreL (PLAVIX) 75 mg, Daily    copper gluconate 2 mg Tab 1 tablet, Daily    cyanocobalamin-cobamamide (B12) 5,000-100 mcg Lozg 1 tablet, Daily    diltiaZEM (CARDIZEM CD) 120 mg, Oral, Daily    diphenoxylate-atropine 2.5-0.025 mg (LOMOTIL)  2.5-0.025 mg per tablet 1 tablet, Oral, 4 times daily PRN    divalproex ER (DEPAKOTE ER) 500 mg, Oral, Daily    ferrous sulfate 325 mg, Daily    fluticasone propionate (FLONASE) 50 mcg, Each Nostril, Daily    levothyroxine (SYNTHROID) 100 mcg, Oral, Daily    losartan (COZAAR) 100 mg, Oral, Daily    magnesium oxide (MAG-OX) 400 mg, Daily    magnesium oxide (MAG-OX) 400 mg, Oral, Daily    meclizine (ANTIVERT) 25 mg, Oral, Every 6 hours    mirabegron (MYRBETRIQ) 50 mg Tb24 One tab daily. Oral    MULTAQ 400 mg, Oral, 2 times daily    nitrofurantoin, macrocrystal-monohydrate, (MACROBID) 100 MG capsule 100 mg, Oral, 2 times daily    pantoprazole (PROTONIX) 40 mg, Oral, Every morning    potassium chloride SA (K-DUR,KLOR-CON M) 10 MEQ tablet 10 mEq, Oral, 2 times daily    pyridoxine (vitamin B6) (VITAMIN B-6) 50 mg, Daily    tiZANidine (ZANAFLEX) 4 mg, Oral, 2 times daily PRN    traZODone (DESYREL) 200 mg, Nightly    vit C,P-Ji-wbrul-lutein-zeaxan (PRESERVISION AREDS 2) 250-90-40-1 mg Cap 1 capsule, 2 times daily          Assessment & Plan     1. Hypertension, essential  Assessment & Plan:  Blood pressure is stable at 120 4/72 mm Hg continue on losartan 100 mg a day      2. Atrial fibrillation, unspecified type  Assessment & Plan:  History of paroxysmal atrial fibrillation currently maintaining sinus rhythm.  Recommend to add magnesium oxide 400 mg daily to her regimen  May consider take a drug holiday withholding on Multaq for about 4 weeks duration see if her musculoskeletal symptoms would improve.  Although she may have a spinal etiology contributing to her progressive weakness drugs causing weakness can not be completely excluded may consider adding Cardizem CD at 120 mg daily to her regimen in an effort to maintain sinus    Orders:  -     IN OFFICE EKG 12-LEAD (to Muse)  -     diltiaZEM (CARDIZEM CD) 120 MG Cp24; Take 1 capsule (120 mg total) by mouth once daily.  Dispense: 90 capsule; Refill: 3    3. Aortic  atherosclerosis  Assessment & Plan:  Continue on risk factor modification unfortunately she can not take much of statin therapies continue on blood pressure control and cholesterol restricted diet.  Continue on antiplatelet therapy.      4. Bilateral carotid artery stenosis  Assessment & Plan:  Continue on risk factor modification      Other orders  -     magnesium oxide (MAG-OX) 400 mg (241.3 mg magnesium) tablet; Take 1 tablet (400 mg total) by mouth once daily.  Dispense: 90 tablet; Refill: 3          No follow-ups on file.

## 2025-01-28 NOTE — ASSESSMENT & PLAN NOTE
Continue on risk factor modification unfortunately she can not take much of statin therapies continue on blood pressure control and cholesterol restricted diet.  Continue on antiplatelet therapy.

## 2025-01-28 NOTE — ASSESSMENT & PLAN NOTE
History of paroxysmal atrial fibrillation currently maintaining sinus rhythm.  Recommend to add magnesium oxide 400 mg daily to her regimen  May consider take a drug holiday withholding on Multaq for about 4 weeks duration see if her musculoskeletal symptoms would improve.  Although she may have a spinal etiology contributing to her progressive weakness drugs causing weakness can not be completely excluded may consider adding Cardizem CD at 120 mg daily to her regimen in an effort to maintain sinus

## 2025-02-05 ENCOUNTER — DOCUMENT SCAN (OUTPATIENT)
Dept: HOME HEALTH SERVICES | Facility: HOSPITAL | Age: 75
End: 2025-02-05
Payer: MEDICARE

## 2025-02-06 DIAGNOSIS — I10 HYPERTENSION, ESSENTIAL: ICD-10-CM

## 2025-02-06 RX ORDER — LOSARTAN POTASSIUM 100 MG/1
100 TABLET ORAL DAILY
Qty: 90 TABLET | Refills: 1 | Status: SHIPPED | OUTPATIENT
Start: 2025-02-06 | End: 2026-02-06

## 2025-02-07 RX ORDER — MIRABEGRON 50 MG/1
TABLET, FILM COATED, EXTENDED RELEASE ORAL
Qty: 90 TABLET | Refills: 0 | Status: SHIPPED | OUTPATIENT
Start: 2025-02-07

## 2025-02-07 NOTE — TELEPHONE ENCOUNTER
Care Due:                  Date            Visit Type   Department     Provider  --------------------------------------------------------------------------------                                SAME DAY -                              ESTABLISHED   [facility]  Last Visit: 12-      PATIENT      [facility]  [place]                              EP -                              PRIMARY      [facility]  Next Visit: 04-      CARE ([facility])   [facility]  [place]                                                            Last  Test          Frequency    Reason                     Performed    Due Date  --------------------------------------------------------------------------------    Phosphate...  12 months..  alendronate..............  Not Found    Overdue    [employer] Embedded Care Due Messages. Reference number: [id number].   2/06/2025 6:47:18 PM CST

## 2025-02-19 DIAGNOSIS — R09.82 PND (POST-NASAL DRIP): ICD-10-CM

## 2025-02-19 RX ORDER — FLUTICASONE PROPIONATE 50 MCG
SPRAY, SUSPENSION (ML) NASAL
Qty: 16 G | Refills: 2 | Status: SHIPPED | OUTPATIENT
Start: 2025-02-19

## 2025-02-19 RX ORDER — CLOPIDOGREL BISULFATE 75 MG/1
75 TABLET ORAL DAILY
Qty: 90 TABLET | Refills: 1 | Status: SHIPPED | OUTPATIENT
Start: 2025-02-19

## 2025-02-21 RX ORDER — TRAZODONE HYDROCHLORIDE 100 MG/1
100 TABLET ORAL NIGHTLY
Qty: 30 TABLET | Refills: 2 | Status: SHIPPED | OUTPATIENT
Start: 2025-02-21

## 2025-03-09 DIAGNOSIS — I48.91 ATRIAL FIBRILLATION, UNSPECIFIED TYPE: ICD-10-CM

## 2025-03-09 RX ORDER — POTASSIUM CHLORIDE 750 MG/1
10 TABLET, EXTENDED RELEASE ORAL 2 TIMES DAILY
Qty: 180 TABLET | Refills: 1 | Status: SHIPPED | OUTPATIENT
Start: 2025-03-09

## 2025-03-09 RX ORDER — AMLODIPINE BESYLATE 2.5 MG/1
2.5 TABLET ORAL DAILY
Qty: 90 TABLET | Refills: 3 | Status: SHIPPED | OUTPATIENT
Start: 2025-03-09

## 2025-03-09 RX ORDER — BUPROPION HYDROCHLORIDE 300 MG/1
300 TABLET ORAL DAILY
Qty: 90 TABLET | Refills: 2 | Status: SHIPPED | OUTPATIENT
Start: 2025-03-09

## 2025-03-09 RX ORDER — LEVOTHYROXINE SODIUM 100 UG/1
100 TABLET ORAL DAILY
Qty: 90 TABLET | Refills: 1 | Status: SHIPPED | OUTPATIENT
Start: 2025-03-09

## 2025-03-09 NOTE — TELEPHONE ENCOUNTER
Care Due:                  Date            Visit Type   Department     Provider  --------------------------------------------------------------------------------                                SAME DAY -                              ESTABLISHED   [facility]  Last Visit: 12-      PAT[facility]      [facility]   [place]                              EP -                              PRIMARY      [facility]  Next Visit: 04-      [facility] ([facility])   [facility]  [place]                                                            Last  Test          Frequency    Reason                     Performed    Due Date  --------------------------------------------------------------------------------    Mg Level....  12 months..  alendronate..............  05-   05-    Health [facility] Embedded Care Due Messages. Reference number: [id number].   3/09/2025 11:22:58 AM CDT

## 2025-03-10 RX ORDER — DILTIAZEM HYDROCHLORIDE 120 MG/1
120 CAPSULE, COATED, EXTENDED RELEASE ORAL DAILY
Qty: 90 CAPSULE | Refills: 3 | OUTPATIENT
Start: 2025-03-10 | End: 2026-03-10

## 2025-03-12 DIAGNOSIS — I48.91 ATRIAL FIBRILLATION, UNSPECIFIED TYPE: ICD-10-CM

## 2025-03-12 RX ORDER — DILTIAZEM HYDROCHLORIDE 120 MG/1
120 CAPSULE, COATED, EXTENDED RELEASE ORAL DAILY
Qty: 90 CAPSULE | Refills: 1 | Status: SHIPPED | OUTPATIENT
Start: 2025-03-12 | End: 2025-09-08

## 2025-03-12 RX ORDER — DIVALPROEX SODIUM 500 MG/1
500 TABLET, FILM COATED, EXTENDED RELEASE ORAL DAILY
Qty: 90 TABLET | Refills: 0 | Status: SHIPPED | OUTPATIENT
Start: 2025-03-12

## 2025-03-24 DIAGNOSIS — Z00.00 ENCOUNTER FOR MEDICARE ANNUAL WELLNESS EXAM: ICD-10-CM

## 2025-04-08 ENCOUNTER — OFFICE VISIT (OUTPATIENT)
Dept: FAMILY MEDICINE | Facility: CLINIC | Age: 75
End: 2025-04-08
Payer: MEDICARE

## 2025-04-08 VITALS
BODY MASS INDEX: 24.77 KG/M2 | HEIGHT: 66 IN | TEMPERATURE: 98 F | DIASTOLIC BLOOD PRESSURE: 68 MMHG | HEART RATE: 68 BPM | SYSTOLIC BLOOD PRESSURE: 122 MMHG | WEIGHT: 154.13 LBS | OXYGEN SATURATION: 99 %

## 2025-04-08 DIAGNOSIS — Z79.02 VISIT FOR MONITORING PLAVIX THERAPY: ICD-10-CM

## 2025-04-08 DIAGNOSIS — Z98.84 HISTORY OF GASTRIC BYPASS: ICD-10-CM

## 2025-04-08 DIAGNOSIS — E27.9 ADRENAL NODULE: ICD-10-CM

## 2025-04-08 DIAGNOSIS — J43.9 PULMONARY EMPHYSEMA, UNSPECIFIED EMPHYSEMA TYPE: ICD-10-CM

## 2025-04-08 DIAGNOSIS — G47.00 INSOMNIA, UNSPECIFIED TYPE: ICD-10-CM

## 2025-04-08 DIAGNOSIS — Z51.81 VISIT FOR MONITORING PLAVIX THERAPY: ICD-10-CM

## 2025-04-08 DIAGNOSIS — F31.9 BIPOLAR 1 DISORDER: Primary | ICD-10-CM

## 2025-04-08 DIAGNOSIS — I48.0 PAROXYSMAL ATRIAL FIBRILLATION: ICD-10-CM

## 2025-04-08 DIAGNOSIS — E03.9 HYPOTHYROIDISM, UNSPECIFIED TYPE: ICD-10-CM

## 2025-04-08 DIAGNOSIS — I10 HYPERTENSION, ESSENTIAL: ICD-10-CM

## 2025-04-08 DIAGNOSIS — Z99.89 USES WALKER: ICD-10-CM

## 2025-04-08 DIAGNOSIS — I65.23 BILATERAL CAROTID ARTERY STENOSIS: ICD-10-CM

## 2025-04-08 DIAGNOSIS — G95.89 SPINAL CORD MASS: ICD-10-CM

## 2025-04-08 PROCEDURE — 99214 OFFICE O/P EST MOD 30 MIN: CPT | Mod: S$PBB,,, | Performed by: FAMILY MEDICINE

## 2025-04-08 PROCEDURE — G2211 COMPLEX E/M VISIT ADD ON: HCPCS | Mod: S$PBB,,, | Performed by: FAMILY MEDICINE

## 2025-04-08 PROCEDURE — 99999 PR PBB SHADOW E&M-EST. PATIENT-LVL V: CPT | Mod: PBBFAC,,, | Performed by: FAMILY MEDICINE

## 2025-04-08 PROCEDURE — 99215 OFFICE O/P EST HI 40 MIN: CPT | Mod: PBBFAC,PN | Performed by: FAMILY MEDICINE

## 2025-04-08 RX ORDER — TRAZODONE HYDROCHLORIDE 100 MG/1
100 TABLET ORAL NIGHTLY
Qty: 90 TABLET | Refills: 1 | Status: SHIPPED | OUTPATIENT
Start: 2025-04-08

## 2025-04-08 NOTE — PROGRESS NOTES
SCRIBE #1 NOTE: I, Onel Dove, am scribing for, and in the presence of,  Sanjiv Ford III, MD. I have scribed the entire note.     Subjective:       Patient ID: Kasandra Jones is a 74 y.o. female.    Chief Complaint: Follow-up (3 month)    Ms. Jones is here for a follow up with her last appointment being here on January 7, 2025 with NP Tiera. Accompanied by . Use of walker. She does walk without it around her house. Insomnia. On Trazodone 100mg at night but breaks it in half. She is having trouble sleeping. She will go to sleep and wake up 2 hours later. She will then be up all night. She will sleep on and off according to her . History of gastric bypass. BMI of 24.87. Cardiovascular good. No chest pain. No palpitations. Blood pressure is 122/68. Hypertension controlled. Bilateral carotid stenosis. PAF. Off Multaq. Using Plavix. Bipolar 1 disorder. Hypothyroidism.     Review of Systems   Constitutional:  Negative for chills and fever.   HENT:  Negative for congestion and sore throat.    Eyes:  Negative for visual disturbance.   Respiratory:  Negative for chest tightness and shortness of breath.    Cardiovascular:  Negative for chest pain.   Gastrointestinal:  Negative for nausea.   Endocrine: Negative for polydipsia and polyuria.   Genitourinary:  Negative for dysuria and flank pain.   Musculoskeletal:  Negative for back pain, neck pain and neck stiffness.   Skin:  Negative for rash.   Neurological:  Negative for weakness.   Hematological:  Does not bruise/bleed easily.   Psychiatric/Behavioral:  Positive for sleep disturbance. Negative for behavioral problems.    All other systems reviewed and are negative.      Objective:      Physical examination: Vital signs noted. No acute distress. No carotid bruit. Regular heart rate and rhythm. Lungs clear to auscultation bilaterally. Abdomen bowel sounds positive soft and nontender. Extremities without edema. 2+ pedal pulses.      Assessment:        1. Bipolar 1 disorder    2. Bilateral carotid artery stenosis    3. Paroxysmal atrial fibrillation    4. Uses walker    5. Hypertension, essential    6. Hypothyroidism, unspecified type    7. Insomnia, unspecified type    8. Visit for monitoring Plavix therapy    9. BMI 24.0-24.9, adult    10. History of gastric bypass    11. Pulmonary emphysema, unspecified emphysema type    12. Spinal cord mass    13. Adrenal nodule        Plan:       Bipolar 1 disorder  -     Valproic Acid; Future; Expected date: 07/08/2025    Bilateral carotid artery stenosis    Paroxysmal atrial fibrillation    Uses walker    Hypertension, essential  -     Lipid Panel; Future; Expected date: 07/08/2025  -     Comprehensive Metabolic Panel; Future; Expected date: 07/08/2025    Hypothyroidism, unspecified type  -     TSH; Future; Expected date: 07/08/2025    Insomnia, unspecified type    Visit for monitoring Plavix therapy  -     CBC Auto Differential; Future; Expected date: 07/08/2025    BMI 24.0-24.9, adult    History of gastric bypass    Pulmonary emphysema, unspecified emphysema type    Spinal cord mass    Adrenal nodule    Other orders  -     traZODone (DESYREL) 100 MG tablet; Take 1 tablet (100 mg total) by mouth every evening.  Dispense: 90 tablet; Refill: 1    Increase trazodone to 100 mg HS 90 with a refill.  Follow-up in 3 months.  Lipid CMP valproic acid level TSH.  Continue follow-up regarding spinal cord mass with neurosurgeon.  Hypertension under good control.  Prediabetes stable.  All care gaps addressed or discussed.     I, Sanjiv Ford III, MD, personally performed the services described in this documentation. All medical record entries made by the scribe were at my direction and in my presence. I have reviewed the chart and agree that the record reflects my personal performance and is accurate and complete.

## 2025-04-10 PROBLEM — J43.9 PULMONARY EMPHYSEMA, UNSPECIFIED EMPHYSEMA TYPE: Status: RESOLVED | Noted: 2024-03-13 | Resolved: 2025-04-10

## 2025-04-12 DIAGNOSIS — I48.91 ATRIAL FIBRILLATION, UNSPECIFIED TYPE: ICD-10-CM

## 2025-04-12 RX ORDER — DILTIAZEM HYDROCHLORIDE 120 MG/1
120 CAPSULE, COATED, EXTENDED RELEASE ORAL DAILY
Qty: 90 CAPSULE | Refills: 1 | Status: SHIPPED | OUTPATIENT
Start: 2025-04-12 | End: 2025-10-09

## 2025-04-12 NOTE — TELEPHONE ENCOUNTER
Care Due:                  Date            Visit Type   Department     Provider  --------------------------------------------------------------------------------                                EP [facility]                              [facility]  Last Visit: 04-      CARE ([facility])   [facility] [place]                              EP -                              PRIMARY      [facility]  Next Visit: 07-      [facility] ([facility])   [facility] [place]                                                            Last  Test          Frequency    Reason                     Performed    Due Date  --------------------------------------------------------------------------------    Phosphate...  12 months..  alendronate..............  Not Found    Overdue    [employer] Embedded Care Due Messages. Reference number: [id number].   4/12/2025 2:47:10 AM CDT

## 2025-04-16 ENCOUNTER — OFFICE VISIT (OUTPATIENT)
Dept: UROLOGY | Facility: CLINIC | Age: 75
End: 2025-04-16
Payer: MEDICARE

## 2025-04-16 VITALS — BODY MASS INDEX: 24.77 KG/M2 | WEIGHT: 154.13 LBS | HEIGHT: 66 IN

## 2025-04-16 DIAGNOSIS — R32 URINARY INCONTINENCE, UNSPECIFIED TYPE: ICD-10-CM

## 2025-04-16 DIAGNOSIS — N32.81 OAB (OVERACTIVE BLADDER): ICD-10-CM

## 2025-04-16 DIAGNOSIS — R35.0 URINARY FREQUENCY: Primary | ICD-10-CM

## 2025-04-16 LAB — POC RESIDUAL URINE VOLUME: 0 ML (ref 0–100)

## 2025-04-16 PROCEDURE — 99999PBSHW POCT BLADDER SCAN: Mod: PBBFAC,,,

## 2025-04-16 PROCEDURE — 99214 OFFICE O/P EST MOD 30 MIN: CPT | Mod: S$PBB,,, | Performed by: NURSE PRACTITIONER

## 2025-04-16 PROCEDURE — 51798 US URINE CAPACITY MEASURE: CPT | Mod: PBBFAC,PO | Performed by: NURSE PRACTITIONER

## 2025-04-16 PROCEDURE — 99215 OFFICE O/P EST HI 40 MIN: CPT | Mod: PBBFAC,PO | Performed by: NURSE PRACTITIONER

## 2025-04-16 PROCEDURE — G2211 COMPLEX E/M VISIT ADD ON: HCPCS | Mod: S$PBB,,, | Performed by: NURSE PRACTITIONER

## 2025-04-16 PROCEDURE — 99999 PR PBB SHADOW E&M-EST. PATIENT-LVL V: CPT | Mod: PBBFAC,,, | Performed by: NURSE PRACTITIONER

## 2025-04-16 NOTE — PROGRESS NOTES
[facility]  Group: José Miguel/Charity/Gume/Kip  NPs: Marie Andrade/Bernie Clemente    Note today written by:Bernie Clemente  Date of Service: 04/16/2025      CHIEF COMPLAINT: Urinary frequency.    PRESENTING ILLNESS:    Kasandra Jones is a 74 y.o. female who presents for urinary frequency. Last clinic visit was 1/16/25 4/16/25  Pt is taking myrbetriq 50 mg daily  No improvement to OAB symptoms on myrbetriq 50 mg  Has already tried oxybutynin and vesicare with poor results in the past  + urinary frequency, urgency and UUI  Pt is wearing at least 5 depends per day for daytime urinary incontinence and using purewick at night.  Denies dysuria, gross hematuria or flank pain  PVR 0 ml      1/16/25  Pt was recently treated for UTI 1/7/25 based on UA and micro UA. No culture obtained. Treated with macrobid, completed 1/12/25  No UTI symptoms today  Only UTI symptom includes urinary frequency  Denies dysuria, gross hematuria or flank pain with UTI  Last culture proven UTI was 4/27/23    UA today sm leuk, neg blood or nitrite  PVR 13 ml    On myrbetriq 50 mg for OAB x ~2 weeks. No change in OAB symptoms so far with myrbetriq.  Voids every 15-30 minutes, + urgency, + UUI, wearing 4-5 depends per day for incontinence.  Pt is able to feel urge to void but difficulty getting to bathroom, especially with mobility issues.  Has tried 2 different OAB medications in the past with poor results, vesicare and oxybutynin.  Denies dysuria, gross hematuria, flank pain, fever, chills, nausea or vomiting.    Hx of hysterectomy   Pt thinks she had a bladder lift but unsure of date     Drinks coke, 1 bottle water per day.    Denies constipation    History of kidney stones: denies  Personal or family hx of  malignancy: unsure if mother had renal cancer but did have kidney issues, pt was 11 when she passed away. No fam hx bladder cancer  History of  trauma: denies  Smoking  history: never smoked    8/2/23 CT chest abd pelvis with contrast:  The kidneys are unremarkable with the exception of a small left simple renal cyst.     Urine cultures:   Lab Results   Component Value Date    LABURIN ESCHERICHIA COLI  50,000 - 99,999 cfu/ml   (A) 01/16/2025    LABURIN  10/17/2023     Multiple organisms isolated. None in predominance.  Repeat if    LABURIN clinically necessary. 10/17/2023    LABURIN (A) 04/27/2023      Comment:          CULTURE, URINE, ROUTINE         Micro Number:      [id number]    Test Status:       Final    Specimen Source:   Urine    Specimen Quality:  Adequate    Result:            Greater than 100,000 CFU/mL of Escherichia coli                              E.coli                            ----------------                            INT   BELINDA     AMOX/CLAVULANATE       S     4     AMPICILLIN             R     >=32     AMP/SULBACTAM          I     16     CEFAZOLIN              NR    <=4 **2     CEFEPIME               S     <=1     CEFTAZIDIME            S     <=1     CEFTRIAXONE            S     <=1     CIPROFLOXACIN          S     <=0.25     GENTAMICIN             S     <=1     IMIPENEM               S     <=0.25     LEVOFLOXACIN           S     <=0.12     NITROFURANTOIN         S     <=16     PIP/TAZOBACTAM         S     <=4     TOBRAMYCIN             S     <=1     TRIMETHOPRIM/SULFA     S     <=20    S=Susceptible  I=Intermediate  R=Resistant  * = Not Tested  NR = Not Reported  **NN = See Therapy Comments      THERAPY COMMENTS        Note 1:      For infections other than uncomplicated UTI      caused by E. coli, K. pneumoniae or P. mirabilis:      Cefazolin is resistant if BELINDA > or = 8 mcg/mL.      (Distinguishing susceptible versus intermediate      for isolates with BELINDA < or = 4 mcg/mL requires      additional testing.)        Note 2:      For uncomplicated UTI caused by E. coli,      K. pneumoniae or P. mirabilis: Cefazolin is      susceptible if BELINDA <32 mcg/mL and  predicts      susceptible to the oral agents cefaclor, cefdinir,      cefpodoxime, cefprozil, cefuroxime, cephalexin      and loracarbef.      LABURIN ESCHERICHIA COLI  >100,000 cfu/ml   (A) 01/04/2023    LABURIN (A) 12/29/2021      Comment:          CULTURE, URINE, ROUTINE         Micro Number:      [id number]    Test Status:       Final    Specimen Source:   Not given    Specimen Quality:  Adequate    Result:            Greater than 100,000 CFU/mL of Klebsiella pneumoniae                              K.pneumoniae                            ----------------                            INT   BELINDA     AMOX/CLAVULANATE       S     <=2     AMPICILLIN             R     16     AMP/SULBACTAM          S     <=2     CEFAZOLIN              NR    <=4 **2     CEFEPIME               S     <=1     CEFTRIAXONE            S     <=1     CIPROFLOXACIN          S     <=0.25     GENTAMICIN             S     <=1     IMIPENEM               S     <=0.25     LEVOFLOXACIN           S     <=0.12     NITROFURANTOIN         S     <=16     PIP/TAZOBACTAM         S     <=4     TOBRAMYCIN             S     <=1     TRIMETHOPRIM/SULFA     S     <=20    S=Susceptible  I=Intermediate  R=Resistant  * = Not Tested  NR = Not Reported  **NN = See Therapy Comments      THERAPY COMMENTS        Note 1:      For infections other than uncomplicated UTI      caused by E. coli, K. pneumoniae or P. mirabilis:      Cefazolin is resistant if BELINDA > or = 8 mcg/mL.      (Distinguishing susceptible versus intermediate      for isolates with BELINDA < or = 4 mcg/mL requires      additional testing.)        Note 2:      For uncomplicated UTI caused by E. coli,      K. pneumoniae or P. mirabilis: Cefazolin is      susceptible if BELINDA <32 mcg/mL and predicts      susceptible to the oral agents cefaclor, cefdinir,      cefpodoxime, cefprozil, cefuroxime, cephalexin      and loracarbef.      LABURIN (A) 07/23/2021      Comment:          CULTURE, URINE, ROUTINE         Micro Number:       [id number]    Test Status:       Final    Specimen Source:   URINE    Specimen Quality:  Adequate    Result:            Greater than 100,000 CFU/mL of Klebsiella pneumoniae                              K.pneumoniae                            ----------------                            INT   BELINDA     AMOX/CLAVULANATE       S     <=2     AMPICILLIN             R     16     AMP/SULBACTAM          S     <=2     CEFAZOLIN              NR    <=4 **2     CEFEPIME               S     <=1     CEFTRIAXONE            S     <=1     CIPROFLOXACIN          S     <=0.25     GENTAMICIN             S     <=1     IMIPENEM               S     <=0.25     LEVOFLOXACIN           S     <=0.12     NITROFURANTOIN         S     <=16     PIP/TAZOBACTAM         S     <=4     TOBRAMYCIN             S     <=1     TRIMETHOPRIM/SULFA     S     <=20    S=Susceptible  I=Intermediate  R=Resistant  * = Not Tested  NR = Not Reported  **NN = See Therapy Comments      THERAPY COMMENTS        Note 1:      For infections other than uncomplicated UTI      caused by E. coli, K. pneumoniae or P. mirabilis:      Cefazolin is resistant if BELINDA > or = 8 mcg/mL.      (Distinguishing susceptible versus intermediate      for isolates with BELINDA < or = 4 mcg/mL requires      additional testing.)        Note 2:      For uncomplicated UTI caused by E. coli,      K. pneumoniae or P. mirabilis: Cefazolin is      susceptible if BELINDA <32 mcg/mL and predicts      susceptible to the oral agents cefaclor, cefdinir,      cefpodoxime, cefprozil, cefuroxime, cephalexin      and loracarbef.      LABURIN  06/05/2018      Comment:        CULTURE, URINE, ROUTINE         MICRO NUMBER:      [id number]    TEST STATUS:       FINAL    SPECIMEN SOURCE:   URINE    SPECIMEN QUALITY:  ADEQUATE    RESULT:            Three or more organisms present, each greater                       than 10,000 cu/mL. May represent normal kali                       contamination from external genitalia. No  further                       testing is required.           REVIEW OF SYSTEMS: as stated above in hpi    PATIENT HISTORY:  Past Medical History:   Diagnosis Date    Cataract        Past Surgical History:   Procedure Laterality Date    EYE SURGERY Bilateral 2020 August    cataract removal    HYSTERECTOMY      TOTAL REDUCTION MAMMOPLASTY  2002       Allergies:  Niacin preparations and Penicillins      PHYSICAL EXAMINATION:  Constitutional: She is oriented to person, place, and time. She appears well-developed and well-nourished.  She is in no apparent distress.  Abdominal:  She exhibits no distension.  There is no CVA tenderness.   Neurological: She is alert and oriented to person, place, and time.   Psych: Cooperative with normal affect.    Physical Exam      LABS:    Lab Results   Component Value Date    CREATININE 0.8 11/14/2024     Lab Results   Component Value Date    LABA1C 5.1 04/02/2018         IMPRESSION:    Encounter Diagnoses   Name Primary?    Urinary frequency Yes    OAB (overactive bladder)     Urinary incontinence, unspecified type        PLAN:  -Stop myrbetriq  Start gemtesa  Discussed side effects and indications for gemtesa. Prescription sent to the pharmacy. Pt verbalized understanding.    If no improvement with gemtesa, needs follow up with MD for further evaluation. Can consider botox injections or interstim.    -Conservative measures to control urgency and frequency including   1. Avoiding/minimizing bladder irritants (see below), especially in afternoon and evening hours  Discussed bladder irritants include coffee (even decaf), tea, alcohol, soda, spicy foods, acidic juices (orange, tomato), vinegar, and artificial sweeteners/sugary beverages.  2. timed voiding - empty on a schedule (approx ~2-3 hours) in spite of need to urinate, to get ahead of urge  3. dont postponing voiding - dont hold it on purpose   4. bowel regimen as distended bowel has extrinsic compressive effect on bladder.   - any  or all of the following in any combination, titrate to soft daily bowel movement without pushing or straining  - colace/stool softener capsule - once to twice daily  - miralax - 1 capful daily to start, can increase to 2x daily (or decrease to 1/2 cap daily)  - increase dietary fibery  - fiber supplements, such as metamucil  - prunes, prune juice  5. INCREASE water intake  6. Stop fluids 2 hours before bed, and urinate just before bed    -RTC 3 months    I encouraged her or any of her family members to call or email me with questions and/or concerns.    Visit today is associated with current or anticipated ongoing medical care related to this patient's single serious condition/complex condition, OAB      30 minutes of total time spent on the encounter, which includes face to face time and non-face to face time preparing to see the patient (eg, review of tests), Obtaining and/or reviewing separately obtained history, Documenting clinical information in the electronic or other health record, Independently interpreting results (not separately reported) and communicating results to the patient/family/caregiver, or Care coordination (not separately reported).

## 2025-04-16 NOTE — PATIENT INSTRUCTIONS
Stop myrbetriq    Conservative measures to control urgency and frequency including   1. Avoiding/minimizing bladder irritants (see below), especially in afternoon and evening hours    Discussed bladder irritants include coffee (even decaf), tea, alcohol, soda, spicy foods, acidic juices (orange, tomato), vinegar, and artificial sweeteners/sugary beverages.    2. timed voiding - empty on a schedule (approx ~2-3 hours) in spite of need to urinate, to get ahead of urge    3. dont postponing voiding - dont hold it on purpose     4. bowel regimen as distended bowel has extrinsic compressive effect on bladder.   - any or all of the following in any combination, titrate to soft daily bowel movement without pushing or straining  - colace/stool softener capsule - once to twice daily  - miralax - 1 capful daily to start, can increase to 2x daily (or decrease to 1/2 cap daily)  - increase dietary fibery  - fiber supplements, such as metamucil  - prunes, prune juice    5. INCREASE water intake    6. Stop fluids 2 hours before bed, and urinate just before bed

## 2025-04-26 DIAGNOSIS — I10 HYPERTENSION, ESSENTIAL: ICD-10-CM

## 2025-04-26 RX ORDER — LOSARTAN POTASSIUM 100 MG/1
100 TABLET ORAL DAILY
Qty: 90 TABLET | Refills: 1 | Status: SHIPPED | OUTPATIENT
Start: 2025-04-26 | End: 2026-04-26

## 2025-04-26 NOTE — TELEPHONE ENCOUNTER
No care due was identified.  Health [facility] Embedded Care Due Messages. Reference number: [id number].   4/26/2025 7:33:00 AM CDT

## 2025-04-30 ENCOUNTER — TELEPHONE (OUTPATIENT)
Dept: FAMILY MEDICINE | Facility: CLINIC | Age: 75
End: 2025-04-30
Payer: MEDICARE

## 2025-04-30 NOTE — TELEPHONE ENCOUNTER
Ret call to pt coni has plavix sent refill on 2/19/2025 a 90 d and 1 add refill just call [facility] .

## 2025-05-20 ENCOUNTER — TELEPHONE (OUTPATIENT)
Dept: NEUROSURGERY | Facility: CLINIC | Age: 75
End: 2025-05-20
Payer: MEDICARE

## 2025-05-20 DIAGNOSIS — G95.89 SPINAL CORD MASS: Primary | ICD-10-CM

## 2025-05-21 ENCOUNTER — HOSPITAL ENCOUNTER (OUTPATIENT)
Dept: RADIOLOGY | Facility: HOSPITAL | Age: 75
Discharge: HOME | End: 2025-05-21
Attending: NEUROLOGICAL SURGERY
Payer: MEDICARE

## 2025-05-21 DIAGNOSIS — G95.89 SPINAL CORD MASS: ICD-10-CM

## 2025-05-21 PROCEDURE — 70553 MRI BRAIN STEM W/O & W/DYE: CPT | Mod: TC,PO

## 2025-05-21 PROCEDURE — A9585 GADOBUTROL INJECTION: HCPCS | Mod: PO | Performed by: NEUROLOGICAL SURGERY

## 2025-05-21 PROCEDURE — 70553 MRI BRAIN STEM W/O & W/DYE: CPT | Mod: 26,,, | Performed by: RADIOLOGY

## 2025-05-21 PROCEDURE — 25500020 PHARM REV CODE 255: Mod: PO | Performed by: NEUROLOGICAL SURGERY

## 2025-05-21 RX ORDER — GADOBUTROL 604.72 MG/ML
7 INJECTION INTRAVENOUS
Status: COMPLETED | OUTPATIENT
Start: 2025-05-21 | End: 2025-05-21

## 2025-05-21 RX ADMIN — GADOBUTROL 7 ML: 604.72 INJECTION INTRAVENOUS at 01:05

## 2025-05-24 NOTE — TELEPHONE ENCOUNTER
Care Due:                  Date            Visit Type   Department     Provider  --------------------------------------------------------------------------------                                [facility]                              [facility]  [facility] Visit: 04-      [facility] ([facility]   [facility] [place]                              EP -                              [facility]  Next Visit: 07-      [facility] ([facility]   [facility] [place]                                                            Last  Test          Frequency    Reason                     Performed    Due Date  --------------------------------------------------------------------------------    Mg Level....  12 months..  alendronate..............  05-   05-    Health [facility] Embedded Care Due Messages. Reference number: [id number].   5/24/2025 12:28:30 PM CDT

## 2025-05-25 RX ORDER — BUPROPION HYDROCHLORIDE 300 MG/1
300 TABLET ORAL DAILY
Qty: 90 TABLET | Refills: 2 | Status: SHIPPED | OUTPATIENT
Start: 2025-05-25

## 2025-05-25 RX ORDER — AMLODIPINE BESYLATE 2.5 MG/1
2.5 TABLET ORAL DAILY
Qty: 90 TABLET | Refills: 3 | Status: SHIPPED | OUTPATIENT
Start: 2025-05-25

## 2025-05-25 RX ORDER — ALENDRONATE SODIUM 70 MG/1
70 TABLET ORAL
Qty: 12 TABLET | Refills: 3 | Status: SHIPPED | OUTPATIENT
Start: 2025-05-25

## 2025-05-25 RX ORDER — LEVOTHYROXINE SODIUM 100 UG/1
100 TABLET ORAL DAILY
Qty: 90 TABLET | Refills: 1 | Status: SHIPPED | OUTPATIENT
Start: 2025-05-25

## 2025-05-25 RX ORDER — DIVALPROEX SODIUM 500 MG/1
500 TABLET, FILM COATED, EXTENDED RELEASE ORAL DAILY
Qty: 90 TABLET | Refills: 0 | Status: SHIPPED | OUTPATIENT
Start: 2025-05-25

## 2025-06-03 ENCOUNTER — OFFICE VISIT (OUTPATIENT)
Dept: NEUROSURGERY | Facility: CLINIC | Age: 75
End: 2025-06-03
Attending: NEUROLOGICAL SURGERY
Payer: MEDICARE

## 2025-06-03 VITALS
DIASTOLIC BLOOD PRESSURE: 71 MMHG | HEART RATE: 70 BPM | WEIGHT: 154.13 LBS | SYSTOLIC BLOOD PRESSURE: 153 MMHG | HEIGHT: 66 IN | BODY MASS INDEX: 24.77 KG/M2

## 2025-06-03 DIAGNOSIS — G95.89 SPINAL CORD MASS: Primary | ICD-10-CM

## 2025-06-03 PROCEDURE — 99215 OFFICE O/P EST HI 40 MIN: CPT | Mod: S$PBB,,, | Performed by: NEUROLOGICAL SURGERY

## 2025-06-27 ENCOUNTER — OFFICE VISIT (OUTPATIENT)
Dept: FAMILY MEDICINE | Facility: CLINIC | Age: 75
End: 2025-06-27
Payer: MEDICARE

## 2025-06-27 VITALS
TEMPERATURE: 98 F | OXYGEN SATURATION: 97 % | HEIGHT: 66 IN | WEIGHT: 157.94 LBS | DIASTOLIC BLOOD PRESSURE: 82 MMHG | SYSTOLIC BLOOD PRESSURE: 140 MMHG | RESPIRATION RATE: 18 BRPM | HEART RATE: 71 BPM | BODY MASS INDEX: 25.38 KG/M2

## 2025-06-27 DIAGNOSIS — R06.02 SOB (SHORTNESS OF BREATH): ICD-10-CM

## 2025-06-27 DIAGNOSIS — J40 BRONCHITIS: Primary | ICD-10-CM

## 2025-06-27 DIAGNOSIS — R05.1 ACUTE COUGH: ICD-10-CM

## 2025-06-27 PROCEDURE — 99214 OFFICE O/P EST MOD 30 MIN: CPT | Mod: S$PBB,,,

## 2025-06-27 PROCEDURE — 99215 OFFICE O/P EST HI 40 MIN: CPT | Mod: PBBFAC,PN

## 2025-06-27 PROCEDURE — 99999 PR PBB SHADOW E&M-EST. PATIENT-LVL V: CPT | Mod: PBBFAC,,,

## 2025-06-27 RX ORDER — ALBUTEROL SULFATE 90 UG/1
2 INHALANT RESPIRATORY (INHALATION) EVERY 6 HOURS PRN
Qty: 6.7 G | Refills: 0 | Status: SHIPPED | OUTPATIENT
Start: 2025-06-27

## 2025-06-27 RX ORDER — AZITHROMYCIN 1 G/1
1 POWDER, FOR SUSPENSION ORAL ONCE
Qty: 1 PACKET | Refills: 0 | Status: SHIPPED | OUTPATIENT
Start: 2025-06-27 | End: 2025-06-30 | Stop reason: CLARIF

## 2025-06-27 RX ORDER — BENZONATATE 200 MG/1
200 CAPSULE ORAL 3 TIMES DAILY PRN
Qty: 30 CAPSULE | Refills: 1 | Status: SHIPPED | OUTPATIENT
Start: 2025-06-27

## 2025-06-27 RX ORDER — PREDNISONE 20 MG/1
20 TABLET ORAL DAILY
Qty: 3 TABLET | Refills: 0 | Status: SHIPPED | OUTPATIENT
Start: 2025-06-27 | End: 2025-06-30

## 2025-06-27 NOTE — PROGRESS NOTES
Subjective:       Patient ID: Kasandra Jones is a 74 y.o. female.    Chief Complaint: Sore Throat, Cough, Nasal Congestion, Chest Congestion, and Wheezing    History of Present Illness     CHIEF COMPLAINT: Patient presents today for cough and chest congestion    HISTORY OF PRESENT ILLNESS: She reports illness onset around Tuesday following exposure to friends visiting from [place] who had a cold the week prior to their visit. She experiences bilateral ear pain and a minimally productive cough with episodes of severe coughing leading to difficulty breathing. She reports occasional shortness of breath and others have noted she feels hot, though she denies fever or chills. She has a mild sore throat. Due to balance issues related to her illness, she is currently using a wheelchair.    CURRENT MEDICATIONS:She has been taking extra strength Tylenol and has two Tessalon pearls remaining at home. She reports intermittent use of Robitussin, though not on the day of visit.    ALLERGIES:She has allergies to niacin and penicillin.            Review of patient's allergies indicates:   Allergen Reactions    Niacin preparations     Penicillins      Social Drivers of Health     Tobacco Use: Low Risk  (6/27/2025)    Patient History     Smoking Tobacco Use: Never     Smokeless Tobacco Use: Never     Passive Exposure: Not on file   Alcohol Use: Not At Risk (3/25/2024)    AUDIT-C     Frequency of Alcohol Consumption: Never     Average Number of Drinks: Patient does not drink     Frequency of Binge Drinking: Never   Financial Resource Strain: Low Risk  (3/25/2024)    Overall Financial Resource Strain (CARDIA)     Difficulty of Paying Living Expenses: Not hard at all   Food Insecurity: No Food Insecurity (3/25/2024)    Hunger Vital Sign     Worried About Running Out of Food in the Last Year: Never true     Ran Out of Food in the Last Year: Never true   Transportation Needs: No Transportation Needs (3/25/2024)    PRAPARE -  Transportation     Lack of Transportation (Medical): No     Lack of Transportation (Non-Medical): No   Physical Activity: Inactive (3/25/2024)    Exercise Vital Sign     Days of Exercise per Week: 0 days     Minutes of Exercise per Session: 0 min   Stress: No Stress Concern Present (3/25/2024)    [employer] - Occupational Stress Questionnaire     Feeling of Stress : Only a little   Housing Stability: Low Risk  (3/25/2024)    Housing Stability Vital Sign     Unable to Pay for Housing in the Last Year: No     Number of Places Lived in the Last Year: 1     Unstable Housing in the Last Year: No   Depression: Low Risk  (1/7/2025)    Depression     Last PHQ-4: Flowsheet Data: 2   Utilities: Not on file   Health Literacy: Not on file   Social Isolation: Not on file      Past Medical History:   Diagnosis Date    Cataract       Past Surgical History:   Procedure Laterality Date    EYE SURGERY Bilateral 2020 August    cataract removal    HYSTERECTOMY      TOTAL REDUCTION MAMMOPLASTY  2002      Social History[1]    Current Medications[2]    Lab Results   Component Value Date    WBC 6.27 09/05/2024    HGB 14.5 09/05/2024    HCT 44.8 09/05/2024     09/05/2024    CHOL 203 (H) 09/05/2024    TRIG 107 09/05/2024    HDL 95 (H) 09/05/2024    ALT 23 09/05/2024    AST 20 09/05/2024     09/05/2024    K 4.5 09/05/2024     09/05/2024    CREATININE 0.7 05/21/2025    BUN 15 09/05/2024    CO2 30 (H) 09/05/2024    TSH 2.738 09/05/2024       Review of Systems   Constitutional:  Negative for chills and fever.   HENT:  Positive for ear discharge, postnasal drip and sore throat. Negative for nasal congestion.    Respiratory:  Positive for cough and shortness of breath.    Cardiovascular: Negative.        Objective:      Physical Exam  Vitals reviewed.   Constitutional:       Appearance: She is ill-appearing.   HENT:      Right Ear: Tympanic membrane normal.      Left Ear: Tympanic membrane normal.       Nose: Congestion present.      Mouth/Throat:      Pharynx: Oropharynx is clear. Posterior oropharyngeal erythema present.   Eyes:      Conjunctiva/sclera: Conjunctivae normal.   Cardiovascular:      Rate and Rhythm: Normal rate and regular rhythm.      Pulses: Normal pulses.      Heart sounds: Normal heart sounds.   Pulmonary:      Effort: Pulmonary effort is normal.      Breath sounds: Normal breath sounds.   Skin:     General: Skin is warm and dry.      Capillary Refill: Capillary refill takes less than 2 seconds.   Neurological:      Mental Status: She is alert.   Psychiatric:         Mood and Affect: Mood normal.         Thought Content: Thought content normal.         Judgment: Judgment normal.         Assessment:       1. Bronchitis    2. Acute cough    3. SOB (shortness of breath)        Plan:       Kasandra was seen today for sore throat, cough, nasal congestion, chest congestion and wheezing.    Diagnoses and all orders for this visit:    Bronchitis  -     Discontinue: azithromycin (ZITHROMAX) 1 gram Pack; Take 1 g by mouth once. for 1 dose  -     azithromycin (Z-SHRUTI) 250 MG tablet; Take 2 tablets by mouth on day 1; Take 1 tablet by mouth on days 2-5    Acute cough  -     benzonatate (TESSALON) 200 MG capsule; Take 1 capsule (200 mg total) by mouth 3 (three) times daily as needed for Cough.    SOB (shortness of breath)  -     predniSONE (DELTASONE) 20 MG tablet; Take 1 tablet (20 mg total) by mouth once daily. for 3 days  -     albuterol (VENTOLIN HFA) 90 mcg/actuation inhaler; Inhale 2 puffs into the lungs every 6 (six) hours as needed for Wheezing or Shortness of Breath. Rescue    Take Z-Shruti as prescribed.  Benzonatate or Coricidin HBP for cough.  Prednisone and albuterol for shortness a breath.  To continue taking her Zyrtec daily.  Follow-up if symptoms worsen or do not improve.        This note was generated with the assistance of ambient listening technology. Verbal consent was obtained by the  patient and accompanying visitor(s) for the recording of patient appointment to facilitate this note. I attest to having reviewed and edited the generated note for accuracy, though some syntax or spelling errors may persist. Please contact the author of this note for any clarification.         [1]   Social History  Socioeconomic History    Marital status:    [2]   Current Outpatient Medications:     alendronate (FOSAMAX) 70 MG tablet, Take 1 tablet (70 mg total) by mouth every 7 days., Disp: 12 tablet, Rfl: 3    ALPRAZolam (XANAX) 0.5 MG tablet, Take 1 tablet (0.5 mg total) by mouth daily as needed for Anxiety. PRN, Disp: 30 tablet, Rfl: 2    amLODIPine (NORVASC) 2.5 MG tablet, Take 1 tablet (2.5 mg total) by mouth once daily., Disp: 90 tablet, Rfl: 3    buPROPion (WELLBUTRIN XL) 300 MG 24 hr tablet, Take 1 tablet (300 mg total) by mouth once daily., Disp: 90 tablet, Rfl: 2    calcium carbonate (CALCIUM 600 ORAL), Take 1 tablet by mouth 2 (two) times a day., Disp: , Rfl:     cetirizine (ZYRTEC) 10 MG tablet, Take 10 mg by mouth once daily., Disp: , Rfl:     cholecalciferol, vitamin D3, 125 mcg (5,000 unit) capsule, Take 5,000 Units by mouth once daily., Disp: , Rfl:     clopidogreL (PLAVIX) 75 mg tablet, Take 1 tablet (75 mg total) by mouth once daily., Disp: 90 tablet, Rfl: 1    copper gluconate 2 mg Tab, Take 1 tablet by mouth once daily., Disp: , Rfl:     cyanocobalamin-cobamamide (B12) 5,000-100 mcg Lozg, Take 1 tablet by mouth once daily., Disp: , Rfl:     diltiaZEM (CARDIZEM CD) 120 MG Cp24, Take 1 capsule (120 mg total) by mouth once daily., Disp: 90 capsule, Rfl: 1    diphenoxylate-atropine 2.5-0.025 mg (LOMOTIL) 2.5-0.025 mg per tablet, Take 1 tablet by mouth 4 (four) times daily as needed for Diarrhea., Disp: 30 tablet, Rfl: 1    divalproex ER (DEPAKOTE ER) 500 MG Tb24 24 hr tablet, Take 1 tablet (500 mg total) by mouth once daily., Disp: 90 tablet, Rfl: 0    ferrous sulfate 325 (65 FE) MG EC  tablet, Take 325 mg by mouth once daily., Disp: , Rfl:     levothyroxine (SYNTHROID) 100 MCG tablet, Take 1 tablet (100 mcg total) by mouth once daily., Disp: 90 tablet, Rfl: 1    losartan (COZAAR) 100 MG tablet, Take 1 tablet (100 mg total) by mouth once daily., Disp: 90 tablet, Rfl: 1    magnesium oxide (MAG-OX) 400 mg (241.3 mg magnesium) tablet, Take 1 tablet (400 mg total) by mouth once daily., Disp: 90 tablet, Rfl: 3    pantoprazole (PROTONIX) 40 MG tablet, Take 1 tablet (40 mg total) by mouth every morning., Disp: 90 tablet, Rfl: 3    potassium chloride SA (K-DUR,KLOR-CON M) 10 MEQ tablet, Take 1 tablet (10 mEq total) by mouth 2 (two) times daily., Disp: 180 tablet, Rfl: 1    pyridoxine, vitamin B6, (VITAMIN B-6) 100 MG Tab, Take 50 mg by mouth once daily., Disp: , Rfl:     traZODone (DESYREL) 100 MG tablet, Take 1 tablet (100 mg total) by mouth every evening., Disp: 90 tablet, Rfl: 1    vibegron 75 mg Tab, Take 1 tablet (75 mg total) by mouth once daily., Disp: 30 tablet, Rfl: 11    vit C,V-Wx-eouxx-lutein-zeaxan (PRESERVISION AREDS 2) 250-90-40-1 mg Cap, Take 1 capsule by mouth 2 (two) times a day., Disp: , Rfl:     albuterol (VENTOLIN HFA) 90 mcg/actuation inhaler, Inhale 2 puffs into the lungs every 6 (six) hours as needed for Wheezing or Shortness of Breath. Rescue, Disp: 6.7 g, Rfl: 0    azithromycin (Z-SHRUTI) 250 MG tablet, Take 2 tablets by mouth on day 1; Take 1 tablet by mouth on days 2-5, Disp: , Rfl:     benzonatate (TESSALON) 200 MG capsule, Take 1 capsule (200 mg total) by mouth 3 (three) times daily as needed for Cough., Disp: 30 capsule, Rfl: 1    fluticasone propionate (FLONASE) 50 mcg/actuation nasal spray, INSTILL ONE SPRAY IN EACH NOSTRIL EVERY DAY, Disp: 16 g, Rfl: 2    magnesium oxide (MAG-OX) 400 mg (241.3 mg magnesium) tablet, Take 400 mg by mouth once daily., Disp: , Rfl:     meclizine (ANTIVERT) 25 mg tablet, Take 1 tablet (25 mg total) by mouth every 6 (six) hours. (Patient not  taking: Reported on 6/27/2025), Disp: 30 tablet, Rfl: 1    predniSONE (DELTASONE) 20 MG tablet, Take 1 tablet (20 mg total) by mouth once daily. for 3 days, Disp: 3 tablet, Rfl: 0    tiZANidine (ZANAFLEX) 4 MG tablet, Take 1 tablet (4 mg total) by mouth 2 (two) times daily as needed (back pain). (Patient not taking: Reported on 6/27/2025), Disp: 60 tablet, Rfl: 1

## 2025-06-30 RX ORDER — AZITHROMYCIN 250 MG/1
TABLET, FILM COATED ORAL
Start: 2025-06-30 | End: 2025-07-05

## 2025-07-07 ENCOUNTER — LAB VISIT (OUTPATIENT)
Dept: LAB | Facility: HOSPITAL | Age: 75
End: 2025-07-07
Attending: FAMILY MEDICINE
Payer: MEDICARE

## 2025-07-07 DIAGNOSIS — Z79.02 VISIT FOR MONITORING PLAVIX THERAPY: ICD-10-CM

## 2025-07-07 DIAGNOSIS — F31.9 BIPOLAR 1 DISORDER: ICD-10-CM

## 2025-07-07 DIAGNOSIS — E03.9 HYPOTHYROIDISM, UNSPECIFIED TYPE: ICD-10-CM

## 2025-07-07 DIAGNOSIS — Z51.81 VISIT FOR MONITORING PLAVIX THERAPY: ICD-10-CM

## 2025-07-07 DIAGNOSIS — I10 HYPERTENSION, ESSENTIAL: ICD-10-CM

## 2025-07-07 LAB
ABSOLUTE EOSINOPHIL (SMH): 0.15 K/UL
ABSOLUTE MONOCYTE (SMH): 0.62 K/UL (ref 0.3–1)
ABSOLUTE NEUTROPHIL COUNT (SMH): 3.8 K/UL (ref 1.8–7.7)
ALBUMIN SERPL-MCNC: 3.5 G/DL (ref 3.5–5.2)
ALP SERPL-CCNC: 65 UNIT/L (ref 55–135)
ALT SERPL-CCNC: 37 UNIT/L (ref 10–44)
ANION GAP (SMH): 6 MMOL/L (ref 8–16)
AST SERPL-CCNC: 29 UNIT/L (ref 10–40)
BASOPHILS # BLD AUTO: 0.06 K/UL
BASOPHILS NFR BLD AUTO: 0.8 %
BILIRUB SERPL-MCNC: 0.3 MG/DL (ref 0.1–1)
BUN SERPL-MCNC: 17 MG/DL (ref 8–23)
CALCIUM SERPL-MCNC: 8.7 MG/DL (ref 8.7–10.5)
CHLORIDE SERPL-SCNC: 104 MMOL/L (ref 95–110)
CHOLEST SERPL-MCNC: 181 MG/DL (ref 120–199)
CHOLEST/HDLC SERPL: 2.4 {RATIO} (ref 2–5)
CO2 SERPL-SCNC: 31 MMOL/L (ref 23–29)
CREAT SERPL-MCNC: 0.6 MG/DL (ref 0.5–1.4)
ERYTHROCYTE [DISTWIDTH] IN BLOOD BY AUTOMATED COUNT: 12.8 % (ref 11.5–14.5)
GFR SERPLBLD CREATININE-BSD FMLA CKD-EPI: >60 ML/MIN/1.73/M2
GLUCOSE SERPL-MCNC: 92 MG/DL (ref 70–110)
HCT VFR BLD AUTO: 39.9 % (ref 37–48.5)
HDLC SERPL-MCNC: 74 MG/DL (ref 40–75)
HDLC SERPL: 40.9 % (ref 20–50)
HGB BLD-MCNC: 12.9 GM/DL (ref 12–16)
IMM GRANULOCYTES # BLD AUTO: 0.04 K/UL (ref 0–0.04)
IMM GRANULOCYTES NFR BLD AUTO: 0.6 % (ref 0–0.5)
LDLC SERPL CALC-MCNC: 76.2 MG/DL (ref 63–159)
LYMPHOCYTES # BLD AUTO: 2.47 K/UL (ref 1–4.8)
MCH RBC QN AUTO: 32.5 PG (ref 27–31)
MCHC RBC AUTO-ENTMCNC: 32.3 G/DL (ref 32–36)
MCV RBC AUTO: 101 FL (ref 82–98)
NONHDLC SERPL-MCNC: 107 MG/DL
NUCLEATED RBC (/100WBC) (SMH): 0 /100 WBC
PLATELET # BLD AUTO: 318 K/UL (ref 150–450)
PMV BLD AUTO: 10 FL (ref 9.2–12.9)
POTASSIUM SERPL-SCNC: 4.1 MMOL/L (ref 3.5–5.1)
PROT SERPL-MCNC: 6.3 GM/DL (ref 6–8.4)
RBC # BLD AUTO: 3.97 M/UL (ref 4–5.4)
RELATIVE EOSINOPHIL (SMH): 2.1 % (ref 0–8)
RELATIVE LYMPHOCYTE (SMH): 34.4 % (ref 18–48)
RELATIVE MONOCYTE (SMH): 8.6 % (ref 4–15)
RELATIVE NEUTROPHIL (SMH): 53.5 % (ref 38–73)
SODIUM SERPL-SCNC: 141 MMOL/L (ref 136–145)
TRIGL SERPL-MCNC: 154 MG/DL (ref 30–150)
TSH SERPL-ACNC: 2.5 UIU/ML (ref 0.34–5.6)
VALPROATE SERPL-MCNC: 19.3 UG/ML (ref 50–100)
WBC # BLD AUTO: 7.17 K/UL (ref 3.9–12.7)

## 2025-07-07 PROCEDURE — 80061 LIPID PANEL: CPT

## 2025-07-07 PROCEDURE — 80164 ASSAY DIPROPYLACETIC ACD TOT: CPT

## 2025-07-07 PROCEDURE — 85025 COMPLETE CBC W/AUTO DIFF WBC: CPT

## 2025-07-07 PROCEDURE — 36415 COLL VENOUS BLD VENIPUNCTURE: CPT

## 2025-07-07 PROCEDURE — 84443 ASSAY THYROID STIM HORMONE: CPT

## 2025-07-07 PROCEDURE — 82947 ASSAY GLUCOSE BLOOD QUANT: CPT

## 2025-07-08 ENCOUNTER — HOSPITAL ENCOUNTER (EMERGENCY)
Facility: HOSPITAL | Age: 75
Discharge: HOME | End: 2025-07-08
Attending: EMERGENCY MEDICINE
Payer: MEDICARE

## 2025-07-08 ENCOUNTER — OFFICE VISIT (OUTPATIENT)
Dept: FAMILY MEDICINE | Facility: CLINIC | Age: 75
End: 2025-07-08
Payer: MEDICARE

## 2025-07-08 VITALS
HEART RATE: 143 BPM | OXYGEN SATURATION: 98 % | HEIGHT: 66 IN | BODY MASS INDEX: 25.5 KG/M2 | TEMPERATURE: 98 F | DIASTOLIC BLOOD PRESSURE: 68 MMHG | SYSTOLIC BLOOD PRESSURE: 108 MMHG

## 2025-07-08 VITALS
SYSTOLIC BLOOD PRESSURE: 101 MMHG | HEART RATE: 89 BPM | DIASTOLIC BLOOD PRESSURE: 70 MMHG | TEMPERATURE: 98 F | HEIGHT: 66 IN | WEIGHT: 150 LBS | OXYGEN SATURATION: 98 % | BODY MASS INDEX: 24.11 KG/M2 | RESPIRATION RATE: 16 BRPM

## 2025-07-08 DIAGNOSIS — J32.9 SINUSITIS, UNSPECIFIED CHRONICITY, UNSPECIFIED LOCATION: ICD-10-CM

## 2025-07-08 DIAGNOSIS — Z99.89 USES WALKER: ICD-10-CM

## 2025-07-08 DIAGNOSIS — I10 HYPERTENSION, ESSENTIAL: Primary | ICD-10-CM

## 2025-07-08 DIAGNOSIS — I48.91 ATRIAL FIBRILLATION, UNSPECIFIED TYPE: ICD-10-CM

## 2025-07-08 DIAGNOSIS — E03.9 HYPOTHYROIDISM, UNSPECIFIED TYPE: ICD-10-CM

## 2025-07-08 DIAGNOSIS — M25.50 ARTHRALGIA, UNSPECIFIED JOINT: ICD-10-CM

## 2025-07-08 DIAGNOSIS — F31.9 BIPOLAR 1 DISORDER: ICD-10-CM

## 2025-07-08 DIAGNOSIS — D75.89 MACROCYTOSIS: ICD-10-CM

## 2025-07-08 DIAGNOSIS — Z51.81 VISIT FOR MONITORING PLAVIX THERAPY: ICD-10-CM

## 2025-07-08 DIAGNOSIS — Z78.0 MENOPAUSE: ICD-10-CM

## 2025-07-08 DIAGNOSIS — I49.9 ARRHYTHMIA: ICD-10-CM

## 2025-07-08 DIAGNOSIS — R53.1 WEAKNESS: ICD-10-CM

## 2025-07-08 DIAGNOSIS — R32 URINARY INCONTINENCE, UNSPECIFIED TYPE: ICD-10-CM

## 2025-07-08 DIAGNOSIS — I48.92 ATRIAL FLUTTER, UNSPECIFIED TYPE: ICD-10-CM

## 2025-07-08 DIAGNOSIS — N32.81 OVERACTIVE BLADDER: ICD-10-CM

## 2025-07-08 DIAGNOSIS — E78.5 HYPERLIPIDEMIA, UNSPECIFIED HYPERLIPIDEMIA TYPE: ICD-10-CM

## 2025-07-08 DIAGNOSIS — Z79.02 VISIT FOR MONITORING PLAVIX THERAPY: ICD-10-CM

## 2025-07-08 DIAGNOSIS — Z13.820 OSTEOPOROSIS SCREENING: ICD-10-CM

## 2025-07-08 DIAGNOSIS — I48.92 ATRIAL FLUTTER BY ELECTROCARDIOGRAM: Primary | ICD-10-CM

## 2025-07-08 DIAGNOSIS — J40 BRONCHITIS: ICD-10-CM

## 2025-07-08 LAB
ABSOLUTE EOSINOPHIL (SMH): 0.08 K/UL
ABSOLUTE MONOCYTE (SMH): 0.67 K/UL (ref 0.3–1)
ABSOLUTE NEUTROPHIL COUNT (SMH): 5.7 K/UL (ref 1.8–7.7)
ALBUMIN SERPL-MCNC: 3.9 G/DL (ref 3.5–5.2)
ALP SERPL-CCNC: 76 UNIT/L (ref 55–135)
ALT SERPL-CCNC: 34 UNIT/L (ref 10–44)
ANION GAP (SMH): 10 MMOL/L (ref 8–16)
AST SERPL-CCNC: 26 UNIT/L (ref 10–40)
BASOPHILS # BLD AUTO: 0.07 K/UL
BASOPHILS NFR BLD AUTO: 0.8 %
BILIRUB SERPL-MCNC: 0.3 MG/DL (ref 0.1–1)
BNP SERPL-MCNC: 191 PG/ML
BUN SERPL-MCNC: 20 MG/DL (ref 8–23)
CALCIUM SERPL-MCNC: 9.5 MG/DL (ref 8.7–10.5)
CHLORIDE SERPL-SCNC: 104 MMOL/L (ref 95–110)
CO2 SERPL-SCNC: 26 MMOL/L (ref 23–29)
CREAT SERPL-MCNC: 0.8 MG/DL (ref 0.5–1.4)
ERYTHROCYTE [DISTWIDTH] IN BLOOD BY AUTOMATED COUNT: 13 % (ref 11.5–14.5)
GFR SERPLBLD CREATININE-BSD FMLA CKD-EPI: >60 ML/MIN/1.73/M2
GLUCOSE SERPL-MCNC: 120 MG/DL (ref 70–110)
HCT VFR BLD AUTO: 47 % (ref 37–48.5)
HGB BLD-MCNC: 15.3 GM/DL (ref 12–16)
IMM GRANULOCYTES # BLD AUTO: 0.06 K/UL (ref 0–0.04)
IMM GRANULOCYTES NFR BLD AUTO: 0.7 % (ref 0–0.5)
LYMPHOCYTES # BLD AUTO: 2.55 K/UL (ref 1–4.8)
MAGNESIUM SERPL-MCNC: 2.4 MG/DL (ref 1.6–2.6)
MCH RBC QN AUTO: 32.6 PG (ref 27–31)
MCHC RBC AUTO-ENTMCNC: 32.6 G/DL (ref 32–36)
MCV RBC AUTO: 100 FL (ref 82–98)
NUCLEATED RBC (/100WBC) (SMH): 0 /100 WBC
OHS QRS DURATION: 82 MS
OHS QTC CALCULATION: 429 MS
PLATELET # BLD AUTO: 403 K/UL (ref 150–450)
PMV BLD AUTO: 10.1 FL (ref 9.2–12.9)
POTASSIUM SERPL-SCNC: 4.4 MMOL/L (ref 3.5–5.1)
PROT SERPL-MCNC: 7 GM/DL (ref 6–8.4)
RBC # BLD AUTO: 4.69 M/UL (ref 4–5.4)
RELATIVE EOSINOPHIL (SMH): 0.9 % (ref 0–8)
RELATIVE LYMPHOCYTE (SMH): 28.1 % (ref 18–48)
RELATIVE MONOCYTE (SMH): 7.4 % (ref 4–15)
RELATIVE NEUTROPHIL (SMH): 62.1 % (ref 38–73)
SODIUM SERPL-SCNC: 140 MMOL/L (ref 136–145)
TROPONIN HIGH SENSITIVE (SMH): 24.2 PG/ML
WBC # BLD AUTO: 9.09 K/UL (ref 3.9–12.7)

## 2025-07-08 PROCEDURE — 82565 ASSAY OF CREATININE: CPT | Performed by: NURSE PRACTITIONER

## 2025-07-08 PROCEDURE — 99999 PR PBB SHADOW E&M-EST. PATIENT-LVL V: CPT | Mod: PBBFAC,,, | Performed by: FAMILY MEDICINE

## 2025-07-08 PROCEDURE — 93005 ELECTROCARDIOGRAM TRACING: CPT | Performed by: GENERAL PRACTICE

## 2025-07-08 PROCEDURE — 99285 EMERGENCY DEPT VISIT HI MDM: CPT | Mod: 25

## 2025-07-08 PROCEDURE — 99215 OFFICE O/P EST HI 40 MIN: CPT | Mod: PBBFAC,PN | Performed by: FAMILY MEDICINE

## 2025-07-08 PROCEDURE — G2211 COMPLEX E/M VISIT ADD ON: HCPCS | Mod: ,,, | Performed by: FAMILY MEDICINE

## 2025-07-08 PROCEDURE — 83880 ASSAY OF NATRIURETIC PEPTIDE: CPT | Performed by: NURSE PRACTITIONER

## 2025-07-08 PROCEDURE — 99214 OFFICE O/P EST MOD 30 MIN: CPT | Mod: S$PBB,,, | Performed by: FAMILY MEDICINE

## 2025-07-08 PROCEDURE — 83735 ASSAY OF MAGNESIUM: CPT | Performed by: NURSE PRACTITIONER

## 2025-07-08 PROCEDURE — 93010 ELECTROCARDIOGRAM REPORT: CPT | Mod: 76,,, | Performed by: GENERAL PRACTICE

## 2025-07-08 PROCEDURE — 85025 COMPLETE CBC W/AUTO DIFF WBC: CPT | Performed by: NURSE PRACTITIONER

## 2025-07-08 PROCEDURE — 84484 ASSAY OF TROPONIN QUANT: CPT | Performed by: NURSE PRACTITIONER

## 2025-07-08 PROCEDURE — 93010 ELECTROCARDIOGRAM REPORT: CPT | Mod: ,,, | Performed by: GENERAL PRACTICE

## 2025-07-08 NOTE — FIRST PROVIDER EVALUATION
Emergency Department TeleTriage Encounter Note      CHIEF COMPLAINT    Chief Complaint   Patient presents with    Irregular Heart Beat     Pt had EKG done at md office she was in aflutter at 143 normal EKG here.        VITAL SIGNS   Initial Vitals [07/08/25 1508]   BP Pulse Resp Temp SpO2   101/70 89 16 97.7 °F (36.5 °C) 98 %      MAP       --            ALLERGIES    Review of patient's allergies indicates:   Allergen Reactions    Niacin preparations     Penicillins        PROVIDER TRIAGE NOTE  Verbal consent for the teletriage evaluation was given by the patient at the start of the evaluation.  All efforts will be made to maintain patient's privacy during the evaluation.      This is a teletriage evaluation of a 74 y.o. female presenting to the ED with c/o elevated HR; sent by PCP for A-flutter.  Reports generalized weakness that started this morning. Limited physical exam via telehealth: The patient is awake, alert, answering questions appropriately and is not in respiratory distress.  As the Teletriage provider, I performed an initial assessment and ordered appropriate labs and imaging studies, if any, to facilitate the patient's care once placed in the ED. Once a room is available, care and a full evaluation will be completed by an alternate ED provider.  Any additional orders and the final disposition will be determined by that provider.  All imaging and labs will not be followed-up by the Teletriage Team, including myself.          ORDERS  Labs Reviewed - No data to display    ED Orders (720h ago, onward)      Start Ordered     Status Ordering Provider    07/08/25 1717 07/08/25 1516  Troponin I High Sensitivity #2  Once Timed         Ordered CLINTON WING    07/08/25 1517 07/08/25 1516  Magnesium  STAT         Ordered CLINTON WING    07/08/25 1517 07/08/25 1516  Vital signs  Every 15 min         Ordered CLINTON WING    07/08/25 1517 07/08/25 1516  Cardiac Monitoring - Adult  Continuous        Comments:  Notify Physician If:    Ordered CLINTON WING    07/08/25 1517 07/08/25 1516  Pulse Oximetry Continuous  Continuous         Ordered CLINTON WING    07/08/25 1517 07/08/25 1516  Diet NPO  Diet effective now         Ordered CLINTON WING    07/08/25 1517 07/08/25 1516  Saline lock IV  Once         Ordered CLINTON WING    07/08/25 1517 07/08/25 1516  CBC auto differential  STAT         Ordered CLINTON WING    07/08/25 1517 07/08/25 1516  Comprehensive metabolic panel  STAT         Ordered CLINTON WING    07/08/25 1517 07/08/25 1516  Troponin I High Sensitivity #1  STAT         Ordered CLINTON WING    07/08/25 1517 07/08/25 1516  B-Type natriuretic peptide (BNP)  STAT         Ordered CLINTON WING    07/08/25 1517 07/08/25 1516  X-Ray Chest AP Portable  1 time imaging         Ordered CLINTON WING    07/08/25 1517 07/08/25 1516  Urinalysis, Reflex to Urine Culture Urine, Clean Catch  STAT         Ordered CLINTON WING    07/08/25 1456 07/08/25 1455  EKG 12-lead  Once         Completed by MOSES CHUNG on 7/8/2025 at  3:00 PM BEATA ANSARI              Virtual Visit Note: The provider triage portion of this emergency department evaluation and documentation was performed via [employer], a HIPAA-compliant telemedicine application, in concert with a tele-presenter in the room. A face to face patient evaluation with one of my colleagues will occur once the patient is placed in an emergency department room.      DISCLAIMER: This note was prepared with [employer] voice recognition transcription software. Garbled syntax, mangled pronouns, and other bizarre constructions may be attributed to that software system.

## 2025-07-08 NOTE — ED PROVIDER NOTES
Encounter Date: 7/8/2025       History     Chief Complaint   Patient presents with    Irregular Heart Beat     Pt had EKG done at md office she was in aflutter at 143 normal EKG here.      HPI  73 y/o PAF on diltiazem and Plavix, hypertension, hypothyroidism, spinal cord mass presents to the emergency department sent from primary care provider for abnormal EKG.  Patient is being followed by Dr. Ford going for routine appointment today obtain EKG noted the patient tachycardic to 140s with findings of atrial flutter.  Patient states she had been normal state of health until today when she does noted to feel fatigue in neck pain which he reports has been chronic.  Reports to dizziness that is positional however longstanding in addition to decreased p.o. intake over the last couple of days.  Denies any headache, recent falls, blurry vision, paresthesia or numbness.  Family member at bedside states that patient has overcome recent viral illness that was ongoing for the last 3 weeks.  Otherwise patient denies any chest pain, shortness of breath, palpitations, nausea or vomiting diarrhea, decreased urine output. Reports medication compliance to meds.    Review of patient's allergies indicates:   Allergen Reactions    Niacin preparations     Penicillins      Past Medical History:   Diagnosis Date    Cataract      Past Surgical History:   Procedure Laterality Date    EYE SURGERY Bilateral 2020 August    cataract removal    HYSTERECTOMY      TOTAL REDUCTION MAMMOPLASTY  2002     Family History   Problem Relation Name Age of Onset    Breast cancer Maternal Aunt       Social History[1]  Review of Systems  See HPI above  Physical Exam     Initial Vitals [07/08/25 1508]   BP Pulse Resp Temp SpO2   101/70 89 16 97.7 °F (36.5 °C) 98 %      MAP       --         Physical Exam    Nursing note and vitals reviewed.  HENT:   Head: Normocephalic and atraumatic.   Eyes: EOM are normal. Pupils are equal, round, and reactive to light.    Cardiovascular:  Normal rate, regular rhythm and normal heart sounds.           No murmur heard.  Pulmonary/Chest: She has no wheezes. She has no rhonchi. She has no rales.   Abdominal: There is no abdominal tenderness. There is no rebound and no guarding.   Musculoskeletal:         General: No edema.     Neurological: She is alert and oriented to person, place, and time.         ED Course   Procedures  Labs Reviewed   MAGNESIUM   CBC W/ AUTO DIFFERENTIAL    Narrative:     The following orders were created for panel order CBC auto differential.  Procedure                               Abnormality         Status                     ---------                               -----------         ------                     CBC with Differential[[id number]]                           In process                   Please view results for these tests on the individual orders.   COMPREHENSIVE METABOLIC PANEL   TROPONIN I HIGH SENSITIVITY   B-TYPE NATRIURETIC PEPTIDE   URINALYSIS, REFLEX TO URINE CULTURE   CBC WITH DIFFERENTIAL   TROPONIN I HIGH SENSITIVITY        ECG Results              EKG 12-lead (In process)        Collection Time Result Time QRS Duration OHS QTC Calculation    07/08/25 14:57:12 07/08/25 15:24:09 82 429                     In process by Interface, Lab In [facility] (07/08/25 15:24:16)                   Narrative:    Test Reason : R53.1,    Vent. Rate :  95 BPM     Atrial Rate :  95 BPM     P-R Int : 160 ms          QRS Dur :  82 ms      QT Int : 342 ms       P-R-T Axes :  68  56  79 degrees    QTcB Int : 429 ms    Normal sinus rhythm  Nonspecific ST abnormality  Abnormal ECG  No previous ECGs available    Referred By:            Confirmed By:                                   Imaging Results              X-Ray Chest AP Portable (Final result)  Result time 07/08/25 15:57:47      Final result by Thor Zhou DO (07/08/25 15:57:47)                   Impression:      No acute cardiopulmonary  abnormality.      Electronically signed by: Thor Abelardo  Date:    07/08/2025  Time:    15:57               Narrative:    EXAMINATION:  XR CHEST AP PORTABLE    CLINICAL HISTORY:  palpitations;    FINDINGS:  Portable chest with comparison study dated 1/9/2025.  Normal cardiomediastinal silhouette.Lungs are clear. Pulmonary vasculature is normal. No acute osseous abnormality.                                       Medications - No data to display  Medical Decision Making  74-year-old female past medical history paroxysmal Afib on Plavix due to fall risk presents to the emergency department for abnormal EKG showing atrial flutter.  Here afebrile hemodynamically stable rate controlled with heart rate of 89 EKG shows normal sinus rhythm rate of 95 without evidence of arrhythmia.  Patient at this time no history of arrhythmia rate controlled and on anticoagulation.  We will obtain lab work to assess for etiology of patient's recent atrial flutter today.  Follow lab work we will discuss with Cardiology however anticipate patient likely be discharged.  See ED course for updates.    Isiah CAVANAUGHU PGY-4 EM    Update:  Labs remarkable for troponin of 24.2 likely in setting of strain from arrhythmia, mild BNP bump at 191, labs otherwise reassuring chest x-ray no acute cardiopulmonary process.  Spoke with Dr. Cline, Cardiologist on call.  Regarding patient's care states that if patient no longer in a flutter in asymptomatic then she is okay to go from a cardiology standpoint.  Repeat EKG on my interpretation normal sinus rhythm with rate of 72.  Normal axis.  Intervals normal limits.  No findings for ischemia or arrhythmia.  Patient exam unequivocal prior.  We will discharge with follow up with primary care provider and follow up with cardiologist Dr. Blakely.  Patient amenable with plan discharged in stable condition    Isiah Tierney Jr.   [facility] PGY-4 EM        Amount and/or Complexity of Data Reviewed  Labs:  ordered. Decision-making details documented in ED Course.  Radiology: ordered and independent interpretation performed. Decision-making details documented in ED Course.  ECG/medicine tests: ordered and independent interpretation performed. Decision-making details documented in ED Course.  Discussion of management or test interpretation with external provider(s): Cardiology    Risk  OTC drugs.              Attending Attestation:   Physician Attestation Statement for Resident:  As the supervising MD   I agree with the above history.  -:   As the supervising MD I agree with the above PE.     As the supervising MD I agree with the above treatment, course, plan, and disposition.   I was personally present during the entire procedure.  I have reviewed and agree with the residents interpretation of the following: lab data.                 ED Course as of 07/08/25 1925 Tue Jul 08, 2025   1533 EKG on my interpretation noted for normal sinus rhythm rate of 95.  Normal axis.  Intervals within normal limits.  Nonspecific T-wave changes.  No ischemic findings. [CA]   1535 IN OFFICE EKG 12-LEAD (to [place])  Reviewed EKG performed and office visit today patient noted to have heart rate in the 140s with EKG noting a flutter [CA]   1609 BP: 101/70 [EF]   1610 MAP (mmHg): 80 [EF]   1610 Temp: 97.7 °F (36.5 °C) [EF]   1610 Temp Source: Oral [EF]   1610 Resp: 16 [EF]   1610 SpO2: 98 % [EF]   1610 X-Ray Chest AP Portable [EF]   1708 CBC auto differential(!)  Independently interpreted by me no leukocytosis H and H stable comparison to prior. [CA]   1708 Magnesium : 2.4  Within normal limits [CA]   1708 Comprehensive metabolic panel(!)  Independently interpreted by me no electrolyte derangements.  Creatinine stable. [CA]   1708 BNP(!): 191  Mildly elevated. [CA]   1709 Troponin I High Sensitivity(!): 24.2  Elevated in comparison to prior.  Patient not endorsing any chest pain low suspicion for ACS [CA]      ED Course User Index  [CA]  Isiah Tierney MD  [EF] Bhupendra Helms MD                           Clinical Impression:  Final diagnoses:  [R53.1] Weakness                     Isiah Tierney MD  Resident  07/08/25 1926       Isiah Tierney MD  Resident  07/08/25 1927         [1]   Social History  Tobacco Use    Smoking status: Never    Smokeless tobacco: Never   Substance Use Topics    Alcohol use: Never    Drug use: Never        Bhupendra Helms MD  07/08/25 2045

## 2025-07-08 NOTE — PROGRESS NOTES
SCRIBE #1 NOTE: I, Onel Dove, am scribing for, and in the presence of,  Sanjiv Ford III, MD. I have scribed the entire note.     Subjective:       Patient ID: Kasandra Jones is a 74 y.o. female.    Chief Complaint: Follow-up (3 month), Neck Pain, Headache, Fatigue, Cough (X 3 weeks), Chest Congestion (X 3 weeks), Sore Throat (X 3 weeks), and Otalgia (both)    Ms. Jones is here for a follow up with her last appointment being here on June 27, 2025 with CARMEN Ruvalcaba. Accompanied by . Uses walker. No falls. Reports being weak and tired today, but she was okay yesterday. Arthralgia. She is having neck, shoulder, and back pain. Bronchitis. Sinusitis. She is having cough, congestion, and sore throat for 3 weeks now. BMI of 25.50. Cardiovascular good. No chest pain. No palpitations. Blood pressure is 108/68. Hypertension controlled. Hyperlipidemia. Cholesterol is 181. HDL is 74. LDL is 76. Using Plavix. Sees Dr. Blakely. Hypothyroidism. TSH is 2.500. Bipolar 1 disorder. On Wellbutrin, Depakote, and Trazodone. States she is very depressed. Valproic acid is 19. Urinary incontinence. OAB. Sees urology soon. Macrocytosis. MCV is 101, was 103. DEXA scan is due soon. CMP is okay.     Review of Systems   Constitutional:  Positive for fatigue. Negative for chills and fever.   HENT:  Positive for congestion and sore throat.    Eyes:  Negative for visual disturbance.   Respiratory:  Positive for cough. Negative for chest tightness and shortness of breath.    Cardiovascular:  Negative for chest pain.   Gastrointestinal:  Negative for nausea.   Endocrine: Negative for polydipsia and polyuria.   Genitourinary:  Positive for frequency. Negative for dysuria and flank pain.   Musculoskeletal:  Negative for back pain, neck pain and neck stiffness.   Skin:  Negative for rash.   Neurological:  Positive for weakness.   Hematological:  Does not bruise/bleed easily.   Psychiatric/Behavioral:  Negative for behavioral problems.    All  other systems reviewed and are negative.      Objective:      Physical examination: Vital signs noted. No acute distress. No carotid bruit. Regular heart rhythm but tachycardic. Lungs clear to auscultation bilaterally. Abdomen bowel sounds positive soft and nontender. Extremities without edema. 2+ pedal pulses.      Assessment:       1. Hypertension, essential    2. Hypothyroidism, unspecified type    3. Uses walker    4. Atrial fibrillation, unspecified type    5. Arthralgia, unspecified joint    6. Bronchitis    7. Sinusitis, unspecified chronicity, unspecified location    8. Bipolar 1 disorder    9. Visit for monitoring Plavix therapy    10. Hyperlipidemia, unspecified hyperlipidemia type    11. Urinary incontinence, unspecified type    12. Macrocytosis    13. Overactive bladder    14. Osteoporosis screening    15. Menopause        Plan:       Hypertension, essential    Hypothyroidism, unspecified type    Uses walker    Atrial fibrillation, unspecified type  -     EKG 12-lead    Arthralgia, unspecified joint    Bronchitis    Sinusitis, unspecified chronicity, unspecified location    Bipolar 1 disorder    Visit for monitoring Plavix therapy    Hyperlipidemia, unspecified hyperlipidemia type    Urinary incontinence, unspecified type    Macrocytosis    Overactive bladder    Osteoporosis screening  -     DXA Bone Density Axial Skeleton 1 or more sites; Future; Expected date: 07/09/2025    Menopause  -     DXA Bone Density Axial Skeleton 1 or more sites; Future; Expected date: 07/09/2025    EKG confirms atrial flutter with 2-1 block.  Patient's sent to emergency room for admission.  All care gaps addressed or discussed.     I, Sanjiv Ford III, MD, personally performed the services described in this documentation. All medical record entries made by the scribe were at my direction and in my presence. I have reviewed the chart and agree that the record reflects my personal performance and is accurate and  complete.

## 2025-07-09 ENCOUNTER — TELEPHONE (OUTPATIENT)
Facility: OTHER | Age: 75
End: 2025-07-09
Payer: MEDICARE

## 2025-07-09 DIAGNOSIS — I48.91 ATRIAL FIBRILLATION, UNSPECIFIED TYPE: ICD-10-CM

## 2025-07-09 PROBLEM — D75.89 MACROCYTOSIS: Status: ACTIVE | Noted: 2025-07-09

## 2025-07-09 PROBLEM — I48.92 ATRIAL FLUTTER: Status: ACTIVE | Noted: 2025-07-09

## 2025-07-09 LAB
OHS QRS DURATION: 88 MS
OHS QTC CALCULATION: 433 MS

## 2025-07-09 RX ORDER — DILTIAZEM HYDROCHLORIDE 120 MG/1
120 CAPSULE, COATED, EXTENDED RELEASE ORAL DAILY
Qty: 90 CAPSULE | Refills: 1 | Status: SHIPPED | OUTPATIENT
Start: 2025-07-09 | End: 2026-01-05

## 2025-07-09 NOTE — PROGRESS NOTES
Call returned per ED Navigator in response to Post ED Text Tracker. Pt states she does not recall what it was for. She has been scheduled to f/u with Cardiology and states her  will take care of setting up her bone density scan when he returns home. Encounter to be closed at this time.

## 2025-07-09 NOTE — TELEPHONE ENCOUNTER
Care Due:                  Date            Visit Type   Department     Provider  --------------------------------------------------------------------------------                                [facility]  Last Visit: 07-      [facility] ([facility])   [facility]                              EP -                              [facility]  Next Visit: 10-      [facility] ([facility])   [facility]                                                            Last  Test          Frequency    Reason                     Performed    Due Date  --------------------------------------------------------------------------------    Phosphate...  12 months..  alendronate..............  Not Found    Overdue    [employer] Embedded Care Due Messages. Reference number: [id number].   7/09/2025 7:51:44 AM CDT

## 2025-07-09 NOTE — DISCHARGE INSTRUCTIONS
You have been evaluated in the emergency department your was reassuring.  Discharge here at this time with strong recommendation of following note which a cardiologist that she will closest availability.  Please return to the emergency department for any fever 100.4 or greater, chest pain, shortness of breath nausea or vomiting, passing out, loss of consciousness or weakness

## 2025-07-10 RX ORDER — CLOPIDOGREL BISULFATE 75 MG/1
75 TABLET ORAL DAILY
Qty: 90 TABLET | Refills: 1 | Status: SHIPPED | OUTPATIENT
Start: 2025-07-10

## 2025-07-13 LAB
OHS QRS DURATION: 82 MS
OHS QRS DURATION: 88 MS
OHS QTC CALCULATION: 429 MS
OHS QTC CALCULATION: 433 MS

## 2025-07-15 NOTE — PROGRESS NOTES
[facility]  Group: José Miguel/Charity/Gume/Kip  NPs: Marie Andrade/Bernie Clemente    Note today written by:Michelle Dunaway  Date of Service: 07/16/2025      CHIEF COMPLAINT: Urinary frequency.    PRESENTING ILLNESS:    Kasandra Jones is a 74 y.o. female who presents for urinary frequency.     Current meds: Gemtesa 75 mg     No improvement in symptoms.   Still having urgency and urge incontinence. Frequency during the day and night.   She uses a pure wick at night.   No definite MARK.     No dysuria or hematuria.     Drinks sips of water with meds. Sweet tea. Occasional coffee.   Has a BM every day.       4/16/25  Pt is taking myrbetriq 50 mg daily  No improvement to OAB symptoms on myrbetriq 50 mg  Has already tried oxybutynin and vesicare with poor results in the past  + urinary frequency, urgency and UUI  Pt is wearing at least 5 depends per day for daytime urinary incontinence and using purewick at night.  Denies dysuria, gross hematuria or flank pain  PVR 0 ml      1/16/25  Pt was recently treated for UTI 1/7/25 based on UA and micro UA. No culture obtained. Treated with macrobid, completed 1/12/25  No UTI symptoms today  Only UTI symptom includes urinary frequency  Denies dysuria, gross hematuria or flank pain with UTI  Last culture proven UTI was 4/27/23    UA today sm leuk, neg blood or nitrite  PVR 13 ml    On myrbetriq 50 mg for OAB x ~2 weeks. No change in OAB symptoms so far with myrbetriq.  Voids every 15-30 minutes, + urgency, + UUI, wearing 4-5 depends per day for incontinence.  Pt is able to feel urge to void but difficulty getting to bathroom, especially with mobility issues.  Has tried 2 different OAB medications in the past with poor results, vesicare and oxybutynin.  Denies dysuria, gross hematuria, flank pain, fever, chills, nausea or vomiting.    Hx of hysterectomy   Pt thinks she had a bladder lift but unsure of date     Drinks  coke, 1 bottle water per day.    Denies constipation    History of kidney stones: denies  Personal or family hx of  malignancy: unsure if mother had renal cancer but did have kidney issues, pt was 11 when she passed away. No fam hx bladder cancer  History of  trauma: denies  Smoking history: never smoked    8/2/23 CT chest abd pelvis with contrast:  The kidneys are unremarkable with the exception of a small left simple renal cyst.     Urine cultures:   Lab Results   Component Value Date    LABURIN ESCHERICHIA COLI  50,000 - 99,999 cfu/ml   (A) 01/16/2025    LABURIN  10/17/2023     Multiple organisms isolated. None in predominance.  Repeat if    LABURIN clinically necessary. 10/17/2023    LABURIN (A) 04/27/2023      Comment:          CULTURE, URINE, ROUTINE         Micro Number:      [id number]    Test Status:       Final    Specimen Source:   Urine    Specimen Quality:  Adequate    Result:            Greater than 100,000 CFU/mL of Escherichia coli                              E.coli                            ----------------                            INT   BELINDA     AMOX/CLAVULANATE       S     4     AMPICILLIN             R     >=32     AMP/SULBACTAM          I     16     CEFAZOLIN              NR    <=4 **2     CEFEPIME               S     <=1     CEFTAZIDIME            S     <=1     CEFTRIAXONE            S     <=1     CIPROFLOXACIN          S     <=0.25     GENTAMICIN             S     <=1     IMIPENEM               S     <=0.25     LEVOFLOXACIN           S     <=0.12     NITROFURANTOIN         S     <=16     PIP/TAZOBACTAM         S     <=4     TOBRAMYCIN             S     <=1     TRIMETHOPRIM/SULFA     S     <=20    S=Susceptible  I=Intermediate  R=Resistant  * = Not Tested  NR = Not Reported  **NN = See Therapy Comments      THERAPY COMMENTS        Note 1:      For infections other than uncomplicated UTI      caused by E. coli, K. pneumoniae or P. mirabilis:      Cefazolin is resistant if BELINDA > or = 8  mcg/mL.      (Distinguishing susceptible versus intermediate      for isolates with BELINDA < or = 4 mcg/mL requires      additional testing.)        Note 2:      For uncomplicated UTI caused by E. coli,      K. pneumoniae or P. mirabilis: Cefazolin is      susceptible if BELINDA <32 mcg/mL and predicts      susceptible to the oral agents cefaclor, cefdinir,      cefpodoxime, cefprozil, cefuroxime, cephalexin      and loracarbef.      LABURIN ESCHERICHIA COLI  >100,000 cfu/ml   (A) 01/04/2023    LABURIN (A) 12/29/2021      Comment:          CULTURE, URINE, ROUTINE         Micro Number:      [id number]    Test Status:       Final    Specimen Source:   Not given    Specimen Quality:  Adequate    Result:            Greater than 100,000 CFU/mL of Klebsiella pneumoniae                              K.pneumoniae                            ----------------                            INT   BELINDA     AMOX/CLAVULANATE       S     <=2     AMPICILLIN             R     16     AMP/SULBACTAM          S     <=2     CEFAZOLIN              NR    <=4 **2     CEFEPIME               S     <=1     CEFTRIAXONE            S     <=1     CIPROFLOXACIN          S     <=0.25     GENTAMICIN             S     <=1     IMIPENEM               S     <=0.25     LEVOFLOXACIN           S     <=0.12     NITROFURANTOIN         S     <=16     PIP/TAZOBACTAM         S     <=4     TOBRAMYCIN             S     <=1     TRIMETHOPRIM/SULFA     S     <=20    S=Susceptible  I=Intermediate  R=Resistant  * = Not Tested  NR = Not Reported  **NN = See Therapy Comments      THERAPY COMMENTS        Note 1:      For infections other than uncomplicated UTI      caused by E. coli, K. pneumoniae or P. mirabilis:      Cefazolin is resistant if BELINDA > or = 8 mcg/mL.      (Distinguishing susceptible versus intermediate      for isolates with BELINDA < or = 4 mcg/mL requires      additional testing.)        Note 2:      For uncomplicated UTI caused by E. coli,      K. pneumoniae or P.  mirabilis: Cefazolin is      susceptible if BELINDA <32 mcg/mL and predicts      susceptible to the oral agents cefaclor, cefdinir,      cefpodoxime, cefprozil, cefuroxime, cephalexin      and loracarbef.      LABURIN (A) 07/23/2021      Comment:          CULTURE, URINE, ROUTINE         Micro Number:      [id number]    Test Status:       Final    Specimen Source:   URINE    Specimen Quality:  Adequate    Result:            Greater than 100,000 CFU/mL of Klebsiella pneumoniae                              K.pneumoniae                            ----------------                            INT   BELINDA     AMOX/CLAVULANATE       S     <=2     AMPICILLIN             R     16     AMP/SULBACTAM          S     <=2     CEFAZOLIN              NR    <=4 **2     CEFEPIME               S     <=1     CEFTRIAXONE            S     <=1     CIPROFLOXACIN          S     <=0.25     GENTAMICIN             S     <=1     IMIPENEM               S     <=0.25     LEVOFLOXACIN           S     <=0.12     NITROFURANTOIN         S     <=16     PIP/TAZOBACTAM         S     <=4     TOBRAMYCIN             S     <=1     TRIMETHOPRIM/SULFA     S     <=20    S=Susceptible  I=Intermediate  R=Resistant  * = Not Tested  NR = Not Reported  **NN = See Therapy Comments      THERAPY COMMENTS        Note 1:      For infections other than uncomplicated UTI      caused by E. coli, K. pneumoniae or P. mirabilis:      Cefazolin is resistant if BELINDA > or = 8 mcg/mL.      (Distinguishing susceptible versus intermediate      for isolates with BELINDA < or = 4 mcg/mL requires      additional testing.)        Note 2:      For uncomplicated UTI caused by E. coli,      K. pneumoniae or P. mirabilis: Cefazolin is      susceptible if BELINDA <32 mcg/mL and predicts      susceptible to the oral agents cefaclor, cefdinir,      cefpodoxime, cefprozil, cefuroxime, cephalexin      and loracarbef.      LABURIN  06/05/2018      Comment:        CULTURE, URINE, ROUTINE         MICRO NUMBER:       [id number]    TEST STATUS:       FINAL    SPECIMEN SOURCE:   URINE    SPECIMEN QUALITY:  ADEQUATE    RESULT:            Three or more organisms present, each greater                       than 10,000 cu/mL. May represent normal kali                       contamination from external genitalia. No further                       testing is required.           REVIEW OF SYSTEMS: as stated above in hpi    PATIENT HISTORY:  Past Medical History:   Diagnosis Date    Cataract        Past Surgical History:   Procedure Laterality Date    EYE SURGERY Bilateral 2020 August    cataract removal    HYSTERECTOMY      TOTAL REDUCTION MAMMOPLASTY  2002       Allergies:  Niacin preparations and Penicillins      PHYSICAL EXAMINATION:  Constitutional: She is oriented to person, place, and time. She appears well-developed and well-nourished.  She is in no apparent distress.  Abdominal:  She exhibits no distension.  There is no CVA tenderness.   Neurological: She is alert and oriented to person, place, and time.   Psych: Cooperative with normal affect.    Physical Exam      LABS:    Lab Results   Component Value Date    CREATININE 0.8 07/08/2025     Lab Results   Component Value Date    LABA1C 5.1 04/02/2018         IMPRESSION:    Encounter Diagnoses   Name Primary?    Overactive bladder Yes     PLAN:  - Discussed third line options including botox vs SNM  - She wants to proceed with SNM   - Will schedule her for basic evaluation on 8/14/25 at [facility]   - If this is successful will proceed with full implant   - Can stop Gemtesa   - Will need clearance to hold Plavix      Michelle Dunaway MD  Urology Department      Visit today included increased complexity associated with the care of the episodic problem OAB addressed and managing the longitudinal care of the patient due to the serious and/or complex managed problem(s).

## 2025-07-16 ENCOUNTER — OFFICE VISIT (OUTPATIENT)
Dept: UROLOGY | Facility: CLINIC | Age: 75
End: 2025-07-16
Payer: MEDICARE

## 2025-07-16 DIAGNOSIS — N32.81 OVERACTIVE BLADDER: Primary | ICD-10-CM

## 2025-07-16 DIAGNOSIS — R35.0 URINARY FREQUENCY: ICD-10-CM

## 2025-07-16 DIAGNOSIS — R32 URINARY INCONTINENCE, UNSPECIFIED TYPE: ICD-10-CM

## 2025-07-16 PROCEDURE — 99214 OFFICE O/P EST MOD 30 MIN: CPT | Mod: S$PBB,,, | Performed by: STUDENT IN AN ORGANIZED HEALTH CARE EDUCATION/TRAINING PROGRAM

## 2025-07-16 PROCEDURE — 99999 PR PBB SHADOW E&M-EST. PATIENT-LVL III: CPT | Mod: PBBFAC,,, | Performed by: STUDENT IN AN ORGANIZED HEALTH CARE EDUCATION/TRAINING PROGRAM

## 2025-07-16 PROCEDURE — 99213 OFFICE O/P EST LOW 20 MIN: CPT | Mod: PBBFAC,PO | Performed by: STUDENT IN AN ORGANIZED HEALTH CARE EDUCATION/TRAINING PROGRAM

## 2025-07-16 PROCEDURE — G2211 COMPLEX E/M VISIT ADD ON: HCPCS | Mod: ,,, | Performed by: STUDENT IN AN ORGANIZED HEALTH CARE EDUCATION/TRAINING PROGRAM

## 2025-07-16 NOTE — PROGRESS NOTES
Procedure Order to Urology [[id number]]    Electronically signed by: Michelle Dunaway MD on 07/16/25 1456 Status: Active   Ordering user: Michelle Dunaway MD 07/16/25 1456 Authorized by: Michelle Dunaway MD   Ordering mode: Standard   Frequency:  07/16/25 -     Diagnoses  Overactive bladder [N32.81]   Questionnaire    Question Answer   Procedure Interstim Comment - 8/14   Facility Name: [place]   Order comments: CPT 32235 Local only at ASC   Stage 1 . Stage 1 and 2 trial PNE  (Description to choose insertion electrode lead neuro temporary ) ASC Local sedation with urine POCT  on arrival please order Nivaram 1 mg po ( 0.5mg if over 80 yrs old) Tylenol 975mg po on arrival and Emla Cream apply cream to entire lumbosacral region of the spine 30 min prior to procedure.in special needs box , pt to bring cream     07324 is used for stage 1 (fresh lead),  PNE and lead replacement (exchange)   74857 is stage 2 (fresh battery) or battery replacement (exchange)   01871 for battery removal only (no replacement)   94656 for lead removal only (no replacement) or lead revision   [id number] if placing lead (stage 1) and battery (stage 2) at same sitting for first time or if exchanging lead and battery same sitting   [id number] if removing battery and lead

## 2025-07-28 ENCOUNTER — OFFICE VISIT (OUTPATIENT)
Dept: CARDIOLOGY | Facility: CLINIC | Age: 75
End: 2025-07-28
Payer: MEDICARE

## 2025-07-28 ENCOUNTER — HOSPITAL ENCOUNTER (OUTPATIENT)
Dept: CARDIOLOGY | Facility: CLINIC | Age: 75
Discharge: HOME | End: 2025-07-28
Payer: MEDICARE

## 2025-07-28 VITALS
DIASTOLIC BLOOD PRESSURE: 70 MMHG | OXYGEN SATURATION: 97 % | BODY MASS INDEX: 25.36 KG/M2 | SYSTOLIC BLOOD PRESSURE: 130 MMHG | WEIGHT: 157.81 LBS | HEART RATE: 71 BPM | HEIGHT: 66 IN

## 2025-07-28 DIAGNOSIS — G47.30 SLEEP APNEA, UNSPECIFIED TYPE: ICD-10-CM

## 2025-07-28 DIAGNOSIS — R07.9 CHEST PAIN, UNSPECIFIED TYPE: ICD-10-CM

## 2025-07-28 DIAGNOSIS — R42 DIZZINESS: ICD-10-CM

## 2025-07-28 DIAGNOSIS — E03.9 HYPOTHYROIDISM, UNSPECIFIED TYPE: ICD-10-CM

## 2025-07-28 DIAGNOSIS — R53.83 FATIGUE, UNSPECIFIED TYPE: ICD-10-CM

## 2025-07-28 DIAGNOSIS — I10 HYPERTENSION, ESSENTIAL: Primary | ICD-10-CM

## 2025-07-28 DIAGNOSIS — R07.89 ATYPICAL CHEST PAIN: ICD-10-CM

## 2025-07-28 DIAGNOSIS — I48.0 PAROXYSMAL ATRIAL FIBRILLATION: ICD-10-CM

## 2025-07-28 PROCEDURE — 99214 OFFICE O/P EST MOD 30 MIN: CPT | Mod: S$PBB,,,

## 2025-07-28 PROCEDURE — 93010 ELECTROCARDIOGRAM REPORT: CPT | Mod: S$PBB,,, | Performed by: INTERNAL MEDICINE

## 2025-07-28 PROCEDURE — 93005 ELECTROCARDIOGRAM TRACING: CPT | Mod: PBBFAC,PN | Performed by: INTERNAL MEDICINE

## 2025-07-28 PROCEDURE — 99215 OFFICE O/P EST HI 40 MIN: CPT | Mod: PBBFAC,PN

## 2025-07-28 PROCEDURE — 99999 PR PBB SHADOW E&M-EST. PATIENT-LVL V: CPT | Mod: PBBFAC,,,

## 2025-07-28 NOTE — PATIENT INSTRUCTIONS
Live cardiac monitor for 14 days  Pharmaceutical cardiac stress test  Echocardiogram  5 week follow up  Referral to sleep clinic for sleep apnea testing

## 2025-07-28 NOTE — PROGRESS NOTES
Subjective:    Patient ID:  Kasandra Jones is a 74 y.o. female who presents for hospital follow-up of atrial flutter with RVR.  Chief Complaint   Patient presents with    Hospital Follow Up     Tachycardia       HPI:  Kasandra Jones is here for follow-up visit after a recent ED visit for atrial flutter with RVR, 2:1 conduction.    History of Present Illness    CHIEF COMPLAINT:  Patient presents today with reports of intermittent fatigue and occasional dizziness while sitting/ rest.    She reports ongoing fatigue and reduced exercise tolerance with persistent difficulty walking and ambulating. She experiences significant exertion with routine activities, noting that taking a shower requires rest afterwards. While not constantly tired, her energy levels are markedly diminished. She had a severe cold in mid June but denies current fever or chills. She reports RUE chest pain while lying in bed 2 nights ago, described as an achy sensation lasting a few seconds, without sharp pain, pressure, squeezing quality, or palpitations. The pain has not reoccurred.    She experiences occasional dizzy spells while sitting and looking straight ahead, occurring without apparent provocation and primarily when maintaining a fixed gaze. One episode occurred during the current clinical encounter.    She experiences chronic insomnia, sleeping 2 hours per night. Frequent urination disrupts her sleep pattern, causing multiple awakenings throughout the night. She denies sleep apnea or episodes of stopped breathing during sleep. However, she notes previous testing for JOSE ENRIQUE as positive. This was prior to her weight loss.    FALL RISK:  She reports decreased frequency of falls compared to previous periods. She had a significant fall in the bathroom resulting in a head injury and facial bruising prior to a family wedding. Her mobility remains a concern due to decreased energy and walking stability.    She reports taking Eliquis years ago but was  switched to Plavix at some point. She denies any history of bleeds. She denies any recent falls, but had significant issues with gait.      ROS:  General: -fever, -chills, +fatigue, -weight gain, -weight loss  Eyes: -vision changes, -redness, -discharge  ENT: -ear pain, -nasal congestion, -sore throat  Cardiovascular: +chest pain, -palpitations, -lower extremity edema  Respiratory: -cough, +shortness of breath, +exertional dyspnea  Gastrointestinal: -abdominal pain, -nausea, -vomiting, -diarrhea, -constipation, -blood in stool  Genitourinary: -dysuria, -hematuria, -frequency, +excessive urination  Musculoskeletal: -joint pain, -muscle pain, +difficulty walking  Skin: -rash, -lesion  Neurological: -headache, +dizziness, -numbness, -tingling, +difficulty falling asleep, +difficulty staying asleep  Psychiatric: -anxiety, -depression, +sleep difficulty          Seen in the hospital 07/09/2025:  HPI  75 y/o PAF on diltiazem and Plavix, hypertension, hypothyroidism, spinal cord mass presents to the emergency department sent from primary care provider for abnormal EKG.  Patient is being followed by Dr. Logan wilson for routine appointment today obtain EKG noted the patient tachycardic to 140s with findings of atrial flutter.  Patient states she had been normal state of health until today when she does noted to feel fatigue in neck pain which he reports has been chronic.  Reports to dizziness that is positional however longstanding in addition to decreased p.o. intake over the last couple of days.  Denies any headache, recent falls, blurry vision, paresthesia or numbness.  Family member at bedside states that patient has overcome recent viral illness that was ongoing for the last 3 weeks.  Otherwise patient denies any chest pain, shortness of breath, palpitations, nausea or vomiting diarrhea, decreased urine output. Reports medication compliance to meds.  Medical Decision Making  74-year-old female past medical history  paroxysmal Afib on Plavix due to fall risk presents to the emergency department for abnormal EKG showing atrial flutter.  Here afebrile hemodynamically stable rate controlled with heart rate of 89 EKG shows normal sinus rhythm rate of 95 without evidence of arrhythmia.  Patient at this time no history of arrhythmia rate controlled and on anticoagulation.  We will obtain lab work to assess for etiology of patient's recent atrial flutter today.  Follow lab work we will discuss with Cardiology however anticipate patient likely be discharged.  See ED course for updates.    Update:  Labs remarkable for troponin of 24.2 likely in setting of strain from arrhythmia, mild BNP bump at 191, labs otherwise reassuring chest x-ray no acute cardiopulmonary process.  Spoke with Dr. Cline, Cardiologist on call.  Regarding patient's care states that if patient no longer in a flutter in asymptomatic then she is okay to go from a cardiology standpoint.  Repeat EKG on my interpretation normal sinus rhythm with rate of 72.  Normal axis.  Intervals normal limits.  No findings for ischemia or arrhythmia.  Patient exam unequivocal prior.  We will discharge with follow up with primary care provider and follow up with cardiologist Dr. Blakely.  Patient amenable with plan discharged in stable condition      Review of patient's allergies indicates:   Allergen Reactions    Niacin preparations     Penicillins        Past Medical History:   Diagnosis Date    Cataract      Past Surgical History:   Procedure Laterality Date    EYE SURGERY Bilateral 2020 August    cataract removal    HYSTERECTOMY      TOTAL REDUCTION MAMMOPLASTY  2002     Social History[1]  Family History   Problem Relation Name Age of Onset    Breast cancer Maternal Aunt          Review of Systems:   See HPI       Objective:        Vitals:    07/28/25 1530   BP: 130/70   Pulse: 71       Lab Results   Component Value Date    WBC 9.09 07/08/2025    HGB 15.3 07/08/2025    HCT 47.0  07/08/2025     07/08/2025    CHOL 181 07/07/2025    TRIG 154 (H) 07/07/2025    HDL 74 07/07/2025    ALT 34 07/08/2025    AST 26 07/08/2025     07/08/2025    K 4.4 07/08/2025     07/08/2025    CREATININE 0.8 07/08/2025    BUN 20 07/08/2025    CO2 26 07/08/2025    TSH 2.500 07/07/2025        ECHOCARDIOGRAM RESULTS  Results for orders placed during the hospital encounter of 12/09/21    Echo    Interpretation Summary  · The left ventricle is normal in size with mildly decreased systolic function.  · The estimated ejection fraction is 48%.  · Grade I left ventricular diastolic dysfunction.  · Normal right ventricular size with normal right ventricular systolic function.  · Mild left atrial enlargement.  · Mild mitral regurgitation.  · Normal central venous pressure (3 mmHg).  · The estimated PA systolic pressure is 30 mmHg.        CURRENT/PREVIOUS VISIT EKG  Results for orders placed or performed during the hospital encounter of 07/08/25   EKG 12-lead    Collection Time: 07/08/25  7:14 PM   Result Value Ref Range    QRS Duration 88 ms    [facility] QTC Calculation 433 ms    Narrative    Test Reason : I49.9,    Vent. Rate :  72 BPM     Atrial Rate :  72 BPM     P-R Int : 158 ms          QRS Dur :  88 ms      QT Int : 396 ms       P-R-T Axes :  58  46  88 degrees    QTcB Int : 433 ms     Suspect unspecified pacemaker failure  Sinus rhythm with occasional AV dual-paced complexes  Abnormal ECG  When compared with ECG of 08-Jul-2025 14:57,  Electronic ventricular pacemaker has replaced Sinus rhythm  Confirmed by Geovanny Mireles ([id number]) on 7/13/2025 12:24:49 PM    Referred By: AAAREFERRAL SELF           Confirmed By: Geovanny Mireles     No valid procedures specified.   Results for orders placed during the hospital encounter of 07/09/21    Nuclear Stress - Cardiology Interpreted    Interpretation Summary    Normal myocardial perfusion scan. There is no evidence of myocardial ischemia or infarction.    The gated  perfusion images showed an ejection fraction of 73% post stress. Normal ejection fraction is greater than 53%.    There is normal wall motion post stress.    LV cavity size is  and normal at stress.    The EKG portion of this study is negative for ischemia.    The patient reported no chest pain during the stress test.    There were no arrhythmias during stress.      Physical Exam:  CONSTITUTIONAL: No fever, no chills  HEENT: Normocephalic, atraumatic, pupils reactive to light                 NECK:  No JVD, no carotid bruit  CVS: S1S2+, RRR, no murmurs   LUNGS: Clear  ABDOMEN: Soft, NT, BS+. No bruit  EXTREMITIES: No cyanosis, edema. Pulses intact  : No griffith catheter  NEURO: AAO X 3  PSY: Normal affect      Medication List with Changes/Refills   Current Medications    ALBUTEROL (VENTOLIN HFA) 90 MCG/ACTUATION INHALER    Inhale 2 puffs into the lungs every 6 (six) hours as needed for Wheezing or Shortness of Breath. Rescue    ALENDRONATE (FOSAMAX) 70 MG TABLET    Take 1 tablet (70 mg total) by mouth every 7 days.    ALPRAZOLAM (XANAX) 0.5 MG TABLET    Take 1 tablet (0.5 mg total) by mouth daily as needed for Anxiety. PRN    AMLODIPINE (NORVASC) 2.5 MG TABLET    Take 1 tablet (2.5 mg total) by mouth once daily.    BENZONATATE (TESSALON) 200 MG CAPSULE    Take 1 capsule (200 mg total) by mouth 3 (three) times daily as needed for Cough.    BUPROPION (WELLBUTRIN XL) 300 MG 24 HR TABLET    Take 1 tablet (300 mg total) by mouth once daily.    CALCIUM CARBONATE (CALCIUM 600 ORAL)    Take 1 tablet by mouth 2 (two) times a day.    CETIRIZINE (ZYRTEC) 10 MG TABLET    Take 10 mg by mouth once daily.    CHOLECALCIFEROL, VITAMIN D3, 125 MCG (5,000 UNIT) CAPSULE    Take 5,000 Units by mouth once daily.    CLOPIDOGREL (PLAVIX) 75 MG TABLET    Take 1 tablet (75 mg total) by mouth once daily.    COPPER GLUCONATE 2 MG TAB    Take 1 tablet by mouth once daily.    CYANOCOBALAMIN-COBAMAMIDE (B12) 5,000-100 MCG LOZG    Take 1 tablet by  mouth once daily.    DILTIAZEM (CARDIZEM CD) 120 MG CP24    Take 1 capsule (120 mg total) by mouth once daily.    DIPHENOXYLATE-ATROPINE 2.5-0.025 MG (LOMOTIL) 2.5-0.025 MG PER TABLET    Take 1 tablet by mouth 4 (four) times daily as needed for Diarrhea.    DIVALPROEX ER (DEPAKOTE ER) 500 MG TB24 24 HR TABLET    Take 1 tablet (500 mg total) by mouth once daily.    FERROUS SULFATE 325 (65 FE) MG EC TABLET    Take 325 mg by mouth once daily.    LEVOTHYROXINE (SYNTHROID) 100 MCG TABLET    Take 1 tablet (100 mcg total) by mouth once daily.    LOSARTAN (COZAAR) 100 MG TABLET    Take 1 tablet (100 mg total) by mouth once daily.    MAGNESIUM OXIDE (MAG-OX) 400 MG (241.3 MG MAGNESIUM) TABLET    Take 1 tablet (400 mg total) by mouth once daily.    MECLIZINE (ANTIVERT) 25 MG TABLET    Take 1 tablet (25 mg total) by mouth every 6 (six) hours.    PANTOPRAZOLE (PROTONIX) 40 MG TABLET    Take 1 tablet (40 mg total) by mouth every morning.    POTASSIUM CHLORIDE SA (K-DUR,KLOR-CON M) 10 MEQ TABLET    Take 1 tablet (10 mEq total) by mouth 2 (two) times daily.    PYRIDOXINE, VITAMIN B6, (VITAMIN B-6) 100 MG TAB    Take 50 mg by mouth once daily.    TIZANIDINE (ZANAFLEX) 4 MG TABLET    Take 1 tablet (4 mg total) by mouth 2 (two) times daily as needed (back pain).    TRAZODONE (DESYREL) 100 MG TABLET    Take 1 tablet (100 mg total) by mouth every evening.    VIBEGRON 75 MG TAB    Take 1 tablet (75 mg total) by mouth once daily.    VIT C,C-FI-DRXKG-LUTEIN-ZEAXAN (PRESERVISION AREDS 2) 250-90-40-1 MG CAP    Take 1 capsule by mouth 2 (two) times a day.             Assessment:       1. Hypertension, essential    2. Paroxysmal atrial fibrillation    3. Hypothyroidism, unspecified type    4. Atypical chest pain    5. Fatigue, unspecified type    6. Sleep apnea, unspecified type    7. Dizziness         Plan:     Assessment & Plan    I48.0 Paroxysmal atrial fibrillation  I10 Hypertension, essential  E03.9 Hypothyroidism, unspecified  type  R07.89 Atypical chest pain  R53.83 Fatigue, unspecified type  G47.30 Sleep apnea, unspecified type  R42 Dizziness    I48.0 PAROXYSMAL ATRIAL FIBRILLATION/ FLUTTER:  - Ordered 2-week live cardiac monitor to evaluate for arrhythmias, with ability to detect irregular heart rhythms.  - Maintained current anticoagulation regimen with Plavix for now, reassess AF burden per monitor.  - Referred to sleep disorders clinic for sleep study, as sleep apnea considered potential contributor to arrhythmia.  - Recent electrolytes as inpatient stable.  - Ordered echocardiogram to assess EF and valve status.    E03.9 HYPOTHYROIDISM, UNSPECIFIED TYPE:  - Continued thyroid medication.    R07.89 ATYPICAL CHEST PAIN:  - Ordered cardiac stress test (pharmaceutical) to evaluate atypical chest pain and rule out cardiac ischemia.  - Ordered echocardiogram to assess EF and valve status.    G47.30 SLEEP APNEA, UNSPECIFIED TYPE:  - Referred to sleep disorders clinic for sleep study, as sleep apnea considered potential contributor to arrhythmia.    R42 DIZZINESS:  - 2-week live cardiac monitor.    PLAN SUMMARY:  - Maintain current anticoagulation regimen with Plavix  - Order cardiac stress test (pharmaceutical)  - Refer to sleep disorders clinic for sleep study  - Order echocardiogram  - Order 2-week live cardiac monitor  - Continue thyroid medication  - Follow up in 5 weeks after completion of all ordered tests          Problem List Items Addressed This Visit          Cardiac/Vascular    Paroxysmal atrial fibrillation    Relevant Orders    IN OFFICE EKG 12-LEAD (to [place])    Ambulatory referral/consult to Sleep Disorders    Echo    30 Day Outpatient Telemetry    Hypertension, essential - Primary       Endocrine    Hypothyroidism     Other Visit Diagnoses         Atypical chest pain        Relevant Orders    Nuclear Stress - Cardiology Interpreted      Fatigue, unspecified type        Relevant Orders    Nuclear Stress - Cardiology  Interpreted    Echo      Sleep apnea, unspecified type        Relevant Orders    Ambulatory referral/consult to Sleep Disorders      Dizziness        Relevant Orders    30 Day Outpatient Telemetry            Follow up in about 5 weeks (around 9/1/2025).          Janell Brown [facility] Cardiology  07/28/2025       This note was generated with the assistance of ambient listening technology. Verbal consent was obtained by the patient and accompanying visitor(s) for the recording of patient appointment to facilitate this note. I attest to having reviewed and edited the generated note for accuracy, though some syntax or spelling errors may persist. Please contact the author of this note for any clarification.            [1]   Social History  Tobacco Use    Smoking status: Never    Smokeless tobacco: Never   Substance Use Topics    Alcohol use: Never    Drug use: Never

## 2025-07-29 LAB — OHS CV CPX PATIENT HEIGHT IN: 66

## 2025-07-31 ENCOUNTER — TELEPHONE (OUTPATIENT)
Dept: UROLOGY | Facility: CLINIC | Age: 75
End: 2025-07-31
Payer: MEDICARE

## 2025-07-31 NOTE — TELEPHONE ENCOUNTER
Spoke with patient and her spouse, explained we need to hold procedure because patient cannot hold her Plavix yet.  agreed, patient has lots of cardio appts scheduled for next month. Unable to do virtual, audio appt made to readdress in few months. They verbally understood. ASU contacted to place case in dep[place].

## 2025-08-01 LAB
OHS QRS DURATION: 94 MS
OHS QTC CALCULATION: 433 MS

## 2025-08-04 DIAGNOSIS — I10 HYPERTENSION, ESSENTIAL: ICD-10-CM

## 2025-08-04 RX ORDER — ALENDRONATE SODIUM 70 MG/1
70 TABLET ORAL
Qty: 12 TABLET | Refills: 3 | Status: SHIPPED | OUTPATIENT
Start: 2025-08-04

## 2025-08-04 RX ORDER — CLOPIDOGREL BISULFATE 75 MG/1
75 TABLET ORAL DAILY
Qty: 90 TABLET | Refills: 1 | Status: SHIPPED | OUTPATIENT
Start: 2025-08-04

## 2025-08-04 RX ORDER — LOSARTAN POTASSIUM 100 MG/1
100 TABLET ORAL DAILY
Qty: 90 TABLET | Refills: 1 | Status: SHIPPED | OUTPATIENT
Start: 2025-08-04 | End: 2026-08-04

## 2025-08-04 NOTE — TELEPHONE ENCOUNTER
No care due was identified.  Health [facility] Embedded Care Due Messages. Reference number: [id number].   8/04/2025 7:27:02 AM CDT

## 2025-08-05 ENCOUNTER — TELEPHONE (OUTPATIENT)
Dept: CARDIOLOGY | Facility: HOSPITAL | Age: 75
End: 2025-08-05

## 2025-08-06 ENCOUNTER — HOSPITAL ENCOUNTER (OUTPATIENT)
Dept: RADIOLOGY | Facility: HOSPITAL | Age: 75
Discharge: HOME | End: 2025-08-06
Payer: MEDICARE

## 2025-08-06 ENCOUNTER — CLINICAL SUPPORT (OUTPATIENT)
Dept: CARDIOLOGY | Facility: HOSPITAL | Age: 75
End: 2025-08-06
Payer: MEDICARE

## 2025-08-06 DIAGNOSIS — R07.89 ATYPICAL CHEST PAIN: ICD-10-CM

## 2025-08-06 DIAGNOSIS — R53.83 FATIGUE, UNSPECIFIED TYPE: ICD-10-CM

## 2025-08-06 DIAGNOSIS — R07.9 CHEST PAIN, UNSPECIFIED TYPE: ICD-10-CM

## 2025-08-06 DIAGNOSIS — I48.0 PAROXYSMAL ATRIAL FIBRILLATION: ICD-10-CM

## 2025-08-06 LAB
AORTIC ROOT ANNULUS: 3.4 CM
AORTIC VALVE CUSP SEPERATION: 1.8 CM
APICAL FOUR CHAMBER EJECTION FRACTION: 46 %
APICAL TWO CHAMBER EJECTION FRACTION: 50 %
AV INDEX (PROSTH): 0.7
AV MEAN GRADIENT: 4 MMHG
AV PEAK GRADIENT: 8 MMHG
AV VALVE AREA BY VELOCITY RATIO: 2.2 CM²
AV VALVE AREA: 2.2 CM²
AV VELOCITY RATIO: 0.71
CV ECHO LV RWT: 0.42 CM
CV PHARM DOSE: 0.4 MG
CV STRESS BASE HR: 59 BPM
DIASTOLIC BLOOD PRESSURE: 70 MMHG
DOP CALC AO PEAK VEL: 1.4 M/S
DOP CALC AO VTI: 32.7 CM
DOP CALC LVOT AREA: 3.1 CM2
DOP CALC LVOT DIAMETER: 2 CM
DOP CALC LVOT PEAK VEL: 1 M/S
DOP CALC MV VTI: 35.8 CM
DOP CALCLVOT PEAK VEL VTI: 23 CM
E WAVE DECELERATION TIME: 236 MSEC
E/A RATIO: 0.61
E/E' RATIO: 12 M/S
ECHO LV POSTERIOR WALL: 0.9 CM (ref 0.6–1.1)
FRACTIONAL SHORTENING: 14 % (ref 28–44)
INTERVENTRICULAR SEPTUM: 0.9 CM (ref 0.6–1.1)
IVC DIAMETER: 1.4 CM
LEFT ATRIUM AREA SYSTOLIC (APICAL 2 CHAMBER): 21.8 CM2
LEFT ATRIUM AREA SYSTOLIC (APICAL 4 CHAMBER): 21.8 CM2
LEFT ATRIUM SIZE: 3.6 CM
LEFT ATRIUM VOLUME MOD: 73 ML
LEFT INTERNAL DIMENSION IN SYSTOLE: 3.7 CM (ref 2.1–4)
LEFT VENTRICLE DIASTOLIC VOLUME: 83 ML
LEFT VENTRICLE END DIASTOLIC VOLUME APICAL 2 CHAMBER: 100 ML
LEFT VENTRICLE END DIASTOLIC VOLUME APICAL 4 CHAMBER: 118 ML
LEFT VENTRICLE END SYSTOLIC VOLUME APICAL 2 CHAMBER: 74.6 ML
LEFT VENTRICLE END SYSTOLIC VOLUME APICAL 4 CHAMBER: 65.5 ML
LEFT VENTRICLE SYSTOLIC VOLUME: 58 ML
LEFT VENTRICULAR INTERNAL DIMENSION IN DIASTOLE: 4.3 CM (ref 3.5–6)
LEFT VENTRICULAR MASS: 123.3 G
LV LATERAL E/E' RATIO: 10 M/S
LV SEPTAL E/E' RATIO: 16 M/S
LVED V (TEICH): 83.07 ML
LVES V (TEICH): 58.13 ML
LVOT MG: 2 MMHG
LVOT MV: 0.56 CM/S
MV MEAN GRADIENT: 3 MMHG
MV PEAK A VEL: 1.32 M/S
MV PEAK E VEL: 0.8 M/S
MV PEAK GRADIENT: 9 MMHG
MV STENOSIS PRESSURE HALF TIME: 109 MS
MV VALVE AREA BY CONTINUITY EQUATION: 2.02 CM2
MV VALVE AREA P 1/2 METHOD: 2.02 CM2
OHS CV CPX 1 MINUTE RECOVERY HEART RATE: 80 BPM
OHS CV CPX 85 PERCENT MAX PREDICTED HEART RATE MALE: 122
OHS CV CPX MAX PREDICTED HEART RATE: 144
OHS CV CPX PATIENT IS FEMALE: 1
OHS CV CPX PATIENT IS MALE: 0
OHS CV CPX PEAK DIASTOLIC BLOOD PRESSURE: 62 MMHG
OHS CV CPX PEAK HEAR RATE: 88 BPM
OHS CV CPX PEAK RATE PRESSURE PRODUCT: NORMAL
OHS CV CPX PEAK SYSTOLIC BLOOD PRESSURE: 184 MMHG
OHS CV CPX PERCENT MAX PREDICTED HEART RATE ACHIEVED: 63
OHS CV CPX RATE PRESSURE PRODUCT PRESENTING: 8732
OHS CV RV/LV RATIO: 0.63 CM
OHS LV EJECTION FRACTION SIMPSONS BIPLANE MOD: 43 %
PV MV: 0.59 M/S
PV PEAK GRADIENT: 3 MMHG
PV PEAK VELOCITY: 0.84 M/S
RA PRESSURE ESTIMATED: 3 MMHG
RIGHT ATRIUM VOLUME AREA LENGTH APICAL 4 CHAMBER: 23.7 ML
RIGHT VENTRICLE DIASTOLIC BASEL DIMENSION: 2.7 CM
RIGHT VENTRICULAR END-DIASTOLIC DIMENSION: 2.7 CM
RV TISSUE DOPPLER FREE WALL SYSTOLIC VELOCITY 1 (APICAL 4 CHAMBER VIEW): 20 CM/S
SYSTOLIC BLOOD PRESSURE: 148 MMHG
TDI LATERAL: 0.08 M/S
TDI SEPTAL: 0.05 M/S
TDI: 0.07 M/S
TRICUSPID ANNULAR PLANE SYSTOLIC EXCURSION: 3 CM

## 2025-08-06 PROCEDURE — 93306 TTE W/DOPPLER COMPLETE: CPT | Mod: 26,,, | Performed by: INTERNAL MEDICINE

## 2025-08-06 PROCEDURE — 93306 TTE W/DOPPLER COMPLETE: CPT

## 2025-08-06 PROCEDURE — 78452 HT MUSCLE IMAGE SPECT MULT: CPT | Mod: TC

## 2025-08-06 PROCEDURE — 93017 CV STRESS TEST TRACING ONLY: CPT

## 2025-08-06 PROCEDURE — 78452 HT MUSCLE IMAGE SPECT MULT: CPT | Mod: 26,,, | Performed by: RADIOLOGY

## 2025-08-06 PROCEDURE — 63600175 PHARM REV CODE 636 W HCPCS

## 2025-08-06 PROCEDURE — A9502 TC99M TETROFOSMIN: HCPCS

## 2025-08-06 RX ORDER — REGADENOSON 0.08 MG/ML
0.4 INJECTION, SOLUTION INTRAVENOUS ONCE
Status: COMPLETED | OUTPATIENT
Start: 2025-08-06 | End: 2025-08-06

## 2025-08-06 RX ADMIN — TETROFOSMIN 10.6 MILLICURIE: 1.38 INJECTION, POWDER, LYOPHILIZED, FOR SOLUTION INTRAVENOUS at 08:08

## 2025-08-06 RX ADMIN — REGADENOSON 0.4 MG: 0.08 INJECTION, SOLUTION INTRAVENOUS at 09:08

## 2025-08-06 RX ADMIN — TETROFOSMIN 25.6 MILLICURIE: 0.23 INJECTION, POWDER, LYOPHILIZED, FOR SOLUTION INTRAVENOUS at 09:08

## 2025-08-08 ENCOUNTER — TELEPHONE (OUTPATIENT)
Dept: FAMILY MEDICINE | Facility: CLINIC | Age: 75
End: 2025-08-08
Payer: MEDICARE

## 2025-08-18 DIAGNOSIS — Z12.31 OTHER SCREENING MAMMOGRAM: Primary | ICD-10-CM

## 2025-08-20 ENCOUNTER — OFFICE VISIT (OUTPATIENT)
Dept: CARDIOLOGY | Facility: CLINIC | Age: 75
End: 2025-08-20
Payer: MEDICARE

## 2025-08-20 ENCOUNTER — TELEPHONE (OUTPATIENT)
Dept: CARDIOLOGY | Facility: CLINIC | Age: 75
End: 2025-08-20

## 2025-08-20 VITALS
HEIGHT: 66 IN | HEART RATE: 63 BPM | WEIGHT: 159 LBS | OXYGEN SATURATION: 99 % | SYSTOLIC BLOOD PRESSURE: 134 MMHG | DIASTOLIC BLOOD PRESSURE: 75 MMHG | BODY MASS INDEX: 25.55 KG/M2

## 2025-08-20 DIAGNOSIS — I10 HYPERTENSION, ESSENTIAL: ICD-10-CM

## 2025-08-20 DIAGNOSIS — E03.9 HYPOTHYROIDISM, UNSPECIFIED TYPE: ICD-10-CM

## 2025-08-20 DIAGNOSIS — I95.1 ORTHOSTASIS: ICD-10-CM

## 2025-08-20 DIAGNOSIS — I65.23 BILATERAL CAROTID ARTERY STENOSIS: ICD-10-CM

## 2025-08-20 DIAGNOSIS — E78.00 PURE HYPERCHOLESTEROLEMIA: ICD-10-CM

## 2025-08-20 DIAGNOSIS — I70.0 AORTIC ATHEROSCLEROSIS: ICD-10-CM

## 2025-08-20 DIAGNOSIS — I48.0 PAROXYSMAL ATRIAL FIBRILLATION: Primary | ICD-10-CM

## 2025-08-20 PROCEDURE — 99214 OFFICE O/P EST MOD 30 MIN: CPT | Mod: S$PBB,,,

## 2025-08-20 PROCEDURE — 99999 PR PBB SHADOW E&M-EST. PATIENT-LVL IV: CPT | Mod: PBBFAC,,,

## 2025-08-20 PROCEDURE — 99214 OFFICE O/P EST MOD 30 MIN: CPT | Mod: PBBFAC,PN

## 2025-08-21 ENCOUNTER — HOSPITAL ENCOUNTER (OUTPATIENT)
Dept: CARDIOLOGY | Facility: CLINIC | Age: 75
Discharge: HOME | End: 2025-08-21
Payer: MEDICARE

## 2025-08-21 DIAGNOSIS — R00.1 BRADYCARDIA: ICD-10-CM

## 2025-08-21 DIAGNOSIS — R00.1 BRADYCARDIA: Primary | ICD-10-CM

## 2025-08-25 ENCOUNTER — TELEPHONE (OUTPATIENT)
Dept: CARDIOLOGY | Facility: CLINIC | Age: 75
End: 2025-08-25
Payer: MEDICARE

## 2025-08-25 RX ORDER — BUPROPION HYDROCHLORIDE 300 MG/1
300 TABLET ORAL DAILY
Qty: 90 TABLET | Refills: 2 | Status: SHIPPED | OUTPATIENT
Start: 2025-08-25

## 2025-08-25 RX ORDER — AMLODIPINE BESYLATE 2.5 MG/1
2.5 TABLET ORAL DAILY
Qty: 90 TABLET | Refills: 3 | Status: SHIPPED | OUTPATIENT
Start: 2025-08-25

## 2025-08-25 RX ORDER — DIVALPROEX SODIUM 500 MG/1
500 TABLET, FILM COATED, EXTENDED RELEASE ORAL DAILY
Qty: 90 TABLET | Refills: 0 | Status: SHIPPED | OUTPATIENT
Start: 2025-08-25

## 2025-08-25 RX ORDER — LEVOTHYROXINE SODIUM 100 UG/1
100 TABLET ORAL DAILY
Qty: 90 TABLET | Refills: 1 | Status: SHIPPED | OUTPATIENT
Start: 2025-08-25

## 2025-08-28 ENCOUNTER — HOSPITAL ENCOUNTER (OUTPATIENT)
Dept: RADIOLOGY | Facility: HOSPITAL | Age: 75
Discharge: HOME | End: 2025-08-28
Attending: FAMILY MEDICINE
Payer: MEDICARE

## 2025-08-28 DIAGNOSIS — Z78.0 MENOPAUSE: ICD-10-CM

## 2025-08-28 DIAGNOSIS — Z13.820 OSTEOPOROSIS SCREENING: ICD-10-CM

## 2025-08-28 PROCEDURE — 77080 DXA BONE DENSITY AXIAL: CPT | Mod: 26,ICN,, | Performed by: RADIOLOGY

## 2025-08-28 PROCEDURE — 77080 DXA BONE DENSITY AXIAL: CPT | Mod: TC,PO

## 2025-09-01 RX ORDER — POTASSIUM CHLORIDE 750 MG/1
10 TABLET, EXTENDED RELEASE ORAL 2 TIMES DAILY
Qty: 180 TABLET | Refills: 1 | Status: SHIPPED | OUTPATIENT
Start: 2025-09-01

## 2025-09-02 ENCOUNTER — HOSPITAL ENCOUNTER (OUTPATIENT)
Dept: RADIOLOGY | Facility: HOSPITAL | Age: 75
Discharge: HOME | End: 2025-09-02
Attending: FAMILY MEDICINE
Payer: MEDICARE

## 2025-09-02 DIAGNOSIS — Z12.31 OTHER SCREENING MAMMOGRAM: ICD-10-CM

## 2025-09-02 PROCEDURE — 77063 BREAST TOMOSYNTHESIS BI: CPT | Mod: TC,PO

## 2025-09-02 PROCEDURE — 77063 BREAST TOMOSYNTHESIS BI: CPT | Mod: 26,,, | Performed by: RADIOLOGY

## 2025-09-02 PROCEDURE — 77067 SCR MAMMO BI INCL CAD: CPT | Mod: 26,,, | Performed by: RADIOLOGY
